# Patient Record
Sex: FEMALE | Race: WHITE | Employment: OTHER | URBAN - METROPOLITAN AREA
[De-identification: names, ages, dates, MRNs, and addresses within clinical notes are randomized per-mention and may not be internally consistent; named-entity substitution may affect disease eponyms.]

---

## 2017-10-16 ENCOUNTER — HOSPITAL ENCOUNTER (OUTPATIENT)
Dept: MRI IMAGING | Age: 72
Discharge: HOME | End: 2017-10-16
Attending: ORTHOPAEDIC SURGERY
Payer: MEDICARE

## 2017-10-16 DIAGNOSIS — M25.552 LEFT HIP PAIN: ICD-10-CM

## 2017-10-16 PROCEDURE — 73721 MRI JNT OF LWR EXTRE W/O DYE: CPT

## 2017-10-26 ENCOUNTER — HOSPITAL ENCOUNTER (OUTPATIENT)
Dept: BONE DENSITY | Age: 72
Discharge: HOME | End: 2017-10-26
Attending: FAMILY MEDICINE
Payer: MEDICARE

## 2017-10-26 DIAGNOSIS — M89.9 DISORDER OF BONE: ICD-10-CM

## 2017-10-26 PROCEDURE — 77080 DXA BONE DENSITY AXIAL: CPT

## 2018-03-23 ENCOUNTER — HOSPITAL ENCOUNTER (OUTPATIENT)
Dept: MAMMOGRAPHY | Age: 73
Discharge: HOME | End: 2018-03-23
Attending: FAMILY MEDICINE
Payer: MEDICARE

## 2018-03-23 DIAGNOSIS — Z12.39 BREAST SCREENING: ICD-10-CM

## 2018-03-23 PROCEDURE — 77067 SCR MAMMO BI INCL CAD: CPT

## 2018-09-07 ENCOUNTER — HOSPITAL ENCOUNTER (OUTPATIENT)
Dept: MRI IMAGING | Age: 73
Discharge: HOME | End: 2018-09-07
Attending: PSYCHIATRY & NEUROLOGY
Payer: MEDICARE

## 2018-09-07 VITALS — WEIGHT: 171 LBS | BODY MASS INDEX: 31.28 KG/M2

## 2018-09-07 DIAGNOSIS — R55 SYNCOPE: ICD-10-CM

## 2018-09-07 PROCEDURE — 70553 MRI BRAIN STEM W/O & W/DYE: CPT

## 2018-09-07 PROCEDURE — 74011250636 HC RX REV CODE- 250/636: Performed by: RADIOLOGY

## 2018-09-07 PROCEDURE — A9575 INJ GADOTERATE MEGLUMI 0.1ML: HCPCS | Performed by: RADIOLOGY

## 2018-09-07 RX ORDER — GADOTERATE MEGLUMINE 376.9 MG/ML
15 INJECTION INTRAVENOUS
Status: COMPLETED | OUTPATIENT
Start: 2018-09-07 | End: 2018-09-07

## 2018-09-07 RX ADMIN — GADOTERATE MEGLUMINE 15 ML: 376.9 INJECTION INTRAVENOUS at 18:01

## 2020-01-09 RX ORDER — LITHIUM CARBONATE 450 MG/1
TABLET ORAL
Qty: 90 TAB | Refills: 1 | Status: ON HOLD | OUTPATIENT
Start: 2020-01-09 | End: 2020-12-15

## 2020-01-09 RX ORDER — LITHIUM CARBONATE 450 MG/1
TABLET ORAL
Qty: 30 TAB | Refills: 1 | Status: SHIPPED | OUTPATIENT
Start: 2020-01-09 | End: 2020-01-09

## 2020-09-12 ENCOUNTER — HOSPITAL ENCOUNTER (EMERGENCY)
Age: 75
Discharge: HOME | End: 2020-09-12
Attending: EMERGENCY MEDICINE
Payer: MEDICARE

## 2020-09-12 VITALS
RESPIRATION RATE: 16 BRPM | DIASTOLIC BLOOD PRESSURE: 72 MMHG | HEART RATE: 75 BPM | TEMPERATURE: 98.1 F | SYSTOLIC BLOOD PRESSURE: 128 MMHG | OXYGEN SATURATION: 96 %
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
## 2020-09-12 PROCEDURE — 99284 EMERGENCY DEPT VISIT MOD MDM: CPT

## 2020-09-12 NOTE — ED NOTES
Pt sleeping, but easily awakened. Unable to contact family or neighbor to meet pt when she gets home. Pt will stay in e.r.. until pt is more awake.

## 2020-09-12 NOTE — ED TRIAGE NOTES
Pt arrived via ems. Ems called by med alert for g.l.f. in bathroom. No obvious injury. Denies pain. Alert to name, place, time, and situation. Nad. No obvious injury.  Pt mad because she does not want to be in hospital.

## 2020-09-12 NOTE — ED PROVIDER NOTES
70-year-old female brought in by EMS for fall at home today. She tripped and fell while in the bathroom and used her life alert to call EMS to help her stand up. When they arrived they brought her to the emergency room for further evaluation. The patient tells me she has no complaints at this time, no pain, and would like to go home. She denies any recent cough or fever. She tells me she had a recent UTI and is taking antibiotics. She took her trazodone last evening, which she uses for sleep. The history is provided by the patient and the EMS personnel. Fall   The accident occurred less than 1 hour ago. The fall occurred while walking. She fell from a height of ground level. She landed on hard floor. There was no blood loss. The patient is experiencing no pain. She was ambulatory at the scene. There was no entrapment after the fall. There was no drug use involved in the accident. There was no alcohol use involved in the accident. Pertinent negatives include no visual change, no fever, no numbness, no abdominal pain, no bowel incontinence, no nausea, no vomiting, no hematuria, no headaches, no extremity weakness, no hearing loss, no loss of consciousness, no tingling and no laceration.         Past Medical History:   Diagnosis Date    Anemia     Arthritis     Bipolar disorder ([address]     Burning with urination     Memory change     Psychiatric disorder     Bipolar Disorder    Stool color black     Tremor        Past Surgical History:   Procedure Laterality Date    HX BREAST BIOPSY Left 1990's    benign    HX CHOLECYSTECTOMY      HX GI      HX GYN           Family History:   Problem Relation Age of Onset    Alzheimer Mother        Social History     Socioeconomic History    Marital status: SINGLE     Spouse name: Not on file    Number of children: Not on file    Years of education: Not on file    Highest education level: Not on file   Occupational History    Not on file   Social Needs    Financial resource strain: Not on file    Food insecurity     Worry: Not on file     Inability: Not on file    Transportation needs     Medical: Not on file     Non-medical: Not on file   Tobacco Use    Smoking status: Former Smoker     Packs/day: 0.25     Years: 10.00     Pack years: 2.50     Last attempt to quit: 1997     Years since quittin.1    Smokeless tobacco: Never Used   Substance and Sexual Activity    Alcohol use: Yes     Alcohol/week: 2.5 standard drinks     Types: 3 Glasses of wine per week    Drug use: No    Sexual activity: Yes   Lifestyle    Physical activity     Days per week: Not on file     Minutes per session: Not on file    Stress: Not on file   Relationships    Social connections     Talks on phone: Not on file     Gets together: Not on file     Attends Restorationism service: Not on file     Active member of club or organization: Not on file     Attends meetings of clubs or organizations: Not on file     Relationship status: Not on file    Intimate partner violence     Fear of current or ex partner: Not on file     Emotionally abused: Not on file     Physically abused: Not on file     Forced sexual activity: Not on file   Other Topics Concern    Not on file   Social History Narrative    Not on file         ALLERGIES: Lamisil [terbinafine] and Thorazine [chlorpromazine]    Review of Systems   Constitutional: Negative for fatigue and fever. HENT: Negative for sneezing and sore throat. Respiratory: Negative for cough and shortness of breath. Cardiovascular: Negative for chest pain and leg swelling. Gastrointestinal: Negative for abdominal pain, bowel incontinence, diarrhea, nausea and vomiting. Genitourinary: Negative for difficulty urinating, dysuria and hematuria. Musculoskeletal: Negative for arthralgias, extremity weakness and myalgias. Skin: Negative for color change and rash.    Neurological: Negative for tingling, loss of consciousness, weakness, numbness and headaches. Psychiatric/Behavioral: Negative for agitation and behavioral problems. Vitals:    09/12/20 0049   BP: 108/62   Pulse: 72   Resp: 16   Temp: 98.4 °F (36.9 °C)   SpO2: 93%            Physical Exam  Vitals signs and nursing note reviewed. Constitutional:       General: She is not in acute distress. Appearance: Normal appearance. HENT:      Head: Normocephalic and atraumatic. Nose: Nose normal.      Mouth/Throat:      Mouth: Mucous membranes are moist.      Pharynx: Oropharynx is clear. Eyes:      Extraocular Movements: Extraocular movements intact. Pupils: Pupils are equal, round, and reactive to light. Neck:      Musculoskeletal: Normal range of motion and neck supple. Cardiovascular:      Rate and Rhythm: Normal rate and regular rhythm. Pulses: Normal pulses. Heart sounds: Normal heart sounds. Pulmonary:      Effort: Pulmonary effort is normal.      Breath sounds: Normal breath sounds. Abdominal:      Palpations: Abdomen is soft. Tenderness: There is no abdominal tenderness. Musculoskeletal: Normal range of motion. General: No tenderness or signs of injury. Skin:     General: Skin is warm and dry. Capillary Refill: Capillary refill takes less than 2 seconds. Findings: No laceration. Neurological:      General: No focal deficit present. Mental Status: She is alert and oriented to person, place, and time. Psychiatric:         Mood and Affect: Mood normal.         Behavior: Behavior normal.          MDM  Number of Diagnoses or Management Options  Diagnosis management comments: 58-year-old female brought by EMS as above. She is alert and oriented, although somewhat sleepy (likely due to trazodone last evening). She wishes to be discharged without any further work-up. Will discharge as requested. Procedures             0700: Patient ambulatory and clinically sober, appropriate for discharge home.

## 2020-09-12 NOTE — ED NOTES
Pt refusing [facility] transport and claims her daughter or son adriana will be waking up soon and will come get her. Transport with [facility] canceled. Pt a+o x's 4.

## 2020-09-12 NOTE — ED NOTES
The patient was discharged home by Dr Nicole Velazquez in stable condition. The patient is alert and oriented, in no respiratory distress and discharge vital signs obtained. The patient's diagnosis, condition and treatment were explained. The patient expressed understanding. A discharge plan has been developed. Aftercare instructions were given. Pt discharged from the ED via w/c by this RN with family. Daughter Kristen Giordano came to pick her up, DC instructions went over with her as well.

## 2020-11-29 ENCOUNTER — APPOINTMENT (OUTPATIENT)
Dept: GENERAL RADIOLOGY | Age: 75
DRG: 885 | End: 2020-11-29
Attending: HOSPITALIST
Payer: MEDICARE

## 2020-11-29 ENCOUNTER — APPOINTMENT (OUTPATIENT)
Dept: CT IMAGING | Age: 75
DRG: 885 | End: 2020-11-29
Attending: EMERGENCY MEDICINE
Payer: MEDICARE

## 2020-11-29 ENCOUNTER — HOSPITAL ENCOUNTER (INPATIENT)
Age: 75
LOS: 15 days | Discharge: PSYCHIATRIC OR SUBSTANCE USE FACILITY | DRG: 885 | End: 2020-12-14
Attending: EMERGENCY MEDICINE | Admitting: HOSPITALIST
Payer: MEDICARE

## 2020-11-29 DIAGNOSIS — R31.9 URINARY TRACT INFECTION WITH HEMATURIA, SITE UNSPECIFIED: Primary | ICD-10-CM

## 2020-11-29 DIAGNOSIS — G93.40 ACUTE ENCEPHALOPATHY: ICD-10-CM

## 2020-11-29 DIAGNOSIS — N39.0 URINARY TRACT INFECTION WITH HEMATURIA, SITE UNSPECIFIED: Primary | ICD-10-CM

## 2020-11-29 PROBLEM — R41.82 AMS (ALTERED MENTAL STATUS): Status: ACTIVE | Noted: 2020-11-29

## 2020-11-29 LAB
ALBUMIN SERPL-MCNC: 2.3 G/DL (ref 3.5–5)
ALBUMIN/GLOB SERPL: 0.6 {RATIO} (ref 1.1–2.2)
ALP SERPL-CCNC: 148 U/L (ref 45–117)
ALT SERPL-CCNC: 56 U/L (ref 12–78)
ANION GAP SERPL CALC-SCNC: 9 MMOL/L (ref 5–15)
APPEARANCE UR: ABNORMAL
AST SERPL-CCNC: 26 U/L (ref 15–37)
BACTERIA URNS QL MICRO: ABNORMAL /HPF
BASOPHILS # BLD: 0 K/UL (ref 0–0.1)
BASOPHILS NFR BLD: 0 % (ref 0–1)
BILIRUB SERPL-MCNC: 0.4 MG/DL (ref 0.2–1)
BILIRUB UR QL: NEGATIVE
BUN SERPL-MCNC: 17 MG/DL (ref 6–20)
BUN/CREAT SERPL: 21 (ref 12–20)
CALCIUM SERPL-MCNC: 9.8 MG/DL (ref 8.5–10.1)
CHLORIDE SERPL-SCNC: 104 MMOL/L (ref 97–108)
CO2 SERPL-SCNC: 27 MMOL/L (ref 21–32)
COLOR UR: ABNORMAL
COMMENT, HOLDF: NORMAL
CREAT SERPL-MCNC: 0.81 MG/DL (ref 0.55–1.02)
DIFFERENTIAL METHOD BLD: ABNORMAL
EOSINOPHIL # BLD: 0.3 K/UL (ref 0–0.4)
EOSINOPHIL NFR BLD: 3 % (ref 0–7)
EPITH CASTS URNS QL MICRO: ABNORMAL /LPF
ERYTHROCYTE [DISTWIDTH] IN BLOOD BY AUTOMATED COUNT: 12.9 % (ref 11.5–14.5)
GLOBULIN SER CALC-MCNC: 3.9 G/DL (ref 2–4)
GLUCOSE SERPL-MCNC: 83 MG/DL (ref 65–100)
GLUCOSE UR STRIP.AUTO-MCNC: NEGATIVE MG/DL
HCT VFR BLD AUTO: 31.8 % (ref 35–47)
HGB BLD-MCNC: 10.1 G/DL (ref 11.5–16)
HGB UR QL STRIP: ABNORMAL
IMM GRANULOCYTES # BLD AUTO: 0.2 K/UL (ref 0–0.04)
IMM GRANULOCYTES NFR BLD AUTO: 2 % (ref 0–0.5)
KETONES UR QL STRIP.AUTO: NEGATIVE MG/DL
LACTATE SERPL-SCNC: 0.9 MMOL/L (ref 0.4–2)
LEUKOCYTE ESTERASE UR QL STRIP.AUTO: ABNORMAL
LITHIUM SERPL-SCNC: 0.57 MMOL/L (ref 0.6–1.2)
LYMPHOCYTES # BLD: 1.8 K/UL (ref 0.8–3.5)
LYMPHOCYTES NFR BLD: 16 % (ref 12–49)
MCH RBC QN AUTO: 32.6 PG (ref 26–34)
MCHC RBC AUTO-ENTMCNC: 31.8 G/DL (ref 30–36.5)
MCV RBC AUTO: 102.6 FL (ref 80–99)
MONOCYTES # BLD: 0.7 K/UL (ref 0–1)
MONOCYTES NFR BLD: 6 % (ref 5–13)
NEUTS SEG # BLD: 7.9 K/UL (ref 1.8–8)
NEUTS SEG NFR BLD: 73 % (ref 32–75)
NITRITE UR QL STRIP.AUTO: POSITIVE
NRBC # BLD: 0 K/UL (ref 0–0.01)
NRBC BLD-RTO: 0 PER 100 WBC
PH UR STRIP: 7 [PH] (ref 5–8)
PLATELET # BLD AUTO: 400 K/UL (ref 150–400)
PMV BLD AUTO: 9.1 FL (ref 8.9–12.9)
POTASSIUM SERPL-SCNC: 3.6 MMOL/L (ref 3.5–5.1)
PROT SERPL-MCNC: 6.2 G/DL (ref 6.4–8.2)
PROT UR STRIP-MCNC: 30 MG/DL
RBC # BLD AUTO: 3.1 M/UL (ref 3.8–5.2)
RBC #/AREA URNS HPF: >100 /HPF (ref 0–5)
SAMPLES BEING HELD,HOLD: NORMAL
SODIUM SERPL-SCNC: 140 MMOL/L (ref 136–145)
SP GR UR REFRACTOMETRY: 1.01 (ref 1–1.03)
TROPONIN I SERPL-MCNC: <0.05 NG/ML
TSH SERPL DL<=0.05 MIU/L-ACNC: 1.86 UIU/ML (ref 0.36–3.74)
UR CULT HOLD, URHOLD: NORMAL
UROBILINOGEN UR QL STRIP.AUTO: 0.2 EU/DL (ref 0.2–1)
WBC # BLD AUTO: 10.8 K/UL (ref 3.6–11)
WBC URNS QL MICRO: ABNORMAL /HPF (ref 0–4)

## 2020-11-29 PROCEDURE — 74011000250 HC RX REV CODE- 250: Performed by: HOSPITALIST

## 2020-11-29 PROCEDURE — 36415 COLL VENOUS BLD VENIPUNCTURE: CPT

## 2020-11-29 PROCEDURE — 80307 DRUG TEST PRSMV CHEM ANLYZR: CPT

## 2020-11-29 PROCEDURE — 81001 URINALYSIS AUTO W/SCOPE: CPT

## 2020-11-29 PROCEDURE — 71045 X-RAY EXAM CHEST 1 VIEW: CPT

## 2020-11-29 PROCEDURE — 84443 ASSAY THYROID STIM HORMONE: CPT

## 2020-11-29 PROCEDURE — 84484 ASSAY OF TROPONIN QUANT: CPT

## 2020-11-29 PROCEDURE — 74011250636 HC RX REV CODE- 250/636: Performed by: HOSPITALIST

## 2020-11-29 PROCEDURE — 83605 ASSAY OF LACTIC ACID: CPT

## 2020-11-29 PROCEDURE — 96374 THER/PROPH/DIAG INJ IV PUSH: CPT

## 2020-11-29 PROCEDURE — 74011250636 HC RX REV CODE- 250/636: Performed by: EMERGENCY MEDICINE

## 2020-11-29 PROCEDURE — 74011250636 HC RX REV CODE- 250/636

## 2020-11-29 PROCEDURE — 87040 BLOOD CULTURE FOR BACTERIA: CPT

## 2020-11-29 PROCEDURE — 85025 COMPLETE CBC W/AUTO DIFF WBC: CPT

## 2020-11-29 PROCEDURE — 74011000258 HC RX REV CODE- 258: Performed by: EMERGENCY MEDICINE

## 2020-11-29 PROCEDURE — 99284 EMERGENCY DEPT VISIT MOD MDM: CPT

## 2020-11-29 PROCEDURE — 80053 COMPREHEN METABOLIC PANEL: CPT

## 2020-11-29 PROCEDURE — 80178 ASSAY OF LITHIUM: CPT

## 2020-11-29 PROCEDURE — 70450 CT HEAD/BRAIN W/O DYE: CPT

## 2020-11-29 PROCEDURE — 65270000029 HC RM PRIVATE

## 2020-11-29 RX ORDER — SODIUM CHLORIDE 0.9 % (FLUSH) 0.9 %
5-40 SYRINGE (ML) INJECTION AS NEEDED
Status: DISCONTINUED | OUTPATIENT
Start: 2020-11-29 | End: 2020-12-14 | Stop reason: HOSPADM

## 2020-11-29 RX ORDER — CALCIUM CARBONATE 200(500)MG
200 TABLET,CHEWABLE ORAL
Status: DISCONTINUED | OUTPATIENT
Start: 2020-11-29 | End: 2020-12-14 | Stop reason: HOSPADM

## 2020-11-29 RX ORDER — SERTRALINE HYDROCHLORIDE 50 MG/1
100 TABLET, FILM COATED ORAL DAILY
Status: DISCONTINUED | OUTPATIENT
Start: 2020-11-30 | End: 2020-11-29

## 2020-11-29 RX ORDER — ACETAMINOPHEN 325 MG/1
650 TABLET ORAL
Status: DISCONTINUED | OUTPATIENT
Start: 2020-11-29 | End: 2020-12-14 | Stop reason: HOSPADM

## 2020-11-29 RX ORDER — TRAZODONE HYDROCHLORIDE 50 MG/1
50 TABLET ORAL
Status: DISCONTINUED | OUTPATIENT
Start: 2020-11-29 | End: 2020-12-14 | Stop reason: HOSPADM

## 2020-11-29 RX ORDER — ENOXAPARIN SODIUM 100 MG/ML
40 INJECTION SUBCUTANEOUS DAILY
Status: DISCONTINUED | OUTPATIENT
Start: 2020-11-30 | End: 2020-12-14 | Stop reason: HOSPADM

## 2020-11-29 RX ORDER — LORAZEPAM 2 MG/ML
2 INJECTION INTRAMUSCULAR
Status: DISCONTINUED | OUTPATIENT
Start: 2020-11-29 | End: 2020-11-30

## 2020-11-29 RX ORDER — HALOPERIDOL 5 MG/ML
2 INJECTION INTRAMUSCULAR
Status: DISCONTINUED | OUTPATIENT
Start: 2020-11-29 | End: 2020-12-14 | Stop reason: HOSPADM

## 2020-11-29 RX ORDER — MELATONIN
1000 DAILY
Status: DISCONTINUED | OUTPATIENT
Start: 2020-11-30 | End: 2020-12-14 | Stop reason: HOSPADM

## 2020-11-29 RX ORDER — PROMETHAZINE HYDROCHLORIDE 25 MG/1
12.5 TABLET ORAL
Status: DISCONTINUED | OUTPATIENT
Start: 2020-11-29 | End: 2020-12-14 | Stop reason: HOSPADM

## 2020-11-29 RX ORDER — PANTOPRAZOLE SODIUM 40 MG/1
40 TABLET, DELAYED RELEASE ORAL
Status: DISCONTINUED | OUTPATIENT
Start: 2020-11-30 | End: 2020-12-14 | Stop reason: HOSPADM

## 2020-11-29 RX ORDER — POLYETHYLENE GLYCOL 3350 17 G/17G
17 POWDER, FOR SOLUTION ORAL DAILY PRN
Status: DISCONTINUED | OUTPATIENT
Start: 2020-11-29 | End: 2020-12-14 | Stop reason: HOSPADM

## 2020-11-29 RX ORDER — ONDANSETRON 2 MG/ML
4 INJECTION INTRAMUSCULAR; INTRAVENOUS
Status: DISCONTINUED | OUTPATIENT
Start: 2020-11-29 | End: 2020-12-14 | Stop reason: HOSPADM

## 2020-11-29 RX ORDER — GUAIFENESIN 100 MG/5ML
81 LIQUID (ML) ORAL DAILY
Status: DISCONTINUED | OUTPATIENT
Start: 2020-11-30 | End: 2020-12-14 | Stop reason: HOSPADM

## 2020-11-29 RX ORDER — ACETAMINOPHEN 650 MG/1
650 SUPPOSITORY RECTAL
Status: DISCONTINUED | OUTPATIENT
Start: 2020-11-29 | End: 2020-12-14 | Stop reason: HOSPADM

## 2020-11-29 RX ORDER — LITHIUM CARBONATE 450 MG/1
450 TABLET ORAL DAILY
Status: DISCONTINUED | OUTPATIENT
Start: 2020-11-30 | End: 2020-12-02

## 2020-11-29 RX ORDER — PRIMIDONE 50 MG/1
5 TABLET ORAL DAILY
Status: DISCONTINUED | OUTPATIENT
Start: 2020-11-30 | End: 2020-11-29

## 2020-11-29 RX ORDER — SODIUM CHLORIDE 0.9 % (FLUSH) 0.9 %
5-40 SYRINGE (ML) INJECTION EVERY 8 HOURS
Status: DISCONTINUED | OUTPATIENT
Start: 2020-11-29 | End: 2020-12-12

## 2020-11-29 RX ORDER — HALOPERIDOL 5 MG/ML
INJECTION INTRAMUSCULAR
Status: COMPLETED
Start: 2020-11-29 | End: 2020-11-29

## 2020-11-29 RX ADMIN — HALOPERIDOL LACTATE 2 MG: 5 INJECTION, SOLUTION INTRAMUSCULAR at 19:06

## 2020-11-29 RX ADMIN — FOLIC ACID: 5 INJECTION, SOLUTION INTRAMUSCULAR; INTRAVENOUS; SUBCUTANEOUS at 21:13

## 2020-11-29 RX ADMIN — Medication 10 ML: at 21:48

## 2020-11-29 RX ADMIN — LORAZEPAM 2 MG: 2 INJECTION INTRAMUSCULAR; INTRAVENOUS at 18:52

## 2020-11-29 RX ADMIN — CEFTRIAXONE SODIUM 1 G: 1 INJECTION, POWDER, FOR SOLUTION INTRAMUSCULAR; INTRAVENOUS at 13:55

## 2020-11-29 NOTE — ED NOTES
TRANSFER - OUT REPORT:    Verbal report given to Katie Payne RN(name) on [address]  being transferred to ER(unit) for routine progression of care       Report consisted of patients Situation, Background, Assessment and   Recommendations(SBAR). Information from the following report(s) SBAR was reviewed with the receiving nurse. Lines:   Peripheral IV 11/29/20 Left Antecubital (Active)   Site Assessment Clean, dry, & intact 11/29/20 1328   Phlebitis Assessment 0 11/29/20 1328   Infiltration Assessment 0 11/29/20 1328   Dressing Status Clean, dry, & intact 11/29/20 1328   Dressing Type Transparent 11/29/20 1328        Opportunity for questions and clarification was provided.       Patient transported with:   Monitor

## 2020-11-29 NOTE — ED NOTES
Spoke to patients daughter on the phone Matti Avila [phone], lives out of town. States pt doesn't share her medical information with family and does not have a POA or advanced directive in place.

## 2020-11-29 NOTE — H&P
History & Physical    Primary Care Provider: Marino Ramirez MD  Source of Information: Patient's daughter      History of Presenting Illness:   Tk Jhon is a 67 y.o. female who presents with AMS      Pt is confused, and had some residue expressive aphasia from previous stroke in may, unable to get good history. 67 white female history of bipolar, stroke, chronic tremor and anemia. [facility] due to worsening mental status disorientation and the delusion. Daughter noticed the patient was walking around her house covered in stool, stooling on herself and more confused. She was the last known normal on Thanksgiving day but has not had a significant changes since then. Daughter said patient was refusing all help from her, living alone, managed her medication by herself. Per record she was taking lithium for many years. And the patient also said she is taking lithium. I called her pharmacy at [facility], per records her last refill was in May with 90 days of supply. She is having delusion and said someone else dumped the stool in her house. She had some expressive aphasia since her stroke this May. Low grade fever noticed in ER. Review of Systems:  UA due to her condition     Past Medical History:   Diagnosis Date    Anemia     Arthritis     Bipolar disorder ([address]     Burning with urination     Memory change     Psychiatric disorder     Bipolar Disorder    Stool color black     Tremor       Past Surgical History:   Procedure Laterality Date    HX BREAST BIOPSY Left 1990's    benign    HX CHOLECYSTECTOMY      HX GI      HX GYN       Prior to Admission medications    Medication Sig Start Date End Date Taking? Authorizing Provider   lithium carbonate CR (ESKALITH CR) 450 mg CR tablet TAKE 1 TABLET BY MOUTH EVERY DAY 1/9/20   Jing Pope MD   primidone (MYSOLINE) 50 mg tablet Take 5 mg by mouth daily. Provider, Historical   hydrocodone-acetaminophen (NORCO) 5-325 mg per tablet Take  by mouth. Provider, Historical   vilazodone (VIIBRYD) 10 mg tab tablet Take 10 mg by mouth daily. Provider, Historical   estradiol (ESTRACE) 1 mg tablet Take 1 mg by mouth daily. Provider, Historical   multivitamin (ONE A DAY) tablet Take 1 Tab by mouth daily. Provider, Historical   calcium carbonate (TUMS) 200 mg calcium (500 mg) chew Take 1 Tab by mouth daily. Provider, Historical   Cholecalciferol, Vitamin D3, (VITAMIN D3) 1,000 unit cap Take  by mouth. Provider, Historical   zolpidem (AMBIEN) 5 mg tablet Take  by mouth nightly as needed for Sleep. Provider, Historical   sertraline (ZOLOFT) 100 mg tablet Take  by mouth daily. Provider, Historical   loratadine 10 mg cap Take  by mouth. Provider, Historical   omega-3 fatty acids-vitamin e (FISH OIL) 1,000 mg cap Take 1 Cap by mouth. Provider, Historical   omeprazole (PRILOSEC) 20 mg capsule Take 20 mg by mouth daily. Provider, Historical   traMADol (ULTRAM) 50 mg tablet Take 50 mg by mouth every six (6) hours as needed for Pain. Provider, Historical   ibuprofen (MOTRIN) 200 mg tablet Take  by mouth.     Provider, Historical     Allergies   Allergen Reactions    Lamisil [Terbinafine] Diarrhea and Nausea and Vomiting    Thorazine [Chlorpromazine] Rash      Family History   Problem Relation Age of Onset    Alzheimer Mother         SOCIAL HISTORY:  Patient resides:  Independently x   Assisted Living    SNF    With family care       Smoking history:   None x   Former    Chronic      Alcohol history:   None x   Social    Chronic      Ambulates:   Independently x   w/cane    w/walker    w/wc    CODE STATUS:  DNR    Full x   Other      Objective:     Physical Exam:     Visit Vitals  /62 (BP 1 Location: Right arm, BP Patient Position: At rest)   Pulse 74   Temp 98.8 °F (37.1 °C)   Resp 20   Ht 5' 2\" (1.575 m)   Wt 75.4 kg (166 lb 3.6 oz)   SpO2 96% BMI 30.40 kg/m²      O2 Device: Room air    General:  Alert, very confused, partially cooperative. Answer some simple quesitons. Not remember daughters name. Some expressive aphasia    Head:  Normocephalic, without obvious abnormality, atraumatic. Eyes:  Conjunctivae/corneas clear. PERRL, EOMs intact. Nose: Nares normal. Septum midline. Mucosa normal. No drainage or sinus tenderness. Throat: Lips, mucosa, and tongue normal. Teeth and gums normal.   Neck: Supple, symmetrical, trachea midline, no adenopathy, thyroid: no enlargement/tenderness/nodules, no carotid bruit and no JVD. Back:   Symmetric, no curvature. ROM normal. No CVA tenderness. Lungs:   Clear to auscultation bilaterally. Chest wall:  No tenderness or deformity. Heart:  Regular rate and rhythm, S1, S2 normal, no murmur, click, rub or gallop. Abdomen:   Soft, non-tender. Bowel sounds normal. No masses,  No organomegaly. Extremities: Extremities normal, atraumatic, no cyanosis or edema. Tremors noticed    Pulses: 2+ and symmetric all extremities. Skin: Skin color, texture, turgor normal. No rashes or lesions   Neurologic: CNII-XII intact. No focal weakness            Data Review:     Recent Days:  Recent Labs     11/29/20  1321   WBC 10.8   HGB 10.1*   HCT 31.8*        Recent Labs     11/29/20  1321      K 3.6      CO2 27   GLU 83   BUN 17   CREA 0.81   CA 9.8   ALB 2.3*   ALT 56     No results for input(s): PH, PCO2, PO2, HCO3, FIO2 in the last 72 hours.     24 Hour Results:  Recent Results (from the past 24 hour(s))   CBC WITH AUTOMATED DIFF    Collection Time: 11/29/20  1:21 PM   Result Value Ref Range    WBC 10.8 3.6 - 11.0 K/uL    RBC 3.10 (L) 3.80 - 5.20 M/uL    HGB 10.1 (L) 11.5 - 16.0 g/dL    HCT 31.8 (L) 35.0 - 47.0 %    .6 (H) 80.0 - 99.0 FL    MCH 32.6 26.0 - 34.0 PG    MCHC 31.8 30.0 - 36.5 g/dL    RDW 12.9 11.5 - 14.5 %    PLATELET 928 365 - 638 K/uL    MPV 9.1 8.9 - 12.9 FL    NRBC 0.0 0  WBC    ABSOLUTE NRBC 0.00 0.00 - 0.01 K/uL    NEUTROPHILS 73 32 - 75 %    LYMPHOCYTES 16 12 - 49 %    MONOCYTES 6 5 - 13 %    EOSINOPHILS 3 0 - 7 %    BASOPHILS 0 0 - 1 %    IMMATURE GRANULOCYTES 2 (H) 0.0 - 0.5 %    ABS. NEUTROPHILS 7.9 1.8 - 8.0 K/UL    ABS. LYMPHOCYTES 1.8 0.8 - 3.5 K/UL    ABS. MONOCYTES 0.7 0.0 - 1.0 K/UL    ABS. EOSINOPHILS 0.3 0.0 - 0.4 K/UL    ABS. BASOPHILS 0.0 0.0 - 0.1 K/UL    ABS. IMM. GRANS. 0.2 (H) 0.00 - 0.04 K/UL    DF AUTOMATED     METABOLIC PANEL, COMPREHENSIVE    Collection Time: 11/29/20  1:21 PM   Result Value Ref Range    Sodium 140 136 - 145 mmol/L    Potassium 3.6 3.5 - 5.1 mmol/L    Chloride 104 97 - 108 mmol/L    CO2 27 21 - 32 mmol/L    Anion gap 9 5 - 15 mmol/L    Glucose 83 65 - 100 mg/dL    BUN 17 6 - 20 MG/DL    Creatinine 0.81 0.55 - 1.02 MG/DL    BUN/Creatinine ratio 21 (H) 12 - 20      GFR est AA >60 >60 ml/min/1.73m2    GFR est non-AA >60 >60 ml/min/1.73m2    Calcium 9.8 8.5 - 10.1 MG/DL    Bilirubin, total 0.4 0.2 - 1.0 MG/DL    ALT (SGPT) 56 12 - 78 U/L    AST (SGOT) 26 15 - 37 U/L    Alk.  phosphatase 148 (H) 45 - 117 U/L    Protein, total 6.2 (L) 6.4 - 8.2 g/dL    Albumin 2.3 (L) 3.5 - 5.0 g/dL    Globulin 3.9 2.0 - 4.0 g/dL    A-G Ratio 0.6 (L) 1.1 - 2.2     TROPONIN I    Collection Time: 11/29/20  1:21 PM   Result Value Ref Range    Troponin-I, Qt. <0.05 <0.05 ng/mL   URINALYSIS W/MICROSCOPIC    Collection Time: 11/29/20  1:21 PM   Result Value Ref Range    Color YELLOW/STRAW      Appearance CLOUDY (A) CLEAR      Specific gravity 1.015 1.003 - 1.030      pH (UA) 7.0 5.0 - 8.0      Protein 30 (A) NEG mg/dL    Glucose Negative NEG mg/dL    Ketone Negative NEG mg/dL    Bilirubin Negative NEG      Blood LARGE (A) NEG      Urobilinogen 0.2 0.2 - 1.0 EU/dL    Nitrites Positive (A) NEG      Leukocyte Esterase SMALL (A) NEG      WBC 20-50 0 - 4 /hpf    RBC >100 (H) 0 - 5 /hpf    Epithelial cells MODERATE (A) FEW /lpf    Bacteria 4+ (A) NEG /hpf   URINE CULTURE HOLD SAMPLE    Collection Time: 11/29/20  1:21 PM    Specimen: Serum; Urine   Result Value Ref Range    Urine culture hold        Urine on hold in Microbiology dept for 2 days. If unpreserved urine is submitted, it cannot be used for addtional testing after 24 hours, recollection will be required. LACTIC ACID    Collection Time: 11/29/20  1:21 PM   Result Value Ref Range    Lactic acid 0.9 0.4 - 2.0 MMOL/L   TSH 3RD GENERATION    Collection Time: 11/29/20  1:21 PM   Result Value Ref Range    TSH 1.86 0.36 - 3.74 uIU/mL   SAMPLES BEING HELD    Collection Time: 11/29/20  1:21 PM   Result Value Ref Range    SAMPLES BEING HELD 1 RED TOP     COMMENT        Add-on orders for these samples will be processed based on acceptable specimen integrity and analyte stability, which may vary by analyte. Imaging:   Ct Head Wo Cont    Result Date: 11/29/2020  IMPRESSION: No acute intracranial process. Small area of chronic encephalomalacia in the left frontal lobe. Imaging findings consistent with moderate chronic microvascular ischemic change. There is a mild degree of cerebral atrophy. Xr Chest Port    Result Date: 11/29/2020  IMPRESSION: No acute findings. Assessment:     Active Problems:    AMS (altered mental status) (11/29/2020)           Plan:     1. AMS/Acute delirium: possible multifactorial, likely pt is not taking lithium as she should. Should run out in Aug, but no refill since then. Worse by the acute metabolic encephalopathy related to UTI, no acute CVA in head CT, but may need brain MRI if no improvement. check lithium level, continue asa, restart lithium, iv abx and re-assess mental status. Psych consult   2. UTI: iv rocephin   3. Anemia:  Chronic likely, check iron study  4. Possible alcohol abuse: per history pt was drinking 3 times /week. Daughter is not sure if she is still drinking.  Will on [facility] protocal          Signed By: Rory Reina MD     November 29, 2020

## 2020-11-29 NOTE — ED NOTES
Report given to Mayo Guzman RN, pt awaiting for [facility] transport to [facility].

## 2020-11-29 NOTE — ED TRIAGE NOTES
Per EMS, pt. Daughter called 911 b/c pt. Has had BM 2 in 2 days on herself. Pt. Lives  By herself and daughter has video that she can watch. Pt. Has previous hx. Of stroke. Pt. Daughter is eating old food. She is baseline in speech post stroke in May. Per EMS  Daughter states she has been unwilling to receive help.

## 2020-11-29 NOTE — ED NOTES
Unable to complete EKG due to patients tremors in both hands and pt unwillingly to hold still.  Dr Annel Andrade aware

## 2020-11-29 NOTE — ED PROVIDER NOTES
The history is provided by the EMS personnel and a relative. Altered mental status    This is a new problem. Episode onset: last seen 3 days ago. The problem has been rapidly worsening. Associated symptoms include confusion, agitation and delusions. Associated symptoms comments: stooling on herself and in her house, unable to care for herself. Was asymptomatic per family 3 days ago. .        Past Medical History:   Diagnosis Date    Anemia     Arthritis     Bipolar disorder ([address]     Burning with urination     Memory change     Psychiatric disorder     Bipolar Disorder    Stool color black     Tremor        Past Surgical History:   Procedure Laterality Date    HX BREAST BIOPSY Left     benign    HX CHOLECYSTECTOMY      HX GI      HX GYN           Family History:   Problem Relation Age of Onset    Alzheimer Mother        Social History     Socioeconomic History    Marital status: SINGLE     Spouse name: Not on file    Number of children: Not on file    Years of education: Not on file    Highest education level: Not on file   Occupational History    Not on file   Social Needs    Financial resource strain: Not on file    Food insecurity     Worry: Not on file     Inability: Not on file    Transportation needs     Medical: Not on file     Non-medical: Not on file   Tobacco Use    Smoking status: Former Smoker     Packs/day: 0.25     Years: 10.00     Pack years: 2.50     Last attempt to quit: 1997     Years since quittin.4    Smokeless tobacco: Never Used   Substance and Sexual Activity    Alcohol use:  Yes     Alcohol/week: 2.5 standard drinks     Types: 3 Glasses of wine per week    Drug use: No    Sexual activity: Yes   Lifestyle    Physical activity     Days per week: Not on file     Minutes per session: Not on file    Stress: Not on file   Relationships    Social connections     Talks on phone: Not on file     Gets together: Not on file     Attends Confucianist service: Not on file     Active member of club or organization: Not on file     Attends meetings of clubs or organizations: Not on file     Relationship status: Not on file    Intimate partner violence     Fear of current or ex partner: Not on file     Emotionally abused: Not on file     Physically abused: Not on file     Forced sexual activity: Not on file   Other Topics Concern    Not on file   Social History Narrative    Not on file         ALLERGIES: Lamisil [terbinafine] and Thorazine [chlorpromazine]    Review of Systems   Unable to perform ROS: Mental status change   Psychiatric/Behavioral: Positive for agitation and confusion. Vitals:    11/29/20 1255 11/29/20 1320   BP: 138/64    Pulse: 68    Resp: 16    Temp: 99.4 °F (37.4 °C) 100.3 °F (37.9 °C)   SpO2: 97%    Weight: 75.4 kg (166 lb 3.6 oz)    Height: 5' 2\" (1.575 m)             Physical Exam  Vitals signs and nursing note reviewed. Constitutional:       General: She is not in acute distress. Appearance: She is well-developed. Comments: Discheveled, covered in stool   HENT:      Head: Normocephalic and atraumatic. Eyes:      Conjunctiva/sclera: Conjunctivae normal.   Neck:      Musculoskeletal: Neck supple. Cardiovascular:      Rate and Rhythm: Normal rate and regular rhythm. Heart sounds: Normal heart sounds. Pulmonary:      Effort: Pulmonary effort is normal. No respiratory distress. Breath sounds: Normal breath sounds. Abdominal:      General: There is no distension. Palpations: Abdomen is soft. Tenderness: There is no abdominal tenderness. Musculoskeletal: Normal range of motion. General: No deformity. Skin:     General: Skin is warm and dry. Neurological:      Mental Status: She is alert. She is disoriented. Cranial Nerves: No cranial nerve deficit. Psychiatric:         Attention and Perception: She is inattentive. Behavior: Behavior is agitated and aggressive.          Cognition and Memory: Cognition is impaired. Memory is impaired. MDM     69-year-old female presents with worsening altered mental status, disorientation, and walking around her house covered in stool, stooling on herself and being unable to take care of herself. She is refuse help from failure numbers. She was last known normal on Thanksgiving day but has had a significant change since then. She has a borderline fever, no other sirs criteria and does have evidence of a urinary tract infection which could be driving encephalopathy. She also has a history of bipolar disorder and stroke which could be contributing. No focal deficits. Procedures    Perfect Serve Consult for Admission  1:44 PM    ED Room Number: SER09/09  Patient Name and age:  Peggy Moore 67 y.o.  female  Working Diagnosis:   1. Urinary tract infection with hematuria, site unspecified    2.  Acute encephalopathy        COVID-19 Suspicion:  no  Sepsis present:  no  Reassessment needed: no  Code Status:  Full Code  Readmission: no  Isolation Requirements:  no  Recommended Level of Care:  telemetry  Department:[place] [facility] - [phone]

## 2020-11-29 NOTE — ED TRIAGE NOTES
Pt comes in from [address] [place] [address] as a transfer for UTI w/ associated AMS.  Pt has expressive aphasia r/t CVA in may

## 2020-11-30 PROBLEM — N39.0 UTI (URINARY TRACT INFECTION): Status: ACTIVE | Noted: 2020-11-30

## 2020-11-30 PROBLEM — G93.41 ACUTE METABOLIC ENCEPHALOPATHY: Status: ACTIVE | Noted: 2020-11-30

## 2020-11-30 LAB
ALBUMIN SERPL-MCNC: 2.4 G/DL (ref 3.5–5)
ALBUMIN/GLOB SERPL: 0.7 {RATIO} (ref 1.1–2.2)
ALP SERPL-CCNC: 148 U/L (ref 45–117)
ALT SERPL-CCNC: 48 U/L (ref 12–78)
ANION GAP SERPL CALC-SCNC: 8 MMOL/L (ref 5–15)
AST SERPL-CCNC: 23 U/L (ref 15–37)
BASOPHILS # BLD: 0.1 K/UL (ref 0–0.1)
BASOPHILS NFR BLD: 1 % (ref 0–1)
BILIRUB SERPL-MCNC: 0.3 MG/DL (ref 0.2–1)
BUN SERPL-MCNC: 11 MG/DL (ref 6–20)
BUN/CREAT SERPL: 17 (ref 12–20)
CALCIUM SERPL-MCNC: 9.2 MG/DL (ref 8.5–10.1)
CHLORIDE SERPL-SCNC: 110 MMOL/L (ref 97–108)
CO2 SERPL-SCNC: 24 MMOL/L (ref 21–32)
CREAT SERPL-MCNC: 0.64 MG/DL (ref 0.55–1.02)
DIFFERENTIAL METHOD BLD: ABNORMAL
EOSINOPHIL # BLD: 0.4 K/UL (ref 0–0.4)
EOSINOPHIL NFR BLD: 4 % (ref 0–7)
ERYTHROCYTE [DISTWIDTH] IN BLOOD BY AUTOMATED COUNT: 12.9 % (ref 11.5–14.5)
ETHANOL SERPL-MCNC: <10 MG/DL
FERRITIN SERPL-MCNC: 91 NG/ML (ref 26–388)
GLOBULIN SER CALC-MCNC: 3.3 G/DL (ref 2–4)
GLUCOSE SERPL-MCNC: 83 MG/DL (ref 65–100)
HCT VFR BLD AUTO: 30.9 % (ref 35–47)
HGB BLD-MCNC: 10 G/DL (ref 11.5–16)
IMM GRANULOCYTES # BLD AUTO: 0.1 K/UL (ref 0–0.04)
IMM GRANULOCYTES NFR BLD AUTO: 1 % (ref 0–0.5)
IRON SATN MFR SERPL: 19 % (ref 20–50)
IRON SERPL-MCNC: 54 UG/DL (ref 35–150)
LYMPHOCYTES # BLD: 2.7 K/UL (ref 0.8–3.5)
LYMPHOCYTES NFR BLD: 27 % (ref 12–49)
MAGNESIUM SERPL-MCNC: 2 MG/DL (ref 1.6–2.4)
MCH RBC QN AUTO: 33.1 PG (ref 26–34)
MCHC RBC AUTO-ENTMCNC: 32.4 G/DL (ref 30–36.5)
MCV RBC AUTO: 102.3 FL (ref 80–99)
MONOCYTES # BLD: 0.9 K/UL (ref 0–1)
MONOCYTES NFR BLD: 9 % (ref 5–13)
NEUTS SEG # BLD: 5.9 K/UL (ref 1.8–8)
NEUTS SEG NFR BLD: 58 % (ref 32–75)
NRBC # BLD: 0 K/UL (ref 0–0.01)
NRBC BLD-RTO: 0 PER 100 WBC
PHOSPHATE SERPL-MCNC: 3 MG/DL (ref 2.6–4.7)
PLATELET # BLD AUTO: 270 K/UL (ref 150–400)
PMV BLD AUTO: 10.6 FL (ref 8.9–12.9)
POTASSIUM SERPL-SCNC: 3.7 MMOL/L (ref 3.5–5.1)
PROT SERPL-MCNC: 5.7 G/DL (ref 6.4–8.2)
RBC # BLD AUTO: 3.02 M/UL (ref 3.8–5.2)
SALICYLATES SERPL-MCNC: 2.4 MG/DL (ref 2.8–20)
SODIUM SERPL-SCNC: 142 MMOL/L (ref 136–145)
TIBC SERPL-MCNC: 288 UG/DL (ref 250–450)
VIT B12 SERPL-MCNC: 763 PG/ML (ref 193–986)
WBC # BLD AUTO: 10.1 K/UL (ref 3.6–11)

## 2020-11-30 PROCEDURE — 82728 ASSAY OF FERRITIN: CPT

## 2020-11-30 PROCEDURE — 74011250637 HC RX REV CODE- 250/637: Performed by: HOSPITALIST

## 2020-11-30 PROCEDURE — 87086 URINE CULTURE/COLONY COUNT: CPT

## 2020-11-30 PROCEDURE — 74011000258 HC RX REV CODE- 258: Performed by: HOSPITALIST

## 2020-11-30 PROCEDURE — 83735 ASSAY OF MAGNESIUM: CPT

## 2020-11-30 PROCEDURE — 36415 COLL VENOUS BLD VENIPUNCTURE: CPT

## 2020-11-30 PROCEDURE — 74011250636 HC RX REV CODE- 250/636: Performed by: HOSPITALIST

## 2020-11-30 PROCEDURE — 82747 ASSAY OF FOLIC ACID RBC: CPT

## 2020-11-30 PROCEDURE — 84100 ASSAY OF PHOSPHORUS: CPT

## 2020-11-30 PROCEDURE — 85025 COMPLETE CBC W/AUTO DIFF WBC: CPT

## 2020-11-30 PROCEDURE — 65270000029 HC RM PRIVATE

## 2020-11-30 PROCEDURE — 74011250636 HC RX REV CODE- 250/636: Performed by: NURSE PRACTITIONER

## 2020-11-30 PROCEDURE — 82607 VITAMIN B-12: CPT

## 2020-11-30 PROCEDURE — 80053 COMPREHEN METABOLIC PANEL: CPT

## 2020-11-30 PROCEDURE — 83540 ASSAY OF IRON: CPT

## 2020-11-30 RX ORDER — FOLIC ACID 1 MG/1
1 TABLET ORAL DAILY
Status: DISCONTINUED | OUTPATIENT
Start: 2020-11-30 | End: 2020-12-14 | Stop reason: HOSPADM

## 2020-11-30 RX ORDER — THERA TABS 400 MCG
1 TAB ORAL DAILY
Status: DISCONTINUED | OUTPATIENT
Start: 2020-11-30 | End: 2020-12-14 | Stop reason: HOSPADM

## 2020-11-30 RX ORDER — LORAZEPAM 2 MG/ML
1 INJECTION INTRAMUSCULAR
Status: DISCONTINUED | OUTPATIENT
Start: 2020-11-30 | End: 2020-12-02

## 2020-11-30 RX ORDER — LANOLIN ALCOHOL/MO/W.PET/CERES
100 CREAM (GRAM) TOPICAL DAILY
Status: DISCONTINUED | OUTPATIENT
Start: 2020-11-30 | End: 2020-12-14 | Stop reason: HOSPADM

## 2020-11-30 RX ADMIN — CEFTRIAXONE SODIUM 1 G: 1 INJECTION, POWDER, FOR SOLUTION INTRAMUSCULAR; INTRAVENOUS at 11:40

## 2020-11-30 RX ADMIN — Medication 10 ML: at 22:00

## 2020-11-30 RX ADMIN — LORAZEPAM 2 MG: 2 INJECTION INTRAMUSCULAR; INTRAVENOUS at 01:16

## 2020-11-30 RX ADMIN — LORAZEPAM 1 MG: 2 INJECTION INTRAMUSCULAR; INTRAVENOUS at 21:20

## 2020-11-30 RX ADMIN — Medication 10 ML: at 14:51

## 2020-11-30 RX ADMIN — HALOPERIDOL LACTATE 2 MG: 5 INJECTION, SOLUTION INTRAMUSCULAR at 16:07

## 2020-11-30 RX ADMIN — ACETAMINOPHEN 650 MG: 325 TABLET ORAL at 19:35

## 2020-11-30 NOTE — PROGRESS NOTES
Care Management Interventions  PCP Verified by CM: Vanessa Sanders MD? at [facility])  Transition of Care Consult (CM Consult): Discharge Planning  Discharge Durable Medical Equipment: No  Physical Therapy Consult: Yes  Occupational Therapy Consult: Yes  Speech Therapy Consult: No  Current Support Network: Lives Alone([facility] apartment)  Confirm Follow Up Transport: (tbd)  The Patient and/or Patient Representative was Provided with a Choice of Provider and Agrees with the Discharge Plan?: Yes  Freedom of Choice List was Provided with Basic Dialogue that Supports the Patient's Individualized Plan of Care/Goals, Treatment Preferences and Shares the Quality Data Associated with the Providers?: Yes  Discharge Location  Discharge Placement: Skilled nursing facility     Chart reviewed. Patient admitted here 11/29/20. Patient's daughter called  911 and was sent to [place] ED with AMS. The patient has a hx of CVA, expressive aphasia, Bipolar Disorder, chronic tremors and anemia. The patient was seen in the ED 9/12/20 due to a fall. CM contacted the patient's daughter (Velasquez Oconnor --[phone]--- to obtain additional background information. Arianna Gonzalez is the only family in the area. The patient has a brother who lives out of state. Arianna Gonzalez moved back to [place] following the patient's stroke and lives 5-10 minutes away. The patient resides at [address] [place] [address] in a [facility] apartment. She has been at this residence for the past 3 years. She has refused help to assist her in the home in the past.    CM discussed therapy recommendations for SNF with Arianna Gonzalez. The patient has been at [address] (SNF's) in the past.  CM will email list of facilities to her tomorrow. The patient will need a covid test (as she gets closer to discharge). CM will continue to follow.     Gailen Brisk, [place], [address] [facility]

## 2020-11-30 NOTE — ED NOTES
Pt became very agitated, yelling from room, attempting to hit staff, + trying to get out of bed. MD notified. Bed alarm placed under pt, bed linens changed, PRN meds given for agitation. Will continue to monitor.

## 2020-11-30 NOTE — PROGRESS NOTES
Occupational Therapy    Orders acknowledged, chart reviewed in prep for skilled OT treatment, spoke with nursing who requests defer at this time 2/2 patient agitation. Will defer and follow up later as able and appropriate.       Thank you,  Patel Castillo, OT

## 2020-11-30 NOTE — PROGRESS NOTES
Problem: Falls - Risk of  Goal: *Absence of Falls  Description: Document Cathy Benitez Fall Risk and appropriate interventions in the flowsheet. Outcome: Not Progressing Towards Goal  Note: Fall Risk Interventions:  Mobility Interventions: (P) Communicate number of staff needed for ambulation/transfer, Bed/chair exit alarm, Patient to call before getting OOB    Mentation Interventions: (P) Bed/chair exit alarm, Toileting rounds, Room close to nurse's station, Door open when patient unattended, Adequate sleep, hydration, pain control    Medication Interventions: (P) Evaluate medications/consider consulting pharmacy, Patient to call before getting OOB, Teach patient to arise slowly    Elimination Interventions: (P) Bed/chair exit alarm, Call light in reach, Patient to call for help with toileting needs, Toileting schedule/hourly rounds    History of Falls Interventions: (P) Bed/chair exit alarm, Door open when patient unattended, Room close to nurse's station         Problem: Patient Education: Go to Patient Education Activity  Goal: Patient/Family Education  Outcome: Not Progressing Towards Goal     Problem: Pressure Injury - Risk of  Goal: *Prevention of pressure injury  Description: Document Kris Scale and appropriate interventions in the flowsheet. Outcome: Not Progressing Towards Goal  Note: Pressure Injury Interventions:  Sensory Interventions: Assess changes in LOC, Minimize linen layers, Maintain/enhance activity level, Keep linens dry and wrinkle-free, Float heels, Turn and reposition approx.  every two hours (pillows and wedges if needed)    Moisture Interventions: Apply protective barrier, creams and emollients, Absorbent underpads, Assess need for specialty bed, Minimize layers, Moisture barrier, Maintain skin hydration (lotion/cream)              Nutrition Interventions: Document food/fluid/supplement intake                     Problem: Patient Education: Go to Patient Education Activity  Goal: Patient/Family Education  Outcome: Not Progressing Towards Goal

## 2020-11-30 NOTE — PROGRESS NOTES
Physical Therapy  11/30/2020    Orders received and acknowledged. Chart reviewed and spoke with RN. Pt kicking, biting and with audible screaming from several doors down. Pt refusing all medications and RN requesting to not initiate PT evaluation at this time. Will continue to follow when pt is more appropriate.      Thank Patricia Nickerson, PT, DPT

## 2020-11-30 NOTE — PROGRESS NOTES
Patient seen kicking, screaming, biting, at nurses. Unable to administer 0730 PO medication of pantoprazole 40 mg. Will continue to monitor.

## 2020-11-30 NOTE — PROGRESS NOTES
[facility]  Hospitalist Group                                                                                          Hospitalist Progress Note  Aruna Segura NP  Answering service: [phone] from in house phone        Date of Service:  2020  NAME:  Berenice Ledesma  :  4/10/1936  MRN:  [id number]      Admission Summary: This is a 67 y.o. female with a PMH CVA, Chronic Tremor, Chronic Anemia and Bipolar Disorder who presents with AMS and Acute Delirium, patient presented from [address] [place] [address] confused, delusional (and said someone else dumped the stool in her house) and had expressive aphasia from previous stroke in May, unable to get full history. Patient's daughter noticed the patient was walking around her house covered in stool and  confused. She was the last known normal on Thanksgiving day but had not had a significant change since then. Daughter said patient was refusing all help from her, living alone and managed her medication by herself. Per record she was taking lithium for many years and patient stated she takes daily as ordered. Per Kary last refill was in May with 90 day supply. Hospitalist team asked to admit. Interval history / Subjective: Follow-up for issues listed below. (Acute Metabolic Encephalopathy, AMS/Acute delirium, UTI, Chronic Anemia)  -Patient seen and examined, no c/o's. Assessment & Plan:     AMS/Acute delirium: multifactorial, likely not taking lithium as orderd. Should've run out in Aug, but no refill since then and worsened by UTI. -CT head: No acute intracranial process. Small area of chronic encephalomalacia in the left frontal lobe. Imaging findings consistent with moderate chronic microvascular ischemic change. There is a mild degree of cerebral atrophy.   -lithium level: 0.57, restart lithium  -closely monitor  -NeuroPsych consult   -ASA level: 2.4  -sitter  -PRN lorazepam and haldol- not to be used at same time    Acute Metabolic Encephalopathy: plan as above. Closely monitor. UTI: low grade fever.  -urine culture pending  -BCNGTD  -IVF's  -IV rocephin x2 doses    Chronic Anemia:    -B12:763, Iron:54,TIBC:288,Iron sat:19%, Ferritin: 91    Possible ETOH abuse: per history pt was drinking 3 times /week. Daughter is not sure if she is still drinking. -[facility] protocol  -alcohol level: <10    DVTppx: Lovenox  Gippx: Protonix  Code Status: Full Code  Diet: Cardiac  Activity: OOB to chair TID and PRN  Discharge: Likely return home @48hrs, ambulates independently. Hospital Problems  Date Reviewed: 8/3/2015          Codes Class Noted POA    AMS (altered mental status) ICD-10-CM: R41.82  ICD-9-CM: 780.97  11/29/2020 Unknown                Review of Systems:   A comprehensive review of systems was negative except for that written in the HPI. Vital Signs:    Last 24hrs VS reviewed since prior progress note. Most recent are:  Visit Vitals  BP (!) 145/51 (BP 1 Location: Right arm, BP Patient Position: At rest)   Pulse 77   Temp 98.1 °F (36.7 °C)   Resp 18   Ht 5' 2\" (1.575 m)   Wt 75.4 kg (166 lb 3.6 oz)   SpO2 97%   BMI 30.40 kg/m²         Intake/Output Summary (Last 24 hours) at 11/30/2020 0801  Last data filed at 11/29/2020 1430  Gross per 24 hour   Intake 50 ml   Output    Net 50 ml        Physical Examination:     I had a face to face encounter with this patient and independently examined them on 11/30/2020 as outlined below:    Constitutional:  No acute distress, uncooperative, unpleasant mood, talking through her teeth- angry   ENT:  Oral mucosa moist.    Resp:  CTA bilaterally. No wheezing/rhonchi/rales. No accessory muscle use. CV:  Regular rhythm, normal rate, no murmurs, gallops, rubs. /GI:  Soft, non distended, non tender, no guarding, BS present. Musculoskeletal:  No edema, warm, 2+ pulses throughout. Neurologic:  Moves all extremities. Chronic aphasia.  Awake, alert, oriented to person and \"I'm in [place]\", otherwise confused, CN II-XII reviewed. Skin:  Good turgor, no rashes or ulcers  Psych:  Poor insight, Intermittent anxiety and agitated. Data Review:    Review and/or order of clinical lab test      Labs:     Recent Labs     11/30/20  0200 11/29/20  1321   WBC 10.1 10.8   HGB 10.0* 10.1*   HCT 30.9* 31.8*    400     Recent Labs     11/30/20  0200 11/29/20  1321    140   K 3.7 3.6   * 104   CO2 24 27   BUN 11 17   CREA 0.64 0.81   GLU 83 83   CA 9.2 9.8   MG 2.0  --    PHOS 3.0  --      Recent Labs     11/30/20  0200 11/29/20  1321   ALT 48 56   * 148*   TBILI 0.3 0.4   TP 5.7* 6.2*   ALB 2.4* 2.3*   GLOB 3.3 3.9     No results for input(s): INR, PTP, APTT, INREXT in the last 72 hours. Recent Labs     11/30/20  0200   TIBC 288   PSAT 19*   FERR 91      Lab Results   Component Value Date/Time    Folate >19.9 08/03/2015 12:00 AM      No results for input(s): PH, PCO2, PO2 in the last 72 hours.   Recent Labs     11/29/20  1321   TROIQ <0.05     No results found for: CHOL, CHOLX, CHLST, CHOLV, HDL, HDLP, LDL, LDLC, DLDLP, TGLX, TRIGL, TRIGP, CHHD, CHHDX  No results found for: Kavita Lock  Lab Results   Component Value Date/Time    Color YELLOW/STRAW 11/29/2020 01:21 PM    Appearance CLOUDY (A) 11/29/2020 01:21 PM    Specific gravity 1.015 11/29/2020 01:21 PM    Specific gravity 1.014 09/07/2016 01:40 AM    pH (UA) 7.0 11/29/2020 01:21 PM    Protein 30 (A) 11/29/2020 01:21 PM    Glucose Negative 11/29/2020 01:21 PM    Ketone Negative 11/29/2020 01:21 PM    Bilirubin Negative 11/29/2020 01:21 PM    Urobilinogen 0.2 11/29/2020 01:21 PM    Nitrites Positive (A) 11/29/2020 01:21 PM    Leukocyte Esterase SMALL (A) 11/29/2020 01:21 PM    Epithelial cells MODERATE (A) 11/29/2020 01:21 PM    Bacteria 4+ (A) 11/29/2020 01:21 PM    WBC 20-50 11/29/2020 01:21 PM    RBC >100 (H) 11/29/2020 01:21 PM         Medications Reviewed:     Current Facility-Administered Medications   Medication Dose Route Frequency    sodium chloride (NS) flush 5-40 mL  5-40 mL IntraVENous Q8H    sodium chloride (NS) flush 5-40 mL  5-40 mL IntraVENous PRN    acetaminophen (TYLENOL) tablet 650 mg  650 mg Oral Q6H PRN    Or    acetaminophen (TYLENOL) suppository 650 mg  650 mg Rectal Q6H PRN    polyethylene glycol (MIRALAX) packet 17 g  17 g Oral DAILY PRN    promethazine (PHENERGAN) tablet 12.5 mg  12.5 mg Oral Q6H PRN    Or    ondansetron (ZOFRAN) injection 4 mg  4 mg IntraVENous Q6H PRN    enoxaparin (LOVENOX) injection 40 mg  40 mg SubCUTAneous DAILY    cefTRIAXone (ROCEPHIN) 1 g in 0.9% sodium chloride (MBP/ADV) 50 mL MBP  1 g IntraVENous Q24H    cholecalciferol (VITAMIN D3) (1000 Units /25 mcg) tablet 1 Tab  1,000 Units Oral DAILY    calcium carbonate (TUMS) chewable tablet 200 mg [elemental]  200 mg Oral TID WITH MEALS    lithium carbonate CR (ESKALITH CR) tablet 450 mg  450 mg Oral DAILY    pantoprazole (PROTONIX) tablet 40 mg  40 mg Oral ACB    aspirin chewable tablet 81 mg  81 mg Oral DAILY    traZODone (DESYREL) tablet 50 mg  50 mg Oral QHS PRN    0.9% sodium chloride 6,761 mL with folic acid 1 mg, thiamine 100 mg, mvi, adult no. 4 with vit K 10 mL infusion   IntraVENous DAILY    LORazepam (ATIVAN) injection 2 mg  2 mg IntraVENous Q1H PRN    haloperidol lactate (HALDOL) injection 2 mg  2 mg IntraMUSCular Q6H PRN     ______________________________________________________________________  EXPECTED LENGTH OF STAY: - - -  ACTUAL LENGTH OF STAY:          1                 Ce Maldonado NP

## 2020-11-30 NOTE — ED NOTES
TRANSFER - OUT REPORT:    Verbal report given to MY[facility], RN (name) on [address]  being transferred to [address] (unit) for routine progression of care       Report consisted of patients Situation, Background, Assessment and   Recommendations(SBAR). Information from the following report(s) SBAR, ED Summary, [facility] and Recent Results was reviewed with the receiving nurse. Lines:   Peripheral IV 11/29/20 Left Antecubital (Active)   Site Assessment Clean, dry, & intact 11/29/20 1328   Phlebitis Assessment 0 11/29/20 1328   Infiltration Assessment 0 11/29/20 1328   Dressing Status Clean, dry, & intact 11/29/20 1328   Dressing Type Transparent 11/29/20 1328        Opportunity for questions and clarification was provided.

## 2020-12-01 LAB
ALBUMIN SERPL-MCNC: 2.3 G/DL (ref 3.5–5)
ALBUMIN/GLOB SERPL: 0.7 {RATIO} (ref 1.1–2.2)
ALP SERPL-CCNC: 130 U/L (ref 45–117)
ALT SERPL-CCNC: 41 U/L (ref 12–78)
ANION GAP SERPL CALC-SCNC: 11 MMOL/L (ref 5–15)
AST SERPL-CCNC: 23 U/L (ref 15–37)
BACTERIA SPEC CULT: NORMAL
BACTERIA SPEC CULT: NORMAL
BASOPHILS # BLD: 0.1 K/UL (ref 0–0.1)
BASOPHILS NFR BLD: 1 % (ref 0–1)
BILIRUB SERPL-MCNC: 0.3 MG/DL (ref 0.2–1)
BUN SERPL-MCNC: 6 MG/DL (ref 6–20)
BUN/CREAT SERPL: 9 (ref 12–20)
CALCIUM SERPL-MCNC: 9.1 MG/DL (ref 8.5–10.1)
CHLORIDE SERPL-SCNC: 113 MMOL/L (ref 97–108)
CO2 SERPL-SCNC: 18 MMOL/L (ref 21–32)
CREAT SERPL-MCNC: 0.66 MG/DL (ref 0.55–1.02)
DIFFERENTIAL METHOD BLD: ABNORMAL
EOSINOPHIL # BLD: 0.4 K/UL (ref 0–0.4)
EOSINOPHIL NFR BLD: 4 % (ref 0–7)
ERYTHROCYTE [DISTWIDTH] IN BLOOD BY AUTOMATED COUNT: 13.2 % (ref 11.5–14.5)
EST. AVERAGE GLUCOSE BLD GHB EST-MCNC: 103 MG/DL
FOLATE BLD-MCNC: 563 NG/ML
FOLATE RBC-MCNC: 1955 NG/ML
GLOBULIN SER CALC-MCNC: 3.4 G/DL (ref 2–4)
GLUCOSE SERPL-MCNC: 127 MG/DL (ref 65–100)
HBA1C MFR BLD: 5.2 % (ref 4–5.6)
HCT VFR BLD AUTO: 28.8 % (ref 34–46.6)
HCT VFR BLD AUTO: 33.3 % (ref 35–47)
HGB BLD-MCNC: 10.2 G/DL (ref 11.5–16)
IMM GRANULOCYTES # BLD AUTO: 0 K/UL (ref 0–0.04)
IMM GRANULOCYTES NFR BLD AUTO: 0 % (ref 0–0.5)
LYMPHOCYTES # BLD: 2.5 K/UL (ref 0.8–3.5)
LYMPHOCYTES NFR BLD: 26 % (ref 12–49)
MAGNESIUM SERPL-MCNC: 2 MG/DL (ref 1.6–2.4)
MCH RBC QN AUTO: 32.6 PG (ref 26–34)
MCHC RBC AUTO-ENTMCNC: 30.6 G/DL (ref 30–36.5)
MCV RBC AUTO: 106.4 FL (ref 80–99)
MONOCYTES # BLD: 0.6 K/UL (ref 0–1)
MONOCYTES NFR BLD: 7 % (ref 5–13)
NEUTS SEG # BLD: 6.1 K/UL (ref 1.8–8)
NEUTS SEG NFR BLD: 63 % (ref 32–75)
NRBC # BLD: 0 K/UL (ref 0–0.01)
NRBC BLD-RTO: 0 PER 100 WBC
PHOSPHATE SERPL-MCNC: 3.1 MG/DL (ref 2.6–4.7)
PLATELET # BLD AUTO: 375 K/UL (ref 150–400)
PMV BLD AUTO: 9.7 FL (ref 8.9–12.9)
POTASSIUM SERPL-SCNC: 3.5 MMOL/L (ref 3.5–5.1)
PROT SERPL-MCNC: 5.7 G/DL (ref 6.4–8.2)
RBC # BLD AUTO: 3.13 M/UL (ref 3.8–5.2)
SERVICE CMNT-IMP: NORMAL
SERVICE CMNT-IMP: NORMAL
SODIUM SERPL-SCNC: 142 MMOL/L (ref 136–145)
WBC # BLD AUTO: 9.6 K/UL (ref 3.6–11)

## 2020-12-01 PROCEDURE — 97530 THERAPEUTIC ACTIVITIES: CPT

## 2020-12-01 PROCEDURE — 74011250637 HC RX REV CODE- 250/637: Performed by: HOSPITALIST

## 2020-12-01 PROCEDURE — 99223 1ST HOSP IP/OBS HIGH 75: CPT | Performed by: PSYCHIATRY & NEUROLOGY

## 2020-12-01 PROCEDURE — 80053 COMPREHEN METABOLIC PANEL: CPT

## 2020-12-01 PROCEDURE — 85025 COMPLETE CBC W/AUTO DIFF WBC: CPT

## 2020-12-01 PROCEDURE — 74011250637 HC RX REV CODE- 250/637: Performed by: NURSE PRACTITIONER

## 2020-12-01 PROCEDURE — 74011000258 HC RX REV CODE- 258: Performed by: HOSPITALIST

## 2020-12-01 PROCEDURE — 36415 COLL VENOUS BLD VENIPUNCTURE: CPT

## 2020-12-01 PROCEDURE — 74011250636 HC RX REV CODE- 250/636: Performed by: HOSPITALIST

## 2020-12-01 PROCEDURE — 83036 HEMOGLOBIN GLYCOSYLATED A1C: CPT

## 2020-12-01 PROCEDURE — 74011250636 HC RX REV CODE- 250/636: Performed by: NURSE PRACTITIONER

## 2020-12-01 PROCEDURE — 97165 OT EVAL LOW COMPLEX 30 MIN: CPT

## 2020-12-01 PROCEDURE — 84100 ASSAY OF PHOSPHORUS: CPT

## 2020-12-01 PROCEDURE — 97161 PT EVAL LOW COMPLEX 20 MIN: CPT

## 2020-12-01 PROCEDURE — 83735 ASSAY OF MAGNESIUM: CPT

## 2020-12-01 PROCEDURE — 65270000029 HC RM PRIVATE

## 2020-12-01 PROCEDURE — 97116 GAIT TRAINING THERAPY: CPT

## 2020-12-01 PROCEDURE — 97535 SELF CARE MNGMENT TRAINING: CPT

## 2020-12-01 RX ORDER — HALOPERIDOL 2 MG/ML
2 SOLUTION ORAL
Status: COMPLETED | OUTPATIENT
Start: 2020-12-01 | End: 2020-12-01

## 2020-12-01 RX ADMIN — Medication 10 ML: at 13:11

## 2020-12-01 RX ADMIN — PANTOPRAZOLE SODIUM 40 MG: 40 TABLET, DELAYED RELEASE ORAL at 06:08

## 2020-12-01 RX ADMIN — LORAZEPAM 1 MG: 2 INJECTION INTRAMUSCULAR; INTRAVENOUS at 11:00

## 2020-12-01 RX ADMIN — CALCIUM CARBONATE (ANTACID) CHEW TAB 500 MG 200 MG: 500 CHEW TAB at 17:16

## 2020-12-01 RX ADMIN — Medication 100 MG: at 13:10

## 2020-12-01 RX ADMIN — CALCIUM CARBONATE (ANTACID) CHEW TAB 500 MG 200 MG: 500 CHEW TAB at 13:09

## 2020-12-01 RX ADMIN — Medication 10 ML: at 06:10

## 2020-12-01 RX ADMIN — Medication 10 ML: at 22:01

## 2020-12-01 RX ADMIN — LITHIUM CARBONATE 450 MG: 450 TABLET, EXTENDED RELEASE ORAL at 13:00

## 2020-12-01 RX ADMIN — THERA TABS 1 TABLET: TAB at 13:10

## 2020-12-01 RX ADMIN — TRAZODONE HYDROCHLORIDE 50 MG: 50 TABLET ORAL at 06:15

## 2020-12-01 RX ADMIN — CALCIUM CARBONATE (ANTACID) CHEW TAB 500 MG 200 MG: 500 CHEW TAB at 06:08

## 2020-12-01 RX ADMIN — HALOPERIDOL 2 MG: 2 SOLUTION ORAL at 12:52

## 2020-12-01 RX ADMIN — CEFTRIAXONE SODIUM 1 G: 1 INJECTION, POWDER, FOR SOLUTION INTRAMUSCULAR; INTRAVENOUS at 11:00

## 2020-12-01 RX ADMIN — HALOPERIDOL LACTATE 2 MG: 5 INJECTION, SOLUTION INTRAMUSCULAR at 18:20

## 2020-12-01 RX ADMIN — TRAZODONE HYDROCHLORIDE 50 MG: 50 TABLET ORAL at 22:01

## 2020-12-01 NOTE — PROGRESS NOTES
Problem: Self Care Deficits Care Plan (Adult)  Goal: *Acute Goals and Plan of Care (Insert Text)  Description:   FUNCTIONAL STATUS PRIOR TO ADMISSION: Pt reports living alone, with walk-in shower with grab bar and shower chair. Chart indicates, pt's daughter has camera set-up to allow for distant Supervision. Pt states she uses Rollator. HOME SUPPORT: The patient lived with daughter to provide PRN assist.    Occupational Therapy Goals  Initiated 12/1/2020  1. Patient will perform RW grooming with supervision within 7 day(s). 2.  Patient will perform EOB/RW lower body dressing with supervision within 7 day(s). 3.  Patient will perform EOB/RW bathing with supervision within 7 day(s). 4.  Patient will perform toilet transfers with RW supervision within 7 day(s). 5.  Patient will perform all aspects of RW toileting with supervision within 7 day(s). Outcome: Not Met     OCCUPATIONAL THERAPY EVALUATION  Patient: Trinh Gordon (89 y.o. female)  Date: 12/1/2020  Primary Diagnosis: AMS (altered mental status) [R41.82]        Precautions:  Fall, Skin, Bed Alarm    ASSESSMENT  Based on the objective data described below, the patient presents with decreased problem solving/sequencing/safety awareness, decreased mobility/balance, decreased strength/endurance, decreased activity tolerance and episodes of high levels of agitation with pt noted to attempt to kick and bite, all of which limit pt's ability to complete self-care routine at level congruent with PLOF. Currently, pt benefits from S/U and Supervision for self-feeding, CGA for RW standing grooming and Min A for bathing, dressing and toileting. Per nursing, pt with increased orientation this date verses yesterday and responded well to simple, directive cues, as well as, cues of encouragement.   Pt benefits from skilled OT to address functional deficits in an overall attempt at maximizing highest level of safe functional independence, with reporting therapist believing pt would benefit from SNF level rehab upon discharge verses home with [facility] and 24/7 ADL/IADL Supervision. Current Level of Function Impacting Discharge (ADLs/self-care): S/U and Supervision for self-feeding, CGA for RW standing grooming and Min A for bathing, dressing and toileting. Functional Outcome Measure: The patient scored 35/100 on the Barthel Index outcome measure. Other factors to consider for discharge: agitation and associated behaviors     Patient will benefit from skilled therapy intervention to address the above noted impairments. PLAN :  Recommendations and Planned Interventions: self care training, functional mobility training, therapeutic exercise, balance training, therapeutic activities, endurance activities, and patient education    Frequency/Duration: Patient will be followed by occupational therapy 3 times a week to address goals. Recommendation for discharge: (in order for the patient to meet his/her long term goals)  To be determined: SNF verses home with [facility] and 24/7 ADL/IADL assist    This discharge recommendation:  Has not yet been discussed the attending provider and/or case management    IF patient discharges home will need the following DME: patient owns DME required for discharge       SUBJECTIVE:   Patient stated I recently lost 35 pounds.     OBJECTIVE DATA SUMMARY:   HISTORY:   Past Medical History:   Diagnosis Date    Anemia     Arthritis     Bipolar disorder ([address]     Burning with urination     Memory change     Psychiatric disorder     Bipolar Disorder    Stool color black     Tremor      Past Surgical History:   Procedure Laterality Date    HX BREAST BIOPSY Left 1990's    benign    HX CHOLECYSTECTOMY      HX GI      HX GYN         Expanded or extensive additional review of patient history:     Home Situation  Home Environment: Private residence  Living Alone: No  Support Systems: Family member(s)(daughter watches her on a camera)  Patient Expects to be Discharged to[de-identified] Unknown  Current DME Used/Available at Home: Bessie Loud, rollator, Grab bars, Shower chair  Tub or Shower Type: Shower    Hand dominance: Right    EXAMINATION OF PERFORMANCE DEFICITS:  Cognitive/Behavioral Status:  Neurologic State: Confused  Orientation Level: Oriented to person;Disoriented to place; Disoriented to situation;Disoriented to time  Cognition: Decreased attention/concentration;Decreased command following; Impaired decision making;Memory loss;Poor safety awareness     Perseveration: Perseverates during ADLS;Perseverates during conversation;Perseverates during mobility  Safety/Judgement: Decreased awareness of environment;Decreased awareness of need for assistance;Decreased awareness of need for safety;Decreased insight into deficits    Hearing: Auditory  Auditory Impairment: None    Vision/Perceptual:     Reading glasses    Range of Motion:  AROM: Generally decreased, functional    Strength:  Strength: Generally decreased, functional    Coordination:  Coordination: Generally decreased, functional  Fine Motor Skills-Upper: Left Intact; Right Intact    Gross Motor Skills-Upper: Left Intact; Right Intact    Tone:  Tone: Normal    Balance:  Sitting: Intact  Standing: Impaired  Standing - Static: Fair;Constant support  Standing - Dynamic : Poor    Functional Mobility and Transfers for ADLs:  Bed Mobility:  Rolling: Supervision  Supine to Sit: Supervision  Sit to Supine: Supervision  Scooting: Supervision    Transfers:  Sit to Stand: Minimum assistance  Stand to Sit: Contact guard assistance  Bed to Chair: Minimum assistance  Bathroom Mobility: Minimum assistance    ADL Assessment:  Feeding: Setup;Supervision    Oral Facial Hygiene/Grooming: Contact guard assistance    Bathing: Minimum assistance    Upper Body Dressing: Minimum assistance    Lower Body Dressing: Minimum assistance    Toileting: Minimum assistance    ADL Intervention and task modifications:  Patient instructed and indicated understanding the benefits of maintaining activity tolerance, functional mobility, and independence with self care tasks during acute stay  to ensure safe return home and to baseline. Encouraged patient to increase frequency and duration OOB, be out of bed for all meals, perform daily ADLs (as approved by RN/MD regarding bathing etc), and performing functional mobility to/from bathroom. Pt educated on safe transfer techniques, with specific emphasis on proper hand placement to push up from seated surface rather than attempt to pull self up, fully positioning self in-front of desired seated location, feeling chair on back of legs and reaching back with 1-2 UE to slowly lower self to seated position. Cognitive Retraining  Safety/Judgement: Decreased awareness of environment;Decreased awareness of need for assistance;Decreased awareness of need for safety;Decreased insight into deficits    Functional Measure:  Barthel Index:    Bathin  Bladder: 5  Bowels: 5  Groomin  Dressin  Feedin  Mobility: 0  Stairs: 0  Toilet Use: 5  Transfer (Bed to Chair and Back): 10  Total: 35/100        The Barthel ADL Index: Guidelines  1. The index should be used as a record of what a patient does, not as a record of what a patient could do. 2. The main aim is to establish degree of independence from any help, physical or verbal, however minor and for whatever reason. 3. The need for supervision renders the patient not independent. 4. A patient's performance should be established using the best available evidence. Asking the patient, friends/relatives and nurses are the usual sources, but direct observation and common sense are also important. However direct testing is not needed. 5. Usually the patient's performance over the preceding 24-48 hours is important, but occasionally longer periods will be relevant. 6. Middle categories imply that the patient supplies over 50 per cent of the effort.   7. Use of aids to be independent is allowed. Jen Ramirez., Barthel, DMattW. (8019). Functional evaluation: the Barthel Index. [address] (14)2. ONOFRE Gonzalez Alois Evan., [facility] [place], [address] (1999). Measuring the change indisability after inpatient rehabilitation; comparison of the responsiveness of the Barthel Index and Functional Tift Measure. Journal of Neurology, Neurosurgery, and Psychiatry, 66(4), 460-372. CAROLIN De La Torre, KELLY Nicole, & Anastasiia Henry M.A. (2004.) Assessment of post-stroke quality of life in cost-effectiveness studies: The usefulness of the Barthel Index and the EuroQoL-5D. Quality of Life Research, 15, 532-20     Occupational Therapy Evaluation Charge Determination   History Examination Decision-Making   LOW Complexity : Brief history review  HIGH Complexity : 5 or more performance deficits relating to physical, cognitive , or psychosocial skils that result in activity limitations and / or participation restrictions LOW Complexity : No comorbidities that affect functional and no verbal or physical assistance needed to complete eval tasks       Based on the above components, the patient evaluation is determined to be of the following complexity level: LOW   Pain Rating:  No c/o pain    Activity Tolerance:   Fair and requires rest breaks    After treatment patient left in no apparent distress:    Supine in bed, Call bell within reach, Bed / chair alarm activated, and Side rails x 3    COMMUNICATION/EDUCATION:   The patients plan of care was discussed with: Physical therapist and Registered nurse. Home safety education was provided and the patient/caregiver indicated understanding., Patient/family have participated as able in goal setting and plan of care. , and Patient/family agree to work toward stated goals and plan of care. This patients plan of care is appropriate for delegation to [facility].     Thank you for this referral.  Deena Abdul OT  Time Calculation: 32 mins

## 2020-12-01 NOTE — PROGRESS NOTES
Physical Therapy  12/1/2020    Chart reviewed and spoke with RN. Pt is currently sleeping and RN requests to not disturb her at this time.     Thank Natalie Yoon, PT, DPT

## 2020-12-01 NOTE — PROGRESS NOTES
Occupational Therapy    Chart reviewed in prep for skilled OT evaluation, spoke with nursing who requests defer at this time 2/2 pt agitation. Will follow up later as able and appropriate.     Thank you,  Day Linton, OT
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
## 2020-12-01 NOTE — PROGRESS NOTES
Problem: Falls - Risk of  Goal: *Absence of Falls  Description: Document Janie Pruitt Fall Risk and appropriate interventions in the flowsheet.   Outcome: Progressing Towards Goal  Note: Fall Risk Interventions:  Mobility Interventions: Communicate number of staff needed for ambulation/transfer    Mentation Interventions: Bed/chair exit alarm    Medication Interventions: Evaluate medications/consider consulting pharmacy    Elimination Interventions: Bed/chair exit alarm    History of Falls Interventions: Bed/chair exit alarm
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
## 2020-12-01 NOTE — PROGRESS NOTES
Problem: Mobility Impaired (Adult and Pediatric)  Goal: *Acute Goals and Plan of Care (Insert Text)  Description: FUNCTIONAL STATUS PRIOR TO ADMISSION: Pt is a poor historian. Per chart review she was Mod Indep with rollator less than a week ago. Daughter has a camera to watch her mother throughout the day. HOME SUPPORT PRIOR TO ADMISSION: The patient lived alone with daughter to provide assistance. Physical Therapy Goals  Initiated 12/1/2020  1. Patient will move from supine to sit and sit to supine , scoot up and down, and roll side to side in bed with modified independence within 7 day(s). 2.  Patient will transfer from bed to chair and chair to bed with modified independence using the least restrictive device within 7 day(s). 3.  Patient will perform sit to stand with modified independence within 7 day(s). 4.  Patient will ambulate with modified independence for 200 feet with the least restrictive device within 7 day(s). 5.  Patient will ascend/descend 3 stairs with 1 handrail(s) with modified independence within 7 day(s). Outcome: Progressing Towards Goal   PHYSICAL THERAPY EVALUATION  Patient: Vitaly Davis (21 y.o. female)  Date: 12/1/2020  Primary Diagnosis: AMS (altered mental status) [R41.82]        Precautions:   Fall, Skin, Bed Alarm    ASSESSMENT  Based on the objective data described below, the patient presents with confusion, poor safety awareness and insight into deficits, impaired standing balance, high fall risk and functional mobility below baseline. Pt continuously yelling out when she is alone in the room. Per chart review pt with increased confusion and AMS leading to this admission. Pt requires Min A for all OOB mobility d/t poor standing balance and altered gait mechanics. When questioned about her leg weakness pt states \"I have no legs. \" Pt ambulated to [facility] with Min A. Adamantly declined socks and refused education on safety.  Two minutes after leaving room pt screaming out again to go to the bathroom and assisted into bathroom with Min JULIO, HHA and pt grabbing to furniture. Pt was living alone prior to admission and in her current cognitive state question her ability to make safe/sound decisions or return home alone. Pt would benefit from a psych evaluation. Recommend SNF vs LTC vs home with daughters with supervision. She can not return home safely at this time. Current Level of Function Impacting Discharge (mobility/balance): Min A to ambulate, high fall risk    Functional Outcome Measure: The patient scored 5/28 on the Tinetti outcome measure which is indicative of high fall risk. Other factors to consider for discharge: poor STM, safety deficits and impaired insight, lives alone     Patient will benefit from skilled therapy intervention to address the above noted impairments. PLAN :  Recommendations and Planned Interventions: bed mobility training, transfer training, gait training, therapeutic exercises, neuromuscular re-education, patient and family training/education, and therapeutic activities      Frequency/Duration: Patient will be followed by physical therapy:  3 times a week to address goals. Recommendation for discharge: (in order for the patient to meet his/her long term goals)  Therapy up to 5 days/week in SNF setting vs LTC vs home with family and 24/7 supervision    This discharge recommendation:  Has been made in collaboration with the attending provider and/or case management    IF patient discharges home will need the following DME: patient owns DME required for discharge         SUBJECTIVE:   Patient stated Anny Ren you ever done anything safe? I don't want to hear it.     OBJECTIVE DATA SUMMARY:   HISTORY:    Past Medical History:   Diagnosis Date    Anemia     Arthritis     Bipolar disorder ([facility] [address]     Burning with urination     Memory change     Psychiatric disorder     Bipolar Disorder    Stool color black     Tremor      Past Surgical History:   Procedure Laterality Date    HX BREAST BIOPSY Left 1990's    benign    HX CHOLECYSTECTOMY      HX GI      HX GYN         Personal factors and/or comorbidities impacting plan of care: bipolar, arthritis    Home Situation  Home Environment: Private residence  Living Alone: No  Support Systems: Family member(s)(daughter watches her on a camera)  Patient Expects to be Discharged to[de-identified] Unknown  Current DME Used/Available at Home: [employer], rollator    EXAMINATION/PRESENTATION/DECISION MAKING:   Critical Behavior:  Neurologic State: Confused, Agitated  Orientation Level: Oriented to person, Disoriented to time, Disoriented to situation, Disoriented to place  Cognition: Decreased attention/concentration     Hearing: Auditory  Auditory Impairment: None  Skin:    Edema:   Range Of Motion:  AROM: Generally decreased, functional                       Strength:    Strength: Generally decreased, functional                    Tone & Sensation:   Tone: Normal                              Coordination:  Coordination: Generally decreased, functional  Vision:      Functional Mobility:  Bed Mobility:  Rolling: Supervision  Supine to Sit: Supervision  Sit to Supine: Supervision  Scooting: Supervision  Transfers:  Sit to Stand: Minimum assistance  Stand to Sit: Contact guard assistance  Stand Pivot Transfers: Minimum assistance     Bed to Chair: Minimum assistance              Balance:   Sitting: Intact  Standing: Impaired  Standing - Static: Fair;Constant support  Standing - Dynamic : Poor  Ambulation/Gait Training:  Distance (ft): 20 Feet (ft)(x 2 and an additional 10 ft)  Assistive Device: Gait belt(HHA)  Ambulation - Level of Assistance: Minimal assistance        Gait Abnormalities: Antalgic;Decreased step clearance; Altered arm swing;Path deviations; Shuffling gait;Trendelenburg;Trunk sway increased        Base of Support: Widened;Center of gravity altered     Speed/Becky: Pace decreased (<100 feet/min); Slow  Step Length: Right shortened;Left shortened  Swing Pattern: Left asymmetrical;Right asymmetrical           Functional Measure:  Tinetti test:    Sitting Balance: 1  Arises: 0  Attempts to Rise: 0  Immediate Standing Balance: 0  Standing Balance: 0  Nudged: 0  Eyes Closed: 0  Turn 360 Degrees - Continuous/Discontinuous: 0  Turn 360 Degrees - Steady/Unsteady: 0  Sitting Down: 1  Balance Score: 2 Balance total score  Indication of Gait: 0  R Step Length/Height: 0  L Step Length/Height: 0  R Foot Clearance: 1  L Foot Clearance: 1  Step Symmetry: 1  Step Continuity: 0  Path: 0  Trunk: 0  Walking Time: 0  Gait Score: 3 Gait total score  Total Score: 5/28 Overall total score         Tinetti Tool Score Risk of Falls  <19 = High Fall Risk  19-24 = Moderate Fall Risk  25-28 = Low Fall Risk  Tinetti ME. Performance-Oriented Assessment of Mobility Problems in Elderly Patients. [facility] 66; [id number].  (Scoring Description: PT Bulletin Feb. 10, 1993)    Older adults: Christian Hdz et al, 2009; n = 1000 [facility] elderly evaluated with ABC, CINDY, ADL, and IADL)  · Mean CINDY score for males aged 69-68 years = 26.21(3.40)  · Mean CINDY score for females age 69-68 years = 25.16(4.30)  · Mean CINDY score for males over 80 years = 23.29(6.02)  · Mean CINDY score for females over 80 years = 17.20(8.32)            Physical Therapy Evaluation Charge Determination   History Examination Presentation Decision-Making   MEDIUM  Complexity : 1-2 comorbidities / personal factors will impact the outcome/ POC  MEDIUM Complexity : 3 Standardized tests and measures addressing body structure, function, activity limitation and / or participation in recreation  LOW Complexity : Stable, uncomplicated  Other outcome measures Tinetti  LOW       Based on the above components, the patient evaluation is determined to be of the following complexity level: LOW     Pain Rating:  none    Activity Tolerance:   Fair    After treatment patient left in no apparent distress:   Supine in bed, Call bell within reach, Bed / chair alarm activated, and Side rails x 3    COMMUNICATION/EDUCATION:   The patients plan of care was discussed with: Registered nurse. Fall prevention education was provided and the patient/caregiver indicated understanding., Patient/family have participated as able in goal setting and plan of care. , and Patient/family agree to work toward stated goals and plan of care.     Thank you for this referral.  Malou Webb, PT   Time Calculation: 38 mins

## 2020-12-02 PROCEDURE — 97530 THERAPEUTIC ACTIVITIES: CPT

## 2020-12-02 PROCEDURE — 74011250637 HC RX REV CODE- 250/637: Performed by: NURSE PRACTITIONER

## 2020-12-02 PROCEDURE — 74011250637 HC RX REV CODE- 250/637: Performed by: HOSPITALIST

## 2020-12-02 PROCEDURE — 65270000029 HC RM PRIVATE

## 2020-12-02 PROCEDURE — 74011250636 HC RX REV CODE- 250/636: Performed by: NURSE PRACTITIONER

## 2020-12-02 PROCEDURE — 74011250636 HC RX REV CODE- 250/636: Performed by: HOSPITALIST

## 2020-12-02 RX ORDER — HALOPERIDOL DECANOATE 50 MG/ML
50 INJECTION INTRAMUSCULAR
Status: DISCONTINUED | OUTPATIENT
Start: 2020-12-03 | End: 2020-12-03

## 2020-12-02 RX ORDER — VALPROIC ACID 250 MG/5ML
250 SOLUTION ORAL 2 TIMES DAILY
Status: DISCONTINUED | OUTPATIENT
Start: 2020-12-02 | End: 2020-12-08

## 2020-12-02 RX ORDER — LORAZEPAM 2 MG/ML
1 INJECTION INTRAMUSCULAR
Status: COMPLETED | OUTPATIENT
Start: 2020-12-02 | End: 2020-12-03

## 2020-12-02 RX ORDER — HALOPERIDOL 2 MG/ML
2 SOLUTION ORAL
Status: DISCONTINUED | OUTPATIENT
Start: 2020-12-02 | End: 2020-12-04

## 2020-12-02 RX ADMIN — VALPROIC ACID 250 MG: 250 SOLUTION ORAL at 20:44

## 2020-12-02 RX ADMIN — Medication 10 ML: at 15:17

## 2020-12-02 RX ADMIN — PROPRANOLOL HYDROCHLORIDE 10 MG: 10 TABLET ORAL at 20:43

## 2020-12-02 RX ADMIN — THERA TABS 1 TABLET: TAB at 10:17

## 2020-12-02 RX ADMIN — HALOPERIDOL LACTATE 2 MG: 5 INJECTION, SOLUTION INTRAMUSCULAR at 17:28

## 2020-12-02 RX ADMIN — Medication 100 MG: at 10:17

## 2020-12-02 RX ADMIN — CALCIUM CARBONATE (ANTACID) CHEW TAB 500 MG 200 MG: 500 CHEW TAB at 06:06

## 2020-12-02 RX ADMIN — Medication 10 ML: at 06:00

## 2020-12-02 RX ADMIN — Medication 1 TABLET: at 10:17

## 2020-12-02 RX ADMIN — LITHIUM CARBONATE 450 MG: 450 TABLET, EXTENDED RELEASE ORAL at 10:17

## 2020-12-02 RX ADMIN — HALOPERIDOL 2 MG: 2 SOLUTION ORAL at 22:35

## 2020-12-02 RX ADMIN — CALCIUM CARBONATE (ANTACID) CHEW TAB 500 MG 200 MG: 500 CHEW TAB at 12:29

## 2020-12-02 RX ADMIN — PANTOPRAZOLE SODIUM 40 MG: 40 TABLET, DELAYED RELEASE ORAL at 06:05

## 2020-12-02 RX ADMIN — ASPIRIN 81 MG: 81 TABLET, CHEWABLE ORAL at 10:17

## 2020-12-02 RX ADMIN — FOLIC ACID 1 MG: 1 TABLET ORAL at 10:17
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
## 2020-12-02 NOTE — PROGRESS NOTES
PINA:  RUR: 11%  Disposition:  SNF    Referrals sent to the following facilities:    [address] [place]-denied  [facility]--denied  [facility]--concerns re yelling. Liaison will visit patient tomorrow morning  [facility] [place]--concerns re yelling. Liaison will visit patient tomorrow morning  [facility] [place]--denied. CM called  Rachell Juanfred (liaison with [facility]). Both facilities present concerns re patient's behaviors of yelling. Concern also presented liaison that patient is not participating consistently in therapy. Yoni of [employer]--??     CM continuing to follow.   Pretty Bloom-Marie, [address]

## 2020-12-02 NOTE — PROGRESS NOTES
Problem: Self Care Deficits Care Plan (Adult)  Goal: *Acute Goals and Plan of Care (Insert Text)  Description:   FUNCTIONAL STATUS PRIOR TO ADMISSION: Pt reports living alone, with walk-in shower with grab bar and shower chair. Chart indicates, pt's daughter has camera set-up to allow for distant Supervision. Pt states she uses Rollator. HOME SUPPORT: The patient lived with daughter to provide PRN assist.    Occupational Therapy Goals  Initiated 12/1/2020  1. Patient will perform RW grooming with supervision within 7 day(s). 2.  Patient will perform EOB/RW lower body dressing with supervision within 7 day(s). 3.  Patient will perform EOB/RW bathing with supervision within 7 day(s). 4.  Patient will perform toilet transfers with RW supervision within 7 day(s). 5.  Patient will perform all aspects of RW toileting with supervision within 7 day(s). Outcome: Not Progressing Towards Goal  OCCUPATIONAL THERAPY TREATMENT  Patient: Mathew Brantley (67 y.o. female)  Date: 12/2/2020  Diagnosis: AMS (altered mental status) [B39.21]   Acute metabolic encephalopathy       Precautions: Fall, Skin, Bed Alarm  Chart, occupational therapy assessment, plan of care, and goals were reviewed. ASSESSMENT  Patient continues with skilled OT services and is not progressing towards goals. Patient received in the bed. Stating \"I just want to go home\". Explained need to stay strong and be mobile with staff to stay strong for discharge. Patient perseverates on \"What's the point. I just want to go home\". Moved to sit, then stand at EOB with supervision overall. Refused hospital socks, the returned to supine in bed despite encouragement. Participation impacted by confusion, impaired orientation, impaired insight, impulsivity. Current Level of Function Impacting Discharge (ADLs): Set up to min assist inferred from mobility.     Other factors to consider for discharge: Lives alone         PLAN :  Patient continues to benefit from skilled intervention to address the above impairments. Continue treatment per established plan of care. to address goals. Recommend with staff: encourage participation in self care and mobility with assist    Recommend next OT session: participation in POC    Recommendation for discharge: (in order for the patient to meet his/her long term goals)  Therapy up to 5 days/week in SNF setting versus home with [facility] and 24/7 assist for cognition and safety    This discharge recommendation:  Has been made in collaboration with the attending provider and/or case management    IF patient discharges home will need the following DME: walker: rolling possibly       SUBJECTIVE:   Patient stated I just want to go home.     OBJECTIVE DATA SUMMARY:   Cognitive/Behavioral Status:  Neurologic State: Alert  Orientation Level: Disoriented to place; Disoriented to situation;Oriented to person  Cognition: Decreased attention/concentration;Decreased command following; Impaired decision making; Impulsive  Perception: Appears intact  Perseveration: Perseverates during conversation  Safety/Judgement: Decreased awareness of environment    Functional Mobility and Transfers for ADLs:  Bed Mobility:  Rolling: Modified independent  Supine to Sit: Supervision  Sit to Supine: Supervision    Transfers:  Sit to Stand: Contact guard assistance          Balance:  Sitting: Intact; Without support  Sitting - Static: Good (unsupported)  Sitting - Dynamic: Good (unsupported)  Standing - Static: Good;Constant support    ADL Intervention:        Cognitive Retraining  Orientation Retraining: Reorienting;Place  Organizing/Sequencing: Breaking task down  Attention to Task: Distractibility; Single task  Following Commands: Follows one step commands/directions; Other(comment)(intermittently with multimodal cues)  Safety/Judgement: Decreased awareness of environment  Cues: Verbal cues provided; Tactile cues provided;Visual cues provided      Activity Tolerance:   Fair    After treatment patient left in no apparent distress:   Supine in bed, Patient positioned in left sidelying for pressure relief, and Bed / chair alarm activated    COMMUNICATION/COLLABORATION:   The patients plan of care was discussed with: Registered nurse.      ROXI Burleson  Time Calculation: 10 mins

## 2020-12-02 NOTE — PROGRESS NOTES
Problem: Falls - Risk of  Goal: *Absence of Falls  Description: Document Thai Oneal Fall Risk and appropriate interventions in the flowsheet.   12/2/2020 1539 by Krystle Mishra RN  Outcome: Progressing Towards Goal  Note: Fall Risk Interventions:  Mobility Interventions: Bed/chair exit alarm, Communicate number of staff needed for ambulation/transfer, Patient to call before getting OOB, Utilize walker, cane, or other assistive device    Mentation Interventions: Adequate sleep, hydration, pain control, Bed/chair exit alarm, Door open when patient unattended, More frequent rounding, Reorient patient, Room close to nurse's station, Toileting rounds, Update white board    Medication Interventions: Bed/chair exit alarm, Evaluate medications/consider consulting pharmacy, Patient to call before getting OOB, Teach patient to arise slowly    Elimination Interventions: Bed/chair exit alarm, Call light in reach, Patient to call for help with toileting needs, Toileting schedule/hourly rounds    History of Falls Interventions: Bed/chair exit alarm, Door open when patient unattended, Room close to nurse's station      12/2/2020 1259 by Krystle Mishra RN  Outcome: Progressing Towards Goal  Note: Fall Risk Interventions:  Mobility Interventions: Bed/chair exit alarm, Communicate number of staff needed for ambulation/transfer, Patient to call before getting OOB, Utilize walker, cane, or other assistive device    Mentation Interventions: Adequate sleep, hydration, pain control, Bed/chair exit alarm, Door open when patient unattended, More frequent rounding, Reorient patient, Room close to nurse's station, Toileting rounds, Update white board    Medication Interventions: Bed/chair exit alarm, Evaluate medications/consider consulting pharmacy, Patient to call before getting OOB, Teach patient to arise slowly    Elimination Interventions: Bed/chair exit alarm, Call light in reach, Patient to call for help with toileting needs, Toileting schedule/hourly rounds    History of Falls Interventions: Bed/chair exit alarm, Door open when patient unattended, Room close to nurse's station         Problem: Patient Education: Go to Patient Education Activity  Goal: Patient/Family Education  12/2/2020 1539 by Thierno Hinson RN  Outcome: Progressing Towards Goal  12/2/2020 1259 by Thierno Hinson RN  Outcome: Progressing Towards Goal     Problem: Pressure Injury - Risk of  Goal: *Prevention of pressure injury  Description: Document Kris Scale and appropriate interventions in the flowsheet. 12/2/2020 1539 by Thierno Hinson RN  Outcome: Progressing Towards Goal  Note: Pressure Injury Interventions:  Sensory Interventions: Assess changes in LOC, Keep linens dry and wrinkle-free, Pressure redistribution bed/mattress (bed type), Minimize linen layers, Float heels, Maintain/enhance activity level, Turn and reposition approx. every two hours (pillows and wedges if needed)    Moisture Interventions: Absorbent underpads, Apply protective barrier, creams and emollients, Check for incontinence Q2 hours and as needed, Maintain skin hydration (lotion/cream), Minimize layers, Moisture barrier    Activity Interventions: Increase time out of bed, Pressure redistribution bed/mattress(bed type)    Mobility Interventions: Float heels, Pressure redistribution bed/mattress (bed type), HOB 30 degrees or less    Nutrition Interventions: Document food/fluid/supplement intake    Friction and Shear Interventions: Lift sheet, Minimize layers             12/2/2020 1259 by Thierno Hinson RN  Outcome: Progressing Towards Goal  Note: Pressure Injury Interventions:  Sensory Interventions: Assess changes in LOC, Keep linens dry and wrinkle-free, Pressure redistribution bed/mattress (bed type), Minimize linen layers, Float heels, Maintain/enhance activity level, Turn and reposition approx.  every two hours (pillows and wedges if needed)    Moisture Interventions: Absorbent underpads, Apply protective barrier, creams and emollients, Check for incontinence Q2 hours and as needed, Maintain skin hydration (lotion/cream), Minimize layers, Moisture barrier    Activity Interventions: Increase time out of bed, Pressure redistribution bed/mattress(bed type)    Mobility Interventions: Float heels, Pressure redistribution bed/mattress (bed type), HOB 30 degrees or less    Nutrition Interventions: Document food/fluid/supplement intake    Friction and Shear Interventions: Lift sheet, Minimize layers                Problem: Patient Education: Go to Patient Education Activity  Goal: Patient/Family Education  12/2/2020 1539 by Cindy Hirsch RN  Outcome: Progressing Towards Goal  12/2/2020 1259 by Cindy Hirsch RN  Outcome: Progressing Towards Goal     Problem: Patient Education: Go to Patient Education Activity  Goal: Patient/Family Education  12/2/2020 1539 by Cindy Hirsch RN  Outcome: Progressing Towards Goal  12/2/2020 1259 by Cindy Hirsch RN  Outcome: Progressing Towards Goal     Problem: Patient Education: Go to Patient Education Activity  Goal: Patient/Family Education  12/2/2020 1539 by Cindy Hirsch RN  Outcome: Progressing Towards Goal  12/2/2020 1259 by Cindy Hirsch RN  Outcome: Progressing Towards Goal

## 2020-12-02 NOTE — CONSULTS
Psychiatric Consultation      DATE OF EVALUATION:  12/2/2020    ATTENDING PHYSICIAN:  Dr. Arnold Slice:    Psychiatric consultation requested for lAlegra Rea to evaluate patient acute delirium. HISTORY OF PRESENT ILLNESS:  The patient, Allegra Rea, is a 67 y.o.,  female  with a [address] CVA 5/1/2019, Chronic Tremor, Chronic Anemia and Bipolar Disorder who presents with AMS and Acute Delirium, patient presented confused, delusional (and said someone else dumped the stool in her house) and had expressive aphasia from previous stroke in May, unable to get full history. Patient's daughter noticed the patient was walking around her house covered in stool and  confused. She was the last known normal on Thanksgiving day but had not had a significant change since then. Daughter said patient was refusing all help from her, living alone and managed her medication by herself. Per record she was taking lithium for many years and patient stated she takes daily as ordered. Per Kary last refill was in May with 90 day supply. Psychiatry asked to consult for behavior concerns of patient intermittent yelling from her room. 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  Authorizing Provider   lithium carbonate CR (ESKALITH CR) 450 mg CR tablet TAKE 1 TABLET BY MOUTH EVERY DAY 20   Leonela Kyle MD   primidone (MYSOLINE) 50 mg tablet Take 5 mg by mouth daily. Provider, Historical   hydrocodone-acetaminophen (NORCO) 5-325 mg per tablet Take  by mouth. Provider, Historical   vilazodone (VIIBRYD) 10 mg tab tablet Take 10 mg by mouth daily. Provider, Historical   estradiol (ESTRACE) 1 mg tablet Take 1 mg by mouth daily. Provider, Historical   multivitamin (ONE A DAY) tablet Take 1 Tab by mouth daily. Provider, Historical   calcium carbonate (TUMS) 200 mg calcium (500 mg) chew Take 1 Tab by mouth daily. Provider, Historical   Cholecalciferol, Vitamin D3, (VITAMIN D3) 1,000 unit cap Take  by mouth. Provider, Historical   zolpidem (AMBIEN) 5 mg tablet Take  by mouth nightly as needed for Sleep. Provider, Historical   sertraline (ZOLOFT) 100 mg tablet Take  by mouth daily. Provider, Historical   loratadine 10 mg cap Take  by mouth. Provider, Historical   omega-3 fatty acids-vitamin e (FISH OIL) 1,000 mg cap Take 1 Cap by mouth. Provider, Historical   omeprazole (PRILOSEC) 20 mg capsule Take 20 mg by mouth daily. Provider, Historical   traMADol (ULTRAM) 50 mg tablet Take 50 mg by mouth every six (6) hours as needed for Pain. Provider, Historical   ibuprofen (MOTRIN) 200 mg tablet Take  by mouth. Provider, Historical       ALLERGIES:  Allergies   Allergen Reactions    Lamisil [Terbinafine] Diarrhea and Nausea and Vomiting    Thorazine [Chlorpromazine] Rash        SOCIAL HISTORY:  Social History     Tobacco Use    Smoking status: Former Smoker     Packs/day: 0.25     Years: 10.00     Pack years: 2.50     Last attempt to quit: 1997     Years since quittin.4    Smokeless tobacco: Never Used   Substance Use Topics    Alcohol use:  Yes     Alcohol/week: 2.5 standard drinks     Types: 3 Glasses of wine per week       FAMILY HISTORY:  Family History   Problem Relation Age of Onset    Alzheimer Mother         REVIEW OF SYSTEMS:  The patient denies complaints of  depression, anxiety, delusions,   A/V hallucinations, suicidal ideation, homicidal ideation. Medical review of systems mainly considered negative. MENTAL STATUS EXAM:  Sensorium  confused   Orientation person and situation   Relations unreliable   Eye Contact appropriate   Appearance:  age appropriate   Motor Behavior:  gait unsteady   Speech:  Expressive aphasia   Vocabulary average   Thought Process: within normal limits   Thought Content not internally preoccupied    Suicidal ideations none   Homicidal ideations none   Mood:  stable   Affect:  mood-congruent   Memory recent  impaired   Memory remote:  impaired   Concentration:  impaired   Abstraction:  abstract   Insight:  Poor   Reliability Poor   Judgment:  Poor     ASSESSEMENT:  The patient is a 67 y.o.  female with a history of Past CVA and acute encephalopathy. I was asked to consult with her today due to delirium, increased confusion after patient was found by her daughter at home covered in stool. Prior to admission the patient was living at home alone with intermittent help from her daughter. The patient is alert with confusion. She is able to respond to some questions, adamant that she does not have depression or anxiety. She was able to tell me that she wants to get out of the hospital.  It was clear that she is having a lot of frustration surrounding her expressive aphasia and has a hard time when she is trying to explain something and is unable to find the words she is looking for. She becomes agitated and frustrated. PLAN:  Recommend to utilize scheduled haldol in the evening for delirium and D/C lorazepam as it can cause increased confusion in elderly. Thank you for the opportunity to participate in the care of your patient.                     SIGNED:    Darshan Charles NP  12/2/2020

## 2020-12-03 ENCOUNTER — APPOINTMENT (OUTPATIENT)
Dept: MRI IMAGING | Age: 75
DRG: 885 | End: 2020-12-03
Attending: NURSE PRACTITIONER
Payer: MEDICARE

## 2020-12-03 LAB
ALBUMIN SERPL-MCNC: 2.6 G/DL (ref 3.5–5)
ALBUMIN/GLOB SERPL: 0.7 {RATIO} (ref 1.1–2.2)
ALP SERPL-CCNC: 110 U/L (ref 45–117)
ALT SERPL-CCNC: 33 U/L (ref 12–78)
ANION GAP SERPL CALC-SCNC: 6 MMOL/L (ref 5–15)
AST SERPL-CCNC: 16 U/L (ref 15–37)
ATRIAL RATE: 77 BPM
BASOPHILS # BLD: 0.1 K/UL (ref 0–0.1)
BASOPHILS NFR BLD: 1 % (ref 0–1)
BILIRUB SERPL-MCNC: 0.3 MG/DL (ref 0.2–1)
BUN SERPL-MCNC: 7 MG/DL (ref 6–20)
BUN/CREAT SERPL: 11 (ref 12–20)
CALCIUM SERPL-MCNC: 9.4 MG/DL (ref 8.5–10.1)
CALCULATED P AXIS, ECG09: 39 DEGREES
CALCULATED R AXIS, ECG10: 20 DEGREES
CALCULATED T AXIS, ECG11: 25 DEGREES
CHLORIDE SERPL-SCNC: 110 MMOL/L (ref 97–108)
CO2 SERPL-SCNC: 25 MMOL/L (ref 21–32)
CREAT SERPL-MCNC: 0.64 MG/DL (ref 0.55–1.02)
DIAGNOSIS, 93000: NORMAL
DIFFERENTIAL METHOD BLD: ABNORMAL
EOSINOPHIL # BLD: 0.6 K/UL (ref 0–0.4)
EOSINOPHIL NFR BLD: 5 % (ref 0–7)
ERYTHROCYTE [DISTWIDTH] IN BLOOD BY AUTOMATED COUNT: 13.6 % (ref 11.5–14.5)
GLOBULIN SER CALC-MCNC: 4 G/DL (ref 2–4)
GLUCOSE SERPL-MCNC: 98 MG/DL (ref 65–100)
HCT VFR BLD AUTO: 32.7 % (ref 35–47)
HGB BLD-MCNC: 10.3 G/DL (ref 11.5–16)
IMM GRANULOCYTES # BLD AUTO: 0.1 K/UL (ref 0–0.04)
IMM GRANULOCYTES NFR BLD AUTO: 0 % (ref 0–0.5)
LYMPHOCYTES # BLD: 2.8 K/UL (ref 0.8–3.5)
LYMPHOCYTES NFR BLD: 23 % (ref 12–49)
MAGNESIUM SERPL-MCNC: 2 MG/DL (ref 1.6–2.4)
MCH RBC QN AUTO: 32.6 PG (ref 26–34)
MCHC RBC AUTO-ENTMCNC: 31.5 G/DL (ref 30–36.5)
MCV RBC AUTO: 103.5 FL (ref 80–99)
MONOCYTES # BLD: 0.6 K/UL (ref 0–1)
MONOCYTES NFR BLD: 5 % (ref 5–13)
NEUTS SEG # BLD: 7.8 K/UL (ref 1.8–8)
NEUTS SEG NFR BLD: 66 % (ref 32–75)
NRBC # BLD: 0 K/UL (ref 0–0.01)
NRBC BLD-RTO: 0 PER 100 WBC
P-R INTERVAL, ECG05: 162 MS
PHOSPHATE SERPL-MCNC: 2.9 MG/DL (ref 2.6–4.7)
PLATELET # BLD AUTO: 492 K/UL (ref 150–400)
PMV BLD AUTO: 8.9 FL (ref 8.9–12.9)
POTASSIUM SERPL-SCNC: 3.5 MMOL/L (ref 3.5–5.1)
PROT SERPL-MCNC: 6.6 G/DL (ref 6.4–8.2)
Q-T INTERVAL, ECG07: 408 MS
QRS DURATION, ECG06: 96 MS
QTC CALCULATION (BEZET), ECG08: 461 MS
RBC # BLD AUTO: 3.16 M/UL (ref 3.8–5.2)
SODIUM SERPL-SCNC: 141 MMOL/L (ref 136–145)
VENTRICULAR RATE, ECG03: 77 BPM
WBC # BLD AUTO: 12 K/UL (ref 3.6–11)

## 2020-12-03 PROCEDURE — 80053 COMPREHEN METABOLIC PANEL: CPT

## 2020-12-03 PROCEDURE — 36415 COLL VENOUS BLD VENIPUNCTURE: CPT

## 2020-12-03 PROCEDURE — 74011250637 HC RX REV CODE- 250/637: Performed by: HOSPITALIST

## 2020-12-03 PROCEDURE — 85025 COMPLETE CBC W/AUTO DIFF WBC: CPT

## 2020-12-03 PROCEDURE — 93005 ELECTROCARDIOGRAM TRACING: CPT

## 2020-12-03 PROCEDURE — 74011250637 HC RX REV CODE- 250/637: Performed by: NURSE PRACTITIONER

## 2020-12-03 PROCEDURE — 65270000029 HC RM PRIVATE

## 2020-12-03 PROCEDURE — 84100 ASSAY OF PHOSPHORUS: CPT

## 2020-12-03 PROCEDURE — 83735 ASSAY OF MAGNESIUM: CPT

## 2020-12-03 PROCEDURE — 74011250636 HC RX REV CODE- 250/636: Performed by: NURSE PRACTITIONER

## 2020-12-03 RX ADMIN — VALPROIC ACID 250 MG: 250 SOLUTION ORAL at 19:20

## 2020-12-03 RX ADMIN — THERA TABS 1 TABLET: TAB at 09:55

## 2020-12-03 RX ADMIN — Medication 10 ML: at 07:06

## 2020-12-03 RX ADMIN — CALCIUM CARBONATE (ANTACID) CHEW TAB 500 MG 200 MG: 500 CHEW TAB at 12:44

## 2020-12-03 RX ADMIN — Medication 10 ML: at 16:47

## 2020-12-03 RX ADMIN — VALPROIC ACID 250 MG: 250 SOLUTION ORAL at 09:55

## 2020-12-03 RX ADMIN — CALCIUM CARBONATE (ANTACID) CHEW TAB 500 MG 200 MG: 500 CHEW TAB at 16:46

## 2020-12-03 RX ADMIN — Medication 100 MG: at 09:55

## 2020-12-03 RX ADMIN — LORAZEPAM 1 MG: 2 INJECTION INTRAMUSCULAR; INTRAVENOUS at 04:58

## 2020-12-03 RX ADMIN — ASPIRIN 81 MG: 81 TABLET, CHEWABLE ORAL at 09:55

## 2020-12-03 RX ADMIN — Medication 1 TABLET: at 11:05

## 2020-12-03 RX ADMIN — PROPRANOLOL HYDROCHLORIDE 10 MG: 10 TABLET ORAL at 11:05

## 2020-12-03 RX ADMIN — PROPRANOLOL HYDROCHLORIDE 10 MG: 10 TABLET ORAL at 20:10

## 2020-12-03 RX ADMIN — FOLIC ACID 1 MG: 1 TABLET ORAL at 09:55

## 2020-12-03 NOTE — PROGRESS NOTES
Occupational therapy Note    OT spoke with RN prior to session and RN reports pt was up all night. Pt currently sleeping and RN advising to defer session to allow pt to sleep. Will follow up tomorrow as appropriate.     Thank you,  Bekah Quinonez OTR/L

## 2020-12-03 NOTE — PROGRESS NOTES
Placed in bilateral soft wrist restraints for combative behavior per daytime hospitalist note. When examined at 2320 last night, patient appeared to be resting quietly. Wrist restraints were loosely affixed bilaterally and not tied. Patient has full ROM upper extremities. Cursory evaluation of skin on hands wrists and forearms unremarkable. Recommend continuing wrist restraints as ordered but removing them when not necessary    This patient was stable at the time of my exam.  Please do not hesitate to call me again if her status does not improve or worsens.       Isabel Montelongo, MPH, MSN, RN, NP-C  [phone] or via [employer]

## 2020-12-03 NOTE — CONSULTS
[address]    Name:  Hemal Infante  MR#:  [id number]  :  1945  ACCOUNT #:  [de-identified]  DATE OF SERVICE:  2020    BEHAVIORAL HEALTH CONSULTATION    REASON FOR CONSULTATION:  Acute delirium. HISTORY OF PRESENTING ILLNESS:  The patient is a 41-year-old female who is currently being seen on the medical unit for psychiatric consultation, for complaints mentioned above. She is a poor historian. No history is obtained from her. She is irritable during the interview. Her past medical history is significant for bipolar disorder, CVA, chronic tremor, and anemia. She was initially sent to [place] ED for worsening mentation, disorientation, and delusion. Her daughter noted that the patient was walking around her house covered in stool and confused. She also was refusing all help from her daughter. She lives by herself. She has been taking lithium for many years. Currently, she is being treated for UTI and acute delirium. The moment I went into her room, she asked me if I am going to get her out of the hospital.  Reportedly, she has been kicking, screaming, and biting. She has been agitated and yelling in the unit. She was given Geodon for aggression. Tremors and confusion are noteworthy. She has been noncompliant with her medication and has been refusing her medications. Her lithium level was 0.57 on . I recommended medication adjustment, but she declined. She stated she would like to stay on lithium and prefers not to try other medication. I was not able to discuss medications with her because she became irritable and yelled at me to step out of the room. She also could not tell me who the prescriber is. PAST MEDICAL HISTORY:  See H and P.     Past Medical History:   Diagnosis Date    Anemia     Arthritis     Bipolar disorder ([facility] [address]     Burning with urination     Memory change     Psychiatric disorder     Bipolar Disorder    Stool color black  Tremor        Labs: (reviewed/updated 12/3/2020)  Patient Vitals for the past 8 hrs:   BP Temp Pulse Resp SpO2   12/03/20 0252 (!) 153/75 97.6 °F (36.4 °C) 74 16 97 %     Labs Reviewed   CBC WITH AUTOMATED DIFF - Abnormal; Notable for the following components:       Result Value    RBC 3.10 (*)     HGB 10.1 (*)     HCT 31.8 (*)     .6 (*)     IMMATURE GRANULOCYTES 2 (*)     ABS. IMM. GRANS. 0.2 (*)     All other components within normal limits   METABOLIC PANEL, COMPREHENSIVE - Abnormal; Notable for the following components:    BUN/Creatinine ratio 21 (*)     Alk. phosphatase 148 (*)     Protein, total 6.2 (*)     Albumin 2.3 (*)     A-G Ratio 0.6 (*)     All other components within normal limits   URINALYSIS W/MICROSCOPIC - Abnormal; Notable for the following components:    Appearance CLOUDY (*)     Protein 30 (*)     Blood LARGE (*)     Nitrites Positive (*)     Leukocyte Esterase SMALL (*)     RBC >100 (*)     Epithelial cells MODERATE (*)     Bacteria 4+ (*)     All other components within normal limits   LITHIUM - Abnormal; Notable for the following components:    Lithium level 0.57 (*)     All other components within normal limits   CBC WITH AUTOMATED DIFF - Abnormal; Notable for the following components:    RBC 3.02 (*)     HGB 10.0 (*)     HCT 30.9 (*)     .3 (*)     IMMATURE GRANULOCYTES 1 (*)     ABS. IMM. GRANS. 0.1 (*)     All other components within normal limits   IRON PROFILE - Abnormal; Notable for the following components:    Iron % saturation 19 (*)     All other components within normal limits   RBC, FOLATE - Abnormal; Notable for the following components:    HCT 28.8 (*)     All other components within normal limits   METABOLIC PANEL, COMPREHENSIVE - Abnormal; Notable for the following components:    Chloride 110 (*)     Alk.  phosphatase 148 (*)     Protein, total 5.7 (*)     Albumin 2.4 (*)     A-G Ratio 0.7 (*)     All other components within normal limits   SALICYLATE - Abnormal; Notable for the following components:    Salicylate level 2.4 (*)     All other components within normal limits   CBC WITH AUTOMATED DIFF - Abnormal; Notable for the following components:    RBC 3.13 (*)     HGB 10.2 (*)     HCT 33.3 (*)     .4 (*)     All other components within normal limits   METABOLIC PANEL, COMPREHENSIVE - Abnormal; Notable for the following components:    Chloride 113 (*)     CO2 18 (*)     Glucose 127 (*)     BUN/Creatinine ratio 9 (*)     Alk. phosphatase 130 (*)     Protein, total 5.7 (*)     Albumin 2.3 (*)     A-G Ratio 0.7 (*)     All other components within normal limits   CBC WITH AUTOMATED DIFF - Abnormal; Notable for the following components:    WBC 12.0 (*)     RBC 3.16 (*)     HGB 10.3 (*)     HCT 32.7 (*)     .5 (*)     PLATELET 463 (*)     ABS. EOSINOPHILS 0.6 (*)     ABS. IMM.  GRANS. 0.1 (*)     All other components within normal limits   METABOLIC PANEL, COMPREHENSIVE - Abnormal; Notable for the following components:    Chloride 110 (*)     BUN/Creatinine ratio 11 (*)     Albumin 2.6 (*)     A-G Ratio 0.7 (*)     All other components within normal limits   URINE CULTURE HOLD SAMPLE   CULTURE, URINE   CULTURE, BLOOD   CULTURE, URINE   TROPONIN I   LACTIC ACID   TSH 3RD GENERATION   SAMPLES BEING HELD   PHOSPHORUS   VITAMIN B12   FERRITIN   MAGNESIUM   ETHYL ALCOHOL   MAGNESIUM   PHOSPHORUS   HEMOGLOBIN A1C WITH EAG   MAGNESIUM   PHOSPHORUS     Lab Results   Component Value Date/Time    Sodium 141 12/03/2020 03:30 AM    Potassium 3.5 12/03/2020 03:30 AM    Chloride 110 (H) 12/03/2020 03:30 AM    CO2 25 12/03/2020 03:30 AM    Anion gap 6 12/03/2020 03:30 AM    Glucose 98 12/03/2020 03:30 AM    BUN 7 12/03/2020 03:30 AM    Creatinine 0.64 12/03/2020 03:30 AM    BUN/Creatinine ratio 11 (L) 12/03/2020 03:30 AM    GFR est AA >60 12/03/2020 03:30 AM    GFR est non-AA >60 12/03/2020 03:30 AM    Calcium 9.4 12/03/2020 03:30 AM    Bilirubin, total 0.3 12/03/2020 03:30 AM    Alk. phosphatase 110 12/03/2020 03:30 AM    Protein, total 6.6 12/03/2020 03:30 AM    Albumin 2.6 (L) 12/03/2020 03:30 AM    Globulin 4.0 12/03/2020 03:30 AM    A-G Ratio 0.7 (L) 12/03/2020 03:30 AM    ALT (SGPT) 33 12/03/2020 03:30 AM     No results displayed because visit has over 200 results. Vitals:    12/02/20 0851 12/02/20 1415 12/02/20 2035 12/03/20 0252   BP: 123/70 133/77 (!) 145/74 (!) 153/75   Pulse: 77 79 71 74   Resp: 16 16 18 16   Temp: 98.6 °F (37 °C) 97.7 °F (36.5 °C) 98.5 °F (36.9 °C) 97.6 °F (36.4 °C)   SpO2: 93% 95% 95% 97%   Weight:       Height:         Recent Results (from the past 24 hour(s))   CBC WITH AUTOMATED DIFF    Collection Time: 12/03/20  3:30 AM   Result Value Ref Range    WBC 12.0 (H) 3.6 - 11.0 K/uL    RBC 3.16 (L) 3.80 - 5.20 M/uL    HGB 10.3 (L) 11.5 - 16.0 g/dL    HCT 32.7 (L) 35.0 - 47.0 %    .5 (H) 80.0 - 99.0 FL    MCH 32.6 26.0 - 34.0 PG    MCHC 31.5 30.0 - 36.5 g/dL    RDW 13.6 11.5 - 14.5 %    PLATELET 100 (H) 803 - 400 K/uL    MPV 8.9 8.9 - 12.9 FL    NRBC 0.0 0  WBC    ABSOLUTE NRBC 0.00 0.00 - 0.01 K/uL    NEUTROPHILS 66 32 - 75 %    LYMPHOCYTES 23 12 - 49 %    MONOCYTES 5 5 - 13 %    EOSINOPHILS 5 0 - 7 %    BASOPHILS 1 0 - 1 %    IMMATURE GRANULOCYTES 0 0.0 - 0.5 %    ABS. NEUTROPHILS 7.8 1.8 - 8.0 K/UL    ABS. LYMPHOCYTES 2.8 0.8 - 3.5 K/UL    ABS. MONOCYTES 0.6 0.0 - 1.0 K/UL    ABS. EOSINOPHILS 0.6 (H) 0.0 - 0.4 K/UL    ABS. BASOPHILS 0.1 0.0 - 0.1 K/UL    ABS. IMM.  GRANS. 0.1 (H) 0.00 - 0.04 K/UL    DF AUTOMATED     MAGNESIUM    Collection Time: 12/03/20  3:30 AM   Result Value Ref Range    Magnesium 2.0 1.6 - 2.4 mg/dL   METABOLIC PANEL, COMPREHENSIVE    Collection Time: 12/03/20  3:30 AM   Result Value Ref Range    Sodium 141 136 - 145 mmol/L    Potassium 3.5 3.5 - 5.1 mmol/L    Chloride 110 (H) 97 - 108 mmol/L    CO2 25 21 - 32 mmol/L    Anion gap 6 5 - 15 mmol/L    Glucose 98 65 - 100 mg/dL    BUN 7 6 - 20 MG/DL    Creatinine 0. 64 0.55 - 1.02 MG/DL    BUN/Creatinine ratio 11 (L) 12 - 20      GFR est AA >60 >60 ml/min/1.73m2    GFR est non-AA >60 >60 ml/min/1.73m2    Calcium 9.4 8.5 - 10.1 MG/DL    Bilirubin, total 0.3 0.2 - 1.0 MG/DL    ALT (SGPT) 33 12 - 78 U/L    AST (SGOT) 16 15 - 37 U/L    Alk. phosphatase 110 45 - 117 U/L    Protein, total 6.6 6.4 - 8.2 g/dL    Albumin 2.6 (L) 3.5 - 5.0 g/dL    Globulin 4.0 2.0 - 4.0 g/dL    A-G Ratio 0.7 (L) 1.1 - 2.2     PHOSPHORUS    Collection Time: 12/03/20  3:30 AM   Result Value Ref Range    Phosphorus 2.9 2.6 - 4.7 MG/DL       RADIOLOGY REPORTS:  Results from Hospital Encounter encounter on 11/29/20   XR CHEST PORT    Narrative EXAM: XR CHEST PORT    INDICATION: fever    COMPARISON: None. FINDINGS: A portable AP radiograph of the chest was obtained at 14:46 hours. The  patient is on a cardiac monitor. The lungs are clear. The cardiac and  mediastinal contours and pulmonary vascularity are normal.  The chest wall  structures and visualized upper abdomen show no acute findings with incidental  note of degenerative spine and shoulder changes as well as partial visualization  of posterior windy and screw fixation hardware of the lumbar spine. Impression IMPRESSION: No acute findings. Ct Head Wo Cont    Result Date: 11/29/2020  CLINICAL HISTORY: Encephalopathy INDICATION: Encephalopathy COMPARISON: 9/7/2018. CT dose reduction was achieved through use of a standardized protocol tailored for this examination and automatic exposure control for dose modulation. TECHNIQUE: Serial axial images with a collimation of 5 mm were obtained from the skull base through the vertex  FINDINGS: There is sulcal and ventricular prominence. Confluent periventricular and scattered foci of hypodensity in the cerebral white matter. There is no evidence of an acute infarction, hemorrhage, or mass-effect. There is no evidence of midline shift or hydrocephalus. Posterior fossa structures are unremarkable.  No extra-axial collections are seen. Mastoid air cells are well pneumatized and clear. Hypodensity in the central ejrri. 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 IMPRESSION: No acute intracranial process. Small area of chronic encephalomalacia in the left frontal lobe. Imaging findings consistent with moderate chronic microvascular ischemic change. There is a mild degree of cerebral atrophy. Xr Chest Port    Result Date: 11/29/2020  EXAM: XR CHEST PORT INDICATION: fever COMPARISON: None. FINDINGS: A portable AP radiograph of the chest was obtained at 14:46 hours. The patient is on a cardiac monitor. The lungs are clear. The cardiac and mediastinal contours and pulmonary vascularity are normal.  The chest wall structures and visualized upper abdomen show no acute findings with incidental note of degenerative spine and shoulder changes as well as partial visualization of posterior windy and screw fixation hardware of the lumbar spine. IMPRESSION: No acute findings. PAST PSYCHIATRIC HISTORY:  She carries a diagnosis of bipolar disorder. She has been on lithium for many years. She was hospitalized at [facility], and she was diagnosed with bipolar disorder in the 66's. She has a history of 3 manic episodes. PSYCHOSOCIAL HISTORY:  She has a daughter. She lives by herself. ASSESSMENT AND PLANNING:  The patient carries a diagnosis of bipolar I disorder. Lithium is not the best medication given her age and worsening tremors and confusion. I attempted to talk to her about medication changes but she got angry and yelled at  me to step out of her room. I recommend to dc lithium and start her on depakote sprinkles or liquid 250 mg bid. She has done well with monotherapy lithium for many years, so I am hopeful her mood lability and agitation will improve with depakote alone. If it doesn't, please consider adding risperdal liquid 1mg at bedtime.  I also recommend inderal to start at 10mg bid for tremors if her medical history allows, if she's able to tolerate it consider increasing it to 20 mg bid after 3 days. I am hopeful that by discontinuing lithium her tremors will improved. If not, I do recommend neurology appointment outpatient to rule out any organic causes such as parkinson disease and also dementia for confusion. Geodon is also not a good option for p.r.n. medication. We can utilize Haldol and lorazepam both IM for increased agitation, psychosis. Studies show that lorazepam IM, short term, does not cause confusion or delirium in the elderly. Thank you for this consult. Please call with questions.       ARMAAN MART NP      [id number]  D:  12/02/2020 18:31  T:  12/02/2020 22:01  JOB #:  [id number]
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
## 2020-12-03 NOTE — PROGRESS NOTES
Spoke with nurse. Patient had a rough night and is currently sleeping. Not advised her wake her at this time. Will follow up tomorrow as appropriate. Thanks!     Sera Stevenson PT, DPT  Geriatric Clinical Specialist

## 2020-12-04 LAB
BACTERIA SPEC CULT: NORMAL
SERVICE CMNT-IMP: NORMAL

## 2020-12-04 PROCEDURE — 74011250637 HC RX REV CODE- 250/637: Performed by: HOSPITALIST

## 2020-12-04 PROCEDURE — 74011250636 HC RX REV CODE- 250/636: Performed by: HOSPITALIST

## 2020-12-04 PROCEDURE — 65270000029 HC RM PRIVATE

## 2020-12-04 PROCEDURE — 74011250637 HC RX REV CODE- 250/637: Performed by: NURSE PRACTITIONER

## 2020-12-04 RX ORDER — FOLIC ACID 1 MG/1
1 TABLET ORAL DAILY
Status: DISCONTINUED | OUTPATIENT
Start: 2020-12-04 | End: 2020-12-04 | Stop reason: SDUPTHER

## 2020-12-04 RX ORDER — RISPERIDONE 1 MG/1
1 TABLET, FILM COATED ORAL 2 TIMES DAILY
Status: DISCONTINUED | OUTPATIENT
Start: 2020-12-04 | End: 2020-12-14 | Stop reason: HOSPADM

## 2020-12-04 RX ORDER — GUAIFENESIN 100 MG/5ML
81 LIQUID (ML) ORAL DAILY
Status: DISCONTINUED | OUTPATIENT
Start: 2020-12-04 | End: 2020-12-04

## 2020-12-04 RX ADMIN — PANTOPRAZOLE SODIUM 40 MG: 40 TABLET, DELAYED RELEASE ORAL at 07:18

## 2020-12-04 RX ADMIN — CALCIUM CARBONATE (ANTACID) CHEW TAB 500 MG 200 MG: 500 CHEW TAB at 07:18

## 2020-12-04 RX ADMIN — THERA TABS 1 TABLET: TAB at 09:15

## 2020-12-04 RX ADMIN — ENOXAPARIN SODIUM 40 MG: 40 INJECTION SUBCUTANEOUS at 09:15

## 2020-12-04 RX ADMIN — Medication 100 MG: at 09:15

## 2020-12-04 RX ADMIN — HALOPERIDOL 2 MG: 2 SOLUTION ORAL at 00:53

## 2020-12-04 RX ADMIN — ASPIRIN 81 MG: 81 TABLET, CHEWABLE ORAL at 09:15

## 2020-12-04 RX ADMIN — VALPROIC ACID 250 MG: 250 SOLUTION ORAL at 09:18

## 2020-12-04 RX ADMIN — CALCIUM CARBONATE (ANTACID) CHEW TAB 500 MG 200 MG: 500 CHEW TAB at 11:10

## 2020-12-04 RX ADMIN — VALPROIC ACID 250 MG: 250 SOLUTION ORAL at 17:00

## 2020-12-04 RX ADMIN — TRAZODONE HYDROCHLORIDE 50 MG: 50 TABLET ORAL at 00:53

## 2020-12-04 RX ADMIN — PROPRANOLOL HYDROCHLORIDE 10 MG: 10 TABLET ORAL at 09:19

## 2020-12-04 RX ADMIN — Medication 10 ML: at 07:18

## 2020-12-04 RX ADMIN — Medication 1 TABLET: at 09:15

## 2020-12-04 RX ADMIN — FOLIC ACID 1 MG: 1 TABLET ORAL at 09:15

## 2020-12-04 RX ADMIN — RISPERIDONE 1 MG: 1 TABLET ORAL at 23:21

## 2020-12-04 RX ADMIN — HALOPERIDOL LACTATE 2 MG: 5 INJECTION, SOLUTION INTRAMUSCULAR at 09:26
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
## 2020-12-04 NOTE — PROGRESS NOTES
Physical Therapy    PT intervention deferred per RN request due to pt sleeping after being agitated/ combative; recommended do not wake. Will continue to follow.     Naveen Given, PT, MPT

## 2020-12-04 NOTE — PROGRESS NOTES
PINA:  RUR: 11%  Disposition:  SNF    CM spoke with communicated with patient's daughter Argentina Rubinstein) through text messages on yesterday. CM advised that patient's prior behaviors were limiting positive feedback from area snfs. Daughter aware that patient would need to be:  1.  restraint free for 24 hrs  2.  exhibiting appropriate behaviors  3.  actively participating with therapy to be considered for SNF rehab. CM asked daughter about visiting and she responded that she was high risk and unable to visit patient due to her health condition. CM visited with patient yesterday (approx 5:15pm). Patient was appropriate in conversation with this CM. She has difficulty in communicating her thoughts (due to previous CVA). CM continuing to follow.     Ila Jordan, [address] [facility]

## 2020-12-04 NOTE — CONSULTS
Lengthy discussion with patient's daughter, Lupe Saldaña, regarding medication recommendations. Educated her why depakote is a better option compared with lithium at this point. Patient has been confused and tremors has been ongoing. Daughter also educated about risperdal. Daughter gave verbal informed consent to continue depakote, to dc lithium and to trial risperdal 1mg bid. Changed made. Va level ordered on the 6th.

## 2020-12-04 NOTE — PROGRESS NOTES
Occupational Therapy: OT intervention deferred per RN request due to pt sleeping after being agitated ; recommended do not wake. Will continue to follow.   ROXI Jauregui/L

## 2020-12-04 NOTE — PROGRESS NOTES
Problem: Falls - Risk of  Goal: *Absence of Falls  Description: Document Francisco Ndiaye Fall Risk and appropriate interventions in the flowsheet. Outcome: Progressing Towards Goal  Note: Fall Risk Interventions:  Mobility Interventions: Bed/chair exit alarm, Patient to call before getting OOB, OT consult for ADLs, PT Consult for mobility concerns, PT Consult for assist device competence    Mentation Interventions: Bed/chair exit alarm, Door open when patient unattended, Adequate sleep, hydration, pain control, More frequent rounding, Reorient patient, Room close to nurse's station, Toileting rounds, Update white board    Medication Interventions: Bed/chair exit alarm, Evaluate medications/consider consulting pharmacy, Patient to call before getting OOB, Teach patient to arise slowly    Elimination Interventions: Bed/chair exit alarm, Call light in reach, Patient to call for help with toileting needs, Toileting schedule/hourly rounds    History of Falls Interventions: Bed/chair exit alarm, Consult care management for discharge planning, Door open when patient unattended, Room close to nurse's station         Problem: Patient Education: Go to Patient Education Activity  Goal: Patient/Family Education  Outcome: Progressing Towards Goal     Problem: Pressure Injury - Risk of  Goal: *Prevention of pressure injury  Description: Document Kris Scale and appropriate interventions in the flowsheet.   Outcome: Progressing Towards Goal  Note: Pressure Injury Interventions:  Sensory Interventions: Assess changes in LOC, Float heels, Keep linens dry and wrinkle-free, Minimize linen layers, Pressure redistribution bed/mattress (bed type)    Moisture Interventions: Absorbent underpads, Apply protective barrier, creams and emollients, Check for incontinence Q2 hours and as needed, Minimize layers, Maintain skin hydration (lotion/cream)    Activity Interventions: Pressure redistribution bed/mattress(bed type), PT/OT evaluation, Increase time out of bed    Mobility Interventions: Float heels, HOB 30 degrees or less, Pressure redistribution bed/mattress (bed type), PT/OT evaluation    Nutrition Interventions: Document food/fluid/supplement intake, Offer support with meals,snacks and hydration    Friction and Shear Interventions: Lift sheet, Minimize layers, Apply protective barrier, creams and emollients                Problem: Patient Education: Go to Patient Education Activity  Goal: Patient/Family Education  Outcome: Progressing Towards Goal     Problem: Patient Education: Go to Patient Education Activity  Goal: Patient/Family Education  Outcome: Progressing Towards Goal     Problem: Patient Education: Go to Patient Education Activity  Goal: Patient/Family Education  Outcome: Progressing Towards Goal     Problem: Non-Violent Restraints  Goal: *Removal from restraints as soon as assessed to be safe  Outcome: Resolved/Met  Goal: *No harm/injury to patient while restraints in use  Outcome: Resolved/Met  Goal: *Patient's dignity will be maintained  Outcome: Resolved/Met  Goal: *Patient Specific Goal (EDIT GOAL, INSERT TEXT)  Outcome: Resolved/Met  Goal: Non-violent Restaints:Standard Interventions  Outcome: Resolved/Met  Goal: Non-violent Restraints:Patient Interventions  Outcome: Resolved/Met  Goal: Patient/Family Education  Outcome: Resolved/Met

## 2020-12-05 PROCEDURE — 74011250636 HC RX REV CODE- 250/636: Performed by: HOSPITALIST

## 2020-12-05 PROCEDURE — 74011250637 HC RX REV CODE- 250/637: Performed by: NURSE PRACTITIONER

## 2020-12-05 PROCEDURE — 65270000029 HC RM PRIVATE

## 2020-12-05 PROCEDURE — 74011250637 HC RX REV CODE- 250/637: Performed by: HOSPITALIST

## 2020-12-05 RX ADMIN — ASPIRIN 81 MG: 81 TABLET, CHEWABLE ORAL at 09:03

## 2020-12-05 RX ADMIN — VALPROIC ACID 250 MG: 250 SOLUTION ORAL at 17:31

## 2020-12-05 RX ADMIN — ACETAMINOPHEN 650 MG: 325 TABLET ORAL at 06:16

## 2020-12-05 RX ADMIN — PANTOPRAZOLE SODIUM 40 MG: 40 TABLET, DELAYED RELEASE ORAL at 06:17

## 2020-12-05 RX ADMIN — RISPERIDONE 1 MG: 1 TABLET ORAL at 09:04

## 2020-12-05 RX ADMIN — VALPROIC ACID 250 MG: 250 SOLUTION ORAL at 09:07

## 2020-12-05 RX ADMIN — CALCIUM CARBONATE (ANTACID) CHEW TAB 500 MG 200 MG: 500 CHEW TAB at 13:09

## 2020-12-05 RX ADMIN — CALCIUM CARBONATE (ANTACID) CHEW TAB 500 MG 200 MG: 500 CHEW TAB at 08:59

## 2020-12-05 RX ADMIN — ENOXAPARIN SODIUM 40 MG: 40 INJECTION SUBCUTANEOUS at 09:07

## 2020-12-05 RX ADMIN — TRAZODONE HYDROCHLORIDE 50 MG: 50 TABLET ORAL at 22:25

## 2020-12-05 RX ADMIN — RISPERIDONE 1 MG: 1 TABLET ORAL at 22:25

## 2020-12-05 RX ADMIN — FOLIC ACID 1 MG: 1 TABLET ORAL at 09:00

## 2020-12-05 RX ADMIN — Medication 100 MG: at 09:03

## 2020-12-05 RX ADMIN — THERA TABS 1 TABLET: TAB at 09:03

## 2020-12-05 RX ADMIN — PROPRANOLOL HYDROCHLORIDE 10 MG: 10 TABLET ORAL at 17:32

## 2020-12-05 RX ADMIN — CALCIUM CARBONATE (ANTACID) CHEW TAB 500 MG 200 MG: 500 CHEW TAB at 16:10

## 2020-12-05 NOTE — BH NOTES
PSYCHIATRY CONSULT NOTE:    REASON FOR CONSULT:      HISTORY OF PRESENTING COMPLAINT:  Soraya Lovelace is a 67 y.o. WHITE OR  female who is currently admitted to the medical floor at [facility].   Patient seen on a consult. Reports she wants to go home. Mood is OK. No indication of avel. Does not feel depressed. Not oriented to time. Demonstrates significant expressive aphasia  Not fully aware of the reason for her hospitalization. Now on Depakote and Risperdal.  Tolerating these medications. Discussed on phone with daughter. Expresses concerns about mother's social situation- lives alone, unable to take her medications without direction. Worries gains made will be lost once patient is discharged home. PAST PSYCHIATRIC HISTORY and SUBSTANCE ABUSE HISTORY:        PAST MEDICAL HISTORY:  Please see H&P for details. Past Medical History:   Diagnosis Date    Anemia     Arthritis     Bipolar disorder ([address]     Burning with urination     Memory change     Psychiatric disorder     Bipolar Disorder    Stool color black     Tremor            Lab Results   Component Value Date/Time    WBC 12.0 (H) 12/03/2020 03:30 AM    HGB 10.3 (L) 12/03/2020 03:30 AM    HCT 32.7 (L) 12/03/2020 03:30 AM    PLATELET 457 (H) 67/00/9847 03:30 AM    .5 (H) 12/03/2020 03:30 AM      Lab Results   Component Value Date/Time    Sodium 141 12/03/2020 03:30 AM    Potassium 3.5 12/03/2020 03:30 AM    Chloride 110 (H) 12/03/2020 03:30 AM    CO2 25 12/03/2020 03:30 AM    Anion gap 6 12/03/2020 03:30 AM    Glucose 98 12/03/2020 03:30 AM    BUN 7 12/03/2020 03:30 AM    Creatinine 0.64 12/03/2020 03:30 AM    BUN/Creatinine ratio 11 (L) 12/03/2020 03:30 AM    GFR est AA >60 12/03/2020 03:30 AM    GFR est non-AA >60 12/03/2020 03:30 AM    Calcium 9.4 12/03/2020 03:30 AM    Bilirubin, total 0.3 12/03/2020 03:30 AM    Alk.  phosphatase 110 12/03/2020 03:30 AM    Protein, total 6.6 12/03/2020 03:30 AM    Albumin 2.6 (L) 12/03/2020 03:30 AM    Globulin 4.0 12/03/2020 03:30 AM    A-G Ratio 0.7 (L) 12/03/2020 03:30 AM    ALT (SGPT) 33 12/03/2020 03:30 AM          PSYCHOSOCIAL HISTORY:        MENTAL STATUS EXAM:    General appearance:  Well  groomed, psychomotor activity is WNL   Eye contact: Good eye contact  Speech: Spontaneous, soft, motor aphasia   Affect : Full range  Mood: \"Good \"  Thought Process: Blocking (due to aphasia)  Perception: Denies AH or VH. Thought Content: Denies SI/HI  Insight: Partial  Judgement: Fair  Cognition: Not oriented to time. ASSESSMENT AND PLAN:  Omaira Martinez meets criteria for a diagnosis of Bipolar disorder. Patient appears to be at her baseline. However, patient does not appear to have the capacity to make her medical decisions at this time. RECOMMENDATIONS  -Refer to daughter for medical decisions  -/came management consult for disposition planning. Thank you.

## 2020-12-05 NOTE — PROGRESS NOTES
Transition of Care Plan  RUR 15 %    Psychiatry to evaluate for patient's competency to make her own medical decisions. Daughter  Merlyn Kelley  [phone]    Cm met with patient in her room to discuss transition of care plan. Patient said she wants to return home when discharged. . She does not want to go to [facility]-- She said she has an aide 2x a week but could not give CM the name of her aide. The aide comes Tues and Thurs for 2 hours to take her to appointments. She said  does not have anyone who cooks or cleans for her. CM discussed with patient the benefits of going to [facility]  as patient is not able to ambulate on her own. Lieutenant Potts, with Isabelle Langford and Karen Robledo was planning to visit patient in the hospital, this week,  to assess her for SNF but canceled due to patient's inappropriate behaviors. Cm offered to have Sydnee Shafer reschedule as patient's behavior has been more appropriate -- She is no longer in restraints. Patient declined having the liaison come to the hospital.     Therapy has not worked with patient in a couple of days. . Previous notes recommend Snf -- Patient does live in a [facility] apartment alone. Patient needs to be seen again for recommendations     CM did talk with patient's daughter, Antonio Gonzalez, on the phone and Antonio Gonzalez is very concerned about patient going home without help. She feels patient needs rehab. She said she cannot take care of patient and wants patient to be safe at home. She has a history of turning \"helpers\" away. S She confirmed the  Tuesday and Thursdays for 2 hours. She does not do any housekeeping or cooking. Antonio Gonzalez has medical issues and cannot take care of patient. She is willing to investigate securing caretakers and as requested, CM emailed her a list of personal care agencies. She does not know about patient's finances but feels that if patient goes home, she will need caretakers.       Daughter did state again to CM that he feels patient needs rehab prior to returning home so that when she returns home, she will be safe. Daughter cannot care for patient due to her ow medical concerns. CM will follow and assist with transition of care planning.

## 2020-12-05 NOTE — PROGRESS NOTES
Hospitalist Progress Note    NAME: Mathew Brantley   :  1945   MRN:  [id number]       Assessment / Plan:  AMS/Acute delirium: multifactorial, likely not taking lithium as orderd. Should've run out in Aug, but no refill since then and worsened by UTI. -CT head: No acute intracranial process. Small area of chronic encephalomalacia in the left frontal lobe. Imaging findings consistent with moderate chronic microvascular ischemic change. There is a mild degree of cerebral atrophy. -lithium level: 0.57,d/c lithium: used since = tremor  -closely monitor  -Neuro consult: no acute needs  -ASA level: 2.4  -sitter  -Combative, biting: Geodon x1 then 12/3 start Haloperidol Decanotate 50mg IM monthly (held lithium/ refuses)  -MRI brain when stable  -Baseline EKG  -restraints  Psychiatry following: Discontinued long-acting haloperidol. Haloperidol 2 mg every 6 hours as needed for agitation.     Bipolar Disorder: diagnosed in the , hospitalized at that time at [facility]. She has been compliant with her meds since then. Daugther states patient has had x2 manic episodes since the . -Psych following: discussed with Psych NP Ifeoma Vinson plan of care: patient with hx of tremor on Lithium ling term- d/c. Haldol Dec 50mg IM x1- long acting, start haldol 2mg PO Q HS, Depakote 25mg BID po, propranolol will start at 10mg BID po- monitor.     Acute Metabolic Encephalopathy: Plan as above. .      UTI: low grade fever.  -Negative urine culture  -BCNGTD  -IVF's  -IV rocephin x2 doses     Chronic Anemia:    -B12:763, Iron:54,TIBC:288,Iron sat:19%, Ferritin: 91     Possible ETOH abuse: per history pt was drinking 3 times /week. Daughter is not sure if she is still drinking.    -CIWA protocol  -alcohol level: <10     DVTppx: Lovenox  Gippx: Protonix  Code Status: Full Code  Diet: Cardiac  Activity: OOB to chair TID and NEALN  [email or web address], ambulates independently, PT/OT eval     Subjective:     Chief Complaint / Reason for Physician Visit  \"More alert today\". Discussed with RN events overnight. Review of Systems:  Symptom Y/N Comments  Symptom Y/N Comments   Fever/Chills    Chest Pain     Poor Appetite    Edema     Cough    Abdominal Pain     Sputum    Joint Pain     SOB/SHAW    Pruritis/Rash     Nausea/vomit    Tolerating PT/OT     Diarrhea    Tolerating Diet     Constipation    Other       Could NOT obtain due to:      Objective:     VITALS:   Last 24hrs VS reviewed since prior progress note. Most recent are:  Patient Vitals for the past 24 hrs:   Temp Pulse Resp BP SpO2   12/05/20 0822 98.5 °F (36.9 °C) 98 18 123/66 96 %   12/05/20 0309 98.2 °F (36.8 °C) 97 17 (!) 147/79 94 %   12/04/20 1521 97.8 °F (36.6 °C) 67 18 135/72 96 %     No intake or output data in the 24 hours ending 12/05/20 1432     I had a face to face encounter and independently examined this patient on 12/5/2020, as outlined below:  PHYSICAL EXAM:  General: No acute distress, delirious  EENT:  EOMI. Anicteric sclerae. MMM  Resp:  CTA bilaterally, no wheezing or rales. No accessory muscle use  CV:  Regular  rhythm,  No edema  GI:  Soft, Non distended, Non tender. +Bowel sounds  Neurologic:  No deficit, occasionally agitated  Psych:   Episode of agitation. Skin:  No rashes. No jaundice    Reviewed most current lab test results and cultures  YES  Reviewed most current radiology test results   YES  Review and summation of old records today    NO  Reviewed patient's current orders and MAR    YES  PMH/SH reviewed - no change compared to H&P  ________________________________________________________________________  Care Plan discussed with:    Comments   Patient     Family      RN     Care Manager     Consultant                        Multidiciplinary team rounds were held today with , nursing, pharmacist and clinical coordinator. Patient's plan of care was discussed; medications were reviewed and discharge planning was addressed. ________________________________________________________________________  Total NON critical care TIME: 35  Minutes    Total CRITICAL CARE TIME Spent:   Minutes non procedure based      Comments   >50% of visit spent in counseling and coordination of care     ________________________________________________________________________  Maricarmen Portillo MD     Procedures: see electronic medical records for all procedures/Xrays and details which were not copied into this note but were reviewed prior to creation of Plan. LABS:  I reviewed today's most current labs and imaging studies.   Pertinent labs include:  Recent Labs     12/03/20  0330   WBC 12.0*   HGB 10.3*   HCT 32.7*   *     Recent Labs     12/03/20  0330      K 3.5   *   CO2 25   GLU 98   BUN 7   CREA 0.64   CA 9.4   MG 2.0   PHOS 2.9   ALB 2.6*   TBILI 0.3   ALT 33       Signed: Maricarmen Portillo MD

## 2020-12-06 PROCEDURE — 74011250636 HC RX REV CODE- 250/636: Performed by: HOSPITALIST

## 2020-12-06 PROCEDURE — 74011250637 HC RX REV CODE- 250/637: Performed by: NURSE PRACTITIONER

## 2020-12-06 PROCEDURE — 74011250637 HC RX REV CODE- 250/637: Performed by: HOSPITALIST

## 2020-12-06 PROCEDURE — 97530 THERAPEUTIC ACTIVITIES: CPT

## 2020-12-06 PROCEDURE — 97116 GAIT TRAINING THERAPY: CPT

## 2020-12-06 PROCEDURE — 65270000029 HC RM PRIVATE

## 2020-12-06 RX ADMIN — TRAZODONE HYDROCHLORIDE 50 MG: 50 TABLET ORAL at 22:38

## 2020-12-06 RX ADMIN — CALCIUM CARBONATE (ANTACID) CHEW TAB 500 MG 200 MG: 500 CHEW TAB at 17:26

## 2020-12-06 RX ADMIN — FOLIC ACID 1 MG: 1 TABLET ORAL at 09:39

## 2020-12-06 RX ADMIN — THERA TABS 1 TABLET: TAB at 09:38

## 2020-12-06 RX ADMIN — VALPROIC ACID 250 MG: 250 SOLUTION ORAL at 17:26

## 2020-12-06 RX ADMIN — VALPROIC ACID 250 MG: 250 SOLUTION ORAL at 09:40

## 2020-12-06 RX ADMIN — PANTOPRAZOLE SODIUM 40 MG: 40 TABLET, DELAYED RELEASE ORAL at 06:38

## 2020-12-06 RX ADMIN — RISPERIDONE 1 MG: 1 TABLET ORAL at 09:39

## 2020-12-06 RX ADMIN — PROPRANOLOL HYDROCHLORIDE 10 MG: 10 TABLET ORAL at 09:42

## 2020-12-06 RX ADMIN — CALCIUM CARBONATE (ANTACID) CHEW TAB 500 MG 200 MG: 500 CHEW TAB at 06:39

## 2020-12-06 RX ADMIN — ACETAMINOPHEN 650 MG: 325 TABLET ORAL at 18:23

## 2020-12-06 RX ADMIN — ASPIRIN 81 MG: 81 TABLET, CHEWABLE ORAL at 09:38

## 2020-12-06 RX ADMIN — Medication 100 MG: at 09:40

## 2020-12-06 RX ADMIN — Medication 1 TABLET: at 09:45

## 2020-12-06 RX ADMIN — CALCIUM CARBONATE (ANTACID) CHEW TAB 500 MG 200 MG: 500 CHEW TAB at 11:20

## 2020-12-06 RX ADMIN — ENOXAPARIN SODIUM 40 MG: 40 INJECTION SUBCUTANEOUS at 09:41

## 2020-12-06 RX ADMIN — RISPERIDONE 1 MG: 1 TABLET ORAL at 22:38

## 2020-12-06 RX ADMIN — Medication 10 ML: at 22:00

## 2020-12-06 RX ADMIN — PROPRANOLOL HYDROCHLORIDE 10 MG: 10 TABLET ORAL at 17:26

## 2020-12-06 NOTE — PROGRESS NOTES
Problem: Mobility Impaired (Adult and Pediatric)  Goal: *Acute Goals and Plan of Care (Insert Text)  Description: FUNCTIONAL STATUS PRIOR TO ADMISSION: Pt is a poor historian. Per chart review she was Mod Indep with rollator less than a week ago. Daughter has a camera to watch her mother throughout the day. HOME SUPPORT PRIOR TO ADMISSION: The patient lived alone with daughter to provide assistance. Physical Therapy Goals  Initiated 12/1/2020  1. Patient will move from supine to sit and sit to supine , scoot up and down, and roll side to side in bed with modified independence within 7 day(s). 2.  Patient will transfer from bed to chair and chair to bed with modified independence using the least restrictive device within 7 day(s). 3.  Patient will perform sit to stand with modified independence within 7 day(s). 4.  Patient will ambulate with modified independence for 200 feet with the least restrictive device within 7 day(s). 5.  Patient will ascend/descend 3 stairs with 1 handrail(s) with modified independence within 7 day(s). Outcome: Progressing Towards Goal   PHYSICAL THERAPY TREATMENT  Patient: Berenice Ledesma (89 y.o. female)  Date: 12/6/2020  Diagnosis: AMS (altered mental status) [S49.82]   Acute metabolic encephalopathy       Precautions: Fall, Skin, Bed Alarm  Chart, physical therapy assessment, plan of care and goals were reviewed. ASSESSMENT  Patient continues with skilled PT services and is progressing towards goals. Was asked to see pt today for possible discharge to home. She remains limited by aphasia (has from a previous CVA), impaired standing balance and some confusion. She was more agreeable to mobility than previous documentation indicates, but did require some encouragement to fully participate. She amb a total of 40 feet with walker support with one seated rest break. Post session left her up to the chair on an activated alarm pad.  Afterwards I heard it going off and PCT in to assist her. I discussed with her nurse recommending trying the chair to recline position to see if that makes pt more relaxed and less likely to attempt up out of the chair. It pt were to discharge to home as she presents today, I would recommend that someone be there with her to ensure her safety, still a high fall risk at this time. Current Level of Function Impacting Discharge (mobility/balance): up to min assist, amb with a rolling walker. Other factors to consider for discharge:  lives alone and psych history         PLAN :  Patient continues to benefit from skilled intervention to address the above impairments. Continue treatment per established plan of care. to address goals. Recommendation for discharge: (in order for the patient to meet his/her long term goals)  Therapy up to 5 days/week in SNF setting or intensive home health therapy program unless she has hands on assist for all of amb    This discharge recommendation:  A follow-up discussion with the attending provider and/or case management is planned    IF patient discharges home will need the following DME: patient owns DME required for discharge       SUBJECTIVE:   Patient stated I have aphasia.     OBJECTIVE DATA SUMMARY:   Chart checked pt cleared by nursing  Critical Behavior:  Neurologic State: Appropriate for age  Orientation Level: Disoriented to situation, Disoriented to time, Oriented to person, Oriented to place  Cognition: Impaired decision making  Safety/Judgement: Decreased awareness of environment  Functional Mobility Training:  Bed Mobility:     Supine to Sit: Supervision              Transfers:  Sit to Stand: Contact guard assistance  Stand to Sit: Stand-by assistance                             Balance:  Sitting: Intact; Without support  Standing: Impaired  Standing - Static: Constant support;Good  Standing - Dynamic : Constant support; Fair  Ambulation/Gait Training:  Distance (ft): 40 Feet (ft)(with one seated rest break)     Ambulation - Level of Assistance: Contact guard assistance(occasional min assist)        Gait Abnormalities: Antalgic;Decreased step clearance        Base of Support: Widened  Stance: Right decreased  Speed/Becky: Slow;Pace decreased (<100 feet/min)  Step Length: Left shortened;Right shortened                    Stairs: Therapeutic Exercises:     Pain Rating:  None rated    Activity Tolerance:   Good and requires rest breaks    After treatment patient left in no apparent distress:   Sitting in chair, Call bell within reach, and Bed / chair alarm activated    COMMUNICATION/COLLABORATION:   The patients plan of care was discussed with: Registered nurseMatt Olguin Line   Time Calculation: 29 mins

## 2020-12-06 NOTE — PROGRESS NOTES
Nurse attempted to draw morning labs (Valproic Acid level) and pt refused. Nurse tried multiple times to get pt to agree to labs but pt refused each time.

## 2020-12-06 NOTE — PROGRESS NOTES
Hospitalist Progress Note    NAME: Valeriy Pino   :  1945   MRN:  [id number]       Assessment / Plan:  AMS/Acute delirium: Multifactorial, likely not taking lithium as orderd. Should've run out in Aug, but no refill since then and worsened by UTI. -CT head: No acute intracranial process. Small area of chronic encephalomalacia in the left frontal lobe. Imaging findings consistent with moderate chronic microvascular ischemic change. There is a mild degree of cerebral atrophy. -lithium level: 0.57,d/c lithium: used since = tremor  -closely monitor  -Neuro consult: no acute needs  -ASA level: 2.4  -sitter  -Combative, biting: Geodon x1 then 12/3 start Haloperidol Decanotate 50mg IM monthly (held lithium/ refuses)  -MRI brain when stable  -Baseline EKG  -restraints  Psychiatry following: Discontinued long-acting haloperidol. Haloperidol 2 mg every 6 hours as needed for agitation.     Bipolar Disorder: Daughter insisted that patient needs to get lithium. She is on valproic acid Risperdal.     Acute Metabolic Encephalopathy: Improving     UTI: low grade fever.  -Negative urine culture  -BCNGTD  -IVF's  -IV rocephin x2 doses. Urine culture negative     Chronic Anemia:    -B12:763, Iron:54,TIBC:288,Iron sat:19%, Ferritin: 91     Possible ETOH abuse: per history pt was drinking 3 times /week. Daughter is not sure if she is still drinking. -[facility] protocol  -alcohol level: <10     DVTppx: Lovenox  Gippx: Protonix  Code Status: Full Code  Diet: Cardiac  Activity: OOB to chair TID and PRN  Discharge: Likely SNF on Monday discussed with case management regarding discharge planning. I consulted psychiatry day advised patient unable to make complex medical decision. Subjective:     Chief Complaint / Reason for Physician Visit  \"ok, denies any discomfort. \". Discussed with RN events overnight.      Review of Systems:  Symptom Y/N Comments  Symptom Y/N Comments   Fever/Chills    Chest Pain     Poor Appetite Edema     Cough    Abdominal Pain     Sputum    Joint Pain     SOB/SHAW    Pruritis/Rash     Nausea/vomit    Tolerating PT/OT     Diarrhea    Tolerating Diet     Constipation    Other       Could NOT obtain due to:      Objective:     VITALS:   Last 24hrs VS reviewed since prior progress note. Most recent are:  Patient Vitals for the past 24 hrs:   Temp Pulse Resp BP SpO2   12/06/20 0834 98.4 °F (36.9 °C) (!) 105 18 (!) 146/84 97 %   12/06/20 0224 98.1 °F (36.7 °C) 77 17 115/70 95 %   12/05/20 1938 98.9 °F (37.2 °C) 97 18 112/68 98 %   12/05/20 1513 98.6 °F (37 °C) 89 16 126/71 97 %     No intake or output data in the 24 hours ending 12/06/20 1450     I had a face to face encounter and independently examined this patient on 12/6/2020, as outlined below:  PHYSICAL EXAM:  General: No acute distress. EENT:  EOMI. Anicteric sclerae. MMM  Resp:  CTA bilaterally, no wheezing or rales. No accessory muscle use  CV:  Regular  rhythm,  No edema  GI:  Soft, Non distended, Non tender. +Bowel sounds  Neurologic:  Expressive dysphasia. Psych:   More alert today, not agitated or anxious  Skin:  No rashes. No jaundice    Reviewed most current lab test results and cultures  YES  Reviewed most current radiology test results   YES  Review and summation of old records today    NO  Reviewed patient's current orders and MAR    YES  PMH/ reviewed - no change compared to H&P  ________________________________________________________________________  Care Plan discussed with:    Comments   Patient     Family      RN     Care Manager     Consultant                        Multidiciplinary team rounds were held today with , nursing, pharmacist and clinical coordinator. Patient's plan of care was discussed; medications were reviewed and discharge planning was addressed.      ________________________________________________________________________  Total NON critical care TIME: 35  Minutes    Total CRITICAL CARE TIME Spent: Minutes non procedure based      Comments   >50% of visit spent in counseling and coordination of care     ________________________________________________________________________  Jabier Flores MD     Procedures: see electronic medical records for all procedures/Xrays and details which were not copied into this note but were reviewed prior to creation of Plan. LABS:  I reviewed today's most current labs and imaging studies. Pertinent labs include:  No results for input(s): WBC, HGB, HCT, PLT, HGBEXT, HCTEXT, PLTEXT, HGBEXT, HCTEXT, PLTEXT in the last 72 hours. No results for input(s): NA, K, CL, CO2, GLU, BUN, CREA, CA, MG, PHOS, ALB, TBIL, TBILI, ALT, INR, INREXT, INREXT in the last 72 hours.     No lab exists for component: SGOT    Signed: Jabier Flores MD

## 2020-12-07 LAB
ANION GAP SERPL CALC-SCNC: 6 MMOL/L (ref 5–15)
BASOPHILS # BLD: 0.1 K/UL (ref 0–0.1)
BASOPHILS NFR BLD: 1 % (ref 0–1)
BUN SERPL-MCNC: 12 MG/DL (ref 6–20)
BUN/CREAT SERPL: 16 (ref 12–20)
CALCIUM SERPL-MCNC: 9.9 MG/DL (ref 8.5–10.1)
CHLORIDE SERPL-SCNC: 108 MMOL/L (ref 97–108)
CO2 SERPL-SCNC: 26 MMOL/L (ref 21–32)
CREAT SERPL-MCNC: 0.73 MG/DL (ref 0.55–1.02)
DIFFERENTIAL METHOD BLD: ABNORMAL
EOSINOPHIL # BLD: 0.2 K/UL (ref 0–0.4)
EOSINOPHIL NFR BLD: 3 % (ref 0–7)
ERYTHROCYTE [DISTWIDTH] IN BLOOD BY AUTOMATED COUNT: 13.5 % (ref 11.5–14.5)
GLUCOSE SERPL-MCNC: 91 MG/DL (ref 65–100)
HCT VFR BLD AUTO: 36 % (ref 35–47)
HGB BLD-MCNC: 11.2 G/DL (ref 11.5–16)
IMM GRANULOCYTES # BLD AUTO: 0 K/UL (ref 0–0.04)
IMM GRANULOCYTES NFR BLD AUTO: 0 % (ref 0–0.5)
LYMPHOCYTES # BLD: 2.1 K/UL (ref 0.8–3.5)
LYMPHOCYTES NFR BLD: 31 % (ref 12–49)
MCH RBC QN AUTO: 32.8 PG (ref 26–34)
MCHC RBC AUTO-ENTMCNC: 31.1 G/DL (ref 30–36.5)
MCV RBC AUTO: 105.6 FL (ref 80–99)
MONOCYTES # BLD: 0.6 K/UL (ref 0–1)
MONOCYTES NFR BLD: 9 % (ref 5–13)
NEUTS SEG # BLD: 3.8 K/UL (ref 1.8–8)
NEUTS SEG NFR BLD: 56 % (ref 32–75)
NRBC # BLD: 0 K/UL (ref 0–0.01)
NRBC BLD-RTO: 0 PER 100 WBC
PLATELET # BLD AUTO: 496 K/UL (ref 150–400)
PMV BLD AUTO: 8.7 FL (ref 8.9–12.9)
POTASSIUM SERPL-SCNC: 3.9 MMOL/L (ref 3.5–5.1)
RBC # BLD AUTO: 3.41 M/UL (ref 3.8–5.2)
SODIUM SERPL-SCNC: 140 MMOL/L (ref 136–145)
VALPROATE SERPL-MCNC: 35 UG/ML (ref 50–100)
WBC # BLD AUTO: 6.7 K/UL (ref 3.6–11)

## 2020-12-07 PROCEDURE — 74011250637 HC RX REV CODE- 250/637: Performed by: NURSE PRACTITIONER

## 2020-12-07 PROCEDURE — 65270000029 HC RM PRIVATE

## 2020-12-07 PROCEDURE — 74011250636 HC RX REV CODE- 250/636: Performed by: HOSPITALIST

## 2020-12-07 PROCEDURE — 36415 COLL VENOUS BLD VENIPUNCTURE: CPT

## 2020-12-07 PROCEDURE — 80048 BASIC METABOLIC PNL TOTAL CA: CPT

## 2020-12-07 PROCEDURE — 74011250637 HC RX REV CODE- 250/637: Performed by: HOSPITALIST

## 2020-12-07 PROCEDURE — 85025 COMPLETE CBC W/AUTO DIFF WBC: CPT

## 2020-12-07 PROCEDURE — 80164 ASSAY DIPROPYLACETIC ACD TOT: CPT

## 2020-12-07 RX ADMIN — Medication 10 ML: at 20:49

## 2020-12-07 RX ADMIN — CALCIUM CARBONATE (ANTACID) CHEW TAB 500 MG 200 MG: 500 CHEW TAB at 17:50

## 2020-12-07 RX ADMIN — Medication 100 MG: at 10:45

## 2020-12-07 RX ADMIN — VALPROIC ACID 250 MG: 250 SOLUTION ORAL at 10:45

## 2020-12-07 RX ADMIN — PROPRANOLOL HYDROCHLORIDE 10 MG: 10 TABLET ORAL at 10:45

## 2020-12-07 RX ADMIN — ENOXAPARIN SODIUM 40 MG: 40 INJECTION SUBCUTANEOUS at 10:45

## 2020-12-07 RX ADMIN — FOLIC ACID 1 MG: 1 TABLET ORAL at 10:45

## 2020-12-07 RX ADMIN — PROPRANOLOL HYDROCHLORIDE 10 MG: 10 TABLET ORAL at 17:50

## 2020-12-07 RX ADMIN — Medication 1 TABLET: at 10:45

## 2020-12-07 RX ADMIN — PANTOPRAZOLE SODIUM 40 MG: 40 TABLET, DELAYED RELEASE ORAL at 06:48

## 2020-12-07 RX ADMIN — PROMETHAZINE HYDROCHLORIDE 12.5 MG: 25 TABLET ORAL at 18:51

## 2020-12-07 RX ADMIN — THERA TABS 1 TABLET: TAB at 10:45

## 2020-12-07 RX ADMIN — TRAZODONE HYDROCHLORIDE 50 MG: 50 TABLET ORAL at 20:48

## 2020-12-07 RX ADMIN — RISPERIDONE 1 MG: 1 TABLET ORAL at 20:48

## 2020-12-07 RX ADMIN — Medication 10 ML: at 13:46

## 2020-12-07 RX ADMIN — CALCIUM CARBONATE (ANTACID) CHEW TAB 500 MG 200 MG: 500 CHEW TAB at 08:00

## 2020-12-07 RX ADMIN — Medication 10 ML: at 06:48

## 2020-12-07 RX ADMIN — RISPERIDONE 1 MG: 1 TABLET ORAL at 10:45

## 2020-12-07 RX ADMIN — CALCIUM CARBONATE (ANTACID) CHEW TAB 500 MG 200 MG: 500 CHEW TAB at 06:48

## 2020-12-07 RX ADMIN — VALPROIC ACID 250 MG: 250 SOLUTION ORAL at 17:50

## 2020-12-07 RX ADMIN — ASPIRIN 81 MG: 81 TABLET, CHEWABLE ORAL at 10:45

## 2020-12-07 NOTE — PROGRESS NOTES
Chart checked, pt cleared by nursing. Pt received up to the chair politely yet adamantly declining to work with me stating \"It's crappy\" multiple times as well as stating \"Your little things don't mean as much to me\". Her affect was flat, unlike yesterday when it was much brighter. Despite encouragement, I was unable to get her to participate in PT session.  Stephanie Rodriges, PT
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
## 2020-12-07 NOTE — PROGRESS NOTES
Comprehensive Nutrition Assessment    Type and Reason for Visit: Initial, RD nutrition re-screen/LOS      Nutrition Recommendations/Plan:    1. Regular diet with trial of ONS Magic Cup and Ensure Enlive  2. Please obtain new weight      Nutrition Assessment:     67 y.o. female who has a past medical history of Anemia, Arthritis, Bipolar disorder ([address] Burning with urination, Memory change, Psychiatric disorder, Stool color black, and Tremor. Admitted with AMS (altered mental status) [R41.82]    Pt screened for LOS. Noted psych following (notes unavailable), but making adjustments to medications. Pt with some delirium today. Noted to have chucked some of her food in trash. Unreceptive to talking to this staff. No overt nutritional concerns per nursing. PO intake variable and r/t mental status. Will add ONS Magic Cup and Ensure Enlive once daily each for pt to try/ see if receptive. Adjust diet to regular to allow more options. Continue to monitor while IP. Malnutrition Assessment:  Malnutrition Status:  Insufficient data      Nutritionally Significant Medications:   Tums, Vit D, folvite, Protonix, MVI, thiamine      Estimated Daily Nutrient Needs:  Energy (kcal): 1702-7296(MSJ x 1.2-1.3)  Protein (g): 75(1 gm/kg)  Fluid (ml/day): 1 mL/kcal    Nutrition Related Findings:   Edema: none  Last BM: 12/4    Wounds:    None       Current Nutrition Therapies:  DIET CARDIAC Regular    Meal intake:   Patient Vitals for the past 168 hrs:   % Diet Eaten   12/07/20 1342 80 %   12/02/20 0851 10 %         Anthropometric Measures:  · Height:  5' 2\" (157.5 cm)  · Current Body Wt:  75.4 kg (166 lb 3.6 oz)   · Admission Body Wt:  166 lb 3.6 oz(75.4 kg - bed)    · Usual Body Wt:      ?unknown  · Ideal Body Wt:  110 lbs:  151.1 %    · BMI Category:  Obese class 1 (BMI 30.0-34. 9)       Wt Readings from Last 20 Encounters:   11/29/20 75.4 kg (166 lb 3.6 oz)   09/07/18 77.6 kg (171 lb)   09/06/16 69.4 kg (152 lb 14.4 oz) 08/03/15 81.3 kg (179 lb 3.2 oz)   09/30/14 80.1 kg (176 lb 8 oz)   09/29/14 80.4 kg (177 lb 4.8 oz)   09/19/14 80.3 kg (177 lb)   08/07/14 80.5 kg (177 lb 6.4 oz)   07/11/14 78.3 kg (172 lb 9.6 oz)         Nutrition Diagnosis:   · (suboptimal oral intake) related to cognitive or neurological impairment as evidenced by intake 26-50%, intake 0-25%, intake 51-75%(variable PO intake)      Nutrition Interventions:   Food and/or Nutrient Delivery: Modify current diet, Start oral nutrition supplement  Nutrition Education and Counseling: No recommendations at this time  Coordination of Nutrition Care: Continue to monitor while inpatient, Feeding assistance/environmental change    Goals:  PO >/=50% of most meals over next 5-7 days       Nutrition Monitoring and Evaluation:   Behavioral-Environmental Outcomes: Beliefs and attitudes  Food/Nutrient Intake Outcomes: Food and nutrient intake, Supplement intake  Physical Signs/Symptoms Outcomes: Biochemical data, GI status, Weight, Meal time behavior    Discharge Planning: Too soon to determine     Recent Labs     12/07/20  0304   GLU 91   BUN 12   CREA 0.73      K 3.9      CO2 26   CA 9.9       Recent Labs     12/08/20  0244 12/07/20  0304   WBC 9.8 6.7   HGB 11.2* 11.2*   HCT 34.9* 36.0    496*       No results for input(s): PREALB in the last 72 hours. No results for input(s): TRIGL in the last 72 hours. No results for input(s): GLUCPOC in the last 72 hours.     Lab Results   Component Value Date/Time    Hemoglobin A1c 5.2 12/01/2020 11:10 AM       Iron Profile  Iron   Date Value Ref Range Status   11/30/2020 54 35 - 150 ug/dL Final     TIBC   Date Value Ref Range Status   11/30/2020 288 250 - 450 ug/dL Final     Iron % saturation   Date Value Ref Range Status   11/30/2020 19 (L) 20 - 50 % Final       Ferritin   Date Value Ref Range Status   11/30/2020 91 26 - 388 NG/ML Final         Justina Canchola, RD  Available via [employer]  Or Pager# [phone]

## 2020-12-07 NOTE — PROGRESS NOTES
Occupational Therapy: Noted, per emerita miller that patient declining therapy. Will follow.   Lawence Skill, DIANE/L

## 2020-12-08 LAB
BASOPHILS # BLD: 0.1 K/UL (ref 0–0.1)
BASOPHILS NFR BLD: 1 % (ref 0–1)
DIFFERENTIAL METHOD BLD: ABNORMAL
EOSINOPHIL # BLD: 0.2 K/UL (ref 0–0.4)
EOSINOPHIL NFR BLD: 2 % (ref 0–7)
ERYTHROCYTE [DISTWIDTH] IN BLOOD BY AUTOMATED COUNT: 15 % (ref 11.5–14.5)
HCT VFR BLD AUTO: 34.9 % (ref 35–47)
HGB BLD-MCNC: 11.2 G/DL (ref 11.5–16)
IMM GRANULOCYTES # BLD AUTO: 0 K/UL (ref 0–0.04)
IMM GRANULOCYTES NFR BLD AUTO: 0 % (ref 0–0.5)
LYMPHOCYTES # BLD: 2.1 K/UL (ref 0.8–3.5)
LYMPHOCYTES NFR BLD: 21 % (ref 12–49)
MCH RBC QN AUTO: 33.5 PG (ref 26–34)
MCHC RBC AUTO-ENTMCNC: 32.1 G/DL (ref 30–36.5)
MCV RBC AUTO: 104.5 FL (ref 80–99)
MONOCYTES # BLD: 0.8 K/UL (ref 0–1)
MONOCYTES NFR BLD: 8 % (ref 5–13)
NEUTS SEG # BLD: 6.6 K/UL (ref 1.8–8)
NEUTS SEG NFR BLD: 68 % (ref 32–75)
NRBC # BLD: 0 K/UL (ref 0–0.01)
NRBC BLD-RTO: 0 PER 100 WBC
PLATELET # BLD AUTO: 357 K/UL (ref 150–400)
PMV BLD AUTO: 11.1 FL (ref 8.9–12.9)
RBC # BLD AUTO: 3.34 M/UL (ref 3.8–5.2)
WBC # BLD AUTO: 9.8 K/UL (ref 3.6–11)

## 2020-12-08 PROCEDURE — 36415 COLL VENOUS BLD VENIPUNCTURE: CPT

## 2020-12-08 PROCEDURE — 85025 COMPLETE CBC W/AUTO DIFF WBC: CPT

## 2020-12-08 PROCEDURE — 65270000029 HC RM PRIVATE

## 2020-12-08 PROCEDURE — 74011250637 HC RX REV CODE- 250/637: Performed by: NURSE PRACTITIONER

## 2020-12-08 PROCEDURE — 74011250637 HC RX REV CODE- 250/637: Performed by: HOSPITALIST

## 2020-12-08 PROCEDURE — 74011250636 HC RX REV CODE- 250/636: Performed by: HOSPITALIST

## 2020-12-08 RX ORDER — VALPROIC ACID 250 MG/5ML
250 SOLUTION ORAL 3 TIMES DAILY
Status: DISCONTINUED | OUTPATIENT
Start: 2020-12-08 | End: 2020-12-14 | Stop reason: HOSPADM

## 2020-12-08 RX ORDER — MIRTAZAPINE 15 MG/1
7.5 TABLET, FILM COATED ORAL
Status: DISCONTINUED | OUTPATIENT
Start: 2020-12-08 | End: 2020-12-10

## 2020-12-08 RX ADMIN — PANTOPRAZOLE SODIUM 40 MG: 40 TABLET, DELAYED RELEASE ORAL at 07:20

## 2020-12-08 RX ADMIN — RISPERIDONE 1 MG: 1 TABLET ORAL at 11:00

## 2020-12-08 RX ADMIN — ASPIRIN 81 MG: 81 TABLET, CHEWABLE ORAL at 10:59

## 2020-12-08 RX ADMIN — VALPROIC ACID 250 MG: 250 SOLUTION ORAL at 11:00

## 2020-12-08 RX ADMIN — MIRTAZAPINE 7.5 MG: 15 TABLET, FILM COATED ORAL at 21:02

## 2020-12-08 RX ADMIN — Medication 100 MG: at 10:59

## 2020-12-08 RX ADMIN — Medication 10 ML: at 07:21

## 2020-12-08 RX ADMIN — ENOXAPARIN SODIUM 40 MG: 40 INJECTION SUBCUTANEOUS at 11:00

## 2020-12-08 RX ADMIN — PROPRANOLOL HYDROCHLORIDE 10 MG: 10 TABLET ORAL at 11:00

## 2020-12-08 RX ADMIN — THERA TABS 1 TABLET: TAB at 11:00

## 2020-12-08 RX ADMIN — VALPROIC ACID 250 MG: 250 SOLUTION ORAL at 17:22

## 2020-12-08 RX ADMIN — VALPROIC ACID 250 MG: 250 SOLUTION ORAL at 21:02

## 2020-12-08 RX ADMIN — CALCIUM CARBONATE (ANTACID) CHEW TAB 500 MG 200 MG: 500 CHEW TAB at 07:21

## 2020-12-08 RX ADMIN — FOLIC ACID 1 MG: 1 TABLET ORAL at 10:59

## 2020-12-08 RX ADMIN — RISPERIDONE 1 MG: 1 TABLET ORAL at 20:58

## 2020-12-08 RX ADMIN — PROPRANOLOL HYDROCHLORIDE 10 MG: 10 TABLET ORAL at 17:22

## 2020-12-08 RX ADMIN — Medication 10 ML: at 21:06

## 2020-12-08 NOTE — ROUTINE PROCESS
Pt refused redraw of AM labs. We started a new IV and got labs but one hemolyzed and pt said \"I don't want to be bother again\".

## 2020-12-08 NOTE — CONSULTS
Psychiatry Consult Follow Up Note    Reason for consult: follow up, med mgt  Please see full consult note written on 12/2/20    IMPRESSION:   Ms. Tejinder Melgar appears better compared to the last time I saw her. She smiled and talked to me during the interview. She also was calm and pleasant but confused. However, her sitter at bedside shares she has periods of lability and remains very confused. She also threw a milk carton at her nurse earlier today, when I asked her about this, she smiled, \"that was good, wasn't it? The nurses are Kenneth Dhillon. \" While she has periods of lability, she has not been combative, no prn im medication has been given in several days which shows progress. Va level yesterday was subtherapeutic at 35. Tremors appear to be better as well. She denies si hi or avh. Sleep varies, doesn't sleep the whole night, \"the nurses always come here at night. \"    I will try to call her daughter Mitzi Evans tomorrow. RECCS:    1. Increase depakene to 250mg tid. 2. Continue risperdal and inderal.  3. Start remeron 7.5mg qhs.  4. Trazodone prn is being utilized.     -------------------------------------------------------------------------------------------------------------------  Clinical summary of events since last encounter:    Meds:  Current Facility-Administered Medications   Medication Dose Route Frequency    valproic acid (as sodium salt) (DEPAKENE) 250 mg/5 mL (5 mL) oral solution 250 mg  250 mg Oral TID    influenza vaccine 2020-21 (6 mos+)(PF) (FLUARIX/FLULAVAL/FLUZONE QUAD) injection 0.5 mL  0.5 mL IntraMUSCular PRIOR TO DISCHARGE    risperiDONE (RisperDAL) tablet 1 mg  1 mg Oral BID    propranolol (INDERAL) 1 mg/mL oral suspension 10 mg  10 mg Oral BID    folic acid (FOLVITE) tablet 1 mg  1 mg Oral DAILY    therapeutic multivitamin (THERAGRAN) tablet 1 Tab  1 Tab Oral DAILY    thiamine HCL (B-1) tablet 100 mg  100 mg Oral DAILY    sodium chloride (NS) flush 5-40 mL  5-40 mL IntraVENous Q8H    sodium chloride (NS) flush 5-40 mL  5-40 mL IntraVENous PRN    acetaminophen (TYLENOL) tablet 650 mg  650 mg Oral Q6H PRN    Or    acetaminophen (TYLENOL) suppository 650 mg  650 mg Rectal Q6H PRN    polyethylene glycol (MIRALAX) packet 17 g  17 g Oral DAILY PRN    promethazine (PHENERGAN) tablet 12.5 mg  12.5 mg Oral Q6H PRN    Or    ondansetron (ZOFRAN) injection 4 mg  4 mg IntraVENous Q6H PRN    enoxaparin (LOVENOX) injection 40 mg  40 mg SubCUTAneous DAILY    cholecalciferol (VITAMIN D3) (1000 Units /25 mcg) tablet 1 Tab  1,000 Units Oral DAILY    calcium carbonate (TUMS) chewable tablet 200 mg [elemental]  200 mg Oral TID WITH MEALS    pantoprazole (PROTONIX) tablet 40 mg  40 mg Oral ACB    aspirin chewable tablet 81 mg  81 mg Oral DAILY    traZODone (DESYREL) tablet 50 mg  50 mg Oral QHS PRN    haloperidol lactate (HALDOL) injection 2 mg  2 mg IntraMUSCular Q6H PRN         Vital Signs:  Blood pressure 132/66, pulse 86, temperature 97.4 °F (36.3 °C), resp. rate 18, height 5' 2\" (1.575 m), weight 75.4 kg (166 lb 3.6 oz), SpO2 99 %. Labs: (reviewed/updated 12/8/2020)  Recent Results (from the past 24 hour(s))   CBC WITH AUTOMATED DIFF    Collection Time: 12/08/20  2:44 AM   Result Value Ref Range    WBC 9.8 3.6 - 11.0 K/uL    RBC 3.34 (L) 3.80 - 5.20 M/uL    HGB 11.2 (L) 11.5 - 16.0 g/dL    HCT 34.9 (L) 35.0 - 47.0 %    .5 (H) 80.0 - 99.0 FL    MCH 33.5 26.0 - 34.0 PG    MCHC 32.1 30.0 - 36.5 g/dL    RDW 15.0 (H) 11.5 - 14.5 %    PLATELET 039 080 - 830 K/uL    MPV 11.1 8.9 - 12.9 FL    NRBC 0.0 0  WBC    ABSOLUTE NRBC 0.00 0.00 - 0.01 K/uL    NEUTROPHILS 68 32 - 75 %    LYMPHOCYTES 21 12 - 49 %    MONOCYTES 8 5 - 13 %    EOSINOPHILS 2 0 - 7 %    BASOPHILS 1 0 - 1 %    IMMATURE GRANULOCYTES 0 0.0 - 0.5 %    ABS. NEUTROPHILS 6.6 1.8 - 8.0 K/UL    ABS. LYMPHOCYTES 2.1 0.8 - 3.5 K/UL    ABS. MONOCYTES 0.8 0.0 - 1.0 K/UL    ABS. EOSINOPHILS 0.2 0.0 - 0.4 K/UL    ABS.  BASOPHILS 0.1 0.0 - 0.1 K/UL    ABS. IMM. GRANS. 0.0 0.00 - 0.04 K/UL    DF AUTOMATED       Lab Results   Component Value Date/Time    Valproic acid 35 (L) 12/07/2020 03:04 AM     Lab Results   Component Value Date/Time    Lithium level 0.57 (L) 11/29/2020 05:27 PM       Radiology (reviewed/updated 12/8/2020)  Ct Head Wo Cont    Result Date: 11/29/2020  CLINICAL HISTORY: Encephalopathy INDICATION: Encephalopathy COMPARISON: 9/7/2018. CT dose reduction was achieved through use of a standardized protocol tailored for this examination and automatic exposure control for dose modulation. TECHNIQUE: Serial axial images with a collimation of 5 mm were obtained from the skull base through the vertex  FINDINGS: There is sulcal and ventricular prominence. Confluent periventricular and scattered foci of hypodensity in the cerebral white matter. There is no evidence of an acute infarction, hemorrhage, or mass-effect. There is no evidence of midline shift or hydrocephalus. Posterior fossa structures are unremarkable. No extra-axial collections are seen. Mastoid air cells are well pneumatized and clear. Hypodensity in the central jerri. 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 IMPRESSION: No acute intracranial process. Small area of chronic encephalomalacia in the left frontal lobe. Imaging findings consistent with moderate chronic microvascular ischemic change. There is a mild degree of cerebral atrophy. Xr Chest Port    Result Date: 11/29/2020  EXAM: XR CHEST PORT INDICATION: fever COMPARISON: None. FINDINGS: A portable AP radiograph of the chest was obtained at 14:46 hours. The patient is on a cardiac monitor. The lungs are clear.  The cardiac and mediastinal contours and pulmonary vascularity are normal.  The chest wall structures and visualized upper abdomen show no acute findings with incidental note of degenerative spine and shoulder changes as well as partial visualization of posterior windy and screw fixation hardware of the lumbar spine. IMPRESSION: No acute findings. Mental Status Exam:  General appearance: Sergo Fofana is a 67 y.o. WHITE OR  female groomed, psychomotor activity is wnl   Eye contact: good eye contact  Speech: Spontaneous  Affect : mildly constricted  Mood: \"good\"  Thought Process: Logical  Perception: Denies AH or VH. Thought Content: denies SI or Plan  Insight: Poor  Judgement: poor  Cognition: Impaired        Length of psychotherapy session: 35 minutes     Total Patient Care Time Spent: 35 minutes : (Coordinated treatment team rounds conducted with patient present; discussions with nurses, , pharmacist,  held; counseling time with patient, individual psychotherapy with patient; and discussions with family members; chart reviewed in full including consultant notes, ancillary staff notes, vitals and labs reviewed in full).     Greater than 50% of total patient care time included counseling    Signed:  Carmen Singh NP  12/8/2020
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
## 2020-12-08 NOTE — PROGRESS NOTES
Pt observed chewing on a medication capsul that was not administered by staff. When questioned the pt stated that it was a compazine capsule that she brought to the hospital. The pt gave the remainder of her medication to staff who locked it in the drawr in the pt's room.

## 2020-12-08 NOTE — PROGRESS NOTES
PINA:  RUR: 15%  Disposition:  SNF    CM will be re-sending referrals to facilities tomorrow morning for reconsideration. Most have denied referrals due to previous behaviors. Patient is not safe to return home alone and can't live with her daughter. CM has been in communication with daughter Ruiz Arteaga) daily by either text or phone call. CM received  From Miladys Freitas document (Report of Incapacity Evaluation) which needed completion. Daughter is trying to position herself to assist patient with financial decision-making and at this time does not have the legal authority to do so. CM discussed and reviewed with Dr. Juvenal Sarah, however he stated he was not comfortable in completing this document. CM will leave form on hard chart and ask if the Psychiatrist would complete this form. CM suggested to Miladys Freitas, that she should consider assisted living following patient's snf rehab stay. CM forwarding information re [facility] [employer] to the daughter. CM continuing to follow.   Madeline Staff, BSW, CRM

## 2020-12-09 PROCEDURE — 74011250637 HC RX REV CODE- 250/637: Performed by: NURSE PRACTITIONER

## 2020-12-09 PROCEDURE — 65270000029 HC RM PRIVATE

## 2020-12-09 PROCEDURE — 74011250636 HC RX REV CODE- 250/636: Performed by: HOSPITALIST

## 2020-12-09 PROCEDURE — 74011250637 HC RX REV CODE- 250/637: Performed by: HOSPITALIST

## 2020-12-09 PROCEDURE — 94760 N-INVAS EAR/PLS OXIMETRY 1: CPT

## 2020-12-09 PROCEDURE — 74011250637 HC RX REV CODE- 250/637: Performed by: FAMILY MEDICINE

## 2020-12-09 RX ORDER — PROPRANOLOL HYDROCHLORIDE 10 MG/1
10 TABLET ORAL 2 TIMES DAILY
Status: DISCONTINUED | OUTPATIENT
Start: 2020-12-09 | End: 2020-12-14 | Stop reason: HOSPADM

## 2020-12-09 RX ADMIN — HALOPERIDOL LACTATE 2 MG: 5 INJECTION, SOLUTION INTRAMUSCULAR at 01:27

## 2020-12-09 RX ADMIN — RISPERIDONE 1 MG: 1 TABLET ORAL at 14:18

## 2020-12-09 RX ADMIN — PROPRANOLOL HYDROCHLORIDE 10 MG: 10 TABLET ORAL at 14:25

## 2020-12-09 RX ADMIN — RISPERIDONE 1 MG: 1 TABLET ORAL at 21:24

## 2020-12-09 RX ADMIN — FOLIC ACID 1 MG: 1 TABLET ORAL at 14:19

## 2020-12-09 RX ADMIN — CALCIUM CARBONATE (ANTACID) CHEW TAB 500 MG 200 MG: 500 CHEW TAB at 19:30

## 2020-12-09 RX ADMIN — VALPROIC ACID 250 MG: 250 SOLUTION ORAL at 21:24

## 2020-12-09 RX ADMIN — PROPRANOLOL HYDROCHLORIDE 10 MG: 10 TABLET ORAL at 19:30

## 2020-12-09 RX ADMIN — ASPIRIN 81 MG: 81 TABLET, CHEWABLE ORAL at 14:18

## 2020-12-09 RX ADMIN — THERA TABS 1 TABLET: TAB at 14:18

## 2020-12-09 RX ADMIN — VALPROIC ACID 250 MG: 250 SOLUTION ORAL at 14:20

## 2020-12-09 RX ADMIN — Medication 1 TABLET: at 14:18

## 2020-12-09 RX ADMIN — MIRTAZAPINE 7.5 MG: 15 TABLET, FILM COATED ORAL at 21:24

## 2020-12-09 RX ADMIN — Medication 100 MG: at 14:18

## 2020-12-09 RX ADMIN — TRAZODONE HYDROCHLORIDE 50 MG: 50 TABLET ORAL at 02:48

## 2020-12-09 RX ADMIN — CALCIUM CARBONATE (ANTACID) CHEW TAB 500 MG 200 MG: 500 CHEW TAB at 14:19

## 2020-12-09 RX ADMIN — PANTOPRAZOLE SODIUM 40 MG: 40 TABLET, DELAYED RELEASE ORAL at 06:40

## 2020-12-09 RX ADMIN — ENOXAPARIN SODIUM 40 MG: 40 INJECTION SUBCUTANEOUS at 14:27

## 2020-12-09 NOTE — PROGRESS NOTES
Physical Therapy    Pt refused PT treatment. Poor response to education and encouragement. RN notes pt ambulating in hallway pushing cart yesterday. Will con't to follow as tolerated.     Herminia Farrell, PT, MPT
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
## 2020-12-09 NOTE — PROGRESS NOTES
Occupational Therapy: Patient received in bed and encouraged to mobilize or sit up to eat. Patient declining participation, stating \"you're not my person\". Patient educated on benefits of mobilization and participation in self care. Patient continued to decline. Will follow to complete weekly reassessment at next visit.   ROXI Burleson/JORDAN

## 2020-12-10 PROCEDURE — 74011250637 HC RX REV CODE- 250/637: Performed by: HOSPITALIST

## 2020-12-10 PROCEDURE — 74011250637 HC RX REV CODE- 250/637: Performed by: FAMILY MEDICINE

## 2020-12-10 PROCEDURE — 74011250637 HC RX REV CODE- 250/637: Performed by: NURSE PRACTITIONER

## 2020-12-10 PROCEDURE — 74011250636 HC RX REV CODE- 250/636: Performed by: HOSPITALIST

## 2020-12-10 PROCEDURE — 97535 SELF CARE MNGMENT TRAINING: CPT

## 2020-12-10 PROCEDURE — 65270000029 HC RM PRIVATE

## 2020-12-10 RX ORDER — MIRTAZAPINE 15 MG/1
15 TABLET, FILM COATED ORAL
Status: DISCONTINUED | OUTPATIENT
Start: 2020-12-10 | End: 2020-12-14 | Stop reason: HOSPADM

## 2020-12-10 RX ADMIN — CALCIUM CARBONATE (ANTACID) CHEW TAB 500 MG 200 MG: 500 CHEW TAB at 12:02

## 2020-12-10 RX ADMIN — VALPROIC ACID 250 MG: 250 SOLUTION ORAL at 08:56

## 2020-12-10 RX ADMIN — ENOXAPARIN SODIUM 40 MG: 40 INJECTION SUBCUTANEOUS at 12:03

## 2020-12-10 RX ADMIN — PROPRANOLOL HYDROCHLORIDE 10 MG: 10 TABLET ORAL at 08:49

## 2020-12-10 RX ADMIN — RISPERIDONE 1 MG: 1 TABLET ORAL at 08:56

## 2020-12-10 RX ADMIN — Medication 1 TABLET: at 12:02

## 2020-12-10 RX ADMIN — THERA TABS 1 TABLET: TAB at 12:02

## 2020-12-10 RX ADMIN — ASPIRIN 81 MG: 81 TABLET, CHEWABLE ORAL at 12:06

## 2020-12-10 RX ADMIN — FOLIC ACID 1 MG: 1 TABLET ORAL at 08:53

## 2020-12-10 RX ADMIN — CALCIUM CARBONATE (ANTACID) CHEW TAB 500 MG 200 MG: 500 CHEW TAB at 17:10

## 2020-12-10 RX ADMIN — RISPERIDONE 1 MG: 1 TABLET ORAL at 21:00

## 2020-12-10 RX ADMIN — Medication 100 MG: at 12:02

## 2020-12-10 RX ADMIN — VALPROIC ACID 250 MG: 250 SOLUTION ORAL at 12:02

## 2020-12-10 RX ADMIN — Medication 100 MG: at 08:51

## 2020-12-10 RX ADMIN — VALPROIC ACID 250 MG: 250 SOLUTION ORAL at 22:00

## 2020-12-10 RX ADMIN — HALOPERIDOL LACTATE 2 MG: 5 INJECTION, SOLUTION INTRAMUSCULAR at 23:28

## 2020-12-10 RX ADMIN — PROPRANOLOL HYDROCHLORIDE 10 MG: 10 TABLET ORAL at 17:10

## 2020-12-10 NOTE — PROGRESS NOTES
Problem: Self Care Deficits Care Plan (Adult)  Goal: *Acute Goals and Plan of Care (Insert Text)  Description:   FUNCTIONAL STATUS PRIOR TO ADMISSION: Pt reports living alone, with walk-in shower with grab bar and shower chair. Chart indicates, pt's daughter has camera set-up to allow for distant Supervision. Pt states she uses Rollator. HOME SUPPORT: The patient lived with daughter to provide PRN assist.    Occupational Therapy Goals  Initiated 12/1/2020, Reviewed 12/10/20, continue all goals  1. Patient will perform RW grooming with supervision within 7 day(s). 2.  Patient will perform EOB/RW lower body dressing with supervision within 7 day(s). 3.  Patient will perform EOB/RW bathing with supervision within 7 day(s). 4.  Patient will perform toilet transfers with RW supervision within 7 day(s). 5.  Patient will perform all aspects of RW toileting with supervision within 7 day(s). Outcome: Progressing Towards Goal   OCCUPATIONAL THERAPY TREATMENT/WEEKLY RE-ASSESSMENT  Patient: Nano Maria (89 y.o. female)  Date: 12/10/2020  Diagnosis: AMS (altered mental status) [E31.87]   Acute metabolic encephalopathy       Precautions: Fall, Skin, Bed Alarm  Chart, occupational therapy assessment, plan of care, and goals were reviewed. ASSESSMENT  Patient continues with skilled OT services and is progressing towards goals. Pt willing to participate with increase encouragement. Pt mostly limited by poor cognition, easily irritable. Pt has been declining to participate in therapy, however, pt was willing to access bathroom using RW to brush teeth at sink with SBA. Occasional verbal/tactile cues to safely manage RW. Pt declined to use the bathroom or sit in chair. Pt's gown was wet and changed gown with min assist. Pt returned to bed with call bell. Anticipate pt would benefit from SNF then transition to LTC if daughter unable to care for her. Will con't to follow.       Current Level of Function Impacting Discharge (ADLs): min assist to SBA    Other factors to consider for discharge: poor cognition         PLAN :  Goals have been updated based on progression since last assessment. Patient continues to benefit from skilled intervention to address the above impairments. Continue to follow patient 3 times a week to address goals. Recommend with staff: Mark  for meals if willing    Recommend next OT session: see goals    Recommendation for discharge: (in order for the patient to meet his/her long term goals)  Therapy up to 5 days/week in SNF setting    This discharge recommendation:  Has been made in collaboration with the attending provider and/or case management    IF patient discharges home will need the following DME: none       SUBJECTIVE:   Patient stated Renetta Jackson are you getting out of here!?.    OBJECTIVE DATA SUMMARY:   Cognitive/Behavioral Status:  Neurologic State: Confused  Orientation Level: Disoriented to place; Disoriented to situation;Disoriented to time  Cognition: Impaired decision making        Safety/Judgement: Decreased insight into deficits; Decreased awareness of need for safety;Decreased awareness of need for assistance    Functional Mobility and Transfers for ADLs:  Bed Mobility:  Supine to Sit: Modified independent  Sit to Supine: Minimum assistance;Assist x1    Transfers:  Sit to Stand: Stand-by assistance  Functional Transfers  Bathroom Mobility: Stand-by assistance  Adaptive Equipment: Walker (comment)       Balance:  Sitting: Intact  Standing: Intact; With support    ADL Intervention:       Grooming  Grooming Assistance: Stand-by assistance  Position Performed: Standing; Other (comment)(at sink)                   Lower Body Dressing Assistance  Socks: Stand-by assistance         Cognitive Retraining  Safety/Judgement: Decreased insight into deficits; Decreased awareness of need for safety;Decreased awareness of need for assistance    Therapeutic Exercises: Pain:      Activity Tolerance:   Good    After treatment patient left in no apparent distress:   Supine in bed, Call bell within reach, and Bed / chair alarm activated    COMMUNICATION/COLLABORATION:   The patients plan of care was discussed with: Physical therapist and Registered nurse.      Juan Francisco Wright, OTR/L  Time Calculation: 23 mins

## 2020-12-10 NOTE — PROGRESS NOTES
Music Therapy Assessment  [id number] [id number]     1945  67 y.o.  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    Macrocytic anemia with vitamin B12 deficiency 08/03/2015    Balance disorder 07/11/2014    JACKIE (obstructive sleep apnea) 07/11/2014    Memory loss 07/11/2014        Date: 12/10/2020            Total Time (in minutes): 5          [facility]E MED ONCOLOGY    Mental Status:   [x] Alert [  ] Lonell Conquest [  ]  Confused  [  ] Minimally responsive  [  ] Sleeping    Communication Status: [  ] Impaired Speech [  ] Nonverbal -N/A    Physical Status:   [  ] Oxygen in use  [  ] Hard of Hearing [  ] Vision Impaired  [  ] Ambulatory  [  ] Ambulatory with assistance [  ] Non-ambulatory -N/A    Music Preferences, Background: N/A: Please see Session Observations below.      Clinical Problem to be addressed: .spiritual support and relaxation    Goal(s) met in session:  Physical/Pain management (Scale of 1-10):    Pre-session rating: Pt didn't report on pain  Post-session rating: Pt didn't report on pain   [  ] Increased relaxation   [  ] Affected breathing patterns  [  ] Decreased muscle tension   [  ] Decreased agitation  [  ] Affected heart rate    [  ] Increased alertness     Emotional/Psychological:  [  ] Increased self-expression   [  ] Decreased aggressive behavior   [  ] Decreased feelings of stress  [  ] Discussed healthy coping skills     [  ] Improved mood    [  ] Decreased withdrawn behavior     Social:  [  ] Decreased feelings of isolation/loneliness [  ] Positive social interaction   [  ] Provided support and/or comfort for family/friends    Spiritual:  [  ] Spiritual support    [  ] Expressed peace  [x] Expressed tono    [  ] Discussed beliefs    Techniques Utilized (Check all that apply): N/A: Please see Session Observations below. [  ] Procedural support MT [  ] Music for relaxation [  ] Patient preferred music  [  ] Dodie analysis  [  ] Song choice  [  ] Music for validation  [  ] Entrainment  [  ] Movement to music [  ] Guided visualization  [  ] Meghann Oocnnor  [  ] Patient instrument playing [  ] Creed Mutters writing  [  ] Beata Bong along   [  ] Sharri Paradise  [  ] Sensory stimulation  [  ] Active Listening  [  ] Music for spiritual support [  ] Making of CDs as gifts    Session Observations:  Referred by John Dixon. Patient (pt) was alert, sitting up in her chair. She was eating dinner as this music therapy intern (MTI) entered the room. MTI introduced self and asked how she was feeling. She presented with confusion as evidenced by (AEB) responding is a way the MTI could not understand. MTI asked what her favorite kinds of music were and she responded saying \"I just want to get out of here\". Pt became tearful during this and MTI provided words of encouragement. MTI asked about the pt's tono and support system. She shared both were strong and have helped with her coping over the hectic year. MTI provided active listening and offered a hymn. She declined this and said \"I want to get out of here\". MTI expressed understanding and asked if the pt needed anything further. She declined this. MTI exited. Florentin Juares, Music Therapy Intern  Spiritual Care Services Dept.    Referral-based service
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
## 2020-12-11 PROCEDURE — 74011250637 HC RX REV CODE- 250/637: Performed by: HOSPITALIST

## 2020-12-11 PROCEDURE — 65270000029 HC RM PRIVATE

## 2020-12-11 PROCEDURE — 74011250637 HC RX REV CODE- 250/637: Performed by: NURSE PRACTITIONER

## 2020-12-11 PROCEDURE — 97530 THERAPEUTIC ACTIVITIES: CPT

## 2020-12-11 PROCEDURE — 74011250637 HC RX REV CODE- 250/637: Performed by: FAMILY MEDICINE

## 2020-12-11 PROCEDURE — 74011250636 HC RX REV CODE- 250/636: Performed by: HOSPITALIST

## 2020-12-11 RX ADMIN — ASPIRIN 81 MG: 81 TABLET, CHEWABLE ORAL at 10:05

## 2020-12-11 RX ADMIN — FOLIC ACID 1 MG: 1 TABLET ORAL at 10:05

## 2020-12-11 RX ADMIN — HALOPERIDOL LACTATE 2 MG: 5 INJECTION, SOLUTION INTRAMUSCULAR at 13:48

## 2020-12-11 RX ADMIN — CALCIUM CARBONATE (ANTACID) CHEW TAB 500 MG 200 MG: 500 CHEW TAB at 17:36

## 2020-12-11 RX ADMIN — ENOXAPARIN SODIUM 40 MG: 40 INJECTION SUBCUTANEOUS at 10:06

## 2020-12-11 RX ADMIN — Medication 1 TABLET: at 10:05

## 2020-12-11 RX ADMIN — THERA TABS 1 TABLET: TAB at 10:05

## 2020-12-11 RX ADMIN — PROPRANOLOL HYDROCHLORIDE 10 MG: 10 TABLET ORAL at 10:05

## 2020-12-11 RX ADMIN — RISPERIDONE 1 MG: 1 TABLET ORAL at 20:10

## 2020-12-11 RX ADMIN — CALCIUM CARBONATE (ANTACID) CHEW TAB 500 MG 200 MG: 500 CHEW TAB at 11:49

## 2020-12-11 RX ADMIN — RISPERIDONE 1 MG: 1 TABLET ORAL at 10:05

## 2020-12-11 RX ADMIN — Medication 100 MG: at 10:05

## 2020-12-11 RX ADMIN — VALPROIC ACID 250 MG: 250 SOLUTION ORAL at 15:32

## 2020-12-11 RX ADMIN — VALPROIC ACID 250 MG: 250 SOLUTION ORAL at 10:03

## 2020-12-11 RX ADMIN — PANTOPRAZOLE SODIUM 40 MG: 40 TABLET, DELAYED RELEASE ORAL at 07:46

## 2020-12-11 RX ADMIN — PROPRANOLOL HYDROCHLORIDE 10 MG: 10 TABLET ORAL at 17:35

## 2020-12-11 NOTE — PROGRESS NOTES
Problem: Mobility Impaired (Adult and Pediatric)  Goal: *Acute Goals and Plan of Care (Insert Text)  Description: FUNCTIONAL STATUS PRIOR TO ADMISSION: Pt is a poor historian. Per chart review she was Mod Indep with rollator less than a week ago. Daughter has a camera to watch her mother throughout the day. HOME SUPPORT PRIOR TO ADMISSION: The patient lived alone with daughter to provide assistance. Physical Therapy Goals  12/11/20  1. Patient will transfer from bed to chair and chair to bed with modified independence using the least restrictive device within 7 day(s). 2.  Patient will perform sit to stand with modified independence within 7 day(s). 3.  Patient will ambulate with modified independence for 200 feet with the least restrictive device within 7 day(s). 4.  Patient will ascend/descend 3 stairs with 1 handrail(s) with modified independence within 7 day(s). Initiated 12/1/2020  1. Patient will move from supine to sit and sit to supine , scoot up and down, and roll side to side in bed with modified independence within 7 day(s). MET  2. Patient will transfer from bed to chair and chair to bed with modified independence using the least restrictive device within 7 day(s). ONGOING  3. Patient will perform sit to stand with modified independence within 7 day(s). ONGOING  4. Patient will ambulate with modified independence for 200 feet with the least restrictive device within 7 day(s). ONGOING  5. Patient will ascend/descend 3 stairs with 1 handrail(s) with modified independence within 7 day(s). ONGOING      Outcome: Progressing Towards Goal  PHYSICAL THERAPY TREATMENT: WEEKLY REASSESSMENT  Patient: Karl Cronin (87 y.o. female)  Date: 12/11/2020  Primary Diagnosis: AMS (altered mental status) [R41.82]        Precautions:   Fall, Skin, Bed Alarm      ASSESSMENT  Patient continues with skilled PT services and is no progressing towards goals with therapy staff.  Per documentation she has been ambulating with care staff on her unit. Today she is received seated EOB yelling for help. Patient is aggressive when socks are donned for safety. She is provided HHA in order to ambulate and transfer from the [facility] to the bed. Patient demonstrates fair dynamic standing balance. Patient's progression toward goals since last assessment: Patient has met 1/4 initially established goals    Current Level of Function Impacting Discharge (mobility/balance): CGA- Min A     Other factors to consider for discharge: medical stability, cognition, decreased balance, increased risk for falls          PLAN :  Goals have been updated based on progression since last assessment. Patient continues to benefit from skilled intervention to address the above impairments. Recommendations and Planned Interventions: transfer training, gait training, therapeutic exercises, neuromuscular re-education, edema management/control, patient and family training/education, and therapeutic activities      Frequency/Duration: Patient will be followed by physical therapy:  2 times a week to address goals. Recommendation for discharge: (in order for the patient to meet his/her long term goals)  Therapy up to 5 days/week in SNF setting to LTC     This discharge recommendation:  Has been made in collaboration with the attending provider and/or case management    IF patient discharges home will need the following DME: to be determined (TBD)         SUBJECTIVE:   Patient stated get out of here, just leave me alone.     OBJECTIVE DATA SUMMARY:   HISTORY:    Past Medical History:   Diagnosis Date    Anemia     Arthritis     Bipolar disorder ([address]     Burning with urination     Memory change     Psychiatric disorder     Bipolar Disorder    Stool color black     Tremor      Past Surgical History:   Procedure Laterality Date    HX BREAST BIOPSY Left 1990's    benign    HX CHOLECYSTECTOMY      HX GI      HX GYN         Personal factors and/or comorbidities impacting plan of care: please see above    Home Situation  Home Environment: Private residence  Living Alone: No  Support Systems: Family member(s)(daughter watches her on a camera)  Patient Expects to be Discharged to[de-identified] Unknown  Current DME Used/Available at Home: Carlota Reading, rollator, Grab bars, Shower chair  Tub or Shower Type: Shower    EXAMINATION/PRESENTATION/DECISION MAKING:   Critical Behavior:  Neurologic State: Confused, Restless  Orientation Level: Disoriented to place, Disoriented to situation, Disoriented to time, Oriented to person  Cognition: Memory loss, Poor safety awareness, Impaired decision making, Impulsive  Safety/Judgement: Decreased insight into deficits, Decreased awareness of need for safety, Decreased awareness of need for assistance  Hearing: Auditory  Auditory Impairment: None  Skin:    Edema:   Range Of Motion:  AROM: Within functional limits           PROM: Within functional limits           Strength:    Strength: Generally decreased, functional                    Tone & Sensation:   Tone: Normal                              Coordination:  Coordination: Generally decreased, functional  Vision:      Functional Mobility:  Bed Mobility:     Supine to Sit: Bed Modified;Stand-by assistance  Sit to Supine: Stand-by assistance  Scooting: Stand-by assistance  Transfers:  Sit to Stand: Stand-by assistance  Stand to Sit: Stand-by assistance        Bed to Chair: Contact guard assistance              Balance:   Sitting: Intact  Standing: Impaired; Without support  Standing - Static: Good  Standing - Dynamic : Fair  Ambulation/Gait Training:  Distance (ft): 8 Feet (ft)  Assistive Device: Gait belt  Ambulation - Level of Assistance: Contact guard assistance        Gait Abnormalities: Decreased step clearance; Path deviations        Base of Support: Widened;Center of gravity altered     Speed/Becky: Slow  Step Length: Right shortened;Left shortened                     Stairs: Therapeutic Exercises:       Pain Rating:      Activity Tolerance:   Fair    After treatment patient left in no apparent distress:   Supine in bed, Call bell within reach, Bed / chair alarm activated, and Side rails x 3    COMMUNICATION/EDUCATION:   The patients plan of care was discussed with: Registered nurse. Fall prevention education was provided and the patient/caregiver indicated understanding., Patient/family have participated as able in goal setting and plan of care. , and Patient/family agree to work toward stated goals and plan of care.     Thank you for this referral.  Virgil Caballero, PT   Time Calculation: 12 mins
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
## 2020-12-11 NOTE — CONSULTS
Psychiatry Consult Follow Up Note    Reason for consult: follow up, med mgt  Please see full consult note written on 12/2/20    IMPRESSION:   Ms. Rito Galindo is sitting up in her chair, initially calm and pleasant but became suddenly irritable when I asked if she could raise her arms up so I can check her tremors. She eventually did but only briefly. Her tremors have significantly improved since lithium was discontinued. She still has periods of lability, easily irritable but no aggression or agitation which is an improvement. She remains markedly confused, asking if she could go home. She is tolerating her meds and denies side effects. She's getting prn trazodone almost every night. I called her daughter, Tavon Young, I informed her that I increased the patient's depakene given the subtherapeutic level of the depakote from the other day, Tavon Young indicated understanding. RECCS:    Increase remeron to 15mg qhs. Continue risperdal, depakene, and inderal.  Trazodone prn for sleep, if remeron is ineffective.   Va level on Saturday.      -------------------------------------------------------------------------------------------------------------------  Clinical summary of events since last encounter:    Meds:  Current Facility-Administered Medications   Medication Dose Route Frequency    propranoloL (INDERAL) tablet 10 mg  10 mg Oral BID    valproic acid (as sodium salt) (DEPAKENE) 250 mg/5 mL (5 mL) oral solution 250 mg  250 mg Oral TID    mirtazapine (REMERON) tablet 7.5 mg  7.5 mg Oral QHS    influenza vaccine 2020-21 (6 mos+)(PF) (FLUARIX/FLULAVAL/FLUZONE QUAD) injection 0.5 mL  0.5 mL IntraMUSCular PRIOR TO DISCHARGE    risperiDONE (RisperDAL) tablet 1 mg  1 mg Oral BID    folic acid (FOLVITE) tablet 1 mg  1 mg Oral DAILY    therapeutic multivitamin (THERAGRAN) tablet 1 Tab  1 Tab Oral DAILY    thiamine HCL (B-1) tablet 100 mg  100 mg Oral DAILY    sodium chloride (NS) flush 5-40 mL  5-40 mL IntraVENous Q8H    sodium chloride (NS) flush 5-40 mL  5-40 mL IntraVENous PRN    acetaminophen (TYLENOL) tablet 650 mg  650 mg Oral Q6H PRN    Or    acetaminophen (TYLENOL) suppository 650 mg  650 mg Rectal Q6H PRN    polyethylene glycol (MIRALAX) packet 17 g  17 g Oral DAILY PRN    promethazine (PHENERGAN) tablet 12.5 mg  12.5 mg Oral Q6H PRN    Or    ondansetron (ZOFRAN) injection 4 mg  4 mg IntraVENous Q6H PRN    enoxaparin (LOVENOX) injection 40 mg  40 mg SubCUTAneous DAILY    cholecalciferol (VITAMIN D3) (1000 Units /25 mcg) tablet 1 Tab  1,000 Units Oral DAILY    calcium carbonate (TUMS) chewable tablet 200 mg [elemental]  200 mg Oral TID WITH MEALS    pantoprazole (PROTONIX) tablet 40 mg  40 mg Oral ACB    aspirin chewable tablet 81 mg  81 mg Oral DAILY    traZODone (DESYREL) tablet 50 mg  50 mg Oral QHS PRN    haloperidol lactate (HALDOL) injection 2 mg  2 mg IntraMUSCular Q6H PRN         Vital Signs:  Blood pressure 123/88, pulse 88, temperature 98.3 °F (36.8 °C), resp. rate 18, height 5' 2\" (1.575 m), weight 75.4 kg (166 lb 3.6 oz), SpO2 96 %. Labs: (reviewed/updated 12/10/2020)  No results found for this or any previous visit (from the past 24 hour(s)). Lab Results   Component Value Date/Time    Valproic acid 35 (L) 12/07/2020 03:04 AM     Lab Results   Component Value Date/Time    Lithium level 0.57 (L) 11/29/2020 05:27 PM       Radiology (reviewed/updated 12/10/2020)  Ct Head Wo Cont    Result Date: 11/29/2020  CLINICAL HISTORY: Encephalopathy INDICATION: Encephalopathy COMPARISON: 9/7/2018. CT dose reduction was achieved through use of a standardized protocol tailored for this examination and automatic exposure control for dose modulation. TECHNIQUE: Serial axial images with a collimation of 5 mm were obtained from the skull base through the vertex  FINDINGS: There is sulcal and ventricular prominence. Confluent periventricular and scattered foci of hypodensity in the cerebral white matter.  There is no evidence of an acute infarction, hemorrhage, or mass-effect. There is no evidence of midline shift or hydrocephalus. Posterior fossa structures are unremarkable. No extra-axial collections are seen. Mastoid air cells are well pneumatized and clear. Hypodensity in the central jerri. 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 IMPRESSION: No acute intracranial process. Small area of chronic encephalomalacia in the left frontal lobe. Imaging findings consistent with moderate chronic microvascular ischemic change. There is a mild degree of cerebral atrophy. Xr Chest Port    Result Date: 11/29/2020  EXAM: XR CHEST PORT INDICATION: fever COMPARISON: None. FINDINGS: A portable AP radiograph of the chest was obtained at 14:46 hours. The patient is on a cardiac monitor. The lungs are clear. The cardiac and mediastinal contours and pulmonary vascularity are normal.  The chest wall structures and visualized upper abdomen show no acute findings with incidental note of degenerative spine and shoulder changes as well as partial visualization of posterior windy and screw fixation hardware of the lumbar spine. IMPRESSION: No acute findings. Mental Status Exam:  General appearance: Peggy Moore is a 67 y.o. WHITE OR  female groomed, psychomotor activity is wnl   Eye contact: good eye contact  Speech: a little pressured  Affect : constricted  Mood: \"ok\"  Thought Process: disorganized  Perception: Denies AH or VH.    Thought Content: denies SI or Plan  Insight: Poor  Judgement: poor  Cognition: Impaired        Length of psychotherapy session: 35 minutes     Total Patient Care Time Spent: 35 minutes : (Coordinated treatment team rounds conducted with patient present; discussions with nurses, , pharmacist,  held; counseling time with patient, individual psychotherapy with patient; and discussions with family members; chart reviewed in full including consultant notes, ancillary staff notes, vitals and labs reviewed in full).     Greater than 50% of total patient care time included counseling    Signed:  Rito Antonio NP  12/10/2020

## 2020-12-11 NOTE — PROGRESS NOTES
-[id number] Patient walking around room covered in stool and urine. Patient very irritable with aggressive behavior towards staff. Patient seen kicking, biting, hitting, and throwing cups of water. Security paged. -[address] arrives. IM Haldol administered and patient returned to bed.

## 2020-12-12 PROCEDURE — 74011250637 HC RX REV CODE- 250/637: Performed by: FAMILY MEDICINE

## 2020-12-12 PROCEDURE — 74011250637 HC RX REV CODE- 250/637: Performed by: NURSE PRACTITIONER

## 2020-12-12 PROCEDURE — 94760 N-INVAS EAR/PLS OXIMETRY 1: CPT

## 2020-12-12 PROCEDURE — 74011250637 HC RX REV CODE- 250/637: Performed by: HOSPITALIST

## 2020-12-12 PROCEDURE — 65270000029 HC RM PRIVATE

## 2020-12-12 PROCEDURE — 74011250636 HC RX REV CODE- 250/636: Performed by: HOSPITALIST

## 2020-12-12 RX ADMIN — PANTOPRAZOLE SODIUM 40 MG: 40 TABLET, DELAYED RELEASE ORAL at 06:33

## 2020-12-12 RX ADMIN — PROPRANOLOL HYDROCHLORIDE 10 MG: 10 TABLET ORAL at 08:08

## 2020-12-12 RX ADMIN — CALCIUM CARBONATE (ANTACID) CHEW TAB 500 MG 200 MG: 500 CHEW TAB at 11:07

## 2020-12-12 RX ADMIN — Medication 100 MG: at 08:08

## 2020-12-12 RX ADMIN — THERA TABS 1 TABLET: TAB at 08:08

## 2020-12-12 RX ADMIN — CALCIUM CARBONATE (ANTACID) CHEW TAB 500 MG 200 MG: 500 CHEW TAB at 07:08

## 2020-12-12 RX ADMIN — PROPRANOLOL HYDROCHLORIDE 10 MG: 10 TABLET ORAL at 16:32

## 2020-12-12 RX ADMIN — VALPROIC ACID 250 MG: 250 SOLUTION ORAL at 16:32

## 2020-12-12 RX ADMIN — ENOXAPARIN SODIUM 40 MG: 40 INJECTION SUBCUTANEOUS at 08:08

## 2020-12-12 RX ADMIN — VALPROIC ACID 250 MG: 250 SOLUTION ORAL at 08:08

## 2020-12-12 RX ADMIN — FOLIC ACID 1 MG: 1 TABLET ORAL at 08:08

## 2020-12-12 RX ADMIN — HALOPERIDOL LACTATE 2 MG: 5 INJECTION, SOLUTION INTRAMUSCULAR at 08:26

## 2020-12-12 RX ADMIN — ASPIRIN 81 MG: 81 TABLET, CHEWABLE ORAL at 08:08

## 2020-12-12 RX ADMIN — RISPERIDONE 1 MG: 1 TABLET ORAL at 08:08

## 2020-12-12 RX ADMIN — CALCIUM CARBONATE (ANTACID) CHEW TAB 500 MG 200 MG: 500 CHEW TAB at 16:33

## 2020-12-12 NOTE — PROGRESS NOTES
Problem: Falls - Risk of  Goal: *Absence of Falls  Description: Document Rajendra Going Fall Risk and appropriate interventions in the flowsheet.   Outcome: Progressing Towards Goal  Note: Fall Risk Interventions:  Mobility Interventions: Communicate number of staff needed for ambulation/transfer, Bed/chair exit alarm, Patient to call before getting OOB    Mentation Interventions: Bed/chair exit alarm, Door open when patient unattended, Update white board, Toileting rounds, Room close to nurse's station    Medication Interventions: Evaluate medications/consider consulting pharmacy    Elimination Interventions: Call light in reach, Bed/chair exit alarm, Patient to call for help with toileting needs    History of Falls Interventions: Door open when patient unattended, Room close to nurse's station
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
## 2020-12-13 PROCEDURE — 74011250637 HC RX REV CODE- 250/637: Performed by: NURSE PRACTITIONER

## 2020-12-13 PROCEDURE — 74011250637 HC RX REV CODE- 250/637: Performed by: HOSPITALIST

## 2020-12-13 PROCEDURE — 74011250637 HC RX REV CODE- 250/637: Performed by: FAMILY MEDICINE

## 2020-12-13 PROCEDURE — 94760 N-INVAS EAR/PLS OXIMETRY 1: CPT

## 2020-12-13 PROCEDURE — 65270000029 HC RM PRIVATE

## 2020-12-13 PROCEDURE — 74011250636 HC RX REV CODE- 250/636: Performed by: HOSPITALIST

## 2020-12-13 RX ADMIN — THERA TABS 1 TABLET: TAB at 08:10

## 2020-12-13 RX ADMIN — RISPERIDONE 1 MG: 1 TABLET ORAL at 08:10

## 2020-12-13 RX ADMIN — CALCIUM CARBONATE (ANTACID) CHEW TAB 500 MG 200 MG: 500 CHEW TAB at 15:51

## 2020-12-13 RX ADMIN — ASPIRIN 81 MG: 81 TABLET, CHEWABLE ORAL at 08:10

## 2020-12-13 RX ADMIN — ENOXAPARIN SODIUM 40 MG: 40 INJECTION SUBCUTANEOUS at 08:09

## 2020-12-13 RX ADMIN — VALPROIC ACID 250 MG: 250 SOLUTION ORAL at 15:52

## 2020-12-13 RX ADMIN — ACETAMINOPHEN 650 MG: 325 TABLET ORAL at 15:51

## 2020-12-13 RX ADMIN — PANTOPRAZOLE SODIUM 40 MG: 40 TABLET, DELAYED RELEASE ORAL at 08:11

## 2020-12-13 RX ADMIN — RISPERIDONE 1 MG: 1 TABLET ORAL at 20:19

## 2020-12-13 RX ADMIN — FOLIC ACID 1 MG: 1 TABLET ORAL at 08:10

## 2020-12-13 RX ADMIN — CALCIUM CARBONATE (ANTACID) CHEW TAB 500 MG 200 MG: 500 CHEW TAB at 11:07

## 2020-12-13 RX ADMIN — PROPRANOLOL HYDROCHLORIDE 10 MG: 10 TABLET ORAL at 15:52

## 2020-12-13 RX ADMIN — PROPRANOLOL HYDROCHLORIDE 10 MG: 10 TABLET ORAL at 08:11

## 2020-12-13 RX ADMIN — VALPROIC ACID 250 MG: 250 SOLUTION ORAL at 20:20

## 2020-12-13 RX ADMIN — Medication 1 TABLET: at 08:10

## 2020-12-13 RX ADMIN — MIRTAZAPINE 15 MG: 15 TABLET, FILM COATED ORAL at 20:19

## 2020-12-13 RX ADMIN — Medication 100 MG: at 08:10

## 2020-12-13 RX ADMIN — TRAZODONE HYDROCHLORIDE 50 MG: 50 TABLET ORAL at 20:19

## 2020-12-13 RX ADMIN — VALPROIC ACID 250 MG: 250 SOLUTION ORAL at 08:09

## 2020-12-13 NOTE — PROGRESS NOTES
Pt refused all med's and told me to [facility] her alone and don't bother her\". Provided education on all medications and how they were helping her. 0540 -Pt said she didn't want any morning med's. I educated her on the medications and their purpose and still denied.
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
## 2020-12-14 ENCOUNTER — HOSPITAL ENCOUNTER (INPATIENT)
Age: 75
LOS: 31 days | Discharge: HOME | DRG: 885 | End: 2021-01-14
Attending: PSYCHIATRY & NEUROLOGY | Admitting: PSYCHIATRY & NEUROLOGY
Payer: MEDICARE

## 2020-12-14 VITALS
HEIGHT: 62 IN | WEIGHT: 166.23 LBS | BODY MASS INDEX: 30.59 KG/M2 | TEMPERATURE: 97.5 F | DIASTOLIC BLOOD PRESSURE: 58 MMHG | OXYGEN SATURATION: 94 % | RESPIRATION RATE: 18 BRPM | HEART RATE: 72 BPM | SYSTOLIC BLOOD PRESSURE: 113 MMHG

## 2020-12-14 PROBLEM — F31.9 BIPOLAR 1 DISORDER (HCC): Status: ACTIVE | Noted: 2020-12-14

## 2020-12-14 LAB
COVID-19 RAPID TEST, COVR: NOT DETECTED
SARS-COV-2, COV2: NOT DETECTED
SOURCE, COVRS: NORMAL
SPECIMEN SOURCE, FCOV2M: NORMAL
SPECIMEN SOURCE, FCOV2M: NORMAL
SPECIMEN TYPE, XMCV1T: NORMAL

## 2020-12-14 PROCEDURE — 74011250637 HC RX REV CODE- 250/637: Performed by: NURSE PRACTITIONER

## 2020-12-14 PROCEDURE — 74011250637 HC RX REV CODE- 250/637: Performed by: HOSPITALIST

## 2020-12-14 PROCEDURE — 87635 SARS-COV-2 COVID-19 AMP PRB: CPT

## 2020-12-14 PROCEDURE — 74011250637 HC RX REV CODE- 250/637: Performed by: FAMILY MEDICINE

## 2020-12-14 PROCEDURE — 65220000003 HC RM SEMIPRIVATE PSYCH

## 2020-12-14 RX ORDER — OLANZAPINE 2.5 MG/1
2.5 TABLET ORAL
Status: DISCONTINUED | OUTPATIENT
Start: 2020-12-14 | End: 2021-01-14 | Stop reason: HOSPADM

## 2020-12-14 RX ORDER — PROPRANOLOL HYDROCHLORIDE 10 MG/1
10 TABLET ORAL 2 TIMES DAILY
Status: DISCONTINUED | OUTPATIENT
Start: 2020-12-14 | End: 2021-01-06

## 2020-12-14 RX ORDER — VALPROIC ACID 250 MG/5ML
250 SOLUTION ORAL 3 TIMES DAILY
Status: DISCONTINUED | OUTPATIENT
Start: 2020-12-14 | End: 2020-12-15

## 2020-12-14 RX ORDER — ADHESIVE BANDAGE
30 BANDAGE TOPICAL DAILY PRN
Status: DISCONTINUED | OUTPATIENT
Start: 2020-12-14 | End: 2021-01-14 | Stop reason: HOSPADM

## 2020-12-14 RX ORDER — HALOPERIDOL 5 MG/ML
2.5 INJECTION INTRAMUSCULAR
Status: DISCONTINUED | OUTPATIENT
Start: 2020-12-14 | End: 2021-01-14 | Stop reason: HOSPADM

## 2020-12-14 RX ORDER — ACETAMINOPHEN 325 MG/1
650 TABLET ORAL
Status: DISCONTINUED | OUTPATIENT
Start: 2020-12-14 | End: 2021-01-14 | Stop reason: HOSPADM

## 2020-12-14 RX ORDER — DIPHENHYDRAMINE HYDROCHLORIDE 50 MG/ML
25 INJECTION, SOLUTION INTRAMUSCULAR; INTRAVENOUS
Status: DISCONTINUED | OUTPATIENT
Start: 2020-12-14 | End: 2021-01-14 | Stop reason: HOSPADM

## 2020-12-14 RX ORDER — RISPERIDONE 1 MG/1
1 TABLET, FILM COATED ORAL 2 TIMES DAILY
Status: DISCONTINUED | OUTPATIENT
Start: 2020-12-14 | End: 2021-01-14 | Stop reason: HOSPADM

## 2020-12-14 RX ORDER — MIRTAZAPINE 15 MG/1
15 TABLET, FILM COATED ORAL
Status: DISCONTINUED | OUTPATIENT
Start: 2020-12-14 | End: 2021-01-14 | Stop reason: HOSPADM

## 2020-12-14 RX ORDER — TRAZODONE HYDROCHLORIDE 50 MG/1
50 TABLET ORAL
Status: DISCONTINUED | OUTPATIENT
Start: 2020-12-14 | End: 2021-01-14 | Stop reason: HOSPADM

## 2020-12-14 RX ORDER — BENZTROPINE MESYLATE 1 MG/1
0.5 TABLET ORAL
Status: DISCONTINUED | OUTPATIENT
Start: 2020-12-14 | End: 2021-01-14 | Stop reason: HOSPADM

## 2020-12-14 RX ADMIN — PROPRANOLOL HYDROCHLORIDE 10 MG: 10 TABLET ORAL at 10:12

## 2020-12-14 RX ADMIN — THERA TABS 1 TABLET: TAB at 10:11

## 2020-12-14 RX ADMIN — CALCIUM CARBONATE (ANTACID) CHEW TAB 500 MG 200 MG: 500 CHEW TAB at 16:00

## 2020-12-14 RX ADMIN — Medication 1 TABLET: at 10:12

## 2020-12-14 RX ADMIN — MIRTAZAPINE 15 MG: 15 TABLET, FILM COATED ORAL at 20:43

## 2020-12-14 RX ADMIN — PROPRANOLOL HYDROCHLORIDE 10 MG: 10 TABLET ORAL at 20:43

## 2020-12-14 RX ADMIN — VALPROIC ACID 250 MG: 250 SOLUTION ORAL at 16:00

## 2020-12-14 RX ADMIN — VALPROIC ACID 250 MG: 250 SOLUTION ORAL at 20:42

## 2020-12-14 RX ADMIN — FOLIC ACID 1 MG: 1 TABLET ORAL at 10:11

## 2020-12-14 RX ADMIN — Medication 100 MG: at 10:11

## 2020-12-14 RX ADMIN — ASPIRIN 81 MG: 81 TABLET, CHEWABLE ORAL at 10:11

## 2020-12-14 RX ADMIN — RISPERIDONE 1 MG: 1 TABLET ORAL at 10:11

## 2020-12-14 RX ADMIN — RISPERIDONE 1 MG: 1 TABLET ORAL at 20:42

## 2020-12-14 RX ADMIN — CALCIUM CARBONATE (ANTACID) CHEW TAB 500 MG 200 MG: 500 CHEW TAB at 11:13

## 2020-12-14 RX ADMIN — VALPROIC ACID 250 MG: 250 SOLUTION ORAL at 10:12

## 2020-12-14 NOTE — PROGRESS NOTES
TRANSFER - IN REPORT:    Verbal report received from [facility]) on [address]  being received from [facility] for routine progression of care      Report consisted of patients Situation, Background, Assessment and   Recommendations(SBAR). Information from the following report(s) SBAR, MAR and Recent Results was reviewed with the receiving nurse. Opportunity for questions and clarification was provided. Assessment completed upon patients arrival to unit and care assumed.
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
## 2020-12-14 NOTE — ROUTINE PROCESS
TRANSFER - OUT REPORT: 
 
Verbal report given to Swapna ALFARO(name) on [address]  being transferred to (unit) for routine progression of care Report consisted of patients Situation, Background, Assessment and  
Recommendations(SBAR). Information from the following report(s) SBAR was reviewed with the receiving nurse. Lines:    
 
Opportunity for questions and clarification was provided. Patient transported with: 
 Registered Nurse

## 2020-12-14 NOTE — PROGRESS NOTES
Occupational Therapy: Noted plan for transfer to [facility]. Will discharge OT order. Please re consult if needed on Behavioral Health. Noted patient up and walking in pierce with staff at times.   PEPITO Rojas

## 2020-12-14 NOTE — PROGRESS NOTES
Physical Therapy    Chart reviewed; noted plan for transfer to [address] [place] [address]. Pt ambulating on unit with nursing staff when tolerated. Will discontinue PT orders at this time. Please re-consult if need arises.     Holli Varela, PT, MPT

## 2020-12-15 LAB
APPEARANCE UR: CLEAR
BACTERIA URNS QL MICRO: NEGATIVE /HPF
BILIRUB UR QL: NEGATIVE
CHOLEST SERPL-MCNC: 176 MG/DL
COLOR UR: ABNORMAL
EPITH CASTS URNS QL MICRO: ABNORMAL /LPF
EST. AVERAGE GLUCOSE BLD GHB EST-MCNC: 97 MG/DL
GLUCOSE P FAST SERPL-MCNC: 95 MG/DL (ref 65–100)
GLUCOSE UR STRIP.AUTO-MCNC: NEGATIVE MG/DL
HBA1C MFR BLD: 5 % (ref 4–5.6)
HDLC SERPL-MCNC: 48 MG/DL
HDLC SERPL: 3.7 {RATIO} (ref 0–5)
HGB UR QL STRIP: ABNORMAL
KETONES UR QL STRIP.AUTO: NEGATIVE MG/DL
LDLC SERPL CALC-MCNC: 76.6 MG/DL (ref 0–100)
LEUKOCYTE ESTERASE UR QL STRIP.AUTO: ABNORMAL
LIPID PROFILE,FLP: ABNORMAL
NITRITE UR QL STRIP.AUTO: NEGATIVE
PH UR STRIP: 7 [PH] (ref 5–8)
PROT UR STRIP-MCNC: NEGATIVE MG/DL
RBC #/AREA URNS HPF: ABNORMAL /HPF (ref 0–5)
SP GR UR REFRACTOMETRY: 1.01 (ref 1–1.03)
TRIGL SERPL-MCNC: 257 MG/DL (ref ?–150)
UR CULT HOLD, URHOLD: NORMAL
UROBILINOGEN UR QL STRIP.AUTO: 0.2 EU/DL (ref 0.2–1)
VLDLC SERPL CALC-MCNC: 51.4 MG/DL
WBC URNS QL MICRO: ABNORMAL /HPF (ref 0–4)

## 2020-12-15 PROCEDURE — 74011250637 HC RX REV CODE- 250/637: Performed by: NURSE PRACTITIONER

## 2020-12-15 PROCEDURE — 83036 HEMOGLOBIN GLYCOSYLATED A1C: CPT

## 2020-12-15 PROCEDURE — 36415 COLL VENOUS BLD VENIPUNCTURE: CPT

## 2020-12-15 PROCEDURE — 82947 ASSAY GLUCOSE BLOOD QUANT: CPT

## 2020-12-15 PROCEDURE — 97116 GAIT TRAINING THERAPY: CPT

## 2020-12-15 PROCEDURE — 97164 PT RE-EVAL EST PLAN CARE: CPT

## 2020-12-15 PROCEDURE — 81001 URINALYSIS AUTO W/SCOPE: CPT

## 2020-12-15 PROCEDURE — 80061 LIPID PANEL: CPT

## 2020-12-15 PROCEDURE — 65220000003 HC RM SEMIPRIVATE PSYCH

## 2020-12-15 RX ORDER — GABAPENTIN 400 MG/1
400 CAPSULE ORAL 4 TIMES DAILY
COMMUNITY
End: 2021-01-14

## 2020-12-15 RX ORDER — ALENDRONATE SODIUM 70 MG/1
70 TABLET ORAL
COMMUNITY
End: 2021-01-14

## 2020-12-15 RX ORDER — TRAZODONE HYDROCHLORIDE 100 MG/1
100 TABLET ORAL
COMMUNITY
End: 2021-01-14

## 2020-12-15 RX ORDER — TIZANIDINE HYDROCHLORIDE 4 MG/1
4 CAPSULE, GELATIN COATED ORAL EVERY 6 HOURS
COMMUNITY
End: 2021-01-14

## 2020-12-15 RX ORDER — ATORVASTATIN CALCIUM 10 MG/1
10 TABLET, FILM COATED ORAL DAILY
COMMUNITY
End: 2021-01-14

## 2020-12-15 RX ORDER — VALPROIC ACID 250 MG/5ML
250 SOLUTION ORAL 4 TIMES DAILY
Status: DISCONTINUED | OUTPATIENT
Start: 2020-12-15 | End: 2020-12-23

## 2020-12-15 RX ORDER — DICLOFENAC SODIUM 75 MG/1
75 TABLET, DELAYED RELEASE ORAL 2 TIMES DAILY
COMMUNITY
End: 2021-01-14

## 2020-12-15 RX ADMIN — VALPROIC ACID 250 MG: 250 SOLUTION ORAL at 17:02

## 2020-12-15 RX ADMIN — RISPERIDONE 1 MG: 1 TABLET ORAL at 09:01

## 2020-12-15 RX ADMIN — PROPRANOLOL HYDROCHLORIDE 10 MG: 10 TABLET ORAL at 09:01

## 2020-12-15 RX ADMIN — PROPRANOLOL HYDROCHLORIDE 10 MG: 10 TABLET ORAL at 17:02

## 2020-12-15 RX ADMIN — VALPROIC ACID 250 MG: 250 SOLUTION ORAL at 12:20

## 2020-12-15 RX ADMIN — ACETAMINOPHEN 650 MG: 325 TABLET ORAL at 11:42

## 2020-12-15 RX ADMIN — VALPROIC ACID 250 MG: 250 SOLUTION ORAL at 09:01

## 2020-12-15 NOTE — PROGRESS NOTES
Goals will be met by 12/22/20  Problem: Falls - Risk of  Goal: *Absence of Falls  Description: Document Jeanie Ulloa Fall Risk and appropriate interventions in the flowsheet. Outcome: Progressing Towards Goal  Note: Fall Risk Interventions:  Patient is absent of falls. Mobility Interventions: Bed/chair exit alarm    Mentation Interventions: Adequate sleep, hydration, pain control, Bed/chair exit alarm, Door open when patient unattended    Medication Interventions: Bed/chair exit alarm    Elimination Interventions: Bed/chair exit alarm, Call light in reach    History of Falls Interventions: Bed/chair exit alarm, Door open when patient unattended, Room close to nurse's station     Problem: Altered Thought Process (Adult/Pediatric)  Goal: *STG: Participates in treatment plan  Outcome: Progressing Towards Goal  Note: Patient participates in treatment plan. Goal: *STG: Complies with medication therapy  Outcome: Progressing Towards Goal  Note: Patient complies with medication therapy.     [facility]  Master Treatment Plan for Eleazar Adkins Treatment Plan Initiated: 12/15/20    Treatment Plan Modalities:  Type of Modality Amount  (x minutes) Frequency (x/week) Duration (x days) Name of Responsible Staff   [address] [place] [address] meetings to encourage peer interactions 15 7 1 Kathy Brooks RN     Group psychotherapy to assist in building coping skills and internal controls 60 7 1 Ulises Pino   Therapeutic activity groups to build coping skills 60 7 1 Ulises Pino   Psychoeducation in group setting to address:   Medication education   [address] PharmD   Coping skills   30 3 1 Ulises Pino   Relaxation techniques         Symptom management         Discharge planning   60 2 [address]   60 1 1 volunteer   Recovery/AA/NA   61 3 1 volunteer   Physician medication management   15 7 1 Dr. Ace Martin   Family meeting/discharge planning   15 2 1 Alysha Abdul and Yessica Walton

## 2020-12-15 NOTE — PROGRESS NOTES
Problem: Falls - Risk of  Goal: *Absence of Falls  Description: Document Cheryle White Fall Risk and appropriate interventions in the flowsheet.   Outcome: Progressing Towards Goal  Note: Fall Risk Interventions:  Mobility Interventions: Communicate number of staff needed for ambulation/transfer, Bed/chair exit alarm, Utilize walker, cane, or other assistive device    Mentation Interventions: Adequate sleep, hydration, pain control, Bed/chair exit alarm, Door open when patient unattended, More frequent rounding, Reorient patient, Room close to nurse's station    Medication Interventions: Bed/chair exit alarm, Patient to call before getting OOB, Teach patient to arise slowly    Elimination Interventions: Bed/chair exit alarm, Patient to call for help with toileting needs, Toilet paper/wipes in reach, Toileting schedule/hourly rounds    History of Falls Interventions: Bed/chair exit alarm, Door open when patient unattended, Investigate reason for fall, Room close to nurse's station         Problem: Altered Thought Process (Adult/Pediatric)  Goal: *STG: Remains safe in hospital  Outcome: Progressing Towards Goal  Goal: *STG: Seeks staff when feelings of anxiety and fear arise  Outcome: Progressing Towards Goal  Goal: *STG: Complies with medication therapy  Outcome: Progressing Towards Goal  Goal: *STG: Absence of lethality  Outcome: Progressing Towards Goal  Goal: Interventions  Outcome: Progressing Towards Goal

## 2020-12-15 NOTE — PROGRESS NOTES
Problem: Discharge Planning  Goal: *Discharge to safe environment  Outcome: Not Progressing Towards Goal  Note: Patient lives alone and may not be safe to return for discharge. Further assessment required. Goal: *Knowledge of medication management  Outcome: Not Progressing Towards Goal  Note: Patient denies mental health Dx and refuses need for medications. Goal: *Knowledge of discharge instructions  Outcome: Not Progressing Towards Goal  Note: Patient does not verbalize understanding of goals for treatment or safe discharge.

## 2020-12-15 NOTE — PROGRESS NOTES
Laboratory Monitoring for Antipsychotics: This patient is currently prescribed the following medication(s):   Current Facility-Administered Medications   Medication Dose Route Frequency    valproic acid (as sodium salt) (DEPAKENE) 250 mg/5 mL (5 mL) oral solution 250 mg  250 mg Oral QID    propranoloL (INDERAL) tablet 10 mg  10 mg Oral BID    mirtazapine (REMERON) tablet 15 mg  15 mg Oral QHS    risperiDONE (RisperDAL) tablet 1 mg  1 mg Oral BID     The following labs have been completed for monitoring of antipsychotics and/or mood stabilizers:    Height, Weight, BMI Estimation  Estimated body mass index is 30.4 kg/m² as calculated from the following:    Height as of 12/8/20: 157.5 cm (62\"). Weight as of 12/2/20: 75.4 kg (166 lb 3.6 oz). Vital Signs/Blood Pressure  Visit Vitals  /64   Pulse 70   Temp 98 °F (36.7 °C)   Resp 16   SpO2 94%     Renal Function, Hepatic Function and Chemistry  CrCl cannot be calculated (Unknown ideal weight.). Lab Results   Component Value Date/Time    Sodium 140 12/07/2020 03:04 AM    Potassium 3.9 12/07/2020 03:04 AM    Chloride 108 12/07/2020 03:04 AM    CO2 26 12/07/2020 03:04 AM    Anion gap 6 12/07/2020 03:04 AM    BUN 12 12/07/2020 03:04 AM    Creatinine 0.73 12/07/2020 03:04 AM    BUN/Creatinine ratio 16 12/07/2020 03:04 AM    Bilirubin, total 0.3 12/03/2020 03:30 AM    Protein, total 6.6 12/03/2020 03:30 AM    Albumin 2.6 (L) 12/03/2020 03:30 AM    Globulin 4.0 12/03/2020 03:30 AM    A-G Ratio 0.7 (L) 12/03/2020 03:30 AM    ALT (SGPT) 33 12/03/2020 03:30 AM    AST (SGOT) 16 12/03/2020 03:30 AM    Alk.  phosphatase 110 12/03/2020 03:30 AM     Lab Results   Component Value Date/Time    Glucose 95 12/15/2020 04:22 AM     Lab Results   Component Value Date/Time    Hemoglobin A1c 5.0 12/15/2020 04:30 AM     Hematology  Lab Results   Component Value Date/Time    WBC 9.8 12/08/2020 02:44 AM    RBC 3.34 (L) 12/08/2020 02:44 AM    HGB 11.2 (L) 12/08/2020 02:44 AM    HCT 34.9 (L) 12/08/2020 02:44 AM    .5 (H) 12/08/2020 02:44 AM    MCH 33.5 12/08/2020 02:44 AM    MCHC 32.1 12/08/2020 02:44 AM    RDW 15.0 (H) 12/08/2020 02:44 AM    PLATELET 846 12/22/9021 02:44 AM     Lipids  Lab Results   Component Value Date/Time    Cholesterol, total 176 12/15/2020 04:22 AM    HDL Cholesterol 48 12/15/2020 04:22 AM    LDL, calculated 76.6 12/15/2020 04:22 AM    Triglyceride 257 (H) 12/15/2020 04:22 AM    CHOL/HDL Ratio 3.7 12/15/2020 04:22 AM     Thyroid Function  Lab Results   Component Value Date/Time    TSH 1.86 11/29/2020 01:21 PM     Assessment/Plan:  Recommended baseline laboratory monitoring has been completed based on this patient's current medication regimen. Patient is a candidate for a high-intensity statin such as atorvastatin 40mg. Follow-up metabolic monitoring labs should be completed in 3 months (quarterly for the first year of antipsychotic therapy).      Angel Vasquez, HELDERD

## 2020-12-15 NOTE — BH NOTES
PSYCHOSOCIAL ASSESSMENT  :Patient identifying info:  Vin Landis is a 67 y.o., female admitted 2020  7:38 PM     Presenting problem and precipitating factors: Pt admitted to [facility] under TDO. She does not know why she is on BHU, denies mental health Dx and refuses medications. Her daughter brought Pt in on 20 for inability to care for self, voiding on self and in house without cleaning it up. Patient has been increasingly confused, agitated and delusion for 3 days PTA. Pt has been noncompliant with medications since . While in hospital Pt was kicking, screaming, biting, at nurses. 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    Strengths: supportive family, stable housing, connected to outpatient provider    Collateral information: daughter Francisco Javier Macedo -[phone]    Current psychiatric /substance abuse providers and contact info: Kerri Yen NP    Previous psychiatric/substance abuse providers and response to treatment: yes per daughter    Family history of mental illness or substance abuse: none indicated    Substance abuse history:    Social History     Tobacco Use    Smoking status: Former Smoker     Packs/day: 0.25     Years: 10.00     Pack years: 2.50     Last attempt to quit: 1997     Years since quittin.4    Smokeless tobacco: Never Used   Substance Use Topics    Alcohol use:  Yes     Alcohol/week: 2.5 standard drinks     Types: 3 Glasses of wine per week       History of biomedical complications associated with substance abuse : none indicated    Patient's current acceptance of treatment or motivation for change: TDO    Family constellation: 1 daughter who lives local, 1 brother who lives out of state    Is significant other involved? no    Describe support system: daughter    Describe living arrangements and home environment: Lives Alone([facility] apartment) may need SNF for discharge per CM note on 20. The patient has been at [address] [place] [address] ([facility]'s) in the past    Health issues:   Hospital Problems  Date Reviewed: 8/3/2015          Codes Class Noted POA    Bipolar 1 disorder ([facility] ICD-10-CM: F31.9  ICD-9-CM: 296.7  2020 Unknown              Trauma history: none indicated    Legal issues: none indicated    History of  service: no    Financial status: not assessed    Rastafarian/cultural factors: Baptism    Education/work history: [employer] graduate, worked as a     Have you been licensed as a health care professional (current or ): no    Leisure and recreation preferences: not assessed    Describe coping skills: limited, ineffective    Yessica Ignacio  12/15/2020
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
## 2020-12-15 NOTE — PROGRESS NOTES
Pt appears asleep in bed, NAD, even respirations, will continue to monitor q15min     Problem: Falls - Risk of  Goal: *Absence of Falls  Description: Document Marisa Blood Fall Risk and appropriate interventions in the flowsheet.   Outcome: Progressing Towards Goal  Note: Fall Risk Interventions:  Mobility Interventions: Bed/chair exit alarm, Communicate number of staff needed for ambulation/transfer, Utilize walker, cane, or other assistive device    Mentation Interventions: Adequate sleep, hydration, pain control, Bed/chair exit alarm, Door open when patient unattended, More frequent rounding    Medication Interventions: Bed/chair exit alarm    Elimination Interventions: Bed/chair exit alarm    History of Falls Interventions: Bed/chair exit alarm

## 2020-12-15 NOTE — INTERDISCIPLINARY ROUNDS
Behavioral Health Interdisciplinary Rounds Patient Name: Pauline Fuller  Age: 67 y.o. Room/Bed:  740/01 Primary Diagnosis: <principal problem not specified> Admission Status: TDO Readmission within 30 days: no 
Power of  in place: no 
Patient requires a blocked bed: no          Reason for blocked bed: VTE Prophylaxis: Not indicated Mobility needs/Fall risk: yes Flu Vaccine :   
Nutritional Plan: no 
Consults: PT        
Labs/Testing due today?: yes Sleep hours: 8 Participation in Care/Groups:  New patient Medication Compliant?: Yes PRNS (last 24 hours): None Restraints (last 24 hours):  no 
  
CIWA (range last 24 hours): COWS (range last 24 hours): Alcohol screening (AUDIT) completed -   AUDIT Score: 0 If applicable, date SBIRT discussed in treatment team AND documented:  
AUDIT Screen Score: AUDIT Score: 0 Tobacco - patient is a smoker: Have You Used Tobacco in the Past 30 Days: No 
Illegal Drugs use: Have You Used Any Illegal Substances Over the Past 12 Months: No 
 
24 hour chart check complete: yes Patient goal(s) for today: : meet treatment team, acclimate to unit Treatment team focus/goals: assess needs for treatment and safe discharge Progress note : PT admitted under TDO for inability to care for self. She is confused, does not understand why she is in hospital and denies and mental healht Dx or need for medication. LOS:  1  Expected LOS: TBD Financial concerns/prescription coverage:  [place] MEDICARE PART A & B Family contact: daughterVivi ([phone]) Family requesting physician contact today:  NA Discharge plan: TBD - Pt was living alone but may required 24/7 care and placement Access to weapons : megan Outpatient provider(s): [facility] Patient's preferred phone number for follow up call : [phone] Patient's preferred e-mail address : 
 
 Participating treatment team members: [id number] Nick Noble, Severa Sil, NP, Margarita Toro, MSW; Galen Ch, RN; Ariel Montiel, PharmD

## 2020-12-15 NOTE — PROGRESS NOTES
Problem: Mobility Impaired (Adult and Pediatric)  Goal: *Acute Goals and Plan of Care (Insert Text)  Description: FUNCTIONAL STATUS PRIOR TO ADMISSION: Patient unable to provide history. Per chart review, patient was modified independence with rollator until recent hospital admission. HOME SUPPORT PRIOR TO ADMISSION: The patient lived alone with daughter to provide assistance. Chart states daughter has a camera to watch her mother throughout the day. Physical Therapy Goals  Initiated 12/15/2020  1. Patient will move from supine to sit and sit to supine , scoot up and down, and roll side to side in bed with modified independence within 7 day(s). 2.  Patient will transfer from bed to chair and chair to bed with modified independence using the least restrictive device within 7 day(s). 3.  Patient will perform sit to stand with modified independence within 7 day(s). 4.  Patient will ambulate with modified independence for 150 feet with the least restrictive device within 7 day(s). Outcome: Progressing Towards Goal  PHYSICAL THERAPY REEVALUATION  Patient: Hien Faye (8 y.o. female)  Date: 12/15/2020  Primary Diagnosis: Bipolar 1 disorder ([facility] [F31.9]        Precautions:  Fall      ASSESSMENT  Based on the objective data described below, the patient presents with generalized weakness, impaired balance, decreased safety awareness, and decreased activity endurance. From presentation and chart review, patients mobility fluctuates and is likely behavioral at times. She was able to ambulate with a RW short distance today. Unsteady gait pattern and concerns for increased fall risk. Current Level of Function Impacting Discharge (mobility/balance): [facility]    Functional Outcome Measure: The patient scored 40/100 on the Barthel Index outcome measure which is indicative of moderate dependency for functional mobility/ADL.       Other factors to consider for discharge: fall risk, lives alone      Patient will benefit from skilled therapy intervention to address the above noted impairments. PLAN :  Recommendations and Planned Interventions: bed mobility training, transfer training, gait training, therapeutic exercises, patient and family training/education, and therapeutic activities      Frequency/Duration: Patient will be followed by physical therapy:  3 times a week to address goals. Recommendation for discharge: (in order for the patient to meet his/her long term goals)  Therapy up to 5 days/week in SNF setting vs HH with family assist (unsafe to return home living alone)    This discharge recommendation:  Has not yet been discussed the attending provider and/or case management    Equipment recommendations for successful discharge (if) home: TBD     SUBJECTIVE:   Patient stated [employer] work but don't work.     OBJECTIVE DATA SUMMARY:   HISTORY:    Past Medical History:   Diagnosis Date    Anemia     Arthritis     Bipolar disorder ([address]     Burning with urination     Memory change     Psychiatric disorder     Bipolar Disorder    Stool color black     Tremor      Past Surgical History:   Procedure Laterality Date    HX BREAST BIOPSY Left 1990's    benign    HX CHOLECYSTECTOMY      HX GI      [address] course since last seen and reason for reevaluation: Admitted to physiatric unit    Personal factors and/or comorbidities impacting plan of care:     Home Situation  Home Environment: Private residence  One/Two Story Residence: Other (Comment)(Unsure)  Living Alone: Yes  Support Systems: Family member(s)(Daughter watches her on camera)  Patient Expects to be Discharged to[de-identified] Unknown  Current DME Used/Available at Home: Marlen Hamilton, rollator, Grab bars, Shower chair    EXAMINATION/PRESENTATION/DECISION MAKING:   Critical Behavior:  Neurologic State: Alert  Orientation Level: Disoriented to place, Disoriented to situation, Disoriented to time, Oriented to person  Cognition: Decreased attention/concentration, Follows commands, Impaired decision making, Memory loss, Poor safety awareness     Hearing: Auditory  Auditory Impairment: None    Range Of Motion:  AROM: Generally decreased, functional                       Strength:    Strength: Generally decreased, functional                    Tone & Sensation:   Tone: Normal              Sensation: Intact               Coordination:  Coordination: Generally decreased, functional    Functional Mobility:  Bed Mobility:  Rolling: Supervision  Supine to Sit: Supervision     Scooting: Supervision  Transfers:  Sit to Stand: Contact guard assistance  Stand to Sit: Contact guard assistance     Balance:   Sitting: Intact  Standing: Impaired  Standing - Static: Fair  Standing - Dynamic : Fair  Ambulation/Gait Training:  Distance (ft): 25 Feet (ft)  Assistive Device: Walker, rolling  Ambulation - Level of Assistance: Contact guard assistance        Gait Abnormalities: Decreased step clearance;Trunk sway increased;Trendelenburg        Base of Support: Widened     Speed/Becky: Slow  Step Length: Left shortened;Right shortened                  Slow, requires support of RW. Mildly unsteady. Functional Measure:  Barthel Index:    Bathin  Bladder: 5  Bowels: 5  Groomin  Dressin  Feedin  Mobility: 0(unable to perform distance)  Stairs: 0  Toilet Use: 5  Transfer (Bed to Chair and Back): 10  Total: 40/100     The Barthel ADL Index: Guidelines  1. The index should be used as a record of what a patient does, not as a record of what a patient could do. 2. The main aim is to establish degree of independence from any help, physical or verbal, however minor and for whatever reason. 3. The need for supervision renders the patient not independent. 4. A patient's performance should be established using the best available evidence. Asking the patient, friends/relatives and nurses are the usual sources, but direct observation and common sense are also important.  However direct testing is not needed. 5. Usually the patient's performance over the preceding 24-48 hours is important, but occasionally longer periods will be relevant. 6. Middle categories imply that the patient supplies over 50 per cent of the effort. 7. Use of aids to be independent is allowed. Allyn Chou., Barthel, D.W. (4423). Functional evaluation: the Barthel Index. [address] [facility] (14)2. ONOFRE Mooney, Cherie Green., Mary Grace Hilario., [place], [address] [place] [address] (1999). Measuring the change indisability after inpatient rehabilitation; comparison of the responsiveness of the Barthel Index and Functional Foster Measure. Journal of Neurology, Neurosurgery, and Psychiatry, 66(4), 306-343. Diane Bolden, CAROLIN, KELLY Nicole, & Ruth Betts MMARIAH. (2004.) Assessment of post-stroke quality of life in cost-effectiveness studies: The usefulness of the Barthel Index and the EuroQoL-5D. Quality of Life Research, 13, 427-43      Pain Rating:  No c/o pain     Activity Tolerance:   Fair    After treatment patient left in no apparent distress:   Sitting in chair in dining room. Nurse present. COMMUNICATION/EDUCATION:   The patients plan of care was discussed with: Registered nurse. Fall prevention education was provided and the patient/caregiver indicated understanding., Patient/family have participated as able in goal setting and plan of care. , and Patient/family agree to work toward stated goals and plan of care.     Thank you for this referral.  Kay Hoffman, PT, DPT   Time Calculation: 18 mins

## 2020-12-15 NOTE — PROGRESS NOTES
Problem: Altered Thought Process (Adult/Pediatric)  Goal: *STG: Remains safe in hospital  Outcome: Progressing Towards Goal  Goal: *STG: Seeks staff when feelings of anxiety and fear arise  Outcome: Progressing Towards Goal  Goal: *STG: Complies with medication therapy  Outcome: Progressing Towards Goal  Goal: *STG: Decreased delusional thinking  Outcome: Not Progressing Towards Goal  Goal: *STG: Absence of lethality  Outcome: Progressing Towards Goal  Goal: Interventions  Outcome: Progressing Towards Goal

## 2020-12-16 PROCEDURE — 74011250637 HC RX REV CODE- 250/637: Performed by: NURSE PRACTITIONER

## 2020-12-16 PROCEDURE — 65220000003 HC RM SEMIPRIVATE PSYCH

## 2020-12-16 RX ADMIN — PROPRANOLOL HYDROCHLORIDE 10 MG: 10 TABLET ORAL at 08:56

## 2020-12-16 RX ADMIN — RISPERIDONE 1 MG: 1 TABLET ORAL at 21:00

## 2020-12-16 RX ADMIN — RISPERIDONE 1 MG: 1 TABLET ORAL at 08:56

## 2020-12-16 RX ADMIN — VALPROIC ACID 250 MG: 250 SOLUTION ORAL at 08:56

## 2020-12-16 RX ADMIN — VALPROIC ACID 250 MG: 250 SOLUTION ORAL at 12:23

## 2020-12-16 RX ADMIN — MIRTAZAPINE 15 MG: 15 TABLET, FILM COATED ORAL at 21:00

## 2020-12-16 RX ADMIN — ACETAMINOPHEN 650 MG: 325 TABLET ORAL at 11:21

## 2020-12-16 RX ADMIN — PROPRANOLOL HYDROCHLORIDE 10 MG: 10 TABLET ORAL at 17:10

## 2020-12-16 RX ADMIN — VALPROIC ACID 250 MG: 250 SOLUTION ORAL at 17:10

## 2020-12-16 RX ADMIN — VALPROIC ACID 250 MG: 250 SOLUTION ORAL at 21:00

## 2020-12-16 NOTE — PROGRESS NOTES
Pt appears asleep in bed, NAD, even respirations, will continue to monitor q15min     Problem: Falls - Risk of  Goal: *Absence of Falls  Description: Document Starleen Freeze Fall Risk and appropriate interventions in the flowsheet.   Outcome: Progressing Towards Goal  Note: Fall Risk Interventions:  Mobility Interventions: Bed/chair exit alarm, Communicate number of staff needed for ambulation/transfer    Mentation Interventions: Bed/chair exit alarm, Adequate sleep, hydration, pain control, Door open when patient unattended, More frequent rounding    Medication Interventions: Bed/chair exit alarm, Teach patient to arise slowly    Elimination Interventions: Bed/chair exit alarm    History of Falls Interventions: Bed/chair exit alarm

## 2020-12-16 NOTE — PROGRESS NOTES
Problem: Falls - Risk of  Goal: *Absence of Falls  Description: Document Primo Navarro Fall Risk and appropriate interventions in the flowsheet. Outcome: Progressing Towards Goal  Note: Fall Risk Interventions:  Mobility Interventions: Bed/chair exit alarm, Patient to call before getting OOB, Utilize walker, cane, or other assistive device    Mentation Interventions: Adequate sleep, hydration, pain control, Bed/chair exit alarm, Door open when patient unattended, Room close to nurse's station    Medication Interventions: Bed/chair exit alarm, Patient to call before getting OOB, Teach patient to arise slowly    Elimination Interventions: Bed/chair exit alarm, Patient to call for help with toileting needs, Stay With Me (per policy)    History of Falls Interventions: Bed/chair exit alarm, Door open when patient unattended, Room close to nurse's station    Free of falls. Falls tool completed and accurate. Bed alarm on the bed. Patient ambulating with walker, gait is fairly stable, but staff at side to maintain safety.

## 2020-12-16 NOTE — PROGRESS NOTES
Problem: Falls - Risk of  Goal: *Absence of Falls  Description: Document Kaiser Wells Fall Risk and appropriate interventions in the flowsheet. Note: Fall Risk Interventions:  Mobility Interventions: Bed/chair exit alarm, Patient to call before getting OOB, Utilize walker, cane, or other assistive device    Mentation Interventions: Adequate sleep, hydration, pain control, Bed/chair exit alarm, Door open when patient unattended, Room close to nurse's station    Medication Interventions: Bed/chair exit alarm, Patient to call before getting OOB, Teach patient to arise slowly    Elimination Interventions: Bed/chair exit alarm, Patient to call for help with toileting needs, Stay With Me (per policy)    History of Falls Interventions: Bed/chair exit alarm, Door open when patient unattended, Room close to nurse's station    1515: Patient received awake and alert and sitting quietly in the chair in the dining room. Mood and affect; dull, flat, anxious and confused. Denies SI/HI. Will continue to monitor q15 minutes for safety checks. Patient ate 100% of dinner tray. Throughout shift, patient was hyperverbal, intrusive, and confused. However was redirectable and accepting of instructions. MMSE was completed during evening shift.

## 2020-12-16 NOTE — H&P
[address] [facility] HISTORY AND PHYSICAL    Name:  Sandhya Akers  MR#:  [id number]  :  1945  ACCOUNT #:  [de-identified]  ADMIT DATE:  2020    PSYCH HISTORY AND PHYSICAL    CHIEF COMPLAINT: \"Am I going home? \"    HISTORY OF PRESENTING ILLNESS:  The patient is a 50-year-old female who is currently admitted at [facility] inpatient psychiatric unit on a temporary USP order. Her TDO hearing is scheduled in the morning. She is well known to me from, I have been following her on the medical unit for psychiatric consultations. She is a poor historian. She remains markedly confused, she confabulates. No history is obtained from her. Her past medical history is significant for CVA, chronic tremor, and anemia. she also carries a long standing diagnosis of Bipolar 1 disorder and has been seeing JACOBY Hester, at [facility]. She has been stabilized on lithium monotherapy for many years. She was initially sent to [facility] for worsening mentation, disorientation, and delusion. Her daughter noted that the patient was walking around her house covered in stool and confused. She also was refusing all help from her daughter. She lives by herself. She was treated for UTI and acute delirium. She has been kicking, screaming, and biting. She has been agitated and yelling in the medical unit. Throwing milk carton, combative. Code atlas was called and IM medication had to be given quite a few times. She is also not sleeping. She also has tremors which according to her daughter has been ongoing for at least 15 years. She has been noncompliant with her medication and has been refusing her medications. She is admitted to the inpatient psychiatric unit for further stabilization and treatment. PAST MEDICAL HISTORY:  See H and P.       Past Medical History:   Diagnosis Date    Anemia     Arthritis     Bipolar disorder ([address]     Burning with urination     Memory change     Psychiatric disorder     Bipolar Disorder    Stool color black     Tremor        Labs: (reviewed/updated 12/16/2020)  Patient Vitals for the past 8 hrs:   BP Temp Pulse Resp SpO2   12/16/20 0829 110/65 98.7 °F (37.1 °C) 84 16 95 %   12/16/20 0742 (!) 143/80 98.1 °F (36.7 °C) 81 16 95 %     Labs Reviewed   LIPID PANEL - Abnormal; Notable for the following components:       Result Value    Triglyceride 257 (*)     All other components within normal limits   URINALYSIS W/MICROSCOPIC - Abnormal; Notable for the following components:    Blood SMALL (*)     Leukocyte Esterase MODERATE (*)     All other components within normal limits   URINE CULTURE HOLD SAMPLE   GLUCOSE, FASTING   HEMOGLOBIN A1C WITH EAG     Lab Results   Component Value Date/Time    Sodium 140 12/07/2020 03:04 AM    Potassium 3.9 12/07/2020 03:04 AM    Chloride 108 12/07/2020 03:04 AM    CO2 26 12/07/2020 03:04 AM    Anion gap 6 12/07/2020 03:04 AM    Glucose 95 12/15/2020 04:22 AM    BUN 12 12/07/2020 03:04 AM    Creatinine 0.73 12/07/2020 03:04 AM    BUN/Creatinine ratio 16 12/07/2020 03:04 AM    GFR est AA >60 12/07/2020 03:04 AM    GFR est non-AA >60 12/07/2020 03:04 AM    Calcium 9.9 12/07/2020 03:04 AM    Bilirubin, total 0.3 12/03/2020 03:30 AM    Alk.  phosphatase 110 12/03/2020 03:30 AM    Protein, total 6.6 12/03/2020 03:30 AM    Albumin 2.6 (L) 12/03/2020 03:30 AM    Globulin 4.0 12/03/2020 03:30 AM    A-G Ratio 0.7 (L) 12/03/2020 03:30 AM    ALT (SGPT) 33 12/03/2020 03:30 AM     Admission on 12/14/2020   Component Date Value Ref Range Status    LIPID PROFILE 12/15/2020        Final    Cholesterol, total 12/15/2020 176  <200 MG/DL Final    Triglyceride 12/15/2020 257* <150 MG/DL Final    HDL Cholesterol 12/15/2020 48  MG/DL Final    LDL, calculated 12/15/2020 76.6  0 - 100 MG/DL Final    VLDL, calculated 12/15/2020 51.4  MG/DL Final    CHOL/HDL Ratio 12/15/2020 3.7  0.0 - 5.0   Final    Glucose 12/15/2020 95  65 - 100 MG/DL Final    Color 12/15/2020 YELLOW/STRAW    Final    Appearance 12/15/2020 CLEAR  CLEAR   Final    Specific gravity 12/15/2020 1.011  1.003 - 1.030   Final    pH (UA) 12/15/2020 7.0  5.0 - 8.0   Final    Protein 12/15/2020 Negative  NEG mg/dL Final    Glucose 12/15/2020 Negative  NEG mg/dL Final    Ketone 12/15/2020 Negative  NEG mg/dL Final    Bilirubin 12/15/2020 Negative  NEG   Final    Blood 12/15/2020 SMALL* NEG   Final    Urobilinogen 12/15/2020 0.2  0.2 - 1.0 EU/dL Final    Nitrites 12/15/2020 Negative  NEG   Final    Leukocyte Esterase 12/15/2020 MODERATE* NEG   Final    WBC 12/15/2020 5-10  0 - 4 /hpf Final    RBC 12/15/2020 0-5  0 - 5 /hpf Final    Epithelial cells 12/15/2020 FEW  FEW /lpf Final    Bacteria 12/15/2020 Negative  NEG /hpf Final    Hemoglobin A1c 12/15/2020 5.0  4.0 - 5.6 % Final    Est. average glucose 12/15/2020 97  mg/dL Final    Urine culture hold 12/15/2020 Urine on hold in Microbiology dept for 2 days. If unpreserved urine is submitted, it cannot be used for addtional testing after 24 hours, recollection will be required. Final     Vitals:    12/15/20 1613 12/15/20 1931 12/16/20 0742 12/16/20 0829   BP: 107/64 108/65 (!) 143/80 110/65   Pulse: 70 80 81 84   Resp: 16 14 16 16   Temp: 98 °F (36.7 °C) 97.1 °F (36.2 °C) 98.1 °F (36.7 °C) 98.7 °F (37.1 °C)   SpO2: 94% 94% 95% 95%     No results found for this or any previous visit (from the past 24 hour(s)). RADIOLOGY REPORTS:  Results from Hospital Encounter encounter on 11/29/20   XR CHEST PORT    Narrative EXAM: XR CHEST PORT    INDICATION: fever    COMPARISON: None. FINDINGS: A portable AP radiograph of the chest was obtained at 14:46 hours. The  patient is on a cardiac monitor. The lungs are clear.  The cardiac and  mediastinal contours and pulmonary vascularity are normal.  The chest wall  structures and visualized upper abdomen show no acute findings with incidental  note of degenerative spine and shoulder changes as well as partial visualization  of posterior windy and screw fixation hardware of the lumbar spine. Impression IMPRESSION: No acute findings. Ct Head Wo Cont    Result Date: 11/29/2020  CLINICAL HISTORY: Encephalopathy INDICATION: Encephalopathy COMPARISON: 9/7/2018. CT dose reduction was achieved through use of a standardized protocol tailored for this examination and automatic exposure control for dose modulation. TECHNIQUE: Serial axial images with a collimation of 5 mm were obtained from the skull base through the vertex  FINDINGS: There is sulcal and ventricular prominence. Confluent periventricular and scattered foci of hypodensity in the cerebral white matter. There is no evidence of an acute infarction, hemorrhage, or mass-effect. There is no evidence of midline shift or hydrocephalus. Posterior fossa structures are unremarkable. No extra-axial collections are seen. Mastoid air cells are well pneumatized and clear. Hypodensity in the central jerri. 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 IMPRESSION: No acute intracranial process. Small area of chronic encephalomalacia in the left frontal lobe. Imaging findings consistent with moderate chronic microvascular ischemic change. There is a mild degree of cerebral atrophy. Xr Chest Port    Result Date: 11/29/2020  EXAM: XR CHEST PORT INDICATION: fever COMPARISON: None. FINDINGS: A portable AP radiograph of the chest was obtained at 14:46 hours. The patient is on a cardiac monitor. The lungs are clear. The cardiac and mediastinal contours and pulmonary vascularity are normal.  The chest wall structures and visualized upper abdomen show no acute findings with incidental note of degenerative spine and shoulder changes as well as partial visualization of posterior windy and screw fixation hardware of the lumbar spine. IMPRESSION: No acute findings.           PAST PSYCHIATRIC HISTORY:  She carries a diagnosis of bipolar disorder. She has been on lithium for many years. She was hospitalized at [facility], and she was diagnosed with bipolar disorder in the 66's. She has a history of 3 manic episodes.      PSYCHOSOCIAL HISTORY:  She has a daughter. She lives by herself. She is a retired . DIAGNOSIS: Unspecified dementia with behavioral disturbance. Bipolar 1 disorder by history. ASSESSMENT AND PLANNING:  I will continue her inpatient stay. She will be provided with support and encouraged to attend groups. Her safety will be monitored. Her medications will be modified and assessed. Case Management will work on discharge planning.     ASSETS AND STRENGTHS:  She has a supportive daughter.     ESTIMATED LENGTH OF STAY:  5-7 days.           ARMAAN MART NP

## 2020-12-16 NOTE — BH NOTES
1800: Per patient, daughter Gracia Escobar [phone] would like to be apart of TDO hearing tomorrow morning via conference call if possible.

## 2020-12-16 NOTE — PROGRESS NOTES
PSYCHIATRIC PROGRESS NOTE       Patient: Jessie Morocho MRN: [id number]  SSN: [id number]    YOB: 1945  Age: 67 y.o. Sex: female      Admit Date: 12/14/2020    LOS: 2 days       Chief Complaint:  What do you want me to say? Interval History:  Jessie Morocho remains very irritable and profoundly confused. She continues to confabulates but is having a hard time processing information. All she kept saying is she wants to go home. She refused her medications last night. Depakote was increased yesterday. Slept 8 hours last night. TDO hearing scheduled in the morning. Past Medical History:  Past Medical History:   Diagnosis Date    Anemia     Arthritis     Bipolar disorder ([address]     Burning with urination     Memory change     Psychiatric disorder     Bipolar Disorder    Stool color black     Tremor          ALLERGIES:(reviewed/updated 12/16/2020)  Allergies   Allergen Reactions    Lamisil [Terbinafine] Diarrhea and Nausea and Vomiting    Thorazine [Chlorpromazine] Rash       Laboratory report:  Lab Results   Component Value Date/Time    WBC 9.8 12/08/2020 02:44 AM    HGB 11.2 (L) 12/08/2020 02:44 AM    HCT 34.9 (L) 12/08/2020 02:44 AM    PLATELET 072 62/14/4281 02:44 AM    .5 (H) 12/08/2020 02:44 AM      Lab Results   Component Value Date/Time    Sodium 140 12/07/2020 03:04 AM    Potassium 3.9 12/07/2020 03:04 AM    Chloride 108 12/07/2020 03:04 AM    CO2 26 12/07/2020 03:04 AM    Anion gap 6 12/07/2020 03:04 AM    Glucose 95 12/15/2020 04:22 AM    BUN 12 12/07/2020 03:04 AM    Creatinine 0.73 12/07/2020 03:04 AM    BUN/Creatinine ratio 16 12/07/2020 03:04 AM    GFR est AA >60 12/07/2020 03:04 AM    GFR est non-AA >60 12/07/2020 03:04 AM    Calcium 9.9 12/07/2020 03:04 AM    Bilirubin, total 0.3 12/03/2020 03:30 AM    Alk.  phosphatase 110 12/03/2020 03:30 AM    Protein, total 6.6 12/03/2020 03:30 AM    Albumin 2.6 (L) 12/03/2020 03:30 AM    Globulin 4.0 12/03/2020 03:30 AM    A-G Ratio 0.7 (L) 12/03/2020 03:30 AM    ALT (SGPT) 33 12/03/2020 03:30 AM      Vitals:    12/15/20 1613 12/15/20 1931 12/16/20 0742 12/16/20 0829   BP: 107/64 108/65 (!) 143/80 110/65   Pulse: 70 80 81 84   Resp: 16 14 16 16   Temp: 98 °F (36.7 °C) 97.1 °F (36.2 °C) 98.1 °F (36.7 °C) 98.7 °F (37.1 °C)   SpO2: 94% 94% 95% 95%       Lab Results   Component Value Date/Time    Valproic acid 35 (L) 12/07/2020 03:04 AM     Lab Results   Component Value Date/Time    Lithium level 0.57 (L) 11/29/2020 05:27 PM       Vital Signs  Patient Vitals for the past 24 hrs:   Temp Pulse Resp BP SpO2   12/16/20 0829 98.7 °F (37.1 °C) 84 16 110/65 95 %   12/16/20 0742 98.1 °F (36.7 °C) 81 16 (!) 143/80 95 %   12/15/20 1931 97.1 °F (36.2 °C) 80 14 108/65 94 %   12/15/20 1613 98 °F (36.7 °C) 70 16 107/64 94 %   12/15/20 1130 98 °F (36.7 °C) 66 16 119/71 96 %     Wt Readings from Last 3 Encounters:   11/29/20 75.4 kg (166 lb 3.6 oz)   12/02/20 75.4 kg (166 lb 3.6 oz)   09/07/18 77.6 kg (171 lb)     Temp Readings from Last 3 Encounters:   12/16/20 98.7 °F (37.1 °C)   12/14/20 97.5 °F (36.4 °C)   09/12/20 98.1 °F (36.7 °C)     BP Readings from Last 3 Encounters:   12/16/20 110/65   12/14/20 (!) 113/58   09/12/20 128/72     Pulse Readings from Last 3 Encounters:   12/16/20 84   12/14/20 72   09/12/20 75       Radiology (reviewed/updated 12/16/2020)  Ct Head Wo Cont    Result Date: 11/29/2020  CLINICAL HISTORY: Encephalopathy INDICATION: Encephalopathy COMPARISON: 9/7/2018. CT dose reduction was achieved through use of a standardized protocol tailored for this examination and automatic exposure control for dose modulation. TECHNIQUE: Serial axial images with a collimation of 5 mm were obtained from the skull base through the vertex  FINDINGS: There is sulcal and ventricular prominence. Confluent periventricular and scattered foci of hypodensity in the cerebral white matter. There is no evidence of an acute infarction, hemorrhage, or mass-effect.  There is no evidence of midline shift or hydrocephalus. Posterior fossa structures are unremarkable. No extra-axial collections are seen. Mastoid air cells are well pneumatized and clear. Hypodensity in the central jerri. 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 IMPRESSION: No acute intracranial process. Small area of chronic encephalomalacia in the left frontal lobe. Imaging findings consistent with moderate chronic microvascular ischemic change. There is a mild degree of cerebral atrophy. Xr Chest Port    Result Date: 11/29/2020  EXAM: XR CHEST PORT INDICATION: fever COMPARISON: None. FINDINGS: A portable AP radiograph of the chest was obtained at 14:46 hours. The patient is on a cardiac monitor. The lungs are clear. The cardiac and mediastinal contours and pulmonary vascularity are normal.  The chest wall structures and visualized upper abdomen show no acute findings with incidental note of degenerative spine and shoulder changes as well as partial visualization of posterior windy and screw fixation hardware of the lumbar spine. IMPRESSION: No acute findings.       Current Facility-Administered Medications   Medication Dose Route Frequency Provider Last Rate Last Admin    valproic acid (as sodium salt) (DEPAKENE) 250 mg/5 mL (5 mL) oral solution 250 mg  250 mg Oral QID Hemalatha Vinson NP   250 mg at 12/16/20 0856    OLANZapine (ZyPREXA) tablet 2.5 mg  2.5 mg Oral Q6H PRN Navneet Moore, MAX        haloperidol lactate (HALDOL) injection 2.5 mg  2.5 mg IntraMUSCular Q6H PRN Navneet Moore, MAX        benztropine (COGENTIN) tablet 0.5 mg  0.5 mg Oral BID PRN Navneet Moore, NP        diphenhydrAMINE (BENADRYL) injection 25 mg  25 mg IntraMUSCular BID PRN Navneet Moore, NP        acetaminophen (TYLENOL) tablet 650 mg  650 mg Oral Q4H PRN Kim Moore NP   650 mg at 12/15/20 1142    magnesium hydroxide (MILK OF MAGNESIA) 400 mg/5 mL oral suspension 30 mL  30 mL Oral DAILY PRN NorwoodNisha Rangel, NP        traZODone (DESYREL) tablet 50 mg  50 mg Oral QHS PRN Norwood-Julio CesarNishao, NP        propranoloL (INDERAL) tablet 10 mg  10 mg Oral BID Kim Moore, NP   10 mg at 12/16/20 0856    mirtazapine (REMERON) tablet 15 mg  15 mg Oral QHS Norwood-Kim Harrell, NP   15 mg at 12/14/20 2043    risperiDONE (RisperDAL) tablet 1 mg  1 mg Oral BID Kim Moore, NP   1 mg at 12/16/20 0856       Side Effects: (reviewed/updated 12/16/2020)  None reported or admitted to. Review of Systems: (reviewed/updated 12/16/2020)  Appetite: good  Sleep: good   All other Review of Systems: negative    Mental Status Exam:  Eye contact: Good eye contact  Psychomotor activity: irritable   Speech is spontaneous  Thought process:confabulation  Mood is \"ok\"  Affect: constricted  Perception: No avh  Suicidal ideation: No si  Homicidal ideation: No hi  Insight/judgment: Poor  Cognition is impaired      Physical Exam:  Musculoskeletal system: steady gait  Tremor not present  Cog wheeling not present      Assessment and Plan:  Ursula Cogan meets criteria for a diagnosis of Unspecified dementia with behavioral disturbance. Bipolar 1 disorder by history. MMSE. Continue current medications as prescribed. We will closely monitor for safety. We will encourage reality orientation. Disposition planning to continue. I certify that this patients inpatient psychiatric hospital services furnished since the previous certification were, and continue to be, required for treatment that could reasonably be expected to improve the patient's condition, or for diagnostic study, and that the patient continues to need, on a daily basis, active treatment furnished directly by or requiring the supervision of inpatient psychiatric facility personnel. In addition, the hospital records show that services furnished were intensive treatment services, admission or related services, or equivalent services.       Signed:  Ena Pelyao NP  12/16/2020

## 2020-12-16 NOTE — INTERDISCIPLINARY ROUNDS
Behavioral Health Interdisciplinary Rounds Patient Name: Jovita Staples  Age: 67 y.o. Room/Bed:  740/01 Primary Diagnosis: <principal problem not specified> Admission Status: TDO Readmission within 30 days: no 
Power of  in place: no 
Patient requires a blocked bed: no          Reason for blocked bed: VTE Prophylaxis: No 
 
Mobility needs/Fall risk: yes Flu Vaccine :   
Nutritional Plan: no 
Consults:         
Labs/Testing due today?: no 
 
Sleep hours: 8 + Participation in Care/Groups:  yes Medication Compliant?: Selective PRNS (last 24 hours): Pain Restraints (last 24 hours):  no 
  
CIWA (range last 24 hours): COWS (range last 24 hours): Alcohol screening (AUDIT) completed -   AUDIT Score: 0 If applicable, date SBIRT discussed in treatment team AND documented:  
AUDIT Screen Score: AUDIT Score: 0 Tobacco - patient is a smoker: Have You Used Tobacco in the Past 30 Days: No 
Illegal Drugs use: Have You Used Any Illegal Substances Over the Past 12 Months: No 
 
24 hour chart check complete: yes Patient goal(s) for today:  
Treatment team focus/goals: titrate medications and assess needs for discharge Progress note: Pt is admitted under TDO for inability to care for self. Daughter agrees Pt needs treatment but does not want to attend TDO hearing on Thursday 12/17/20. Daughter plans to seek guardianship of patient as there is no POA or advanced directives for patient, she desires independence and refuses necessary care at times. LOS:  2  Expected LOS: TBD Financial concerns/prescription coverage:  [place] MEDICARE PART A & B Family contact: daughterTaryn ([phone]) Family requesting physician contact today:  NA Discharge plan: TBD - Pt was living alone but may required 24/7 care and placement Access to weapons : na Outpatient provider(s): CYNTHIA Keen transitional care manger for Dr Marcel Chavez ([phone]) Patient's preferred phone number for follow up call : [phone] Patient's preferred e-mail address : 
 
Participating treatment team members: Martell Carmichael, Elijah Keane NP, Gordo Conley MSW; Trip Giraldo RN; Yakelin Vasquez, ShivaD

## 2020-12-16 NOTE — PROGRESS NOTES
Problem: Altered Thought Process (Adult/Pediatric)  Goal: *STG: Participates in treatment plan  Outcome: Progressing Towards Goal   Received this morning resting in bed. Was alert, but remains confused. Appears aphasic at times, having difficulties verbalizing thoughts/needs. Was irritable  and short with staff, refusing shower early this am.  As the morning continued, became cooperative and pleasant. Agreeable to taking a shower. Was able to follow directions during am cares, but needed much assistance. Remains labile, pleasant at one moment and defiant at another. Has been medication and meal compliant. Some small moments of  paranoia noted. Has been out on the unit most of the morning and has been medication and meal compliant. Staff to maintain safety during hospitalization.   [address] [facility] [address]  Master Treatment Plan for [employer]    Date Treatment Plan Initiated:12/16/2020    Treatment Plan Modalities:  Type of Modality Amount  (x minutes) Frequency (x/week) Duration (x days) Name of Responsible Staff   Community & wrap-up meetings to encourage peer interactions 15 7 1   DARION   Group psychotherapy to assist in building coping skills and internal controls 60 7 1 Ulises Pino   Therapeutic activity groups to build coping skills 60 7 1 Ulises Pino   Psychoeducation in group setting to address:   Medication education   [address] PharmD   Coping skills   30 3 1 Ulises Pino   Relaxation techniques         Symptom management         Discharge planning   60 2 [address]   60 1 1 volunteer   Recovery/AA/NA   61 1 1 volunteer   Physician medication management   15 7 1 SENP   Family meeting/discharge planning   15 2 [address]

## 2020-12-17 PROCEDURE — 74011250637 HC RX REV CODE- 250/637: Performed by: NURSE PRACTITIONER

## 2020-12-17 PROCEDURE — 65220000003 HC RM SEMIPRIVATE PSYCH

## 2020-12-17 RX ORDER — DONEPEZIL HYDROCHLORIDE 5 MG/1
5 TABLET, FILM COATED ORAL
Status: DISCONTINUED | OUTPATIENT
Start: 2020-12-17 | End: 2021-01-14 | Stop reason: HOSPADM

## 2020-12-17 RX ADMIN — PROPRANOLOL HYDROCHLORIDE 10 MG: 10 TABLET ORAL at 16:14

## 2020-12-17 RX ADMIN — VALPROIC ACID 250 MG: 250 SOLUTION ORAL at 12:41

## 2020-12-17 RX ADMIN — DONEPEZIL HYDROCHLORIDE 5 MG: 5 TABLET, FILM COATED ORAL at 20:14

## 2020-12-17 RX ADMIN — VALPROIC ACID 250 MG: 250 SOLUTION ORAL at 08:18

## 2020-12-17 RX ADMIN — MIRTAZAPINE 15 MG: 15 TABLET, FILM COATED ORAL at 20:14

## 2020-12-17 RX ADMIN — VALPROIC ACID 250 MG: 250 SOLUTION ORAL at 16:15

## 2020-12-17 RX ADMIN — VALPROIC ACID 250 MG: 250 SOLUTION ORAL at 20:13

## 2020-12-17 RX ADMIN — RISPERIDONE 1 MG: 1 TABLET ORAL at 08:17

## 2020-12-17 RX ADMIN — PROPRANOLOL HYDROCHLORIDE 10 MG: 10 TABLET ORAL at 08:18

## 2020-12-17 RX ADMIN — RISPERIDONE 1 MG: 1 TABLET ORAL at 20:14

## 2020-12-17 NOTE — PROGRESS NOTES
Patient educated on fall precautions while taking sedative medications. Patient has bell at bedside and has been educated on ringing bell before getting OOB. Will continue to educate and monitor. Problem: Falls - Risk of  Goal: *Absence of Falls  Description: Document Solis Lund Fall Risk and appropriate interventions in the flowsheet.   Outcome: Progressing Towards Goal  Note: Fall Risk Interventions:  Mobility Interventions: Bed/chair exit alarm    Mentation Interventions: Adequate sleep, hydration, pain control, Bed/chair exit alarm, Door open when patient unattended, Room close to nurse's station    Medication Interventions: Bed/chair exit alarm, Patient to call before getting OOB, Teach patient to arise slowly    Elimination Interventions: Bed/chair exit alarm    History of Falls Interventions: Bed/chair exit alarm, Door open when patient unattended, Room close to nurse's station         Problem: Altered Thought Process (Adult/Pediatric)  Goal: *STG: Complies with medication therapy  Outcome: Progressing Towards Goal

## 2020-12-17 NOTE — PROGRESS NOTES
PSYCHIATRIC PROGRESS NOTE       Patient: Vasquez Hartley MRN: [id number]  SSN: [id number]    YOB: 1945  Age: 67 y.o. Sex: female      Admit Date: 12/14/2020    LOS: 3 days       Chief Complaint:  I don't remember ever seeing you. Interval History:  Vasquez Hartley is sitting in the day room, markedly confused, not as irritable as yesterday. She slept about 6 hours last night. She does not remember seeing me at all, she is also asking if she could go home. She had her hearing this morning and was involuntary committed. Mood remains labile but no agitation or aggression. She is compliant with her meds and denies side effects. MMSE was 8, which shows sever cognitive impairment. Past Medical History:  Past Medical History:   Diagnosis Date    Anemia     Arthritis     Bipolar disorder ([address]     Burning with urination     Memory change     Psychiatric disorder     Bipolar Disorder    Stool color black     Tremor          ALLERGIES:(reviewed/updated 12/17/2020)  Allergies   Allergen Reactions    Lamisil [Terbinafine] Diarrhea and Nausea and Vomiting    Thorazine [Chlorpromazine] Rash       Laboratory report:  Lab Results   Component Value Date/Time    WBC 9.8 12/08/2020 02:44 AM    HGB 11.2 (L) 12/08/2020 02:44 AM    HCT 34.9 (L) 12/08/2020 02:44 AM    PLATELET 592 09/73/2294 02:44 AM    .5 (H) 12/08/2020 02:44 AM      Lab Results   Component Value Date/Time    Sodium 140 12/07/2020 03:04 AM    Potassium 3.9 12/07/2020 03:04 AM    Chloride 108 12/07/2020 03:04 AM    CO2 26 12/07/2020 03:04 AM    Anion gap 6 12/07/2020 03:04 AM    Glucose 95 12/15/2020 04:22 AM    BUN 12 12/07/2020 03:04 AM    Creatinine 0.73 12/07/2020 03:04 AM    BUN/Creatinine ratio 16 12/07/2020 03:04 AM    GFR est AA >60 12/07/2020 03:04 AM    GFR est non-AA >60 12/07/2020 03:04 AM    Calcium 9.9 12/07/2020 03:04 AM    Bilirubin, total 0.3 12/03/2020 03:30 AM    Alk.  phosphatase 110 12/03/2020 03:30 AM    Protein, total 6.6 12/03/2020 03:30 AM    Albumin 2.6 (L) 12/03/2020 03:30 AM    Globulin 4.0 12/03/2020 03:30 AM    A-G Ratio 0.7 (L) 12/03/2020 03:30 AM    ALT (SGPT) 33 12/03/2020 03:30 AM      Vitals:    12/16/20 1228 12/16/20 1600 12/16/20 1709 12/17/20 0743   BP: 122/77 (!) 101/55 120/77 125/75   Pulse: 75 75  96   Resp: 16 16  16   Temp: 97.4 °F (36.3 °C) 98.5 °F (36.9 °C)  97.7 °F (36.5 °C)   SpO2: 96% 96%  96%       Lab Results   Component Value Date/Time    Valproic acid 35 (L) 12/07/2020 03:04 AM     Lab Results   Component Value Date/Time    Lithium level 0.57 (L) 11/29/2020 05:27 PM       Vital Signs  Patient Vitals for the past 24 hrs:   Temp Pulse Resp BP SpO2   12/17/20 0743 97.7 °F (36.5 °C) 96 16 125/75 96 %   12/16/20 1709    120/77    12/16/20 1600 98.5 °F (36.9 °C) 75 16 (!) 101/55 96 %     Wt Readings from Last 3 Encounters:   11/29/20 75.4 kg (166 lb 3.6 oz)   12/02/20 75.4 kg (166 lb 3.6 oz)   09/07/18 77.6 kg (171 lb)     Temp Readings from Last 3 Encounters:   12/17/20 97.7 °F (36.5 °C)   12/14/20 97.5 °F (36.4 °C)   09/12/20 98.1 °F (36.7 °C)     BP Readings from Last 3 Encounters:   12/17/20 125/75   12/14/20 (!) 113/58   09/12/20 128/72     Pulse Readings from Last 3 Encounters:   12/17/20 96   12/14/20 72   09/12/20 75       Radiology (reviewed/updated 12/17/2020)  Ct Head Wo Cont    Result Date: 11/29/2020  CLINICAL HISTORY: Encephalopathy INDICATION: Encephalopathy COMPARISON: 9/7/2018. CT dose reduction was achieved through use of a standardized protocol tailored for this examination and automatic exposure control for dose modulation. TECHNIQUE: Serial axial images with a collimation of 5 mm were obtained from the skull base through the vertex  FINDINGS: There is sulcal and ventricular prominence. Confluent periventricular and scattered foci of hypodensity in the cerebral white matter. There is no evidence of an acute infarction, hemorrhage, or mass-effect.  There is no evidence of midline shift or hydrocephalus. Posterior fossa structures are unremarkable. No extra-axial collections are seen. Mastoid air cells are well pneumatized and clear. Hypodensity in the central jerri. 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 IMPRESSION: No acute intracranial process. Small area of chronic encephalomalacia in the left frontal lobe. Imaging findings consistent with moderate chronic microvascular ischemic change. There is a mild degree of cerebral atrophy. Xr Chest Port    Result Date: 11/29/2020  EXAM: XR CHEST PORT INDICATION: fever COMPARISON: None. FINDINGS: A portable AP radiograph of the chest was obtained at 14:46 hours. The patient is on a cardiac monitor. The lungs are clear. The cardiac and mediastinal contours and pulmonary vascularity are normal.  The chest wall structures and visualized upper abdomen show no acute findings with incidental note of degenerative spine and shoulder changes as well as partial visualization of posterior windy and screw fixation hardware of the lumbar spine. IMPRESSION: No acute findings.       Current Facility-Administered Medications   Medication Dose Route Frequency Provider Last Rate Last Admin    valproic acid (as sodium salt) (DEPAKENE) 250 mg/5 mL (5 mL) oral solution 250 mg  250 mg Oral QID Hemalatha Vinson NP   250 mg at 12/17/20 1241    OLANZapine (ZyPREXA) tablet 2.5 mg  2.5 mg Oral Q6H PRN Sukumar Moore NP        haloperidol lactate (HALDOL) injection 2.5 mg  2.5 mg IntraMUSCular Q6H PRN Sukumar Moore NP        benztropine (COGENTIN) tablet 0.5 mg  0.5 mg Oral BID PRN Sukumar Moore NP        diphenhydrAMINE (BENADRYL) injection 25 mg  25 mg IntraMUSCular BID PRN Kim Moore NP        acetaminophen (TYLENOL) tablet 650 mg  650 mg Oral Q4H PRN Kim Moore NP   650 mg at 12/16/20 1121    magnesium hydroxide (MILK OF MAGNESIA) 400 mg/5 mL oral suspension 30 mL  30 mL Oral DAILY PRN Norwood-Julio Cesar, Chioma Ket, NP        traZODone (DESYREL) tablet 50 mg  50 mg Oral QHS PRN Norwood-Julio Cesar, Chioma Ket, NP        propranoloL (INDERAL) tablet 10 mg  10 mg Oral BID Kim Moore, NP   10 mg at 12/17/20 0818    mirtazapine (REMERON) tablet 15 mg  15 mg Oral QHS NorwoodKim Rangel, NP   15 mg at 12/16/20 2100    risperiDONE (RisperDAL) tablet 1 mg  1 mg Oral BID Kim Moore, NP   1 mg at 12/17/20 0817       Side Effects: (reviewed/updated 12/17/2020)  None reported or admitted to. Review of Systems: (reviewed/updated 12/17/2020)  Appetite: good  Sleep: good   All other Review of Systems: negative    Mental Status Exam:  Eye contact: Good eye contact  Psychomotor activity: irritable   Speech is spontaneous  Thought process:confabulation  Mood is \"ok\"  Affect: constricted  Perception: No avh  Suicidal ideation: No si  Homicidal ideation: No hi  Insight/judgment: Poor  Cognition is impaired. MMSE-8      Physical Exam:  Musculoskeletal system: steady gait  Tremor not present  Cog wheeling not present      Assessment and Plan:  Sayda Navarro meets criteria for a diagnosis of Unspecified dementia with behavioral disturbance. Bipolar 1 disorder by history. Aricept 5mg qhs. Va level Saturday am.  Continue current medications as prescribed. We will closely monitor for safety. We will encourage reality orientation. Disposition planning to continue.        I certify that this patients inpatient psychiatric hospital services furnished since the previous certification were, and continue to be, required for treatment that could reasonably be expected to improve the patient's condition, or for diagnostic study, and that the patient continues to need, on a daily basis, active treatment furnished directly by or requiring the supervision of inpatient psychiatric facility personnel. In addition, the hospital records show that services furnished were intensive treatment services, admission or related services, or equivalent services.       Signed:  Lenin Anderson NP  12/17/2020

## 2020-12-17 NOTE — PROGRESS NOTES
Problem: Falls - Risk of  Goal: *Absence of Falls  Description: Document Green Salvia Fall Risk and appropriate interventions in the flowsheet. Outcome: Progressing Towards Goal  Note: Fall Risk Interventions:  Mobility Interventions: Bed/chair exit alarm    Mentation Interventions: Adequate sleep, hydration, pain control, Bed/chair exit alarm, Door open when patient unattended, Room close to nurse's station    Medication Interventions: Bed/chair exit alarm, Patient to call before getting OOB, Teach patient to arise slowly    Elimination Interventions: Bed/chair exit alarm    History of Falls Interventions: Bed/chair exit alarm, Door open when patient unattended, Room close to nurse's station         Problem: Altered Thought Process (Adult/Pediatric)  Goal: *STG: Participates in treatment plan  Outcome: Progressing Towards Goal  Goal: *STG: Seeks staff when feelings of anxiety and fear arise  Outcome: Progressing Towards Goal  Goal: *STG: Attends activities and groups  Outcome: Progressing Towards Goal    Patient received resting and alert in bed. Ambulates with walker. Confused, periods of aphasia. Appreciates humor, engaging with others. Medication compliant.

## 2020-12-17 NOTE — PROGRESS NOTES
Problem: Discharge Planning  Goal: *Discharge to safe environment  Outcome: Not Progressing Towards Goal  Goal: *Knowledge of medication management  Outcome: Not Progressing Towards Goal  Goal: *Knowledge of discharge instructions  Outcome: Not Progressing Towards Goal

## 2020-12-17 NOTE — INTERDISCIPLINARY ROUNDS
Behavioral Health Interdisciplinary Rounds Patient Name: Pauline Fuller  Age: 67 y.o. Room/Bed:  740/01 Primary Diagnosis: <principal problem not specified> Admission Status: Involuntary Commitment Readmission within 30 days: no 
Power of  in place: no 
Patient requires a blocked bed: no          Reason for blocked bed: VTE Prophylaxis: No 
 
Mobility needs/Fall risk: yes Flu Vaccine :   
Nutritional Plan: no 
Consults:       
Labs/Testing due today?: no 
 
Sleep hours: 6.5 Participation in Care/Groups:   
Medication Compliant?: Yes PRNS (last 24 hours): None Restraints (last 24 hours):  no 
  
CIWA (range last 24 hours): COWS (range last 24 hours): Alcohol screening (AUDIT) completed -   AUDIT Score: 0 If applicable, date SBIRT discussed in treatment team AND documented:  
AUDIT Screen Score: AUDIT Score: 0 Tobacco - patient is a smoker: Have You Used Tobacco in the Past 30 Days: No 
Illegal Drugs use: Have You Used Any Illegal Substances Over the Past 12 Months: No 
 
24 hour chart check complete: yes Patient goal(s) for today: attend TDO hearing Treatment team focus/goals: facilitate TDO hearing, titrate medications and assess needs for discharge Progress note: Pt was involuntarily committed at TDO hearing for inability to care for self. Daughter agrees Pt needs treatment but does not want to attend TDO hearing today. Daughter plans to seek guardianship of patient as there is no POA or advanced directives for patient, she desires independence and refuses necessary care at times. Pt is confused and forgetful and does not remember talking to her NP 3 minutes after treatment team.  
 
LOS:  3  Expected LOS: TBD 
  
Financial concerns/prescription coverage:  [place] MEDICARE PART A & B Family contact: daughterVivi ([phone]) MSW spoke with daughter regarding plans for treatment and safe discharge.   
Family requesting physician contact today:  JANEL 
 Discharge plan: TBD - Pt was living alone but may required 24/7 care and placement Access to weapons : na Outpatient provider(s): [facility] Coffman transitional care manger for Dr Emi Au ([phone]) Patient's preferred phone number for follow up call : [phone] Patient's preferred e-mail address : 
  
Participating treatment team members: Marc Lawrence NP, Chico Li, MSW; Praveen Sena RN; Gi Manning, ShivaD

## 2020-12-17 NOTE — PROGRESS NOTES
Problem: Falls - Risk of  Goal: *Absence of Falls  Description: Document Stefanie Collado Fall Risk and appropriate interventions in the flowsheet. Outcome: Progressing Towards Goal  Note: Fall Risk Interventions:  Mobility Interventions: Bed/chair exit alarm    Mentation Interventions: Adequate sleep, hydration, pain control, Bed/chair exit alarm, Door open when patient unattended, Room close to nurse's station    Medication Interventions: Bed/chair exit alarm, Patient to call before getting OOB, Teach patient to arise slowly    Elimination Interventions: Bed/chair exit alarm, Patient to call for help with toileting needs, Stay With Me (per policy)    History of Falls Interventions: Bed/chair exit alarm, Door open when patient unattended, Room close to nurse's station       Pt. Received resting in bed, NAD, respirations even and unlabored. Will continue q15 safety rounds.

## 2020-12-18 PROCEDURE — 65220000003 HC RM SEMIPRIVATE PSYCH

## 2020-12-18 PROCEDURE — 74011250637 HC RX REV CODE- 250/637: Performed by: NURSE PRACTITIONER

## 2020-12-18 PROCEDURE — 97116 GAIT TRAINING THERAPY: CPT

## 2020-12-18 RX ADMIN — ACETAMINOPHEN 650 MG: 325 TABLET ORAL at 14:06

## 2020-12-18 RX ADMIN — RISPERIDONE 1 MG: 1 TABLET ORAL at 08:23

## 2020-12-18 RX ADMIN — VALPROIC ACID 250 MG: 250 SOLUTION ORAL at 12:35

## 2020-12-18 RX ADMIN — RISPERIDONE 1 MG: 1 TABLET ORAL at 20:26

## 2020-12-18 RX ADMIN — VALPROIC ACID 250 MG: 250 SOLUTION ORAL at 20:26

## 2020-12-18 RX ADMIN — PROPRANOLOL HYDROCHLORIDE 10 MG: 10 TABLET ORAL at 17:04

## 2020-12-18 RX ADMIN — PROPRANOLOL HYDROCHLORIDE 10 MG: 10 TABLET ORAL at 08:23

## 2020-12-18 RX ADMIN — VALPROIC ACID 250 MG: 250 SOLUTION ORAL at 08:23

## 2020-12-18 RX ADMIN — VALPROIC ACID 250 MG: 250 SOLUTION ORAL at 17:04

## 2020-12-18 RX ADMIN — DONEPEZIL HYDROCHLORIDE 5 MG: 5 TABLET, FILM COATED ORAL at 20:26

## 2020-12-18 RX ADMIN — OLANZAPINE 2.5 MG: 2.5 TABLET, FILM COATED ORAL at 19:08

## 2020-12-18 RX ADMIN — MIRTAZAPINE 15 MG: 15 TABLET, FILM COATED ORAL at 20:26

## 2020-12-18 NOTE — BH NOTES
Behavioral Health Treatment Team Note     Patient goal(s) for today: take medications as prescribed, follow staff instructions, talk to family  Treatment team focus/goals: titrate medications, assess needs for discharge    Progress note: Patient remains confused and forgetful. Tolerated Aricept last night with no difficulties noted. LOS:  4  Expected LOS: TBD    Insurance info/prescription coverage:  [place] MED[place]ARE/[place] MEDICARE PART A & B  Date of last family contact:  daughterJeff ([phone]) MSW spoke with daughter regarding plans for treatment and safe discharge.    Family requesting physician contact today:  no  Discharge plan:  TBD - Pt was living alone but may required 24/7 care and placement  Guns in the home:  no   Outpatient provider(s):  CYN[facility]IA    Participating treatment team members: Otilia Healy, Renetta Jordan LPC LSATP CSAC, Brennon Wang NP

## 2020-12-18 NOTE — INTERDISCIPLINARY ROUNDS
Behavioral Health Interdisciplinary Rounds Patient Name: Rishi Maradiaga  Age: 67 y.o. Room/Bed:  740/ Primary Diagnosis: <principal problem not specified> Admission Status: Involuntary Commitment Readmission within 30 days: no 
Power of  in place: no 
Patient requires a blocked bed: no          Reason for blocked bed: VTE Prophylaxis: No 
 
Mobility needs/Fall risk: yes Flu Vaccine :  
Nutritional Plan: no 
Consults:         
Labs/Testing due today?: no 
 
Sleep hours: 7.5 Participation in Care/Groups:  
Medication Compliant?: Yes PRNS (last 24 hours): None Restraints (last 24 hours):  no 
  
CIWA (range last 24 hours): COWS (range last 24 hours): Alcohol screening (AUDIT) completed -   AUDIT Score: 0 If applicable, date SBIRT discussed in treatment team AND documented:  
AUDIT Screen Score: AUDIT Score: 0 Document Brief Intervention (corresponds directly with the 5 A's, Ask, Advise, Assess, Assist, and Arrange): At- Risk Patients (Score 7-15 for women; 8-15 for men) Discuss concern patient is drinking at unhealthy levels known to increase risk of alcohol-related health problems. Is Patient ready to commit to change? If No: 
? Encourage reflection ? Discuss short term and long term health risks of consuming alcohol ? Barriers to change ? Reaffirm willingness to help / Educational materials provided If Yes: 
? Set goal 
? Plan 
? Educational materials provided Harmful use or Dependence (Score 16 or greater) ? Discuss short term and long term health risks of consuming alcohol ? Recommendations ? Negotiate drinking goal 
? Recommend addiction specialist/center ? Arrange follow-up appointments. Tobacco - patient is a smoker: Have You Used Tobacco in the Past 30 Days: No 
Illegal Drugs use: Have You Used Any Illegal Substances Over the Past 12 Months: No 
 
24 hour chart check complete:  
 
Patient goal(s) for today: Treatment team focus/goals:  
Progress note LOS:  4  Expected LOS:  
 
Financial concerns/prescription coverage: 
Family contact:      
Family requesting physician contact today:  
Discharge plan: Access to weapons: Outpatient provider(s):  
Patient's preferred phone number for follow up call :  
Patient's preferred e-mail address : 
Participating treatment team members: Frank Shea, * (assigned SW),

## 2020-12-18 NOTE — PROGRESS NOTES
Problem: Falls - Risk of  Goal: *Absence of Falls  Description: Document Jackie Gorge Fall Risk and appropriate interventions in the flowsheet.   Outcome: Progressing Towards Goal  Note: Fall Risk Interventions:  Mobility Interventions: Bed/chair exit alarm    Mentation Interventions: Adequate sleep, hydration, pain control, Bed/chair exit alarm, Door open when patient unattended, Room close to nurse's station    Medication Interventions: Teach patient to arise slowly    Elimination Interventions: Bed/chair exit alarm    History of Falls Interventions: Bed/chair exit alarm, Door open when patient unattended, Room close to nurse's station         Problem: Altered Thought Process (Adult/Pediatric)  Goal: *STG: Remains safe in hospital  Outcome: Progressing Towards Goal

## 2020-12-18 NOTE — PROGRESS NOTES
PSYCHIATRIC PROGRESS NOTE       Patient: Milagro Cole MRN: [id number]  SSN: [id number]    YOB: 1945  Age: 67 y.o. Sex: female      Admit Date: 12/14/2020    LOS: 4 days       Chief Complaint:  I don't remember ever seeing you. Interval History:  Milagro Cole is calm and pleasant but profoundly confused. She confabulates a lot but smiling a lot. Less irritable today. She is requesting to go home. Denies si hi or avh. Compliant with her meds with no side effects noted. Sleeping and eating well. Past Medical History:  Past Medical History:   Diagnosis Date    Anemia     Arthritis     Bipolar disorder ([address]     Burning with urination     Memory change     Psychiatric disorder     Bipolar Disorder    Stool color black     Tremor          ALLERGIES:(reviewed/updated 12/18/2020)  Allergies   Allergen Reactions    Lamisil [Terbinafine] Diarrhea and Nausea and Vomiting    Thorazine [Chlorpromazine] Rash       Laboratory report:  Lab Results   Component Value Date/Time    WBC 9.8 12/08/2020 02:44 AM    HGB 11.2 (L) 12/08/2020 02:44 AM    HCT 34.9 (L) 12/08/2020 02:44 AM    PLATELET 378 22/18/7207 02:44 AM    .5 (H) 12/08/2020 02:44 AM      Lab Results   Component Value Date/Time    Sodium 140 12/07/2020 03:04 AM    Potassium 3.9 12/07/2020 03:04 AM    Chloride 108 12/07/2020 03:04 AM    CO2 26 12/07/2020 03:04 AM    Anion gap 6 12/07/2020 03:04 AM    Glucose 95 12/15/2020 04:22 AM    BUN 12 12/07/2020 03:04 AM    Creatinine 0.73 12/07/2020 03:04 AM    BUN/Creatinine ratio 16 12/07/2020 03:04 AM    GFR est AA >60 12/07/2020 03:04 AM    GFR est non-AA >60 12/07/2020 03:04 AM    Calcium 9.9 12/07/2020 03:04 AM    Bilirubin, total 0.3 12/03/2020 03:30 AM    Alk.  phosphatase 110 12/03/2020 03:30 AM    Protein, total 6.6 12/03/2020 03:30 AM    Albumin 2.6 (L) 12/03/2020 03:30 AM    Globulin 4.0 12/03/2020 03:30 AM    A-G Ratio 0.7 (L) 12/03/2020 03:30 AM    ALT (SGPT) 33 12/03/2020 03:30 AM      Vitals:    12/17/20 1606 12/17/20 1937 12/18/20 0754 12/18/20 1619   BP: 119/64 118/65 109/63 117/63   Pulse: 74 76 75 79   Resp: 16 16 16 16   Temp: 98.3 °F (36.8 °C) 98.1 °F (36.7 °C) 98.2 °F (36.8 °C) 98.2 °F (36.8 °C)   SpO2:   95% 97%       Lab Results   Component Value Date/Time    Valproic acid 35 (L) 12/07/2020 03:04 AM     Lab Results   Component Value Date/Time    Lithium level 0.57 (L) 11/29/2020 05:27 PM       Vital Signs  Patient Vitals for the past 24 hrs:   Temp Pulse Resp BP SpO2   12/18/20 1619 98.2 °F (36.8 °C) 79 16 117/63 97 %   12/18/20 0754 98.2 °F (36.8 °C) 75 16 109/63 95 %   12/17/20 1937 98.1 °F (36.7 °C) 76 16 118/65      Wt Readings from Last 3 Encounters:   11/29/20 75.4 kg (166 lb 3.6 oz)   12/02/20 75.4 kg (166 lb 3.6 oz)   09/07/18 77.6 kg (171 lb)     Temp Readings from Last 3 Encounters:   12/18/20 98.2 °F (36.8 °C)   12/14/20 97.5 °F (36.4 °C)   09/12/20 98.1 °F (36.7 °C)     BP Readings from Last 3 Encounters:   12/18/20 117/63   12/14/20 (!) 113/58   09/12/20 128/72     Pulse Readings from Last 3 Encounters:   12/18/20 79   12/14/20 72   09/12/20 75       Radiology (reviewed/updated 12/18/2020)  Ct Head Wo Cont    Result Date: 11/29/2020  CLINICAL HISTORY: Encephalopathy INDICATION: Encephalopathy COMPARISON: 9/7/2018. CT dose reduction was achieved through use of a standardized protocol tailored for this examination and automatic exposure control for dose modulation. TECHNIQUE: Serial axial images with a collimation of 5 mm were obtained from the skull base through the vertex  FINDINGS: There is sulcal and ventricular prominence. Confluent periventricular and scattered foci of hypodensity in the cerebral white matter. There is no evidence of an acute infarction, hemorrhage, or mass-effect. There is no evidence of midline shift or hydrocephalus. Posterior fossa structures are unremarkable. No extra-axial collections are seen.  Mastoid air cells are well pneumatized and clear.  Hypodensity in the central jerri. 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 IMPRESSION: No acute intracranial process. Small area of chronic encephalomalacia in the left frontal lobe. Imaging findings consistent with moderate chronic microvascular ischemic change. There is a mild degree of cerebral atrophy. Xr Chest Port    Result Date: 11/29/2020  EXAM: XR CHEST PORT INDICATION: fever COMPARISON: None. FINDINGS: A portable AP radiograph of the chest was obtained at 14:46 hours. The patient is on a cardiac monitor. The lungs are clear. The cardiac and mediastinal contours and pulmonary vascularity are normal.  The chest wall structures and visualized upper abdomen show no acute findings with incidental note of degenerative spine and shoulder changes as well as partial visualization of posterior windy and screw fixation hardware of the lumbar spine. IMPRESSION: No acute findings.       Current Facility-Administered Medications   Medication Dose Route Frequency Provider Last Rate Last Admin    donepeziL (ARICEPT) tablet 5 mg  5 mg Oral QHS Hemalatha Vinson NP   5 mg at 12/17/20 2014    valproic acid (as sodium salt) (DEPAKENE) 250 mg/5 mL (5 mL) oral solution 250 mg  250 mg Oral QID Hemalatha Vinson NP   250 mg at 12/18/20 1704    OLANZapine (ZyPREXA) tablet 2.5 mg  2.5 mg Oral Q6H PRN Enma-Julio Cesar, Redge Ill, NP        haloperidol lactate (HALDOL) injection 2.5 mg  2.5 mg IntraMUSCular Q6H PRN Norwood-Julio Cesar, Redge Ill, NP        benztropine (COGENTIN) tablet 0.5 mg  0.5 mg Oral BID PRN Norwood-Julio Cesar, Redge Ill, NP        diphenhydrAMINE (BENADRYL) injection 25 mg  25 mg IntraMUSCular BID PRN Norwood-Julio Cesar, Redge Ill, NP        acetaminophen (TYLENOL) tablet 650 mg  650 mg Oral Q4H PRN Kim Moore NP   650 mg at 12/18/20 1406    magnesium hydroxide (MILK OF MAGNESIA) 400 mg/5 mL oral suspension 30 mL  30 mL Oral DAILY PRN NorwoodChioma Hayden Ket, NP        traZODone (DESYREL) tablet 50 mg  50 mg Oral QHS PRN Chioma Moore, NP        propranoloL (INDERAL) tablet 10 mg  10 mg Oral BID NorwoodKim Hayden, NP   10 mg at 12/18/20 1704    mirtazapine (REMERON) tablet 15 mg  15 mg Oral QHS NorwoodKim Hayden, NP   15 mg at 12/17/20 2014    risperiDONE (RisperDAL) tablet 1 mg  1 mg Oral BID WillemKim nunes, NP   1 mg at 12/18/20 1812       Side Effects: (reviewed/updated 12/18/2020)  None reported or admitted to. Review of Systems: (reviewed/updated 12/18/2020)  Appetite: good  Sleep: good   All other Review of Systems: negative    Mental Status Exam:  Eye contact: Good eye contact  Psychomotor activity: irritable   Speech is spontaneous  Thought process:confabulation  Mood is \"ok\"  Affect: constricted  Perception: No avh  Suicidal ideation: No si  Homicidal ideation: No hi  Insight/judgment: Poor  Cognition is impaired. MMSE-8      Physical Exam:  Musculoskeletal system: steady gait  Tremor not present  Cog wheeling not present      Assessment and Plan:  Sayda Navarro meets criteria for a diagnosis of Unspecified dementia with behavioral disturbance. Bipolar 1 disorder by history. Va level Saturday am.  Continue current medications as prescribed. We will closely monitor for safety. We will encourage reality orientation. Disposition planning to continue. I certify that this patients inpatient psychiatric hospital services furnished since the previous certification were, and continue to be, required for treatment that could reasonably be expected to improve the patient's condition, or for diagnostic study, and that the patient continues to need, on a daily basis, active treatment furnished directly by or requiring the supervision of inpatient psychiatric facility personnel.  In addition, the hospital records show that services furnished were intensive treatment services, admission or related services, or equivalent services.       Signed:  Farhan Corbin NP  12/18/2020

## 2020-12-18 NOTE — PROGRESS NOTES
Pt received talking in dining room. Confused, confabulates. Oriented to person and place. Problem: Falls - Risk of  Goal: *Absence of Falls  Description: Document Adolfo Sol Fall Risk and appropriate interventions in the flowsheet.   Outcome: Progressing Towards Goal  Note: Fall Risk Interventions:  Mobility Interventions: Assess mobility with egress test, Patient to call before getting OOB, Utilize walker, cane, or other assistive device    Mentation Interventions: Adequate sleep, hydration, pain control, Bed/chair exit alarm    Medication Interventions: Bed/chair exit alarm    Elimination Interventions: Bed/chair exit alarm    History of Falls Interventions: Bed/chair exit alarm         Problem: Altered Thought Process (Adult/Pediatric)  Goal: *STG: Participates in treatment plan  Outcome: Progressing Towards Goal

## 2020-12-18 NOTE — GROUP NOTE
[facility] GROUP DOCUMENTATION INDIVIDUAL Group Therapy Note Date: 12/18/2020 Group Start Time: 5002 Group End Time: 2219 Group Topic: Reflection/Relaxation [address] [facility] FUNMILAYO Cortes  GROUP DOCUMENTATION GROUP Group Therapy Note Attendees:  
  
 
Attendance: Attended Patient's Goal:decrease anxiety Interventions/techniques: Supported Follows Directions: Followed directions Interactions: Interacted appropriately Mental Status: Calm Behavior/appearance: Cooperative Goals Achieved: Able to engage in interactions, Able to listen to others and Able to give feedback to another Additional Notes:   
Leora Wadsworth LPN

## 2020-12-18 NOTE — PROGRESS NOTES
Problem: Altered Thought Process (Adult/Pediatric)  Goal: *STG: Participates in treatment plan  Outcome: Progressing Towards Goal  Note: Up on unit engaegd and social. Impaired memory, confabulation and easily confused. Mood and affect anxious to sad. Complaint with tx plan and following nursing direction. Behaviors appropriate and kind. Daily goal is to talk with family.  Staff ofcus is on coping skills education  Goal: *STG: Seeks staff when feelings of anxiety and fear arise  Outcome: Progressing Towards Goal  Goal: *STG: Complies with medication therapy  Outcome: Progressing Towards Goal

## 2020-12-18 NOTE — GROUP NOTE
[facility]  GROUP DOCUMENTATION INDIVIDUAL Group Therapy Note Date: 12/18/2020 Group Start Time: 6846 Group End Time: 1020 Group Topic: Reflection/Relaxation [address] [facility] FUNMILAYO Bravo  GROUP DOCUMENTATION GROUP Group Therapy Note Attendees:  
  
 
Attendance: Attended Patient's Goal: decreased anxiety Interventions/techniques: Supported Follows Directions: Followed directions Interactions: Interacted appropriately Mental Status: Anxious Behavior/appearance: Cooperative Goals Achieved: Able to engage in interactions, Able to listen to others and Able to give feedback to another Additional Notes:  
Michelle Urbina LPN

## 2020-12-18 NOTE — PROGRESS NOTES
Problem: Mobility Impaired (Adult and Pediatric)  Goal: *Acute Goals and Plan of Care (Insert Text)  Description: FUNCTIONAL STATUS PRIOR TO ADMISSION: Patient unable to provide history. Per chart review, patient was modified independence with rollator until recent hospital admission. HOME SUPPORT PRIOR TO ADMISSION: The patient lived alone with daughter to provide assistance. Chart states daughter has a camera to watch her mother throughout the day. Physical Therapy Goals  Initiated 12/15/2020  1. Patient will move from supine to sit and sit to supine , scoot up and down, and roll side to side in bed with modified independence within 7 day(s). - MET  2. Patient will transfer from bed to chair and chair to bed with modified independence using the least restrictive device within 7 day(s). -MET  3. Patient will perform sit to stand with modified independence within 7 day(s). -MET  4. Patient will ambulate with modified independence for 150 feet with the least restrictive device within 7 day(s). -Assist met, distance not. Patient verbalized no interest in walking the distance. Outcome: Resolved/Met   PHYSICAL THERAPY TREATMENT/DISCHARGE  Patient: Henok Elkins (64 y.o. female)  Date: 12/18/2020  Diagnosis: Bipolar 1 disorder ([facility] [F31.9] <principal problem not specified>       Precautions: Fall  Chart, physical therapy assessment, plan of care and goals were reviewed. ASSESSMENT  Patient continues with skilled PT services and has progressed towards goals. Patient has met all goals except for distance of ambulation. She verbalized not having to ambulate distance at baseline and no desire to increase distance of ambulation. Patient does demonstrate safety concerns with impulsivity, confusion and poor safety awareness/planning. Will likely need supervision for safety at time of discharge. Recommend LTC placement vs home with family support/supervision.      Other factors to consider for discharge: confusion, lives alone at baseline          PLAN :  Patient will be discharged from acute skilled physical therapy at this time. Rationale for discharge:  Goals achieved    Recommendation for discharge: (in order for the patient to meet his/her long term goals)  No skilled physical therapy/ follow up rehabilitation needs identified at this time. This discharge recommendation:  Has not yet been discussed the attending provider and/or case management    IF patient discharges home will need the following DME: patient owns DME required for discharge       SUBJECTIVE:   Patient stated I don't walk more.     OBJECTIVE DATA SUMMARY:   Critical Behavior:  Neurologic State: Confused  Orientation Level: Disoriented to situation, Disoriented to time  Cognition: Impaired decision making     Functional Mobility Training:  Bed Mobility:  Rolling: Modified independent  Supine to Sit: Modified independent     Scooting: Modified independent        Transfers:  Sit to Stand: Modified independent  Stand to Sit: Modified independent                             Balance:  Sitting: Intact  Standing: Intact  Standing - Static: Good  Standing - Dynamic : Good(with support of RW)  Ambulation/Gait Training:  Distance (ft): 20 Feet (ft)(1 rep with PT, witnessed 2 additional reps without assist/PT)  Assistive Device: Walker, rolling  Ambulation - Level of Assistance: Modified independent                 Base of Support: Widened     Speed/Becky: Slow(slightly)  Step Length: Right shortened;Left shortened                  Steady and safe with use of RW. Pain Rating:  No c/o pain     Activity Tolerance:   Good    After treatment patient left in no apparent distress:   Sitting in chair and in dining room with nursing. COMMUNICATION/COLLABORATION:   The patients plan of care was discussed with: Registered nurse.      Howard Araiza, PT, DPT   Time Calculation: 12 mins

## 2020-12-19 PROCEDURE — 74011250637 HC RX REV CODE- 250/637: Performed by: NURSE PRACTITIONER

## 2020-12-19 PROCEDURE — 65220000003 HC RM SEMIPRIVATE PSYCH

## 2020-12-19 RX ORDER — NYSTATIN 100000 [USP'U]/G
POWDER TOPICAL 2 TIMES DAILY
Status: DISCONTINUED | OUTPATIENT
Start: 2020-12-19 | End: 2021-01-14 | Stop reason: HOSPADM

## 2020-12-19 RX ORDER — DOCUSATE SODIUM 100 MG/1
100 CAPSULE, LIQUID FILLED ORAL DAILY
Status: DISCONTINUED | OUTPATIENT
Start: 2020-12-19 | End: 2021-01-14 | Stop reason: HOSPADM

## 2020-12-19 RX ORDER — DOCUSATE SODIUM 100 MG/1
100 CAPSULE, LIQUID FILLED ORAL DAILY
Status: DISCONTINUED | OUTPATIENT
Start: 2020-12-20 | End: 2020-12-19

## 2020-12-19 RX ADMIN — VALPROIC ACID 250 MG: 250 SOLUTION ORAL at 17:59

## 2020-12-19 RX ADMIN — DOCUSATE SODIUM 100 MG: 100 CAPSULE ORAL at 13:51

## 2020-12-19 RX ADMIN — MIRTAZAPINE 15 MG: 15 TABLET, FILM COATED ORAL at 20:28

## 2020-12-19 RX ADMIN — PROPRANOLOL HYDROCHLORIDE 10 MG: 10 TABLET ORAL at 09:08

## 2020-12-19 RX ADMIN — RISPERIDONE 1 MG: 1 TABLET ORAL at 20:29

## 2020-12-19 RX ADMIN — VALPROIC ACID 250 MG: 250 SOLUTION ORAL at 13:51

## 2020-12-19 RX ADMIN — DONEPEZIL HYDROCHLORIDE 5 MG: 5 TABLET, FILM COATED ORAL at 20:29

## 2020-12-19 RX ADMIN — RISPERIDONE 1 MG: 1 TABLET ORAL at 09:08

## 2020-12-19 RX ADMIN — NYSTATIN: 100000 POWDER TOPICAL at 13:56

## 2020-12-19 RX ADMIN — PROPRANOLOL HYDROCHLORIDE 10 MG: 10 TABLET ORAL at 17:59

## 2020-12-19 RX ADMIN — MAGNESIUM HYDROXIDE 30 ML: 400 SUSPENSION ORAL at 12:21

## 2020-12-19 RX ADMIN — VALPROIC ACID 250 MG: 250 SOLUTION ORAL at 20:28

## 2020-12-19 RX ADMIN — VALPROIC ACID 250 MG: 250 SOLUTION ORAL at 09:08

## 2020-12-19 RX ADMIN — ACETAMINOPHEN 650 MG: 325 TABLET ORAL at 00:03

## 2020-12-19 NOTE — PROGRESS NOTES
PSYCHIATRIC PROGRESS NOTE       Patient: Sayda Navarro MRN: [id number]  SSN: [id number]    YOB: 1945  Age: 67 y.o. Sex: female      Admit Date: 12/14/2020    LOS: 5 days       Chief Complaint:  Who are you? Interval History:  Sayda Navarro is calm and pleasant but remains markedly confused. She tries her best to answer but ends either stopping mid sentence or confabulating. Remains discharge focused. Denies si hi or avh. Compliant with her meds with no side effects noted. Sleeping and eating well. Va level was not obtained this morning, reordered for the morning. Past Medical History:  Past Medical History:   Diagnosis Date    Anemia     Arthritis     Bipolar disorder ([address]     Burning with urination     Memory change     Psychiatric disorder     Bipolar Disorder    Stool color black     Tremor          ALLERGIES:(reviewed/updated 12/19/2020)  Allergies   Allergen Reactions    Lamisil [Terbinafine] Diarrhea and Nausea and Vomiting    Thorazine [Chlorpromazine] Rash       Laboratory report:  Lab Results   Component Value Date/Time    WBC 9.8 12/08/2020 02:44 AM    HGB 11.2 (L) 12/08/2020 02:44 AM    HCT 34.9 (L) 12/08/2020 02:44 AM    PLATELET 010 22/98/3555 02:44 AM    .5 (H) 12/08/2020 02:44 AM      Lab Results   Component Value Date/Time    Sodium 140 12/07/2020 03:04 AM    Potassium 3.9 12/07/2020 03:04 AM    Chloride 108 12/07/2020 03:04 AM    CO2 26 12/07/2020 03:04 AM    Anion gap 6 12/07/2020 03:04 AM    Glucose 95 12/15/2020 04:22 AM    BUN 12 12/07/2020 03:04 AM    Creatinine 0.73 12/07/2020 03:04 AM    BUN/Creatinine ratio 16 12/07/2020 03:04 AM    GFR est AA >60 12/07/2020 03:04 AM    GFR est non-AA >60 12/07/2020 03:04 AM    Calcium 9.9 12/07/2020 03:04 AM    Bilirubin, total 0.3 12/03/2020 03:30 AM    Alk.  phosphatase 110 12/03/2020 03:30 AM    Protein, total 6.6 12/03/2020 03:30 AM    Albumin 2.6 (L) 12/03/2020 03:30 AM    Globulin 4.0 12/03/2020 03:30 AM A-G Ratio 0.7 (L) 12/03/2020 03:30 AM    ALT (SGPT) 33 12/03/2020 03:30 AM      Vitals:    12/18/20 0754 12/18/20 1619 12/18/20 2032 12/19/20 0907   BP: 109/63 117/63 (!) 145/81 (!) 143/77   Pulse: 75 79 85 94   Resp: 16 16 18 18   Temp: 98.2 °F (36.8 °C) 98.2 °F (36.8 °C) 98 °F (36.7 °C) 98.1 °F (36.7 °C)   SpO2: 95% 97% 98%        Lab Results   Component Value Date/Time    Valproic acid 35 (L) 12/07/2020 03:04 AM     Lab Results   Component Value Date/Time    Lithium level 0.57 (L) 11/29/2020 05:27 PM       Vital Signs  Patient Vitals for the past 24 hrs:   Temp Pulse Resp BP SpO2   12/19/20 0907 98.1 °F (36.7 °C) 94 18 (!) 143/77    12/18/20 2032 98 °F (36.7 °C) 85 18 (!) 145/81 98 %   12/18/20 1619 98.2 °F (36.8 °C) 79 16 117/63 97 %     Wt Readings from Last 3 Encounters:   11/29/20 75.4 kg (166 lb 3.6 oz)   12/02/20 75.4 kg (166 lb 3.6 oz)   09/07/18 77.6 kg (171 lb)     Temp Readings from Last 3 Encounters:   12/19/20 98.1 °F (36.7 °C)   12/14/20 97.5 °F (36.4 °C)   09/12/20 98.1 °F (36.7 °C)     BP Readings from Last 3 Encounters:   12/19/20 (!) 143/77   12/14/20 (!) 113/58   09/12/20 128/72     Pulse Readings from Last 3 Encounters:   12/19/20 94   12/14/20 72   09/12/20 75       Radiology (reviewed/updated 12/19/2020)  Ct Head Wo Cont    Result Date: 11/29/2020  CLINICAL HISTORY: Encephalopathy INDICATION: Encephalopathy COMPARISON: 9/7/2018. CT dose reduction was achieved through use of a standardized protocol tailored for this examination and automatic exposure control for dose modulation. TECHNIQUE: Serial axial images with a collimation of 5 mm were obtained from the skull base through the vertex  FINDINGS: There is sulcal and ventricular prominence. Confluent periventricular and scattered foci of hypodensity in the cerebral white matter. There is no evidence of an acute infarction, hemorrhage, or mass-effect. There is no evidence of midline shift or hydrocephalus.  Posterior fossa structures are unremarkable. No extra-axial collections are seen. Mastoid air cells are well pneumatized and clear. Hypodensity in the central jerri. 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 IMPRESSION: No acute intracranial process. Small area of chronic encephalomalacia in the left frontal lobe. Imaging findings consistent with moderate chronic microvascular ischemic change. There is a mild degree of cerebral atrophy. Xr Chest Port    Result Date: 11/29/2020  EXAM: XR CHEST PORT INDICATION: fever COMPARISON: None. FINDINGS: A portable AP radiograph of the chest was obtained at 14:46 hours. The patient is on a cardiac monitor. The lungs are clear. The cardiac and mediastinal contours and pulmonary vascularity are normal.  The chest wall structures and visualized upper abdomen show no acute findings with incidental note of degenerative spine and shoulder changes as well as partial visualization of posterior windy and screw fixation hardware of the lumbar spine. IMPRESSION: No acute findings.       Current Facility-Administered Medications   Medication Dose Route Frequency Provider Last Rate Last Admin    nystatin (MYCOSTATIN) 100,000 unit/gram powder   Topical BID Hemalatha Vinson NP        donepeziL (ARICEPT) tablet 5 mg  5 mg Oral QHS Hemalatha Vinson NP   5 mg at 12/18/20 2026    valproic acid (as sodium salt) (DEPAKENE) 250 mg/5 mL (5 mL) oral solution 250 mg  250 mg Oral QID Hemalatha Vinson NP   250 mg at 12/19/20 0908    OLANZapine (ZyPREXA) tablet 2.5 mg  2.5 mg Oral Q6H PRN Kim Moore NP   2.5 mg at 12/18/20 1908    haloperidol lactate (HALDOL) injection 2.5 mg  2.5 mg IntraMUSCular Q6H PRN Josey Moore NP        benztropine (COGENTIN) tablet 0.5 mg  0.5 mg Oral BID PRN Josey Moore NP        diphenhydrAMINE (BENADRYL) injection 25 mg  25 mg IntraMUSCular BID PRN Oscar Akhil Chaney NP        acetaminophen (TYLENOL) tablet 650 mg  650 mg Oral Q4H PRN Kim Moore NP   650 mg at 12/19/20 0003    magnesium hydroxide (MILK OF MAGNESIA) 400 mg/5 mL oral suspension 30 mL  30 mL Oral DAILY PRN NorwoodJuan Carlos, Akhil Chaney, NP        traZODone (DESYREL) tablet 50 mg  50 mg Oral QHS PRN [facility], MAX        propranoloL (INDERAL) tablet 10 mg  10 mg Oral BID Kim Moore, NP   10 mg at 12/19/20 0908    mirtazapine (REMERON) tablet 15 mg  15 mg Oral QHS Kim Moore, NP   15 mg at 12/18/20 2026    risperiDONE (RisperDAL) tablet 1 mg  1 mg Oral BID Kim Moore, NP   1 mg at 12/19/20 0908       Side Effects: (reviewed/updated 12/19/2020)  None reported or admitted to. Review of Systems: (reviewed/updated 12/19/2020)  Appetite: good  Sleep: good   All other Review of Systems: negative    Mental Status Exam:  Eye contact: Good eye contact  Psychomotor activity: irritable   Speech is spontaneous  Thought process:confabulation  Mood is \"ok\"  Affect: constricted  Perception: No avh  Suicidal ideation: No si  Homicidal ideation: No hi  Insight/judgment: Poor  Cognition is impaired. MMSE-8      Physical Exam:  Musculoskeletal system: steady gait  Tremor not present  Cog wheeling not present      Assessment and Plan:  Frank Shea meets criteria for a diagnosis of Unspecified dementia with behavioral disturbance. Bipolar 1 disorder by history. Va level tomorrow am.  Continue current medications as prescribed. We will closely monitor for safety. We will encourage reality orientation. Disposition planning to continue.        I certify that this patients inpatient psychiatric hospital services furnished since the previous certification were, and continue to be, required for treatment that could reasonably be expected to improve the patient's condition, or for diagnostic study, and that the patient continues to need, on a daily basis, active treatment furnished directly by or requiring the supervision of inpatient psychiatric facility personnel. In addition, the hospital records show that services furnished were intensive treatment services, admission or related services, or equivalent services.       Signed:  Lenin Anderson NP  12/19/2020

## 2020-12-19 NOTE — PROGRESS NOTES
Problem: Altered Thought Process (Adult/Pediatric)  Goal: *STG: Complies with medication therapy  Note: Patient complies with medication therapy. Patient is out on the unit, confused, labile. Patient refused to take a shower, and yelled \"you can't make me! \"  A few minutes later, patient allowed staff to help her clean up and get dressed. Patient ate 40% of breakfast, consumed 600ml of coffee, water and juice.

## 2020-12-19 NOTE — BH NOTES
Pt called daughter and during call appeared agitated stated \"she's telling me lies\", then proceeded to hang up the phone. RN attempted to administer medication, pt stated \"I'm not taking a damn thing until you get her back on the phone. \" RN clarified who \"her\" was that the pt was referring to and she stated \"my daughter\". After phone call with daughter, pt agreed to take medication. Attempted to assist pt with personal hygiene and pt refused, stating \"I'm fine\". Multiple attempts to assist pt with hygiene, but still refused stating, \"I have been to 4 hospitals and none of yall know shit! \". She threw her soiled urszula pad onto the floor and asked for a clean one. After pad was replaced, pt stated she was tired and asked for a long sleeve shirt to be put on top of the one she was already wearing and asked that blankets be pulled over her.

## 2020-12-19 NOTE — PROGRESS NOTES
Problem: Falls - Risk of  Goal: *Absence of Falls  Description: Document Leafy Holden Fall Risk and appropriate interventions in the flowsheet. Outcome: Progressing Towards Goal  Note: Fall Risk Interventions:  Mobility Interventions: Assess mobility with egress test    Mentation Interventions: Adequate sleep, hydration, pain control    Medication Interventions: Bed/chair exit alarm    Elimination Interventions: Bed/chair exit alarm    History of Falls Interventions: Bed/chair exit alarm       Pt. In bed resting, NAD, respirations even and unlabored. Will continue q15 safety rounds.     PRN Medication Documentation    Specific patient behavior that led to need for PRN medication: c/o of pain   Staff interventions attempted prior to PRN being given: education  PRN medication given: tylenol  Patient response/effectiveness of PRN medication: pt resting

## 2020-12-19 NOTE — PROGRESS NOTES
Problem: Falls - Risk of  Goal: *Absence of Falls  Description: Document Lita Talbot Fall Risk and appropriate interventions in the flowsheet. Outcome: Progressing Towards Goal  Note: Fall Risk Interventions:  Mobility Interventions: Assess mobility with egress test    Mentation Interventions: Adequate sleep, hydration, pain control    Medication Interventions: Bed/chair exit alarm    Elimination Interventions: Bed/chair exit alarm    History of Falls Interventions: Bed/chair exit alarm    Problem: Altered Thought Process (Adult/Pediatric)  Goal: *STG: Participates in treatment plan  Outcome: Progressing Towards Goal  Goal: *STG: Complies with medication therapy  Outcome: Progressing Towards Goal    Pt appears calm, cooperative, and compliant with medications. No signs of distress noted, will continue to monitor.

## 2020-12-20 LAB — VALPROATE SERPL-MCNC: 58 UG/ML (ref 50–100)

## 2020-12-20 PROCEDURE — 65220000003 HC RM SEMIPRIVATE PSYCH

## 2020-12-20 PROCEDURE — 36415 COLL VENOUS BLD VENIPUNCTURE: CPT

## 2020-12-20 PROCEDURE — 74011250637 HC RX REV CODE- 250/637: Performed by: NURSE PRACTITIONER

## 2020-12-20 PROCEDURE — 80164 ASSAY DIPROPYLACETIC ACD TOT: CPT

## 2020-12-20 RX ORDER — LOPERAMIDE HYDROCHLORIDE 2 MG/1
2 CAPSULE ORAL
Status: DISCONTINUED | OUTPATIENT
Start: 2020-12-20 | End: 2021-01-14 | Stop reason: HOSPADM

## 2020-12-20 RX ADMIN — DONEPEZIL HYDROCHLORIDE 5 MG: 5 TABLET, FILM COATED ORAL at 20:40

## 2020-12-20 RX ADMIN — MIRTAZAPINE 15 MG: 15 TABLET, FILM COATED ORAL at 20:40

## 2020-12-20 RX ADMIN — NYSTATIN: 100000 POWDER TOPICAL at 10:17

## 2020-12-20 RX ADMIN — PROPRANOLOL HYDROCHLORIDE 10 MG: 10 TABLET ORAL at 09:57

## 2020-12-20 RX ADMIN — NYSTATIN: 100000 POWDER TOPICAL at 17:57

## 2020-12-20 RX ADMIN — VALPROIC ACID 250 MG: 250 SOLUTION ORAL at 09:57

## 2020-12-20 RX ADMIN — LOPERAMIDE HYDROCHLORIDE 2 MG: 2 CAPSULE ORAL at 20:41

## 2020-12-20 RX ADMIN — RISPERIDONE 1 MG: 1 TABLET ORAL at 09:56

## 2020-12-20 RX ADMIN — PROPRANOLOL HYDROCHLORIDE 10 MG: 10 TABLET ORAL at 17:27

## 2020-12-20 RX ADMIN — RISPERIDONE 1 MG: 1 TABLET ORAL at 20:40

## 2020-12-20 RX ADMIN — VALPROIC ACID 250 MG: 250 SOLUTION ORAL at 13:18

## 2020-12-20 RX ADMIN — VALPROIC ACID 250 MG: 250 SOLUTION ORAL at 20:40

## 2020-12-20 RX ADMIN — VALPROIC ACID 250 MG: 250 SOLUTION ORAL at 17:27

## 2020-12-20 NOTE — PROGRESS NOTES
Problem: Falls - Risk of  Goal: *Absence of Falls  Description: Document Greyson Cease Fall Risk and appropriate interventions in the flowsheet. Outcome: Progressing Towards Goal  Note: Fall Risk Interventions:  Mobility Interventions: Assess mobility with egress test    Mentation Interventions: Adequate sleep, hydration, pain control    Medication Interventions: Bed/chair exit alarm    Elimination Interventions: Bed/chair exit alarm    History of Falls Interventions: Bed/chair exit alarm       Pt. Received resting in bed, NAD, respirations even and unlabored. Will continue q15 safety rounds.

## 2020-12-20 NOTE — PROGRESS NOTES
Problem: Falls - Risk of  Goal: *Absence of Falls  Description: Document Eugene Banks Fall Risk and appropriate interventions in the flowsheet. 12/20/2020 1514 by Roxana Stewart RN  Outcome: Progressing Towards Goal  Note: Fall Risk Interventions:  Mobility Interventions: Assess mobility with egress test, Bed/chair exit alarm, Communicate number of staff needed for ambulation/transfer, Patient to call before getting OOB, Utilize walker, cane, or other assistive device    Mentation Interventions: Adequate sleep, hydration, pain control, Bed/chair exit alarm, Door open when patient unattended, More frequent rounding, Reorient patient, Room close to nurse's station    Medication Interventions: Bed/chair exit alarm, Patient to call before getting OOB, Teach patient to arise slowly    Elimination Interventions: Bed/chair exit alarm, Patient to call for help with toileting needs, Stay With Me (per policy), Toilet paper/wipes in reach, Toileting schedule/hourly rounds    History of Falls Interventions: Bed/chair exit alarm, Door open when patient unattended, Room close to nurse's station         Problem: Altered Thought Process (Adult/Pediatric)  Goal: *STG: Participates in treatment plan  12/20/2020 1514 by Roxana Stewart RN  Outcome: Progressing Towards Goal     Problem: Altered Thought Process (Adult/Pediatric)  Goal: *STG: Complies with medication therapy  Outcome: Progressing Towards Goal     Problem: Altered Thought Process (Adult/Pediatric)  Goal: *STG: Attends activities and groups  Outcome: Not Progressing Towards Goal     Problem: Altered Thought Process (Adult/Pediatric)  Goal: *STG: Demonstrates ability to understand and use improved judgment in daily activities and relationships  Outcome: Not Progressing Towards Goal     Pt is in her room resting. Remains periodically pleasantly confused - redirection and reorientation offered but pt is calm cooperative and appropriate with staff and peers.   Helping patient with suhail care due to BM's. Encouraged plenty of fluids. Med/meal compliant   NAD noted   Will continue to monitor.

## 2020-12-20 NOTE — BH NOTES
1900: woke patient and got her cleaned up and out of soiled garments. Patient was out on unit pleasant and cooperative and appropriate with peers. Was med compliant but only after she was satisfied with information given by writer on the purpose of each med. She stated that she was only taking them because she knew she had to in order to get out of here.

## 2020-12-20 NOTE — PROGRESS NOTES
Problem: Falls - Risk of  Goal: *Absence of Falls  Description: Document Madiha Wang Fall Risk and appropriate interventions in the flowsheet. Outcome: Progressing Towards Goal  Note: Fall Risk Interventions:  Mobility Interventions: Assess mobility with egress test, Bed/chair exit alarm, Communicate number of staff needed for ambulation/transfer, Patient to call before getting OOB, Utilize walker, cane, or other assistive device    Mentation Interventions: Adequate sleep, hydration, pain control, Bed/chair exit alarm, Door open when patient unattended, More frequent rounding, Reorient patient, Room close to nurse's station    Medication Interventions: Bed/chair exit alarm, Patient to call before getting OOB, Teach patient to arise slowly    Elimination Interventions: Bed/chair exit alarm, Patient to call for help with toileting needs, Stay With Me (per policy), Toilet paper/wipes in reach, Toileting schedule/hourly rounds    History of Falls Interventions: Bed/chair exit alarm, Door open when patient unattended, Room close to nurse's station         Problem: Altered Thought Process (Adult/Pediatric)  Goal: *STG: Participates in treatment plan  Outcome: Progressing Towards Goal  Note: Pt is visible in the DR periodic confusion noted. Pt is alert to place and person. Continues to ask why she is here and that she is not getting anywhere from here. Pleasant calm and cooperative   Med/meal compliant   Able to use a walker to ambulate minimal assistance needed with ADL's  NAD noted   Will continue to monitor.

## 2020-12-20 NOTE — PROGRESS NOTES
PSYCHIATRIC PROGRESS NOTE       Patient: Jah Mendez MRN: [id number]  SSN: [id number]    YOB: 1945  Age: 67 y.o. Sex: female      Admit Date: 12/14/2020    LOS: 6 days       Chief Complaint:  Did you know that I am a ? Interval History:  Jah Mendez states she is doing ok, calm and pleasant during the interview but remains markedly confused. She tries her best to answer but ends either stopping mid sentence or confabulating. Denies si hi or avh. Compliant with her meds with no side effects noted. Sleeping and eating well. Fernanda Morning. Past Medical History:  Past Medical History:   Diagnosis Date    Anemia     Arthritis     Bipolar disorder ([address]     Burning with urination     Memory change     Psychiatric disorder     Bipolar Disorder    Stool color black     Tremor          ALLERGIES:(reviewed/updated 12/20/2020)  Allergies   Allergen Reactions    Lamisil [Terbinafine] Diarrhea and Nausea and Vomiting    Thorazine [Chlorpromazine] Rash       Laboratory report:  Lab Results   Component Value Date/Time    WBC 9.8 12/08/2020 02:44 AM    HGB 11.2 (L) 12/08/2020 02:44 AM    HCT 34.9 (L) 12/08/2020 02:44 AM    PLATELET 359 23/25/5459 02:44 AM    .5 (H) 12/08/2020 02:44 AM      Lab Results   Component Value Date/Time    Sodium 140 12/07/2020 03:04 AM    Potassium 3.9 12/07/2020 03:04 AM    Chloride 108 12/07/2020 03:04 AM    CO2 26 12/07/2020 03:04 AM    Anion gap 6 12/07/2020 03:04 AM    Glucose 95 12/15/2020 04:22 AM    BUN 12 12/07/2020 03:04 AM    Creatinine 0.73 12/07/2020 03:04 AM    BUN/Creatinine ratio 16 12/07/2020 03:04 AM    GFR est AA >60 12/07/2020 03:04 AM    GFR est non-AA >60 12/07/2020 03:04 AM    Calcium 9.9 12/07/2020 03:04 AM    Bilirubin, total 0.3 12/03/2020 03:30 AM    Alk.  phosphatase 110 12/03/2020 03:30 AM    Protein, total 6.6 12/03/2020 03:30 AM    Albumin 2.6 (L) 12/03/2020 03:30 AM    Globulin 4.0 12/03/2020 03:30 AM    A-G Ratio 0.7 (L) 12/03/2020 03:30 AM    ALT (SGPT) 33 12/03/2020 03:30 AM      Vitals:    12/19/20 0907 12/19/20 1608 12/20/20 0812 12/20/20 1026   BP: (!) 143/77 113/75 122/77    Pulse: 94 86 (!) 118    Resp: 18 16 18    Temp: 98.1 °F (36.7 °C) 97.8 °F (36.6 °C) 98.1 °F (36.7 °C)    SpO2:  94% 95%    Weight:    69.3 kg (152 lb 11.2 oz)       Lab Results   Component Value Date/Time    Valproic acid 58 12/20/2020 06:20 AM     Lab Results   Component Value Date/Time    Lithium level 0.57 (L) 11/29/2020 05:27 PM       Vital Signs  Patient Vitals for the past 24 hrs:   Temp Pulse Resp BP SpO2   12/20/20 0812 98.1 °F (36.7 °C) (!) 118 18 122/77 95 %   12/19/20 1608 97.8 °F (36.6 °C) 86 16 113/75 94 %     Wt Readings from Last 3 Encounters:   12/20/20 69.3 kg (152 lb 11.2 oz)   11/29/20 75.4 kg (166 lb 3.6 oz)   12/02/20 75.4 kg (166 lb 3.6 oz)     Temp Readings from Last 3 Encounters:   12/20/20 98.1 °F (36.7 °C)   12/14/20 97.5 °F (36.4 °C)   09/12/20 98.1 °F (36.7 °C)     BP Readings from Last 3 Encounters:   12/20/20 122/77   12/14/20 (!) 113/58   09/12/20 128/72     Pulse Readings from Last 3 Encounters:   12/20/20 (!) 118   12/14/20 72   09/12/20 75       Radiology (reviewed/updated 12/20/2020)  Ct Head Wo Cont    Result Date: 11/29/2020  CLINICAL HISTORY: Encephalopathy INDICATION: Encephalopathy COMPARISON: 9/7/2018. CT dose reduction was achieved through use of a standardized protocol tailored for this examination and automatic exposure control for dose modulation. TECHNIQUE: Serial axial images with a collimation of 5 mm were obtained from the skull base through the vertex  FINDINGS: There is sulcal and ventricular prominence. Confluent periventricular and scattered foci of hypodensity in the cerebral white matter. There is no evidence of an acute infarction, hemorrhage, or mass-effect. There is no evidence of midline shift or hydrocephalus. Posterior fossa structures are unremarkable. No extra-axial collections are seen. Mastoid air cells are well pneumatized and clear. Hypodensity in the central jerri. 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 IMPRESSION: No acute intracranial process. Small area of chronic encephalomalacia in the left frontal lobe. Imaging findings consistent with moderate chronic microvascular ischemic change. There is a mild degree of cerebral atrophy. Xr Chest Port    Result Date: 11/29/2020  EXAM: XR CHEST PORT INDICATION: fever COMPARISON: None. FINDINGS: A portable AP radiograph of the chest was obtained at 14:46 hours. The patient is on a cardiac monitor. The lungs are clear. The cardiac and mediastinal contours and pulmonary vascularity are normal.  The chest wall structures and visualized upper abdomen show no acute findings with incidental note of degenerative spine and shoulder changes as well as partial visualization of posterior windy and screw fixation hardware of the lumbar spine. IMPRESSION: No acute findings.       Current Facility-Administered Medications   Medication Dose Route Frequency Provider Last Rate Last Admin    nystatin (MYCOSTATIN) 100,000 unit/gram powder   Topical BID Hemalatha Vinson NP   Given at 12/20/20 1017    docusate sodium (COLACE) capsule 100 mg  100 mg Oral DAILY Hemalatha Vinson NP   Stopped at 12/20/20 0900    donepeziL (ARICEPT) tablet 5 mg  5 mg Oral QHS Hemalatha Vinson NP   5 mg at 12/19/20 2029    valproic acid (as sodium salt) (DEPAKENE) 250 mg/5 mL (5 mL) oral solution 250 mg  250 mg Oral QID Hemalatha Vinson NP   250 mg at 12/20/20 0957    OLANZapine (ZyPREXA) tablet 2.5 mg  2.5 mg Oral Q6H PRN Kim Moore NP   2.5 mg at 12/18/20 1908    haloperidol lactate (HALDOL) injection 2.5 mg  2.5 mg IntraMUSCular Q6H PRN Kim Moore NP        benztropine (COGENTIN) tablet 0.5 mg  0.5 mg Oral BID PRN Doug Moore NP Sharmaine Ping diphenhydrAMINE (BENADRYL) injection 25 mg  25 mg IntraMUSCular BID PRN Chaneli, Ebb Span, NP        acetaminophen (TYLENOL) tablet 650 mg  650 mg Oral Q4H PRN Kim Moore, NP   650 mg at 12/19/20 0003    magnesium hydroxide (MILK OF MAGNESIA) 400 mg/5 mL oral suspension 30 mL  30 mL Oral DAILY PRN Kim Moore, NP   30 mL at 12/19/20 1221    traZODone (DESYREL) tablet 50 mg  50 mg Oral QHS PRN Chaneli, Ebb Span, NP        propranoloL (INDERAL) tablet 10 mg  10 mg Oral BID Kim Moore, NP   10 mg at 12/20/20 0957    mirtazapine (REMERON) tablet 15 mg  15 mg Oral QHS Kim Moore, NP   15 mg at 12/19/20 2028    risperiDONE (RisperDAL) tablet 1 mg  1 mg Oral BID Kim Moore, NP   1 mg at 12/20/20 9022       Side Effects: (reviewed/updated 12/20/2020)  None reported or admitted to. Review of Systems: (reviewed/updated 12/20/2020)  Appetite: good  Sleep: good   All other Review of Systems: negative    Mental Status Exam:  Eye contact: Good eye contact  Psychomotor activity: irritable   Speech is spontaneous  Thought process:confabulation  Mood is \"ok\"  Affect: constricted  Perception: No avh  Suicidal ideation: No si  Homicidal ideation: No hi  Insight/judgment: Poor  Cognition is impaired. MMSE-8      Physical Exam:  Musculoskeletal system: steady gait  Tremor not present  Cog wheeling not present      Assessment and Plan:  Yasmin Vallejo meets criteria for a diagnosis of Unspecified dementia with behavioral disturbance. Bipolar 1 disorder by history. Continue current medications as prescribed. We will closely monitor for safety. We will encourage reality orientation. Disposition planning to continue.        I certify that this patients inpatient psychiatric hospital services furnished since the previous certification were, and continue to be, required for treatment that could reasonably be expected to improve the patient's condition, or for diagnostic study, and that the patient continues to need, on a daily basis, active treatment furnished directly by or requiring the supervision of inpatient psychiatric facility personnel. In addition, the hospital records show that services furnished were intensive treatment services, admission or related services, or equivalent services.       Signed:  Brooklyn Mckeon NP  12/20/2020

## 2020-12-20 NOTE — PROGRESS NOTES
Patient's mews is 3 due to elevated HR. Patient is eating breakfast, in no acute distress, will continue to monitor.

## 2020-12-21 PROCEDURE — 74011250637 HC RX REV CODE- 250/637: Performed by: NURSE PRACTITIONER

## 2020-12-21 PROCEDURE — 65220000003 HC RM SEMIPRIVATE PSYCH

## 2020-12-21 RX ADMIN — LOPERAMIDE HYDROCHLORIDE 2 MG: 2 CAPSULE ORAL at 08:00

## 2020-12-21 RX ADMIN — RISPERIDONE 1 MG: 1 TABLET ORAL at 08:00

## 2020-12-21 RX ADMIN — PROPRANOLOL HYDROCHLORIDE 10 MG: 10 TABLET ORAL at 08:00

## 2020-12-21 RX ADMIN — RISPERIDONE 1 MG: 1 TABLET ORAL at 20:18

## 2020-12-21 RX ADMIN — VALPROIC ACID 250 MG: 250 SOLUTION ORAL at 12:00

## 2020-12-21 RX ADMIN — NYSTATIN: 100000 POWDER TOPICAL at 16:45

## 2020-12-21 RX ADMIN — VALPROIC ACID 250 MG: 250 SOLUTION ORAL at 20:18

## 2020-12-21 RX ADMIN — ACETAMINOPHEN 650 MG: 325 TABLET ORAL at 11:58

## 2020-12-21 RX ADMIN — VALPROIC ACID 250 MG: 250 SOLUTION ORAL at 08:00

## 2020-12-21 RX ADMIN — DONEPEZIL HYDROCHLORIDE 5 MG: 5 TABLET, FILM COATED ORAL at 20:18

## 2020-12-21 RX ADMIN — LOPERAMIDE HYDROCHLORIDE 2 MG: 2 CAPSULE ORAL at 16:45

## 2020-12-21 RX ADMIN — PROPRANOLOL HYDROCHLORIDE 10 MG: 10 TABLET ORAL at 16:45

## 2020-12-21 RX ADMIN — MIRTAZAPINE 15 MG: 15 TABLET, FILM COATED ORAL at 20:18

## 2020-12-21 RX ADMIN — VALPROIC ACID 250 MG: 250 SOLUTION ORAL at 16:44

## 2020-12-21 NOTE — PROGRESS NOTES
Problem: Falls - Risk of  Goal: *Absence of Falls  Description: Document Kaiden Carranza Fall Risk and appropriate interventions in the flowsheet.   Outcome: Progressing Towards Goal  Note: Fall Risk Interventions:  Mobility Interventions: Communicate number of staff needed for ambulation/transfer    Mentation Interventions: Adequate sleep, hydration, pain control, Evaluate medications/consider consulting pharmacy    Medication Interventions: Bed/chair exit alarm    Elimination Interventions: Bed/chair exit alarm    History of Falls Interventions: Bed/chair exit alarm

## 2020-12-21 NOTE — PROGRESS NOTES
PSYCHIATRIC PROGRESS NOTE       Patient: Hien Faye MRN: [id number]  SSN: [id number]    YOB: 1945  Age: 67 y.o. Sex: female      Admit Date: 12/14/2020    LOS: 7 days       Chief Complaint:  I feel good today. Interval History:  Hien Faye remains confused and disorganized. She greeted this Sari Cohen like she had known me for a long time. However her memory is quite poor and was not oriented X 3 except for remembering the year. Says her mood is \"good\" and denies any SI or plan. She remains ambivalent about being placed in an penitentiary but is still willing to continue the discussion. Compliant with taking medications. Past Medical History:  Past Medical History:   Diagnosis Date    Anemia     Arthritis     Bipolar disorder ([address]     Burning with urination     Memory change     Psychiatric disorder     Bipolar Disorder    Stool color black     Tremor          ALLERGIES:(reviewed/updated 12/21/2020)  Allergies   Allergen Reactions    Lamisil [Terbinafine] Diarrhea and Nausea and Vomiting    Thorazine [Chlorpromazine] Rash       Laboratory report:  Lab Results   Component Value Date/Time    WBC 9.8 12/08/2020 02:44 AM    HGB 11.2 (L) 12/08/2020 02:44 AM    HCT 34.9 (L) 12/08/2020 02:44 AM    PLATELET 502 69/56/5707 02:44 AM    .5 (H) 12/08/2020 02:44 AM      Lab Results   Component Value Date/Time    Sodium 140 12/07/2020 03:04 AM    Potassium 3.9 12/07/2020 03:04 AM    Chloride 108 12/07/2020 03:04 AM    CO2 26 12/07/2020 03:04 AM    Anion gap 6 12/07/2020 03:04 AM    Glucose 95 12/15/2020 04:22 AM    BUN 12 12/07/2020 03:04 AM    Creatinine 0.73 12/07/2020 03:04 AM    BUN/Creatinine ratio 16 12/07/2020 03:04 AM    GFR est AA >60 12/07/2020 03:04 AM    GFR est non-AA >60 12/07/2020 03:04 AM    Calcium 9.9 12/07/2020 03:04 AM    Bilirubin, total 0.3 12/03/2020 03:30 AM    Alk.  phosphatase 110 12/03/2020 03:30 AM    Protein, total 6.6 12/03/2020 03:30 AM    Albumin 2.6 (L) 12/03/2020 03:30 AM    Globulin 4.0 12/03/2020 03:30 AM    A-G Ratio 0.7 (L) 12/03/2020 03:30 AM    ALT (SGPT) 33 12/03/2020 03:30 AM      Vitals:    12/20/20 1026 12/20/20 1652 12/20/20 1934 12/21/20 0750   BP:  (!) 141/74 136/67 120/60   Pulse:  93 75 89   Resp:  16 16 18   Temp:  98.6 °F (37 °C) 98.2 °F (36.8 °C) 98.5 °F (36.9 °C)   SpO2:  96% 98% 95%   Weight: 69.3 kg (152 lb 11.2 oz)          Lab Results   Component Value Date/Time    Valproic acid 58 12/20/2020 06:20 AM     Lab Results   Component Value Date/Time    Lithium level 0.57 (L) 11/29/2020 05:27 PM       Vital Signs  Patient Vitals for the past 24 hrs:   Temp Pulse Resp BP SpO2   12/21/20 0750 98.5 °F (36.9 °C) 89 18 120/60 95 %   12/20/20 1934 98.2 °F (36.8 °C) 75 16 136/67 98 %   12/20/20 1652 98.6 °F (37 °C) 93 16 (!) 141/74 96 %     Wt Readings from Last 3 Encounters:   12/20/20 69.3 kg (152 lb 11.2 oz)   11/29/20 75.4 kg (166 lb 3.6 oz)   12/02/20 75.4 kg (166 lb 3.6 oz)     Temp Readings from Last 3 Encounters:   12/21/20 98.5 °F (36.9 °C)   12/14/20 97.5 °F (36.4 °C)   09/12/20 98.1 °F (36.7 °C)     BP Readings from Last 3 Encounters:   12/21/20 120/60   12/14/20 (!) 113/58   09/12/20 128/72     Pulse Readings from Last 3 Encounters:   12/21/20 89   12/14/20 72   09/12/20 75       Radiology (reviewed/updated 12/21/2020)  Ct Head Wo Cont    Result Date: 11/29/2020  CLINICAL HISTORY: Encephalopathy INDICATION: Encephalopathy COMPARISON: 9/7/2018. CT dose reduction was achieved through use of a standardized protocol tailored for this examination and automatic exposure control for dose modulation. TECHNIQUE: Serial axial images with a collimation of 5 mm were obtained from the skull base through the vertex  FINDINGS: There is sulcal and ventricular prominence. Confluent periventricular and scattered foci of hypodensity in the cerebral white matter. There is no evidence of an acute infarction, hemorrhage, or mass-effect.  There is no evidence of midline shift or hydrocephalus. Posterior fossa structures are unremarkable. No extra-axial collections are seen. Mastoid air cells are well pneumatized and clear. Hypodensity in the central jerri. 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 IMPRESSION: No acute intracranial process. Small area of chronic encephalomalacia in the left frontal lobe. Imaging findings consistent with moderate chronic microvascular ischemic change. There is a mild degree of cerebral atrophy. Xr Chest Port    Result Date: 11/29/2020  EXAM: XR CHEST PORT INDICATION: fever COMPARISON: None. FINDINGS: A portable AP radiograph of the chest was obtained at 14:46 hours. The patient is on a cardiac monitor. The lungs are clear. The cardiac and mediastinal contours and pulmonary vascularity are normal.  The chest wall structures and visualized upper abdomen show no acute findings with incidental note of degenerative spine and shoulder changes as well as partial visualization of posterior windy and screw fixation hardware of the lumbar spine. IMPRESSION: No acute findings.       Current Facility-Administered Medications   Medication Dose Route Frequency Provider Last Rate Last Admin    loperamide (IMODIUM) capsule 2 mg  2 mg Oral Q4H PRN Hemalatha Vinson NP   2 mg at 12/21/20 0800    nystatin (MYCOSTATIN) 100,000 unit/gram powder   Topical BID Crystal Kasey KIM NP   Given at 12/20/20 1757    docusate sodium (COLACE) capsule 100 mg  100 mg Oral DAILY Hemalatha Vinson NP   Stopped at 12/20/20 0900    donepeziL (ARICEPT) tablet 5 mg  5 mg Oral QHS Hemalatha Vinson NP   5 mg at 12/20/20 2040    valproic acid (as sodium salt) (DEPAKENE) 250 mg/5 mL (5 mL) oral solution 250 mg  250 mg Oral QID Hemalatha Vinson NP   250 mg at 12/21/20 0800    OLANZapine (ZyPREXA) tablet 2.5 mg  2.5 mg Oral Q6H PRN Kim Moore NP   2.5 mg at 12/18/20 1908    haloperidol lactate (HALDOL) injection 2.5 mg  2.5 mg IntraMUSCular Q6H PRN Cyndi Moore, NP        benztropine (COGENTIN) tablet 0.5 mg  0.5 mg Oral BID PRN Cyndi Moore, NP        diphenhydrAMINE (BENADRYL) injection 25 mg  25 mg IntraMUSCular BID PRN Cyndi Moore, NP        acetaminophen (TYLENOL) tablet 650 mg  650 mg Oral Q4H PRN Kim Moore, NP   650 mg at 12/19/20 0003    magnesium hydroxide (MILK OF MAGNESIA) 400 mg/5 mL oral suspension 30 mL  30 mL Oral DAILY PRN Kim Moore, NP   30 mL at 12/19/20 1221    traZODone (DESYREL) tablet 50 mg  50 mg Oral QHS PRN Cyndi Moore, NP        propranoloL (INDERAL) tablet 10 mg  10 mg Oral BID Kim Moore, NP   10 mg at 12/21/20 0800    mirtazapine (REMERON) tablet 15 mg  15 mg Oral QHS Kim Moore, NP   15 mg at 12/20/20 2040    risperiDONE (RisperDAL) tablet 1 mg  1 mg Oral BID Kim Moore, NP   1 mg at 12/21/20 0800       Side Effects: (reviewed/updated 12/21/2020)  None reported or admitted to. Review of Systems: (reviewed/updated 12/21/2020)  Appetite: good  Sleep: good   All other Review of Systems: negative    Mental Status Exam:  Eye contact: Good eye contact  Psychomotor activity: irritable   Speech is spontaneous  Thought process:confabulation  Mood is \"ok\"  Affect: constricted  Perception: No avh  Suicidal ideation: No si  Homicidal ideation: No hi  Insight/judgment: Poor  Cognition is impaired. MMSE-8      Physical Exam:  Musculoskeletal system: steady gait  Tremor not present  Cog wheeling not present      Assessment and Plan:  Milagro Cole meets criteria for a diagnosis of Unspecified dementia with behavioral disturbance. Bipolar 1 disorder by history. Continue current medications as prescribed. We will closely monitor for safety. We will encourage reality orientation.   Disposition planning to continue. I certify that this patients inpatient psychiatric hospital services furnished since the previous certification were, and continue to be, required for treatment that could reasonably be expected to improve the patient's condition, or for diagnostic study, and that the patient continues to need, on a daily basis, active treatment furnished directly by or requiring the supervision of inpatient psychiatric facility personnel. In addition, the hospital records show that services furnished were intensive treatment services, admission or related services, or equivalent services.       Signed:  Jerline Ahumada, MD  12/21/2020

## 2020-12-21 NOTE — PROGRESS NOTES
Problem: Falls - Risk of  Goal: *Absence of Falls  Description: Document Bard  Fall Risk and appropriate interventions in the flowsheet.   Outcome: Progressing Towards Goal  Note: Fall Risk Interventions:  Mobility Interventions: Bed/chair exit alarm, Communicate number of staff needed for ambulation/transfer, Utilize walker, cane, or other assistive device    Mentation Interventions: Adequate sleep, hydration, pain control, Bed/chair exit alarm, Door open when patient unattended, More frequent rounding    Medication Interventions: Teach patient to arise slowly, Bed/chair exit alarm    Elimination Interventions: Bed/chair exit alarm    History of Falls Interventions: Bed/chair exit alarm

## 2020-12-21 NOTE — PROGRESS NOTES
[address] [address]  Master Treatment Plan Juan Wyatt        Date Treatment Plan Initiated: 12/14      Treatment Plan Modalities:    Type of Modality Amount  (x minutes) Frequency (x/week) Duration (x days) Name of Responsible Staff   Community & wrap-up meetings to encourage peer interactions    15    7    1     Ginny WHITMORE   Group psychotherapy to assist in building coping skills and internal controls    60    7    1    Mason Joya LCSW   Therapeutic activity groups to build coping skills    60    7    1    Mason Joya LCSW   Psychoeducation in group setting to address:   Medication education    15    7    1    Michelle Valverde RN   Coping skills    20    7    1    Noelle WHITMORE, RN   Relaxation techniques           Symptom management           Discharge planning    15    7    1    Yessica    Spirituality     60    7    1     Daron SILVAI    60    7    1    Volunteer from [employer]   [facility]    60    7    1    Volunteer from [address] [place] [address] medication management    15    7    1    Dr. Denice Fuller   Family meeting/discharge planning                           Problem: Falls - Risk of  Goal: *Absence of Falls  Description: Document Enid Fall Risk and appropriate interventions in the flowsheet.   Outcome: Progressing Towards Goal  Note: Fall Risk Interventions:  Mobility Interventions: Bed/chair exit alarm    Mentation Interventions: Adequate sleep, hydration, pain control, Bed/chair exit alarm, Door open when patient unattended, Reorient patient    Medication Interventions: Bed/chair exit alarm, Teach patient to arise slowly    Elimination Interventions: Bed/chair exit alarm, Call light in reach    History of Falls Interventions: Bed/chair exit alarm         Problem: Altered Thought Process (Adult/Pediatric)  Goal: *STG: Participates in treatment plan  12/21/2020 1331 by James Up RN  Outcome: Progressing Towards Goal  Note: Pt was very irritable, argumentative, confused in the morning, ate 25% of breakfast and most of lunch. Pt took medications. Pt is calmer at lunch, still confusion, thought blocking. Walking with walker steadily. Pt showered.    Problem: Patient Education: Go to Patient Education Activity  Goal: Patient/Family Education  Outcome: Progressing Towards Goal     Problem: Altered Thought Process (Adult/Pediatric)  Goal: *STG: Complies with medication therapy  Outcome: Progressing Towards Goal  Note: Med compliant  Goal: *STG: Attends activities and groups  Outcome: Progressing Towards Goal  Goal: *STG: Demonstrates ability to understand and use improved judgment in daily activities and relationships  Outcome: Progressing Towards Goal  Goal: Interventions  Outcome: Progressing Towards Goal     Problem: Patient Education: Go to Patient Education Activity  Goal: Patient/Family Education  Outcome: Progressing Towards Goal

## 2020-12-21 NOTE — INTERDISCIPLINARY ROUNDS
Behavioral Health Interdisciplinary Rounds Patient Name: Maylin Fernandez  Age: 67 y.o. Room/Bed:  740/ Primary Diagnosis: <principal problem not specified> Admission Status: Involuntary Commitment Readmission within 30 days: no 
Power of  in place: no 
Patient requires a blocked bed: no          Reason for blocked bed:  
Sleep hours: 8 Participation in Care/Groups:  yes Medication Compliant?: Yes PRNS (last 24 hours): imodium Restraints (last 24 hours):  no 
  
Alcohol screening (AUDIT) completed -  AUDIT Score: 0  If applicable, date SBIRT discussed in treatment team AND documented:   
Tobacco - patient is a smoker: Have You Used Tobacco in the Past 30 Days: No 
Illegal Drugs use: Have You Used Any Illegal Substances Over the Past 12 Months: No 
 
24 hour chart check complete: yes Patient goal(s) for today:  
Treatment team focus/goals: titrate medications and assess needs for discharge, compete [facility] for placement Progress note: Pt is involuntarily committed for inability to care for self. Pt is alert but disoriented. She engages with treatment team but is confused, cannot stay on topic and tries to wear her face mask like a hat. She reported to treatment team that she had spoke to her \"mother\" this morning. Family Contact information: daughter, Lizy Gardner ([phone]) MSW spoke to daughter regarding plan for seeking placement for safe discharge. Patient's preferred phone number for follow up call : [phone] Patient's preferred e-mail address : 
 
LOS:  7  Expected LOS: TBD Participating treatment team members: Dr. Ruth Lawrence;  RULA Akbar; Dann Ritchie RN; Modesto Mitchell, ShivaD

## 2020-12-22 PROCEDURE — 65220000003 HC RM SEMIPRIVATE PSYCH

## 2020-12-22 PROCEDURE — 74011250637 HC RX REV CODE- 250/637: Performed by: NURSE PRACTITIONER

## 2020-12-22 RX ADMIN — PROPRANOLOL HYDROCHLORIDE 10 MG: 10 TABLET ORAL at 09:40

## 2020-12-22 RX ADMIN — DONEPEZIL HYDROCHLORIDE 5 MG: 5 TABLET, FILM COATED ORAL at 20:18

## 2020-12-22 RX ADMIN — LOPERAMIDE HYDROCHLORIDE 2 MG: 2 CAPSULE ORAL at 09:39

## 2020-12-22 RX ADMIN — NYSTATIN: 100000 POWDER TOPICAL at 09:00

## 2020-12-22 RX ADMIN — PROPRANOLOL HYDROCHLORIDE 10 MG: 10 TABLET ORAL at 16:59

## 2020-12-22 RX ADMIN — VALPROIC ACID 250 MG: 250 SOLUTION ORAL at 16:59

## 2020-12-22 RX ADMIN — OLANZAPINE 2.5 MG: 2.5 TABLET, FILM COATED ORAL at 11:48

## 2020-12-22 RX ADMIN — RISPERIDONE 1 MG: 1 TABLET ORAL at 09:40

## 2020-12-22 RX ADMIN — ACETAMINOPHEN 650 MG: 325 TABLET ORAL at 15:30

## 2020-12-22 RX ADMIN — VALPROIC ACID 250 MG: 250 SOLUTION ORAL at 13:32

## 2020-12-22 RX ADMIN — VALPROIC ACID 250 MG: 250 SOLUTION ORAL at 20:18

## 2020-12-22 RX ADMIN — RISPERIDONE 1 MG: 1 TABLET ORAL at 20:19

## 2020-12-22 RX ADMIN — VALPROIC ACID 250 MG: 250 SOLUTION ORAL at 09:39

## 2020-12-22 RX ADMIN — MIRTAZAPINE 15 MG: 15 TABLET, FILM COATED ORAL at 20:19

## 2020-12-22 NOTE — PROGRESS NOTES
PSYCHIATRIC PROGRESS NOTE       Patient: Otilia Healy MRN: [id number]  SSN: [id number]    YOB: 1945  Age: 67 y.o. Sex: female      Admit Date: 12/14/2020    LOS: 8 days       Chief Complaint:  I feel fine    Interval History:  Otilia Healy is little changed. She has been somewhat irritable at times but is otherwise doing well. Calm and pleasant during the interview but is quite disorganized and confused. Asks the same question repeatedly. Denies any SI or plan. She had two episodes of loose stools today. No Ah or Vh.       Past Medical History:  Past Medical History:   Diagnosis Date    Anemia     Arthritis     Bipolar disorder ([address]     Burning with urination     Memory change     Psychiatric disorder     Bipolar Disorder    Stool color black     Tremor          ALLERGIES:(reviewed/updated 12/22/2020)  Allergies   Allergen Reactions    Lamisil [Terbinafine] Diarrhea and Nausea and Vomiting    Thorazine [Chlorpromazine] Rash       Laboratory report:  Lab Results   Component Value Date/Time    WBC 9.8 12/08/2020 02:44 AM    HGB 11.2 (L) 12/08/2020 02:44 AM    HCT 34.9 (L) 12/08/2020 02:44 AM    PLATELET 306 83/78/9343 02:44 AM    .5 (H) 12/08/2020 02:44 AM      Lab Results   Component Value Date/Time    Sodium 140 12/07/2020 03:04 AM    Potassium 3.9 12/07/2020 03:04 AM    Chloride 108 12/07/2020 03:04 AM    CO2 26 12/07/2020 03:04 AM    Anion gap 6 12/07/2020 03:04 AM    Glucose 95 12/15/2020 04:22 AM    BUN 12 12/07/2020 03:04 AM    Creatinine 0.73 12/07/2020 03:04 AM    BUN/Creatinine ratio 16 12/07/2020 03:04 AM    GFR est AA >60 12/07/2020 03:04 AM    GFR est non-AA >60 12/07/2020 03:04 AM    Calcium 9.9 12/07/2020 03:04 AM    Bilirubin, total 0.3 12/03/2020 03:30 AM    Alk.  phosphatase 110 12/03/2020 03:30 AM    Protein, total 6.6 12/03/2020 03:30 AM    Albumin 2.6 (L) 12/03/2020 03:30 AM    Globulin 4.0 12/03/2020 03:30 AM    A-G Ratio 0.7 (L) 12/03/2020 03:30 AM    ALT (SGPT) 33 12/03/2020 03:30 AM      Vitals:    12/21/20 0750 12/21/20 1625 12/21/20 2011 12/22/20 0855   BP: 120/60 122/67 139/69 137/77   Pulse: 89 87 85 90   Resp: 18 16 16 16   Temp: 98.5 °F (36.9 °C) 98.1 °F (36.7 °C)  98.2 °F (36.8 °C)   SpO2: 95% 95% 96% 93%   Weight:           Lab Results   Component Value Date/Time    Valproic acid 58 12/20/2020 06:20 AM     Lab Results   Component Value Date/Time    Lithium level 0.57 (L) 11/29/2020 05:27 PM       Vital Signs  Patient Vitals for the past 24 hrs:   Temp Pulse Resp BP SpO2   12/22/20 0855 98.2 °F (36.8 °C) 90 16 137/77 93 %   12/21/20 2011  85 16 139/69 96 %   12/21/20 1625 98.1 °F (36.7 °C) 87 16 122/67 95 %     Wt Readings from Last 3 Encounters:   12/20/20 69.3 kg (152 lb 11.2 oz)   11/29/20 75.4 kg (166 lb 3.6 oz)   12/02/20 75.4 kg (166 lb 3.6 oz)     Temp Readings from Last 3 Encounters:   12/22/20 98.2 °F (36.8 °C)   12/14/20 97.5 °F (36.4 °C)   09/12/20 98.1 °F (36.7 °C)     BP Readings from Last 3 Encounters:   12/22/20 137/77   12/14/20 (!) 113/58   09/12/20 128/72     Pulse Readings from Last 3 Encounters:   12/22/20 90   12/14/20 72   09/12/20 75       Radiology (reviewed/updated 12/22/2020)  Ct Head Wo Cont    Result Date: 11/29/2020  CLINICAL HISTORY: Encephalopathy INDICATION: Encephalopathy COMPARISON: 9/7/2018. CT dose reduction was achieved through use of a standardized protocol tailored for this examination and automatic exposure control for dose modulation. TECHNIQUE: Serial axial images with a collimation of 5 mm were obtained from the skull base through the vertex  FINDINGS: There is sulcal and ventricular prominence. Confluent periventricular and scattered foci of hypodensity in the cerebral white matter. There is no evidence of an acute infarction, hemorrhage, or mass-effect. There is no evidence of midline shift or hydrocephalus. Posterior fossa structures are unremarkable. No extra-axial collections are seen.  Mastoid air cells are well pneumatized and clear. Hypodensity in the central jerri. 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 IMPRESSION: No acute intracranial process. Small area of chronic encephalomalacia in the left frontal lobe. Imaging findings consistent with moderate chronic microvascular ischemic change. There is a mild degree of cerebral atrophy. Xr Chest Port    Result Date: 11/29/2020  EXAM: XR CHEST PORT INDICATION: fever COMPARISON: None. FINDINGS: A portable AP radiograph of the chest was obtained at 14:46 hours. The patient is on a cardiac monitor. The lungs are clear. The cardiac and mediastinal contours and pulmonary vascularity are normal.  The chest wall structures and visualized upper abdomen show no acute findings with incidental note of degenerative spine and shoulder changes as well as partial visualization of posterior windy and screw fixation hardware of the lumbar spine. IMPRESSION: No acute findings.       Current Facility-Administered Medications   Medication Dose Route Frequency Provider Last Rate Last Admin    loperamide (IMODIUM) capsule 2 mg  2 mg Oral Q4H PRN Hemalatha Vinson NP   2 mg at 12/22/20 0939    nystatin (MYCOSTATIN) 100,000 unit/gram powder   Topical BID Radhajaleesa KIM NP   Given at 12/21/20 1645    docusate sodium (COLACE) capsule 100 mg  100 mg Oral DAILY Hemalatha Vinson NP   Stopped at 12/20/20 0900    donepeziL (ARICEPT) tablet 5 mg  5 mg Oral QHS Hemalatha Vinson NP   5 mg at 12/21/20 2018    valproic acid (as sodium salt) (DEPAKENE) 250 mg/5 mL (5 mL) oral solution 250 mg  250 mg Oral QID Hemalatha Vinson NP   250 mg at 12/22/20 0939    OLANZapine (ZyPREXA) tablet 2.5 mg  2.5 mg Oral Q6H PRN Kim Moore NP   2.5 mg at 12/18/20 1908    haloperidol lactate (HALDOL) injection 2.5 mg  2.5 mg IntraMUSCular Q6H PRN Frieda Moore, NP        benztropine (COGENTIN) tablet 0.5 mg  0.5 mg Oral BID PRN Megan Moore, NP        diphenhydrAMINE (BENADRYL) injection 25 mg  25 mg IntraMUSCular BID PRN Megan Moore NP        acetaminophen (TYLENOL) tablet 650 mg  650 mg Oral Q4H PRN Kim Moore, NP   650 mg at 12/21/20 1158    magnesium hydroxide (MILK OF MAGNESIA) 400 mg/5 mL oral suspension 30 mL  30 mL Oral DAILY PRN Kim Moore, NP   30 mL at 12/19/20 1221    traZODone (DESYREL) tablet 50 mg  50 mg Oral QHS PRN Megan Moore NP        propranoloL (INDERAL) tablet 10 mg  10 mg Oral BID Kim Moore, NP   10 mg at 12/22/20 0940    mirtazapine (REMERON) tablet 15 mg  15 mg Oral QHS Kim Moore, NP   15 mg at 12/21/20 2018    risperiDONE (RisperDAL) tablet 1 mg  1 mg Oral BID Kim Moore, NP   1 mg at 12/22/20 0940       Side Effects: (reviewed/updated 12/22/2020)  None reported or admitted to. Review of Systems: (reviewed/updated 12/22/2020)  Appetite: good  Sleep: good   All other Review of Systems: negative    Mental Status Exam:  Eye contact: Good eye contact  Psychomotor activity: irritable   Speech is spontaneous  Thought process:confabulation  Mood is \"ok\"  Affect: constricted  Perception: No avh  Suicidal ideation: No si  Homicidal ideation: No hi  Insight/judgment: Poor  Cognition is impaired. MMSE-8      Physical Exam:  Musculoskeletal system: steady gait  Tremor not present  Cog wheeling not present      Assessment and Plan:  Sandra Taveras meets criteria for a diagnosis of Unspecified dementia with behavioral disturbance. Bipolar 1 disorder by history. Continue current medications as prescribed. We will closely monitor for safety. We will encourage reality orientation. Disposition planning to continue.        I certify that this patients inpatient psychiatric hospital services furnished since the previous certification were, and continue to be, required for treatment that could reasonably be expected to improve the patient's condition, or for diagnostic study, and that the patient continues to need, on a daily basis, active treatment furnished directly by or requiring the supervision of inpatient psychiatric facility personnel. In addition, the hospital records show that services furnished were intensive treatment services, admission or related services, or equivalent services.       Signed:  Desean Macias MD  12/22/2020

## 2020-12-22 NOTE — PROGRESS NOTES
Laboratory Monitoring for Divalproex/Valproic Acid: This patient is currently prescribed the following medication(s):   Current Facility-Administered Medications   Medication Dose Route Frequency    nystatin (MYCOSTATIN) 100,000 unit/gram powder   Topical BID    docusate sodium (COLACE) capsule 100 mg  100 mg Oral DAILY    donepeziL (ARICEPT) tablet 5 mg  5 mg Oral QHS    valproic acid (as sodium salt) (DEPAKENE) 250 mg/5 mL (5 mL) oral solution 250 mg  250 mg Oral QID    propranoloL (INDERAL) tablet 10 mg  10 mg Oral BID    mirtazapine (REMERON) tablet 15 mg  15 mg Oral QHS    risperiDONE (RisperDAL) tablet 1 mg  1 mg Oral BID     The following labs have been completed for monitoring of valproic acid:    Valproic Acid Serum Concentration  Lab Results   Component Value Date/Time    Valproic acid 58 12/20/2020 06:20 AM       Hepatic Function  Lab Results   Component Value Date/Time    Bilirubin, total 0.3 12/03/2020 03:30 AM    Protein, total 6.6 12/03/2020 03:30 AM    Albumin 2.6 (L) 12/03/2020 03:30 AM    Globulin 4.0 12/03/2020 03:30 AM    A-G Ratio 0.7 (L) 12/03/2020 03:30 AM    ALT (SGPT) 33 12/03/2020 03:30 AM    AST (SGOT) 16 12/03/2020 03:30 AM    Alk. phosphatase 110 12/03/2020 03:30 AM     Hematology  Lab Results   Component Value Date/Time    WBC 9.8 12/08/2020 02:44 AM    RBC 3.34 (L) 12/08/2020 02:44 AM    HGB 11.2 (L) 12/08/2020 02:44 AM    HCT 34.9 (L) 12/08/2020 02:44 AM    .5 (H) 12/08/2020 02:44 AM    MCH 33.5 12/08/2020 02:44 AM    MCHC 32.1 12/08/2020 02:44 AM    RDW 15.0 (H) 12/08/2020 02:44 AM    PLATELET 604 57/81/1266 02:44 AM     Assessment/Plan:  A valproic acid level was drawn on 12/20 @ 0620 and found to be 58 mcg/mL. This level is reflective of a steady state trough. Based on this level, recommend continuing current dose. Could consider dose increase to 375mg TID, if needed based on clinical response.      Sunita Ford, PharmD, BCPP, BCPS  Clinical Pharmacy Specialist, Behavioral Health

## 2020-12-22 NOTE — PROGRESS NOTES
Problem: Falls - Risk of  Goal: *Absence of Falls  Description: Document Joanna Alberts Fall Risk and appropriate interventions in the flowsheet. Outcome: Progressing Towards Goal  Note: Fall Risk Interventions:  Mobility Interventions: Bed/chair exit alarm    Mentation Interventions: Adequate sleep, hydration, pain control    Medication Interventions: Bed/chair exit alarm    Elimination Interventions: Bed/chair exit alarm    History of Falls Interventions: Bed/chair exit alarm    Pt. Received resting in bed, NAD, respirations even and unlabored. Will continue q15 safety rounds.

## 2020-12-22 NOTE — PROGRESS NOTES
Problem: Discharge Planning  Goal: *Discharge to safe environment  Outcome: Not Progressing Towards Goal  Note: Patient is not safe to discharge home due to cognitive impairments and will requires placement for safe discharge. Goal: *Knowledge of medication management  Outcome: Not Progressing Towards Goal  Note: Patient does not verbalizes understanding of need for medications or hospitalization. Goal: *Knowledge of discharge instructions  Outcome: Not Progressing Towards Goal  Note: Patient does not verbalize understanding of goals for treatment or safe discharge due to cognitive impairments.

## 2020-12-22 NOTE — BH NOTES
Patient continuously asks to call her boyfriend in [place] who she calls \"oogie boogie\". She finally tells the tech that his name is Alok Jimenez. Her daughter, Verena Houston, states that this is someone that has been in her mother's life for quite some time, but is not someone that she encourages a relationship with. She states that she does not have any contact information for him and that she would prefer that her mother didn't either. Patient continues to ask for the phone to look up his number.

## 2020-12-22 NOTE — INTERDISCIPLINARY ROUNDS
Behavioral Health Interdisciplinary Rounds Patient Name: Sabas Montemayor  Age: 67 y.o. Room/Bed:  740/ Primary Diagnosis: <principal problem not specified> Admission Status: Involuntary Commitment Readmission within 30 days: no 
Power of  in place: no 
Patient requires a blocked bed: no          Reason for blocked bed:  
Sleep hours:  8 Participation in Care/Groups:  no 
Medication Compliant?: Yes PRNS (last 24 hours): Pain Restraints (last 24 hours):  no 
  
Alcohol screening (AUDIT) completed -  AUDIT Score: 0  If applicable, date SBIRT discussed in treatment team AND documented:   
Tobacco - patient is a smoker: Have You Used Tobacco in the Past 30 Days: No 
Illegal Drugs use: Have You Used Any Illegal Substances Over the Past 12 Months: No 
 
24 hour chart check complete: yes Patient goal(s) for today:  
Treatment team focus/goals: titrate medications and assess needs for discharge Progress note: Pt is involuntarily committed for inability to care for self. Pt is alert but disoriented and cannot safely discharge back home. [facility] has been completed and family is looking into housing options. Family Contact information: daughter, Joel Harden ([phone]) MSW spoke to daughter regarding plan for seeking placement for safe discharge. Patient's preferred phone number for follow up call : [phone] Patient's preferred e-mail address : 
 
LOS:  8  Expected LOS: TBD 
  
Participating treatment team members: Dr. Bartolo Navarro;  RULA Setwart; Jennifer Diego RN; America Gamez, ShivaD

## 2020-12-22 NOTE — PROGRESS NOTES
Problem: Falls - Risk of  Goal: *Absence of Falls  Description: Document Leafy Holden Fall Risk and appropriate interventions in the flowsheet. Outcome: Progressing Towards Goal  Note: Fall Risk Interventions:  Mobility Interventions: Bed/chair exit alarm, Utilize walker, cane, or other assistive device    Mentation Interventions: Adequate sleep, hydration, pain control, Bed/chair exit alarm, Door open when patient unattended, Room close to nurse's station    Medication Interventions: Bed/chair exit alarm, Teach patient to arise slowly    Elimination Interventions: Bed/chair exit alarm, Elevated toilet seat, Stay With Me (per policy)    History of Falls Interventions: Bed/chair exit alarm    1515: Patient received awake and sitting quietly in the dining room watching tv, interacting appropriately with staff and peers. Mood and affect; labile, anxious, however cooperative and pleasant. Denies SI/HI. Will continue to monitor q15 minutes for safety checks. Patient ate 100% of dinner tray.

## 2020-12-22 NOTE — PROGRESS NOTES
Problem: Falls - Risk of  Goal: *Absence of Falls  Description: Document Joanna Alberts Fall Risk and appropriate interventions in the flowsheet. Outcome: Progressing Towards Goal  Note: Fall Risk Interventions:  Mobility Interventions: Bed/chair exit alarm, Utilize walker, cane, or other assistive device    Mentation Interventions: Adequate sleep, hydration, pain control, Bed/chair exit alarm, Door open when patient unattended, Room close to nurse's station    Medication Interventions: Bed/chair exit alarm, Teach patient to arise slowly    Elimination Interventions: Bed/chair exit alarm, Elevated toilet seat, Stay With Me (per policy)    History of Falls Interventions: Bed/chair exit alarm    Free of falls. Falls tool completed and accurate. Bed alarm on the bed. Patient ambulating with walker, gait stable. Staff at side for stand by assistance. Staff to maintain safety during hospitalization.

## 2020-12-22 NOTE — PROGRESS NOTES
Problem: Altered Thought Process (Adult/Pediatric)  Goal: *STG: Participates in treatment plan  Outcome: Progressing Towards Goal   Received this morning sleeping in bed. Slept in till about 0900. Woke alert, but remains confused and labile. Smiling and pleasant, but then can easily become irritable and frustrated. Had a loose stool this morning, medicated with lomotil prn. Agreeable to taking a shower. Able to follow directions, but needs assistance. Came out to eat breakfast.  Noted to have red,rash area to groin during shower, will discuss during treatment team. Continues to need monitoring and assistance for safety reasons. 1145-Restless and irritated after treatment team. Shania Cannon to ask the same questions and staff is attempting to explain and re-a sure patient. Patient not taking re-direction. Asking to speak with parents. Getting up abruptly from table in an irritable manner and shutting her door to her room. Medicated with Zyprexa PRN. Remains sitting at the dining table eating. 1300-resting/sleeping in bed.

## 2020-12-23 PROCEDURE — 74011250637 HC RX REV CODE- 250/637: Performed by: PSYCHIATRY & NEUROLOGY

## 2020-12-23 PROCEDURE — 65220000003 HC RM SEMIPRIVATE PSYCH

## 2020-12-23 PROCEDURE — 74011250637 HC RX REV CODE- 250/637: Performed by: NURSE PRACTITIONER

## 2020-12-23 RX ORDER — VALPROIC ACID 250 MG/5ML
625 SOLUTION ORAL 2 TIMES DAILY
Status: DISCONTINUED | OUTPATIENT
Start: 2020-12-23 | End: 2021-01-13

## 2020-12-23 RX ADMIN — MIRTAZAPINE 15 MG: 15 TABLET, FILM COATED ORAL at 20:28

## 2020-12-23 RX ADMIN — OLANZAPINE 2.5 MG: 2.5 TABLET, FILM COATED ORAL at 14:16

## 2020-12-23 RX ADMIN — DONEPEZIL HYDROCHLORIDE 5 MG: 5 TABLET, FILM COATED ORAL at 20:27

## 2020-12-23 RX ADMIN — RISPERIDONE 1 MG: 1 TABLET ORAL at 20:28

## 2020-12-23 RX ADMIN — PROPRANOLOL HYDROCHLORIDE 10 MG: 10 TABLET ORAL at 08:17

## 2020-12-23 RX ADMIN — NYSTATIN: 100000 POWDER TOPICAL at 16:55

## 2020-12-23 RX ADMIN — DOCUSATE SODIUM 100 MG: 100 CAPSULE ORAL at 08:17

## 2020-12-23 RX ADMIN — VALPROIC ACID 250 MG: 250 SOLUTION ORAL at 08:18

## 2020-12-23 RX ADMIN — PROPRANOLOL HYDROCHLORIDE 10 MG: 10 TABLET ORAL at 16:54

## 2020-12-23 RX ADMIN — VALPROIC ACID 625 MG: 250 SOLUTION ORAL at 16:54

## 2020-12-23 RX ADMIN — LOPERAMIDE HYDROCHLORIDE 2 MG: 2 CAPSULE ORAL at 10:14

## 2020-12-23 RX ADMIN — RISPERIDONE 1 MG: 1 TABLET ORAL at 08:17

## 2020-12-23 NOTE — PROGRESS NOTES
PSYCHIATRIC PROGRESS NOTE       Patient: Ning Schroeder MRN: [id number]  SSN: [id number]    YOB: 1945  Age: 67 y.o. Sex: female      Admit Date: 12/14/2020    LOS: 9 days       Chief Complaint:  I am lost at times. Interval History:  Ning Schroeder is little changed. She was irritable in the milieu yesterday and had to be given PRN Zyprexa which helped. Says she is feeling lost and frustrated about delays in her placement. Her memory is quite poor and repeatedly asks the same questions. Denies any SI or plan. She had another episode of loose stools today. Compliant in taking her medications. Past Medical History:  Past Medical History:   Diagnosis Date    Anemia     Arthritis     Bipolar disorder ([address]     Burning with urination     Memory change     Psychiatric disorder     Bipolar Disorder    Stool color black     Tremor          ALLERGIES:(reviewed/updated 12/23/2020)  Allergies   Allergen Reactions    Lamisil [Terbinafine] Diarrhea and Nausea and Vomiting    Thorazine [Chlorpromazine] Rash       Laboratory report:  Lab Results   Component Value Date/Time    WBC 9.8 12/08/2020 02:44 AM    HGB 11.2 (L) 12/08/2020 02:44 AM    HCT 34.9 (L) 12/08/2020 02:44 AM    PLATELET 310 20/67/3125 02:44 AM    .5 (H) 12/08/2020 02:44 AM      Lab Results   Component Value Date/Time    Sodium 140 12/07/2020 03:04 AM    Potassium 3.9 12/07/2020 03:04 AM    Chloride 108 12/07/2020 03:04 AM    CO2 26 12/07/2020 03:04 AM    Anion gap 6 12/07/2020 03:04 AM    Glucose 95 12/15/2020 04:22 AM    BUN 12 12/07/2020 03:04 AM    Creatinine 0.73 12/07/2020 03:04 AM    BUN/Creatinine ratio 16 12/07/2020 03:04 AM    GFR est AA >60 12/07/2020 03:04 AM    GFR est non-AA >60 12/07/2020 03:04 AM    Calcium 9.9 12/07/2020 03:04 AM    Bilirubin, total 0.3 12/03/2020 03:30 AM    Alk.  phosphatase 110 12/03/2020 03:30 AM    Protein, total 6.6 12/03/2020 03:30 AM    Albumin 2.6 (L) 12/03/2020 03:30 AM    Globulin 4.0 12/03/2020 03:30 AM    A-G Ratio 0.7 (L) 12/03/2020 03:30 AM    ALT (SGPT) 33 12/03/2020 03:30 AM      Vitals:    12/22/20 2008 12/23/20 0730 12/23/20 0759 12/23/20 0943   BP: 131/78 (!) 146/84 136/84    Pulse: 78 (!) 104 (!) 120 92   Resp: 16 16 16    Temp: 99.3 °F (37.4 °C) 98.9 °F (37.2 °C) 98.9 °F (37.2 °C)    SpO2: 95% 94%     Weight:           Lab Results   Component Value Date/Time    Valproic acid 58 12/20/2020 06:20 AM     Lab Results   Component Value Date/Time    Lithium level 0.57 (L) 11/29/2020 05:27 PM       Vital Signs  Patient Vitals for the past 24 hrs:   Temp Pulse Resp BP SpO2   12/23/20 0943  92      12/23/20 0759 98.9 °F (37.2 °C) (!) 120 16 136/84    12/23/20 0730 98.9 °F (37.2 °C) (!) 104 16 (!) 146/84 94 %   12/22/20 2008 99.3 °F (37.4 °C) 78 16 131/78 95 %   12/22/20 1614 98.8 °F (37.1 °C) 83 16 118/64 94 %     Wt Readings from Last 3 Encounters:   12/20/20 69.3 kg (152 lb 11.2 oz)   11/29/20 75.4 kg (166 lb 3.6 oz)   12/02/20 75.4 kg (166 lb 3.6 oz)     Temp Readings from Last 3 Encounters:   12/23/20 98.9 °F (37.2 °C)   12/14/20 97.5 °F (36.4 °C)   09/12/20 98.1 °F (36.7 °C)     BP Readings from Last 3 Encounters:   12/23/20 136/84   12/14/20 (!) 113/58   09/12/20 128/72     Pulse Readings from Last 3 Encounters:   12/23/20 92   12/14/20 72   09/12/20 75       Radiology (reviewed/updated 12/23/2020)  Ct Head Wo Cont    Result Date: 11/29/2020  CLINICAL HISTORY: Encephalopathy INDICATION: Encephalopathy COMPARISON: 9/7/2018. CT dose reduction was achieved through use of a standardized protocol tailored for this examination and automatic exposure control for dose modulation. TECHNIQUE: Serial axial images with a collimation of 5 mm were obtained from the skull base through the vertex  FINDINGS: There is sulcal and ventricular prominence. Confluent periventricular and scattered foci of hypodensity in the cerebral white matter.  There is no evidence of an acute infarction, hemorrhage, or mass-effect. There is no evidence of midline shift or hydrocephalus. Posterior fossa structures are unremarkable. No extra-axial collections are seen. Mastoid air cells are well pneumatized and clear. Hypodensity in the central jerri. 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 IMPRESSION: No acute intracranial process. Small area of chronic encephalomalacia in the left frontal lobe. Imaging findings consistent with moderate chronic microvascular ischemic change. There is a mild degree of cerebral atrophy. Xr Chest Port    Result Date: 11/29/2020  EXAM: XR CHEST PORT INDICATION: fever COMPARISON: None. FINDINGS: A portable AP radiograph of the chest was obtained at 14:46 hours. The patient is on a cardiac monitor. The lungs are clear. The cardiac and mediastinal contours and pulmonary vascularity are normal.  The chest wall structures and visualized upper abdomen show no acute findings with incidental note of degenerative spine and shoulder changes as well as partial visualization of posterior windy and screw fixation hardware of the lumbar spine. IMPRESSION: No acute findings.       Current Facility-Administered Medications   Medication Dose Route Frequency Provider Last Rate Last Admin    valproic acid (as sodium salt) (DEPAKENE) 250 mg/5 mL (5 mL) oral solution 625 mg  625 mg Oral BID Ilya Smith MD        loperamide (IMODIUM) capsule 2 mg  2 mg Oral Q4H PRN Hemalatha Vinson NP   2 mg at 12/23/20 1014    nystatin (MYCOSTATIN) 100,000 unit/gram powder   Topical BID Hemalatha Vinson NP   Stopped at 12/22/20 1800    docusate sodium (COLACE) capsule 100 mg  100 mg Oral DAILY Hemalatha Vinson NP   100 mg at 12/23/20 0817    donepeziL (ARICEPT) tablet 5 mg  5 mg Oral QHS Hemalatha Vinson NP   5 mg at 12/22/20 2018    OLANZapine (ZyPREXA) tablet 2.5 mg  2.5 mg Oral Q6H PRN Kim Moore NP   2.5 mg at 12/22/20 1148    haloperidol lactate (HALDOL) injection 2.5 mg  2.5 mg IntraMUSCular Q6H PRN Doug Moore, NP        benztropine (COGENTIN) tablet 0.5 mg  0.5 mg Oral BID PRN Oscar, Doug Perezten, NP        diphenhydrAMINE (BENADRYL) injection 25 mg  25 mg IntraMUSCular BID PRN Doug Moore, NP        acetaminophen (TYLENOL) tablet 650 mg  650 mg Oral Q4H PRN Kim Moore, NP   650 mg at 12/22/20 1530    magnesium hydroxide (MILK OF MAGNESIA) 400 mg/5 mL oral suspension 30 mL  30 mL Oral DAILY PRN Kim Moore, NP   30 mL at 12/19/20 1221    traZODone (DESYREL) tablet 50 mg  50 mg Oral QHS PRN NorwoodDoug Rangel, NP        propranoloL (INDERAL) tablet 10 mg  10 mg Oral BID Kim Moore, NP   10 mg at 12/23/20 0817    mirtazapine (REMERON) tablet 15 mg  15 mg Oral QHS Kim Moore, NP   15 mg at 12/22/20 2019    risperiDONE (RisperDAL) tablet 1 mg  1 mg Oral BID Kim Moore, NP   1 mg at 12/23/20 0817       Side Effects: (reviewed/updated 12/23/2020)  None reported or admitted to. Review of Systems: (reviewed/updated 12/23/2020)  Appetite: good  Sleep: good   All other Review of Systems: negative    Mental Status Exam:  Eye contact: Good eye contact  Psychomotor activity: irritable   Speech is spontaneous  Thought process:confabulation  Mood is \"ok\"  Affect: constricted  Perception: No avh  Suicidal ideation: No si  Homicidal ideation: No hi  Insight/judgment: Poor  Cognition is impaired. MMSE-8      Physical Exam:  Musculoskeletal system: steady gait  Tremor not present  Cog wheeling not present      Assessment and Plan:  [address] meets criteria for a diagnosis of Unspecified dementia with behavioral disturbance. Bipolar 1 disorder by history. Stool for ova and cysts  Increased Depakote to 625mg BID. Continue current medications as prescribed.   We will closely monitor for safety. We will encourage reality orientation. Disposition planning to continue. I certify that this patients inpatient psychiatric hospital services furnished since the previous certification were, and continue to be, required for treatment that could reasonably be expected to improve the patient's condition, or for diagnostic study, and that the patient continues to need, on a daily basis, active treatment furnished directly by or requiring the supervision of inpatient psychiatric facility personnel. In addition, the hospital records show that services furnished were intensive treatment services, admission or related services, or equivalent services.       Signed:  Jhon Hamilton MD  12/23/2020

## 2020-12-23 NOTE — PROGRESS NOTES
Problem: Falls - Risk of  Goal: *Absence of Falls  Description: Document Joanna Alberts Fall Risk and appropriate interventions in the flowsheet. Outcome: Progressing Towards Goal  Note: Fall Risk Interventions:  Mobility Interventions: Bed/chair exit alarm    Mentation Interventions: Adequate sleep, hydration, pain control, Bed/chair exit alarm, Door open when patient unattended, Room close to nurse's station    Medication Interventions: Bed/chair exit alarm, Teach patient to arise slowly    Elimination Interventions: Bed/chair exit alarm, Elevated toilet seat, Stay With Me (per policy)    History of Falls Interventions: Bed/chair exit alarm    1515: Patient received awake and lying in bed quietly. Mood and affect; dull, flat, and at times labile; and remaining fairly isolative to room. Denies SI/HI. Will continue to monitor q15 minutes for safety checks. Patient ate 50% of meal tray.

## 2020-12-23 NOTE — PROGRESS NOTES
Problem: Falls - Risk of  Goal: *Absence of Falls  Description: Document Solis Lund Fall Risk and appropriate interventions in the flowsheet. Outcome: Progressing Towards Goal  Note: Fall Risk Interventions:  Mobility Interventions: Bed/chair exit alarm    Mentation Interventions: Adequate sleep, hydration, pain control, Bed/chair exit alarm, Door open when patient unattended, Room close to nurse's station    Medication Interventions: Bed/chair exit alarm, Teach patient to arise slowly    Elimination Interventions: Bed/chair exit alarm, Elevated toilet seat, Stay With Me (per policy)    History of Falls Interventions: Bed/chair exit alarm    Problem: Altered Thought Process (Adult/Pediatric)  Goal: *STG: Participates in treatment plan  Outcome: Progressing Towards Goal  Goal: *STG: Attends activities and groups  Outcome: Progressing Towards Goal  Goal: Interventions  Outcome: Progressing Towards Goal    Patient present in dayroom for mealtime. Consumes approximately 15 % of meal, reports not feeling well. Rushes to bathroom, stool loose. Staff assisted clean up. Offered ginger ale and crackers. Consumes 100%. Returns to bed. Requests a call be made to her mother. Inform patient that we may not be able to reach her mom but would call her daughter if she would like. Patient does not feel that will be helpful to her. Continue to ambulate, orient to day, time and situation.

## 2020-12-23 NOTE — PROGRESS NOTES
Problem: Falls - Risk of  Goal: *Absence of Falls  Description: Document Roshan David Fall Risk and appropriate interventions in the flowsheet. Outcome: Progressing Towards Goal  Note: Fall Risk Interventions:  Mobility Interventions: Bed/chair exit alarm, Utilize walker, cane, or other assistive device    Mentation Interventions: Adequate sleep, hydration, pain control, Bed/chair exit alarm, Door open when patient unattended, Room close to nurse's station    Medication Interventions: Bed/chair exit alarm, Teach patient to arise slowly    Elimination Interventions: Bed/chair exit alarm, Elevated toilet seat, Stay With Me (per policy)    History of Falls Interventions: Bed/chair exit alarm       Pt. Received resting in bed, NAD, respirations even and unlabored. Will continue q15 safety rounds.

## 2020-12-23 NOTE — BH NOTES
Behavioral Health Interdisciplinary Rounds     Patient Name: Maribeth Yun  Age: 67 y.o. Room/Bed:  740/  Primary Diagnosis: <principal problem not specified>   Admission Status: Involuntary Commitment     Readmission within 30 days: no  Power of  in place: no  Patient requires a blocked bed: no          Reason for blocked bed:   Sleep hours:  8+      Participation in Care/Groups:  no  Medication Compliant?: Yes  PRNS (last 24 hours): Antipsychotic (PO)    Restraints (last 24 hours):  no     Alcohol screening (AUDIT) completed -  AUDIT Score: 0  If applicable, date SBIRT discussed in treatment team AND documented:    Tobacco - patient is a smoker: Have You Used Tobacco in the Past 30 Days: No  Illegal Drugs use: Have You Used Any Illegal Substances Over the Past 12 Months: No    24 hour chart check complete: yes     Patient goal(s) for today:   Treatment team focus/goals: titrate medications and assess needs for discharge  Progress note: Patient remains confused, and lost. Continues to ask the same question about her dx. Memory is declining. Experienced loose stools today. MSW (Leelee Lara) returned call from [place] for [address] [place] [address], patient's PCP. Spoke to Jennifer Murphy (care manager) [phone] to give update on patient's admission status-Involuntary committed and patient's status/progress. MSW faxed over guardianship form to [phone]. Family Contact information: daughter, Bernadette Read ([phone]) [facility] spoke to daughter regarding plan for seeking placement for safe discharge. Patient's preferred phone number for follow up call : [phone]   Patient's preferred e-mail address:     LOS:  9  Expected LOS: TBD     Participating treatment team members: Maribeth Yun, Dr. Jero Whalen, MSW; Amparo Chance, RN; Todd Gu, PharmD; Aliyah Valdivia, RN.

## 2020-12-23 NOTE — BH NOTES
CM NOTE: MSW faxed over guardianship form to [phone]. 1308: MSW ([facility]) returned call from [address], [address] for [facility], patient's PCP. Spoke to Jarret (care manager) [phone] to give update on patient's admission status-Involuntary committed and patient's status/progress. James Matthew, Supervisee in Social Work.

## 2020-12-23 NOTE — BH NOTES
PRN Medication Documentation  @ 3648    Specific patient behavior that led to need for PRN medication: agitation, frustration, irritable  Staff interventions attempted prior to PRN being given: humor, redirection, therapeutic listening  PRN medication given: olanzapine 2.5 mg  Patient response/effectiveness of PRN medication: pending

## 2020-12-24 PROCEDURE — 74011250637 HC RX REV CODE- 250/637: Performed by: NURSE PRACTITIONER

## 2020-12-24 PROCEDURE — 65220000003 HC RM SEMIPRIVATE PSYCH

## 2020-12-24 PROCEDURE — 74011250637 HC RX REV CODE- 250/637: Performed by: PSYCHIATRY & NEUROLOGY

## 2020-12-24 RX ADMIN — VALPROIC ACID 625 MG: 250 SOLUTION ORAL at 09:38

## 2020-12-24 RX ADMIN — MIRTAZAPINE 15 MG: 15 TABLET, FILM COATED ORAL at 21:00

## 2020-12-24 RX ADMIN — NYSTATIN: 100000 POWDER TOPICAL at 10:08

## 2020-12-24 RX ADMIN — PROPRANOLOL HYDROCHLORIDE 10 MG: 10 TABLET ORAL at 09:38

## 2020-12-24 RX ADMIN — DONEPEZIL HYDROCHLORIDE 5 MG: 5 TABLET, FILM COATED ORAL at 20:47

## 2020-12-24 RX ADMIN — ACETAMINOPHEN 650 MG: 325 TABLET ORAL at 15:28

## 2020-12-24 RX ADMIN — PROPRANOLOL HYDROCHLORIDE 10 MG: 10 TABLET ORAL at 17:11

## 2020-12-24 RX ADMIN — NYSTATIN: 100000 POWDER TOPICAL at 20:48

## 2020-12-24 RX ADMIN — RISPERIDONE 1 MG: 1 TABLET ORAL at 09:38

## 2020-12-24 RX ADMIN — RISPERIDONE 1 MG: 1 TABLET ORAL at 20:47

## 2020-12-24 RX ADMIN — VALPROIC ACID 625 MG: 250 SOLUTION ORAL at 17:12

## 2020-12-24 NOTE — PROGRESS NOTES
Problem: Falls - Risk of  Goal: *Absence of Falls  Description: Document Clydene Bone Fall Risk and appropriate interventions in the flowsheet. Outcome: Progressing Towards Goal  Note: Fall Risk Interventions:  Mobility Interventions: Bed/chair exit alarm    Mentation Interventions: Adequate sleep, hydration, pain control, Bed/chair exit alarm, Door open when patient unattended, Room close to nurse's station    Medication Interventions: Bed/chair exit alarm, Teach patient to arise slowly    Elimination Interventions: Bed/chair exit alarm, Elevated toilet seat, Stay With Me (per policy)    History of Falls Interventions: Bed/chair exit alarm       Pt. Received resting in bed, NAD, respirations even and unlabored. Will continue q15 safety rounds.

## 2020-12-24 NOTE — PROGRESS NOTES
Problem: Falls - Risk of  Goal: *Absence of Falls  Description: Document Lupe Bonds Fall Risk and appropriate interventions in the flowsheet.   Outcome: Progressing Towards Goal  Note: Fall Risk Interventions:  Mobility Interventions: Bed/chair exit alarm    Mentation Interventions: Adequate sleep, hydration, pain control, Bed/chair exit alarm, Door open when patient unattended, Room close to nurse's station    Medication Interventions: Bed/chair exit alarm, Teach patient to arise slowly    Elimination Interventions: Bed/chair exit alarm, Elevated toilet seat, Stay With Me (per policy)    History of Falls Interventions: Bed/chair exit alarm         Problem: Altered Thought Process (Adult/Pediatric)  Goal: *STG: Participates in treatment plan  Outcome: Progressing Towards Goal  Goal: *STG: Complies with medication therapy  Outcome: Progressing Towards Goal  Goal: *STG: Attends activities and groups  Outcome: Progressing Towards Goal

## 2020-12-24 NOTE — INTERDISCIPLINARY ROUNDS
Behavioral Health Interdisciplinary Rounds Patient Name: Charlee Nance  Age: 67 y.o. Room/Bed:  0/ Primary Diagnosis: <principal problem not specified> Admission Status: Involuntary Commitment Readmission within 30 days: no 
Power of  in place: no 
Patient requires a blocked bed: no          Reason for blocked bed:  
Sleep hours: 7 Participation in Care/Groups:   
Medication Compliant?: Yes PRNS (last 24 hours): Antipsychotic (PO) Restraints (last 24 hours):  no 
  
Alcohol screening (AUDIT) completed -  AUDIT Score: 0  If applicable, date SBIRT discussed in treatment team AND documented:   
Tobacco - patient is a smoker: Have You Used Tobacco in the Past 30 Days: No 
Illegal Drugs use: Have You Used Any Illegal Substances Over the Past 12 Months: No 
 
24 hour chart check complete: yes Patient goal(s) for today:  
Treatment team focus/goals:  
Progress note Family Contact information:  
Patient's preferred phone number for follow up call :  
Patient's preferred e-mail address : 
LOS:  10  Expected LOS:  
 
Participating treatment team members: Charlee Nance, * (assigned SW),

## 2020-12-24 NOTE — BH NOTES
1530: PRN Medication Documentation    Specific patient behavior that led to need for PRN medication: c/o shoulder pain, ittermittent  Staff interventions attempted prior to PRN being given: position change  PRN medication given: tylenol PO  Patient response/effectiveness of PRN medication: reduced pain

## 2020-12-25 PROCEDURE — 74011250637 HC RX REV CODE- 250/637: Performed by: NURSE PRACTITIONER

## 2020-12-25 PROCEDURE — 65220000003 HC RM SEMIPRIVATE PSYCH

## 2020-12-25 PROCEDURE — 74011250637 HC RX REV CODE- 250/637: Performed by: PSYCHIATRY & NEUROLOGY

## 2020-12-25 RX ADMIN — NYSTATIN: 100000 POWDER TOPICAL at 09:43

## 2020-12-25 RX ADMIN — MIRTAZAPINE 15 MG: 15 TABLET, FILM COATED ORAL at 20:24

## 2020-12-25 RX ADMIN — DONEPEZIL HYDROCHLORIDE 5 MG: 5 TABLET, FILM COATED ORAL at 20:24

## 2020-12-25 RX ADMIN — NYSTATIN: 100000 POWDER TOPICAL at 16:47

## 2020-12-25 RX ADMIN — RISPERIDONE 1 MG: 1 TABLET ORAL at 09:43

## 2020-12-25 RX ADMIN — PROPRANOLOL HYDROCHLORIDE 10 MG: 10 TABLET ORAL at 09:35

## 2020-12-25 RX ADMIN — VALPROIC ACID 625 MG: 250 SOLUTION ORAL at 16:45

## 2020-12-25 RX ADMIN — RISPERIDONE 1 MG: 1 TABLET ORAL at 20:25

## 2020-12-25 RX ADMIN — PROPRANOLOL HYDROCHLORIDE 10 MG: 10 TABLET ORAL at 16:49

## 2020-12-25 RX ADMIN — VALPROIC ACID 625 MG: 250 SOLUTION ORAL at 09:33

## 2020-12-25 NOTE — PROGRESS NOTES
Problem: Falls - Risk of  Goal: *Absence of Falls  Description: Document [facility] Fall Risk and appropriate interventions in the flowsheet. Outcome: Progressing Towards Goal  Note: Fall Risk Interventions:  Mobility Interventions: Bed/chair exit alarm    Mentation Interventions: Adequate sleep, hydration, pain control, Reorient patient, Update white board    Medication Interventions: Teach patient to arise slowly    Elimination Interventions: Call light in reach    History of Falls Interventions: Bed/chair exit alarm      2300 - Received patient in bed. Appears to be sleeping, no signs of stress. Respirations even and unlabored. Will continue to monitor with q15min safety rounds.

## 2020-12-25 NOTE — PROGRESS NOTES
Problem: Falls - Risk of  Goal: *Absence of Falls  Description: Document Meg Farooq Fall Risk and appropriate interventions in the flowsheet. Outcome: Progressing Towards Goal  Note: Fall Risk Interventions:  Mobility Interventions: Assess mobility with egress test, Bed/chair exit alarm, Communicate number of staff needed for ambulation/transfer, Patient to call before getting OOB, Utilize walker, cane, or other assistive device    Mentation Interventions: Adequate sleep, hydration, pain control, Bed/chair exit alarm, Door open when patient unattended, More frequent rounding, Reorient patient, Room close to nurse's station    Medication Interventions: Bed/chair exit alarm, Patient to call before getting OOB, Teach patient to arise slowly    Elimination Interventions: Bed/chair exit alarm, Toilet paper/wipes in reach, Toileting schedule/hourly rounds    History of Falls Interventions: Bed/chair exit alarm, Door open when patient unattended, Room close to nurse's station         Problem: Altered Thought Process (Adult/Pediatric)  Goal: *STG: Participates in treatment plan  Outcome: Progressing Towards Goal  Note: Pt is visible out in the DR periodic confusion noted - redirection and reorientation offered   Independent in her ADL's uses a walker with minimal assistance   Med/meal compliant   Mood is anxious irritable at times. Positive coping methods encouraged  NAD noted   Will continue to monitor.

## 2020-12-25 NOTE — INTERDISCIPLINARY ROUNDS
Behavioral Health Interdisciplinary Rounds Patient Name: Wyatt Ashley  Age: 67 y.o. Room/Bed:  740/ Primary Diagnosis: <principal problem not specified> Admission Status: Involuntary Commitment Readmission within 30 days: no 
Power of  in place: no 
Patient requires a blocked bed: no          Reason for blocked bed:  
Sleep hours: 6 Participation in Care/Groups:   
Medication Compliant?: Yes PRNS (last 24 hours): Pain Restraints (last 24 hours):  no 
  
Alcohol screening (AUDIT) completed -  AUDIT Score: 0  If applicable, date SBIRT discussed in treatment team AND documented:   
Tobacco - patient is a smoker: Have You Used Tobacco in the Past 30 Days: No 
Illegal Drugs use: Have You Used Any Illegal Substances Over the Past 12 Months: No 
 
24 hour chart check complete:   
 
Patient goal(s) for today:  
Treatment team focus/goals:  
Progress note Family Contact information:  
Patient's preferred phone number for follow up call :  
Patient's preferred e-mail address : 
LOS:  11  Expected LOS:  
 
Participating treatment team members: Wyatt Ashley, * (assigned SW),

## 2020-12-25 NOTE — PROGRESS NOTES
PSYCHIATRIC PROGRESS NOTE       Patient: Ace Woodard MRN: [id number]  SSN: [id number]    YOB: 1945  Age: 67 y.o. Sex: female      Admit Date: 12/14/2020    LOS: 11 days       Chief Complaint:  I am okay I think     Interval History:  Ace Woodard is little changed. She remains forgetful and confused. Could not recall seeing this author 5 minutes after we had a long conversation. Otherwise has been calm and pleasant. No episodes of agitation or aggression. Denies any adverse events from her medications. Past Medical History:  Past Medical History:   Diagnosis Date    Anemia     Arthritis     [facility] disorder ([facility] [address]     Burning with urination     Memory change     Psychiatric disorder     Bipolar Disorder    Stool color black     Tremor          ALLERGIES:(reviewed/updated 12/25/2020)  Allergies   Allergen Reactions    Lamisil [Terbinafine] Diarrhea and Nausea and Vomiting    Thorazine [Chlorpromazine] Rash       Laboratory report:  Lab Results   Component Value Date/Time    WBC 9.8 12/08/2020 02:44 AM    HGB 11.2 (L) 12/08/2020 02:44 AM    HCT 34.9 (L) 12/08/2020 02:44 AM    PLATELET 099 93/75/0032 02:44 AM    .5 (H) 12/08/2020 02:44 AM      Lab Results   Component Value Date/Time    Sodium 140 12/07/2020 03:04 AM    Potassium 3.9 12/07/2020 03:04 AM    Chloride 108 12/07/2020 03:04 AM    CO2 26 12/07/2020 03:04 AM    Anion gap 6 12/07/2020 03:04 AM    Glucose 95 12/15/2020 04:22 AM    BUN 12 12/07/2020 03:04 AM    Creatinine 0.73 12/07/2020 03:04 AM    BUN/Creatinine ratio 16 12/07/2020 03:04 AM    GFR est AA >60 12/07/2020 03:04 AM    GFR est non-AA >60 12/07/2020 03:04 AM    Calcium 9.9 12/07/2020 03:04 AM    Bilirubin, total 0.3 12/03/2020 03:30 AM    Alk.  phosphatase 110 12/03/2020 03:30 AM    Protein, total 6.6 12/03/2020 03:30 AM    Albumin 2.6 (L) 12/03/2020 03:30 AM    Globulin 4.0 12/03/2020 03:30 AM    A-G Ratio 0.7 (L) 12/03/2020 03:30 AM    ALT (SGPT) 33 12/03/2020 03:30 AM      Vitals:    12/24/20 0716 12/24/20 0730 12/24/20 1534 12/25/20 0825   BP:  123/78 116/63 (!) 147/78   Pulse: (!) 118 88 94 87   Resp: 18 18 16 18   Temp: 98.8 °F (37.1 °C) 98.8 °F (37.1 °C) 97.4 °F (36.3 °C) 98.3 °F (36.8 °C)   SpO2: 93% 93% 94% 94%   Weight:           Lab Results   Component Value Date/Time    Valproic acid 58 12/20/2020 06:20 AM     Lab Results   Component Value Date/Time    Lithium level 0.57 (L) 11/29/2020 05:27 PM       Vital Signs  Patient Vitals for the past 24 hrs:   Temp Pulse Resp BP SpO2   12/25/20 0825 98.3 °F (36.8 °C) 87 18 (!) 147/78 94 %   12/24/20 1534 97.4 °F (36.3 °C) 94 16 116/63 94 %     Wt Readings from Last 3 Encounters:   12/20/20 69.3 kg (152 lb 11.2 oz)   11/29/20 75.4 kg (166 lb 3.6 oz)   12/02/20 75.4 kg (166 lb 3.6 oz)     Temp Readings from Last 3 Encounters:   12/25/20 98.3 °F (36.8 °C)   12/14/20 97.5 °F (36.4 °C)   09/12/20 98.1 °F (36.7 °C)     BP Readings from Last 3 Encounters:   12/25/20 (!) 147/78   12/14/20 (!) 113/58   09/12/20 128/72     Pulse Readings from Last 3 Encounters:   12/25/20 87   12/14/20 72   09/12/20 75       Radiology (reviewed/updated 12/25/2020)  Ct Head Wo Cont    Result Date: 11/29/2020  CLINICAL HISTORY: Encephalopathy INDICATION: Encephalopathy COMPARISON: 9/7/2018. CT dose reduction was achieved through use of a standardized protocol tailored for this examination and automatic exposure control for dose modulation. TECHNIQUE: Serial axial images with a collimation of 5 mm were obtained from the skull base through the vertex  FINDINGS: There is sulcal and ventricular prominence. Confluent periventricular and scattered foci of hypodensity in the cerebral white matter. There is no evidence of an acute infarction, hemorrhage, or mass-effect. There is no evidence of midline shift or hydrocephalus. Posterior fossa structures are unremarkable. No extra-axial collections are seen.  Mastoid air cells are well pneumatized and clear.  Hypodensity in the central jerri. 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 IMPRESSION: No acute intracranial process. Small area of chronic encephalomalacia in the left frontal lobe. Imaging findings consistent with moderate chronic microvascular ischemic change. There is a mild degree of cerebral atrophy. Xr Chest Port    Result Date: 11/29/2020  EXAM: XR CHEST PORT INDICATION: fever COMPARISON: None. FINDINGS: A portable AP radiograph of the chest was obtained at 14:46 hours. The patient is on a cardiac monitor. The lungs are clear. The cardiac and mediastinal contours and pulmonary vascularity are normal.  The chest wall structures and visualized upper abdomen show no acute findings with incidental note of degenerative spine and shoulder changes as well as partial visualization of posterior windy and screw fixation hardware of the lumbar spine. IMPRESSION: No acute findings.       Current Facility-Administered Medications   Medication Dose Route Frequency Provider Last Rate Last Admin    valproic acid (as sodium salt) (DEPAKENE) 250 mg/5 mL (5 mL) oral solution 625 mg  625 mg Oral BID Orestes Miller MD   625 mg at 12/25/20 7442    loperamide (IMODIUM) capsule 2 mg  2 mg Oral Q4H PRN Keiry Bump A, NP   2 mg at 12/23/20 1014    nystatin (MYCOSTATIN) 100,000 unit/gram powder   Topical BID Keiry Bump A, NP   Given at 12/25/20 0943    docusate sodium (COLACE) capsule 100 mg  100 mg Oral DAILY Hemalatha Vinson NP   Stopped at 12/24/20 0900    donepeziL (ARICEPT) tablet 5 mg  5 mg Oral QHS Hemalatha Vinson NP   5 mg at 12/24/20 2047    OLANZapine (ZyPREXA) tablet 2.5 mg  2.5 mg Oral Q6H PRN Kim Moore NP   2.5 mg at 12/23/20 1416    haloperidol lactate (HALDOL) injection 2.5 mg  2.5 mg IntraMUSCular Q6H PRN Kim Moore, NP        benztropine (COGENTIN) tablet 0.5 mg  0.5 mg Oral BID PRN Zara Round, NP        diphenhydrAMINE (BENADRYL) injection 25 mg  25 mg IntraMUSCular BID PRN Oscar, Phucb Span, NP        acetaminophen (TYLENOL) tablet 650 mg  650 mg Oral Q4H PRN Kim Moore, NP   650 mg at 12/24/20 1528    magnesium hydroxide (MILK OF MAGNESIA) 400 mg/5 mL oral suspension 30 mL  30 mL Oral DAILY PRN Kim Moore, NP   30 mL at 12/19/20 1221    traZODone (DESYREL) tablet 50 mg  50 mg Oral QHS PRN Oscar, Phucb Span, NP        propranoloL (INDERAL) tablet 10 mg  10 mg Oral BID Kim Moore, NP   10 mg at 12/25/20 0935    mirtazapine (REMERON) tablet 15 mg  15 mg Oral QHS Kim Moore, NP   15 mg at 12/24/20 2100    risperiDONE (RisperDAL) tablet 1 mg  1 mg Oral BID Kim Moore, NP   1 mg at 12/25/20 0943       Side Effects: (reviewed/updated 12/25/2020)  None reported or admitted to. Review of Systems: (reviewed/updated 12/25/2020)  Appetite: good  Sleep: good   All other Review of Systems: negative    Mental Status Exam:  Eye contact: Good eye contact  Psychomotor activity: irritable   Speech is spontaneous  Thought process:confabulation  Mood is \"ok\"  Affect: constricted  Perception: No avh  Suicidal ideation: No si  Homicidal ideation: No hi  Insight/judgment: Poor  Cognition is impaired. MMSE-8      Physical Exam:  Musculoskeletal system: steady gait  Tremor not present  Cog wheeling not present      Assessment and Plan:  Yasmin Vallejo meets criteria for a diagnosis of Unspecified dementia with behavioral disturbance. Bipolar 1 disorder by history. Continue current medications as prescribed. We will closely monitor for safety. We will encourage reality orientation. Disposition planning to continue.        I certify that this patients inpatient psychiatric hospital services furnished since the previous certification were, and continue to be, required for treatment that could reasonably be expected to improve the patient's condition, or for diagnostic study, and that the patient continues to need, on a daily basis, active treatment furnished directly by or requiring the supervision of inpatient psychiatric facility personnel. In addition, the hospital records show that services furnished were intensive treatment services, admission or related services, or equivalent services.       Signed:  Faiza Hammer MD  12/25/2020

## 2020-12-25 NOTE — PROGRESS NOTES
Problem: Falls - Risk of  Goal: *Absence of Falls  Description: Document Joanna Alberts Fall Risk and appropriate interventions in the flowsheet.   Outcome: Progressing Towards Goal  Note: Fall Risk Interventions:  Mobility Interventions: Utilize walker, cane, or other assistive device    Mentation Interventions: Adequate sleep, hydration, pain control    Medication Interventions: Teach patient to arise slowly    Elimination Interventions: Bed/chair exit alarm, Toilet paper/wipes in reach, Toileting schedule/hourly rounds    History of Falls Interventions: Bed/chair exit alarm, Door open when patient unattended, Room close to nurse's station         Problem: Altered Thought Process (Adult/Pediatric)  Goal: Interventions  Outcome: Progressing Towards Goal

## 2020-12-26 PROCEDURE — 65220000003 HC RM SEMIPRIVATE PSYCH

## 2020-12-26 PROCEDURE — 74011250637 HC RX REV CODE- 250/637: Performed by: NURSE PRACTITIONER

## 2020-12-26 PROCEDURE — 74011250637 HC RX REV CODE- 250/637: Performed by: PSYCHIATRY & NEUROLOGY

## 2020-12-26 RX ADMIN — RISPERIDONE 1 MG: 1 TABLET ORAL at 08:57

## 2020-12-26 RX ADMIN — PROPRANOLOL HYDROCHLORIDE 10 MG: 10 TABLET ORAL at 16:48

## 2020-12-26 RX ADMIN — VALPROIC ACID 625 MG: 250 SOLUTION ORAL at 08:57

## 2020-12-26 RX ADMIN — VALPROIC ACID 625 MG: 250 SOLUTION ORAL at 16:50

## 2020-12-26 RX ADMIN — NYSTATIN: 100000 POWDER TOPICAL at 16:52

## 2020-12-26 RX ADMIN — MIRTAZAPINE 15 MG: 15 TABLET, FILM COATED ORAL at 21:05

## 2020-12-26 RX ADMIN — NYSTATIN: 100000 POWDER TOPICAL at 08:59

## 2020-12-26 RX ADMIN — RISPERIDONE 1 MG: 1 TABLET ORAL at 21:05

## 2020-12-26 RX ADMIN — PROPRANOLOL HYDROCHLORIDE 10 MG: 10 TABLET ORAL at 08:57

## 2020-12-26 RX ADMIN — DONEPEZIL HYDROCHLORIDE 5 MG: 5 TABLET, FILM COATED ORAL at 21:05

## 2020-12-26 NOTE — PROGRESS NOTES
Problem: Falls - Risk of  Goal: *Absence of Falls  Description: Document Meg Farooq Fall Risk and appropriate interventions in the flowsheet. Outcome: Progressing Towards Goal  Note: Fall Risk Interventions:  Mobility Interventions: Assess mobility with egress test, Bed/chair exit alarm, Communicate number of staff needed for ambulation/transfer, Patient to call before getting OOB, Utilize walker, cane, or other assistive device    Mentation Interventions: Adequate sleep, hydration, pain control, Bed/chair exit alarm, Door open when patient unattended, More frequent rounding, Reorient patient, Room close to nurse's station    Medication Interventions: Bed/chair exit alarm, Patient to call before getting OOB, Teach patient to arise slowly    Elimination Interventions: Bed/chair exit alarm, Patient to call for help with toileting needs, Stay With Me (per policy), Toilet paper/wipes in reach, Toileting schedule/hourly rounds    History of Falls Interventions: Bed/chair exit alarm, Door open when patient unattended, Room close to nurse's station         Problem: Altered Thought Process (Adult/Pediatric)  Goal: *STG: Participates in treatment plan  Outcome: Progressing Towards Goal  Note: Pt is visible out in the DR calm cooperative and appropriate with staff and peers   Periodic confusion noted   Pt was alert today to place person month   Mood is labile  Able to answer staff questions and follow directions  Uses a walker to ambualte with minimal assistance   Med/meal comlpliant   Will continue to monitor.

## 2020-12-26 NOTE — BH NOTES
Chief Complaint:  \"I am a . \"    Interval History:  Patient is pleasantly confused and easily distractible and is unable to meaningfully participate in the evaluation. Mental Status Exam:  General Presentation:  Well groomed; maintains eye contact  Orientation:  Disorieted  Gait and Station:  Ambulates with a walker  Musculoskeletal System : WNL  Language: Aphasia  Speech:  Regular  Thought Processes:  Tangential  Thought Associations: goal directed  Thought Content:  Not delusional  Perception: Denies AH/VH  Suicidal Ideations:  Denies  Homicidal Ideations:  Denies  Mood:  OK  Affect:   Constricted  Insight:  Impaired  Judgment:  Fair    Assessment and Plan:  Unspecified dementia with behavioral disturbance. Bipolar 1 disorder by history    Continue the current medication regimen   Disposition planning to continue. I certify that this patients inpatient psychiatric hospital services furnished since the previous certification were, and continue to be, required for treatment that could reasonably be expected to improve the patient's condition, or for diagnostic study, and that the patient continues to need, on a daily basis, active treatment furnished directly by or requiring the supervision of inpatient psychiatric facility personnel. In addition, the hospital records show that services furnished were intensive treatment services, admission or related services, or equivalent services.      Current Facility-Administered Medications   Medication Dose Route Frequency    valproic acid (as sodium salt) (DEPAKENE) 250 mg/5 mL (5 mL) oral solution 625 mg  625 mg Oral BID    nystatin (MYCOSTATIN) 100,000 unit/gram powder   Topical BID    docusate sodium (COLACE) capsule 100 mg  100 mg Oral DAILY    donepeziL (ARICEPT) tablet 5 mg  5 mg Oral QHS    propranoloL (INDERAL) tablet 10 mg  10 mg Oral BID    mirtazapine (REMERON) tablet 15 mg  15 mg Oral QHS    risperiDONE (RisperDAL) tablet 1 mg  1 mg Oral BID       Physical Exam:         Past Medical History:  Past Medical History:   Diagnosis Date    Anemia     Arthritis     [facility]     Burning with urination     Memory change     Psychiatric disorder     Bipolar Disorder    Stool color black     Tremor            Labs:  Lab Results   Component Value Date/Time    WBC 9.8 12/08/2020 02:44 AM    HGB 11.2 (L) 12/08/2020 02:44 AM    HCT 34.9 (L) 12/08/2020 02:44 AM    PLATELET 587 63/87/7080 02:44 AM    .5 (H) 12/08/2020 02:44 AM      Lab Results   Component Value Date/Time    Sodium 140 12/07/2020 03:04 AM    Potassium 3.9 12/07/2020 03:04 AM    Chloride 108 12/07/2020 03:04 AM    CO2 26 12/07/2020 03:04 AM    Anion gap 6 12/07/2020 03:04 AM    Glucose 95 12/15/2020 04:22 AM    BUN 12 12/07/2020 03:04 AM    Creatinine 0.73 12/07/2020 03:04 AM    BUN/Creatinine ratio 16 12/07/2020 03:04 AM    GFR est AA >60 12/07/2020 03:04 AM    GFR est non-AA >60 12/07/2020 03:04 AM    Calcium 9.9 12/07/2020 03:04 AM    Bilirubin, total 0.3 12/03/2020 03:30 AM    Alk.  phosphatase 110 12/03/2020 03:30 AM    Protein, total 6.6 12/03/2020 03:30 AM    Albumin 2.6 (L) 12/03/2020 03:30 AM    Globulin 4.0 12/03/2020 03:30 AM    A-G Ratio 0.7 (L) 12/03/2020 03:30 AM    ALT (SGPT) 33 12/03/2020 03:30 AM      Vitals:    12/25/20 2056 12/26/20 0149 12/26/20 0855 12/26/20 1627   BP: 126/63  (!) 143/62 120/73   Pulse: 77  94 82   Resp: 16  16 16   Temp: 98.3 °F (36.8 °C)  98.9 °F (37.2 °C) 98.7 °F (37.1 °C)   SpO2: 95%  94%    Weight:       Height:  5' 2\" (1.575 m)

## 2020-12-26 NOTE — PROGRESS NOTES
2300: Resting quietly in bed with eyes closed. No apparent distress. Respirations are even and unlabored. Staff will continue to monitor q15 throughout the shift. Problem: Falls - Risk of  Goal: *Absence of Falls  Description: Document Stefanie Tobias Fall Risk and appropriate interventions in the flowsheet.   Outcome: Progressing Towards Goal  Note: Fall Risk Interventions:  Mobility Interventions: Utilize walker, cane, or other assistive device    Mentation Interventions: Adequate sleep, hydration, pain control    Medication Interventions: Teach patient to arise slowly    Elimination Interventions: Bed/chair exit alarm, Toilet paper/wipes in reach, Toileting schedule/hourly rounds    History of Falls Interventions: Bed/chair exit alarm, Door open when patient unattended, Room close to nurse's station

## 2020-12-26 NOTE — PROGRESS NOTES
Problem: Falls - Risk of  Goal: *Absence of Falls  Description: Document Jenelle Cat Fall Risk and appropriate interventions in the flowsheet. 12/26/2020 1523 by Penny Ashley RN  Outcome: Progressing Towards Goal  Note: Fall Risk Interventions:  Mobility Interventions: Assess mobility with egress test, Bed/chair exit alarm, Communicate number of staff needed for ambulation/transfer, Patient to call before getting OOB, Utilize walker, cane, or other assistive device    Mentation Interventions: Adequate sleep, hydration, pain control, Bed/chair exit alarm, Door open when patient unattended, More frequent rounding, Reorient patient, Room close to nurse's station    Medication Interventions: Bed/chair exit alarm, Patient to call before getting OOB, Teach patient to arise slowly    Elimination Interventions: Bed/chair exit alarm, Patient to call for help with toileting needs, Stay With Me (per policy), Toilet paper/wipes in reach, Toileting schedule/hourly rounds    History of Falls Interventions: Bed/chair exit alarm, Door open when patient unattended, Room close to nurse's station         Problem: Altered Thought Process (Adult/Pediatric)  Goal: *STG: Participates in treatment plan  12/26/2020 1523 by Penny Ashley RN  Outcome: Progressing Towards Goal     Problem: Altered Thought Process (Adult/Pediatric)  Goal: *STG: Complies with medication therapy  Outcome: Progressing Towards Goal     Problem: Altered Thought Process (Adult/Pediatric)  Goal: *STG: Attends activities and groups  Outcome: Progressing Towards Goal     Problem: Altered Thought Process (Adult/Pediatric)  Goal: *STG: Demonstrates ability to understand and use improved judgment in daily activities and relationships  Outcome: Not Progressing Towards Goal     Pt remains confused redirection and reorientation offered. Med/meal compliant. Calm and pleasant. Will continue to monitor.

## 2020-12-27 PROCEDURE — 74011250637 HC RX REV CODE- 250/637: Performed by: NURSE PRACTITIONER

## 2020-12-27 PROCEDURE — 74011250637 HC RX REV CODE- 250/637: Performed by: PSYCHIATRY & NEUROLOGY

## 2020-12-27 PROCEDURE — 65220000003 HC RM SEMIPRIVATE PSYCH

## 2020-12-27 RX ADMIN — RISPERIDONE 1 MG: 1 TABLET ORAL at 09:11

## 2020-12-27 RX ADMIN — PROPRANOLOL HYDROCHLORIDE 10 MG: 10 TABLET ORAL at 09:11

## 2020-12-27 RX ADMIN — DONEPEZIL HYDROCHLORIDE 5 MG: 5 TABLET, FILM COATED ORAL at 20:44

## 2020-12-27 RX ADMIN — PROPRANOLOL HYDROCHLORIDE 10 MG: 10 TABLET ORAL at 17:32

## 2020-12-27 RX ADMIN — NYSTATIN: 100000 POWDER TOPICAL at 17:32

## 2020-12-27 RX ADMIN — NYSTATIN: 100000 POWDER TOPICAL at 09:14

## 2020-12-27 RX ADMIN — VALPROIC ACID 625 MG: 250 SOLUTION ORAL at 17:32

## 2020-12-27 RX ADMIN — MIRTAZAPINE 15 MG: 15 TABLET, FILM COATED ORAL at 20:44

## 2020-12-27 RX ADMIN — VALPROIC ACID 625 MG: 250 SOLUTION ORAL at 09:11

## 2020-12-27 RX ADMIN — RISPERIDONE 1 MG: 1 TABLET ORAL at 20:44

## 2020-12-27 NOTE — PROGRESS NOTES
Problem: Falls - Risk of  Goal: *Absence of Falls  Description: Document Jesse Fernandez Fall Risk and appropriate interventions in the flowsheet. Outcome: Progressing Towards Goal  Note: Fall Risk Interventions:  Mobility Interventions: Assess mobility with egress test, Bed/chair exit alarm, Communicate number of staff needed for ambulation/transfer, Patient to call before getting OOB, Utilize walker, cane, or other assistive device    Mentation Interventions: Adequate sleep, hydration, pain control, Bed/chair exit alarm, Door open when patient unattended, More frequent rounding, Reorient patient, Room close to nurse's station    Medication Interventions: Bed/chair exit alarm, Patient to call before getting OOB, Teach patient to arise slowly    Elimination Interventions: Bed/chair exit alarm, Patient to call for help with toileting needs, Stay With Me (per policy), Toilet paper/wipes in reach, Toileting schedule/hourly rounds    History of Falls Interventions: Bed/chair exit alarm, Door open when patient unattended, Room close to nurse's station         Problem: Altered Thought Process (Adult/Pediatric)  Goal: *STG: Participates in treatment plan  Outcome: Progressing Towards Goal  Note: Pt is smiling and talking to peers and staff in the DR. Confusion noted with disorganized thinking. Independent in ADL's with minimal assistance   Uses a walker. Mood is labile  Med/meal compliant   NAD noted   Will continue to monitor.

## 2020-12-27 NOTE — PROGRESS NOTES
Problem: Falls - Risk of  Goal: *Absence of Falls  Description: Document Greyson Cease Fall Risk and appropriate interventions in the flowsheet. Outcome: Progressing Towards Goal  Note: Fall Risk Interventions:  Mobility Interventions: Assess mobility with egress test, Utilize walker, cane, or other assistive device    Mentation Interventions: Adequate sleep, hydration, pain control    Medication Interventions: Patient to call before getting OOB    Elimination Interventions: Patient to call for help with toileting needs    History of Falls Interventions: Room close to nurse's station      Pt. Received resting in bed, NAD, respirations even and unlabored. Will continue q15 safety rounds.        Sleep-8

## 2020-12-27 NOTE — BH NOTES
Chief Complaint:  Patient is tangential and unable to focus for evaluation     Interval History:  She remains confused. Per staff, patient is intermittently irritable.        Mental Status Exam:  General Presentation:  Well groomed; maintains eye contact  Orientation:      Disorieted  Gait and Station:          Ambulates with a walker  Musculoskeletal System         : WNL  Language:       Aphasia  Speech:           Regular  Thought Processes:     Tangential  Thought Associations:            goal directed  Thought Content:         Not delusional  Perception: Denies AH/VH  Suicidal Ideations:        Denies  Homicidal Ideations:    Denies  Mood:  OK  Affect:   Constricted  Insight:            Impaired  Judgment:       Fair     Assessment and Plan:  Unspecified dementia with behavioral disturbance. Bipolar 1 disorder by history    Continue the current medication regimen   Disposition planning to continue. I certify that this patients inpatient psychiatric hospital services furnished since the previous certification were, and continue to be, required for treatment that could reasonably be expected to improve the patient's condition, or for diagnostic study, and that the patient continues to need, on a daily basis, active treatment furnished directly by or requiring the supervision of inpatient psychiatric facility personnel. In addition, the hospital records show that services furnished were intensive treatment services, admission or related services, or equivalent services.      Current Facility-Administered Medications   Medication Dose Route Frequency    valproic acid (as sodium salt) (DEPAKENE) 250 mg/5 mL (5 mL) oral solution 625 mg  625 mg Oral BID    nystatin (MYCOSTATIN) 100,000 unit/gram powder   Topical BID    docusate sodium (COLACE) capsule 100 mg  100 mg Oral DAILY    donepeziL (ARICEPT) tablet 5 mg  5 mg Oral QHS    propranoloL (INDERAL) tablet 10 mg  10 mg Oral BID    mirtazapine (REMERON) tablet 15 mg  15 mg Oral QHS    risperiDONE (RisperDAL) tablet 1 mg  1 mg Oral BID       Physical Exam:         Past Medical History:  Past Medical History:   Diagnosis Date    Anemia     Arthritis     Bipolar disorder ([address]     Burning with urination     Memory change     Psychiatric disorder     Bipolar Disorder    Stool color black     Tremor            Labs:  Lab Results   Component Value Date/Time    WBC 9.8 12/08/2020 02:44 AM    HGB 11.2 (L) 12/08/2020 02:44 AM    HCT 34.9 (L) 12/08/2020 02:44 AM    PLATELET 106 74/72/5904 02:44 AM    .5 (H) 12/08/2020 02:44 AM      Lab Results   Component Value Date/Time    Sodium 140 12/07/2020 03:04 AM    Potassium 3.9 12/07/2020 03:04 AM    Chloride 108 12/07/2020 03:04 AM    CO2 26 12/07/2020 03:04 AM    Anion gap 6 12/07/2020 03:04 AM    Glucose 95 12/15/2020 04:22 AM    BUN 12 12/07/2020 03:04 AM    Creatinine 0.73 12/07/2020 03:04 AM    BUN/Creatinine ratio 16 12/07/2020 03:04 AM    GFR est AA >60 12/07/2020 03:04 AM    GFR est non-AA >60 12/07/2020 03:04 AM    Calcium 9.9 12/07/2020 03:04 AM    Bilirubin, total 0.3 12/03/2020 03:30 AM    Alk.  phosphatase 110 12/03/2020 03:30 AM    Protein, total 6.6 12/03/2020 03:30 AM    Albumin 2.6 (L) 12/03/2020 03:30 AM    Globulin 4.0 12/03/2020 03:30 AM    A-G Ratio 0.7 (L) 12/03/2020 03:30 AM    ALT (SGPT) 33 12/03/2020 03:30 AM      Vitals:    12/26/20 1627 12/27/20 0822 12/27/20 1032 12/27/20 1548   BP: 120/73 135/66  135/78   Pulse: 82 82  81   Resp: 16 16  16   Temp: 98.7 °F (37.1 °C) 97.6 °F (36.4 °C)  98 °F (36.7 °C)   SpO2:  95%  96%   Weight:   66.7 kg (147 lb)    Height:

## 2020-12-27 NOTE — PROGRESS NOTES
Problem: Falls - Risk of  Goal: *Absence of Falls  Description: Document Clydene Bone Fall Risk and appropriate interventions in the flowsheet. Outcome: Progressing Towards Goal  Note: Fall Risk Interventions:  Mobility Interventions: Assess mobility with egress test    Mentation Interventions: Adequate sleep, hydration, pain control    Medication Interventions: Bed/chair exit alarm    Elimination Interventions: Bed/chair exit alarm    History of Falls Interventions: Bed/chair exit alarm      8995 PRN Medication Documentation    Specific patient behavior that led to need for PRN medication: trouble sleeping and agitated   Staff interventions attempted prior to PRN being given: relaxation techniques  PRN medication given: trazodone    Blocked Bed Documentation:    Room number: 746  Type: Behavior  Rationale: Poor Self-Control  Anticipated duration: TBD  Additional comments: This RN blocked this pts room d/t pt being uncooperative, aggressive and inappropriate. Pt throwing things, confused, and being verbally inappropriate.

## 2020-12-27 NOTE — PROGRESS NOTES
Received pt lying awake in bed. Calm and cooperative. No distress noted. Respirations WNL. Will continue to monitor q15 for safety. Problem: Falls - Risk of  Goal: *Absence of Falls  Description: Document Clydene Bone Fall Risk and appropriate interventions in the flowsheet.   Outcome: Progressing Towards Goal  Note: Fall Risk Interventions:  Mobility Interventions: Utilize walker, cane, or other assistive device    Mentation Interventions: Adequate sleep, hydration, pain control    Medication Interventions: Patient to call before getting OOB    Elimination Interventions: Patient to call for help with toileting needs    History of Falls Interventions: Room close to nurse's station

## 2020-12-28 PROCEDURE — 74011250637 HC RX REV CODE- 250/637: Performed by: NURSE PRACTITIONER

## 2020-12-28 PROCEDURE — 74011250637 HC RX REV CODE- 250/637: Performed by: PSYCHIATRY & NEUROLOGY

## 2020-12-28 PROCEDURE — 65220000003 HC RM SEMIPRIVATE PSYCH

## 2020-12-28 RX ADMIN — RISPERIDONE 1 MG: 1 TABLET ORAL at 21:43

## 2020-12-28 RX ADMIN — VALPROIC ACID 625 MG: 250 SOLUTION ORAL at 09:12

## 2020-12-28 RX ADMIN — DOCUSATE SODIUM 100 MG: 100 CAPSULE ORAL at 09:14

## 2020-12-28 RX ADMIN — VALPROIC ACID 625 MG: 250 SOLUTION ORAL at 17:12

## 2020-12-28 RX ADMIN — PROPRANOLOL HYDROCHLORIDE 10 MG: 10 TABLET ORAL at 17:12

## 2020-12-28 RX ADMIN — NYSTATIN: 100000 POWDER TOPICAL at 09:12

## 2020-12-28 RX ADMIN — RISPERIDONE 1 MG: 1 TABLET ORAL at 09:14

## 2020-12-28 RX ADMIN — DONEPEZIL HYDROCHLORIDE 5 MG: 5 TABLET, FILM COATED ORAL at 21:43

## 2020-12-28 RX ADMIN — PROPRANOLOL HYDROCHLORIDE 10 MG: 10 TABLET ORAL at 09:14

## 2020-12-28 RX ADMIN — MIRTAZAPINE 15 MG: 15 TABLET, FILM COATED ORAL at 21:43

## 2020-12-28 NOTE — PROGRESS NOTES
Problem: Falls - Risk of  Goal: *Absence of Falls  Description: Document Marisa Blood Fall Risk and appropriate interventions in the flowsheet. Outcome: Progressing Towards Goal  Note: Fall Risk Interventions:  Mobility Interventions: Bed/chair exit alarm, Patient to call before getting OOB, Utilize walker, cane, or other assistive device    Mentation Interventions: Adequate sleep, hydration, pain control, Bed/chair exit alarm, Reorient patient, Room close to nurse's station    Medication Interventions: Patient to call before getting OOB, Bed/chair exit alarm, Teach patient to arise slowly    Elimination Interventions: Bed/chair exit alarm, Patient to call for help with toileting needs, Toilet paper/wipes in reach    History of Falls Interventions: Bed/chair exit alarm, Room close to nurse's station         Problem: Altered Thought Process (Adult/Pediatric)  Goal: *STG: Participates in treatment plan  Outcome: Progressing Towards Goal  Goal: *STG: Complies with medication therapy  Outcome: Progressing Towards Goal  Goal: *STG: Demonstrates ability to understand and use improved judgment in daily activities and relationships  Outcome: Progressing Towards Goal       Pt pleasantly confused in conversation. Able to ambulate to the bathroom. Assisted with brief change. Med and meal compliant. Refused shower when prompted, refused nystatin powder.       Blocked Bed Documentation:    Room number: 740-02  Type: Behavior  Rationale: Poor Self-Control  Anticipated duration: TBD  Additional comments:Pt is paranoid that roommate is taking her belongings, labile, confused

## 2020-12-28 NOTE — PROGRESS NOTES
Goals will be met by 01/04/21  Problem: Falls - Risk of  Goal: *Absence of Falls  Description: Document Madiha Wang Fall Risk and appropriate interventions in the flowsheet. Outcome: Progressing Towards Goal  Note: Fall Risk Interventions:  Patient is absent of falls. Mobility Interventions: Assess mobility with egress test, Bed/chair exit alarm    Mentation Interventions: Adequate sleep, hydration, pain control, Bed/chair exit alarm, Door open when patient unattended    Medication Interventions: Bed/chair exit alarm    Elimination Interventions: Bed/chair exit alarm, Elevated toilet seat, Stay With Me (per policy), Toileting schedule/hourly rounds    History of Falls Interventions: Bed/chair exit alarm       Problem: Altered Thought Process (Adult/Pediatric)  Goal: *STG: Complies with medication therapy  Outcome: Progressing Towards Goal  Note: Patient complies with medication therapy. Patient is med compliant, out on the unit, confused, displays a keen sense of humor. Patient ate 50% of breakfast and lunch. Smiling, labile, cooperative. Goal: *STG: Attends activities and groups  Outcome: Progressing Towards Goal  Note: Patient attends activities and groups. Patient is appropriate with staff and peers.    [address] [facility] 60  Master Treatment Plan for Coventry Amis    Date Treatment Plan Initiated: 12/28/20    Treatment Plan Modalities:  Type of Modality Amount  (x minutes) Frequency (x/week) Duration (x days) Name of Responsible Staff   [address] [place] [address] meetings to encourage peer interactions 15 7 1 MISAEL Muñoz     Group psychotherapy to assist in building coping skills and internal controls 60 7 1 Ulises Pino   Therapeutic activity groups to build coping skills 60 7 1 Ulises Pino   Psychoeducation in group setting to address:   Medication education   15 [address] PharmD   Coping skills   30 3 1 Ulises Pino   Relaxation techniques         Symptom management         Discharge planning   60 2 700 [address]   Recovery/AA/NA   60 3 1 volunteer   Physician medication management   15 7 1 Dr. Samara Mix   Family meeting/discharge planning   15 2 1400 [facility] and Yessica Chandler

## 2020-12-28 NOTE — PROGRESS NOTES
PSYCHIATRIC PROGRESS NOTE       Patient: Jessie Morocho MRN: [id number]  SSN: [id number]    YOB: 1945  Age: 67 y.o. Sex: female      Admit Date: 12/14/2020    LOS: 14 days       Chief Complaint:  I just want to go home. Interval History:  Jessie Morocho is doing fairly well today. Says her mood is \"fair\" and has been sleeping better. She struggled to recall meeting this Citlali Cinnamon and has marked difficulty finding words. Remains pleasant and cheerful in the milieu except for mild irritability. Denies any adverse events from her medications and has been compliant in taking them regularly. No SI or plan. Denies any AH or VH. Past Medical History:  Past Medical History:   Diagnosis Date    Anemia     Arthritis     Bipolar disorder ([address]     Burning with urination     Memory change     Psychiatric disorder     Bipolar Disorder    Stool color black     Tremor          ALLERGIES:(reviewed/updated 12/28/2020)  Allergies   Allergen Reactions    Lamisil [Terbinafine] Diarrhea and Nausea and Vomiting    Thorazine [Chlorpromazine] Rash       Laboratory report:  Lab Results   Component Value Date/Time    WBC 9.8 12/08/2020 02:44 AM    HGB 11.2 (L) 12/08/2020 02:44 AM    HCT 34.9 (L) 12/08/2020 02:44 AM    PLATELET 480 99/27/0320 02:44 AM    .5 (H) 12/08/2020 02:44 AM      Lab Results   Component Value Date/Time    Sodium 140 12/07/2020 03:04 AM    Potassium 3.9 12/07/2020 03:04 AM    Chloride 108 12/07/2020 03:04 AM    CO2 26 12/07/2020 03:04 AM    Anion gap 6 12/07/2020 03:04 AM    Glucose 95 12/15/2020 04:22 AM    BUN 12 12/07/2020 03:04 AM    Creatinine 0.73 12/07/2020 03:04 AM    BUN/Creatinine ratio 16 12/07/2020 03:04 AM    GFR est AA >60 12/07/2020 03:04 AM    GFR est non-AA >60 12/07/2020 03:04 AM    Calcium 9.9 12/07/2020 03:04 AM    Bilirubin, total 0.3 12/03/2020 03:30 AM    Alk.  phosphatase 110 12/03/2020 03:30 AM    Protein, total 6.6 12/03/2020 03:30 AM    Albumin 2.6 (L) 12/03/2020 03:30 AM    Globulin 4.0 12/03/2020 03:30 AM    A-G Ratio 0.7 (L) 12/03/2020 03:30 AM    ALT (SGPT) 33 12/03/2020 03:30 AM      Vitals:    12/27/20 0822 12/27/20 1032 12/27/20 1548 12/28/20 0800   BP: 135/66  135/78 136/77   Pulse: 82  81 98   Resp: 16  16 16   Temp: 97.6 °F (36.4 °C)  98 °F (36.7 °C) 98.4 °F (36.9 °C)   SpO2: 95%  96% 95%   Weight:  66.7 kg (147 lb)     Height:           Lab Results   Component Value Date/Time    Valproic acid 58 12/20/2020 06:20 AM     Lab Results   Component Value Date/Time    Lithium level 0.57 (L) 11/29/2020 05:27 PM       Vital Signs  Patient Vitals for the past 24 hrs:   Temp Pulse Resp BP SpO2   12/28/20 0800 98.4 °F (36.9 °C) 98 16 136/77 95 %   12/27/20 1548 98 °F (36.7 °C) 81 16 135/78 96 %     Wt Readings from Last 3 Encounters:   12/27/20 66.7 kg (147 lb)   11/29/20 75.4 kg (166 lb 3.6 oz)   12/02/20 75.4 kg (166 lb 3.6 oz)     Temp Readings from Last 3 Encounters:   12/28/20 98.4 °F (36.9 °C)   12/14/20 97.5 °F (36.4 °C)   09/12/20 98.1 °F (36.7 °C)     BP Readings from Last 3 Encounters:   12/28/20 136/77   12/14/20 (!) 113/58   09/12/20 128/72     Pulse Readings from Last 3 Encounters:   12/28/20 98   12/14/20 72   09/12/20 75       Radiology (reviewed/updated 12/28/2020)  Ct Head Wo Cont    Result Date: 11/29/2020  CLINICAL HISTORY: Encephalopathy INDICATION: Encephalopathy COMPARISON: 9/7/2018. CT dose reduction was achieved through use of a standardized protocol tailored for this examination and automatic exposure control for dose modulation. TECHNIQUE: Serial axial images with a collimation of 5 mm were obtained from the skull base through the vertex  FINDINGS: There is sulcal and ventricular prominence. Confluent periventricular and scattered foci of hypodensity in the cerebral white matter. There is no evidence of an acute infarction, hemorrhage, or mass-effect. There is no evidence of midline shift or hydrocephalus.  Posterior fossa structures are unremarkable. No extra-axial collections are seen. Mastoid air cells are well pneumatized and clear. Hypodensity in the central jerri. 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 IMPRESSION: No acute intracranial process. Small area of chronic encephalomalacia in the left frontal lobe. Imaging findings consistent with moderate chronic microvascular ischemic change. There is a mild degree of cerebral atrophy. Xr Chest Port    Result Date: 11/29/2020  EXAM: XR CHEST PORT INDICATION: fever COMPARISON: None. FINDINGS: A portable AP radiograph of the chest was obtained at 14:46 hours. The patient is on a cardiac monitor. The lungs are clear. The cardiac and mediastinal contours and pulmonary vascularity are normal.  The chest wall structures and visualized upper abdomen show no acute findings with incidental note of degenerative spine and shoulder changes as well as partial visualization of posterior windy and screw fixation hardware of the lumbar spine. IMPRESSION: No acute findings.       Current Facility-Administered Medications   Medication Dose Route Frequency Provider Last Rate Last Admin    valproic acid (as sodium salt) (DEPAKENE) 250 mg/5 mL (5 mL) oral solution 625 mg  625 mg Oral BID Bryce Arizmendi MD   625 mg at 12/28/20 0912    loperamide (IMODIUM) capsule 2 mg  2 mg Oral Q4H PRN Neena Balsherif A NP   2 mg at 12/23/20 1014    nystatin (MYCOSTATIN) 100,000 unit/gram powder   Topical BID Neean Balsherif A, NP   Given at 12/28/20 0912    docusate sodium (COLACE) capsule 100 mg  100 mg Oral DAILY Hemalatha Vinson NP   100 mg at 12/28/20 0914    donepeziL (ARICEPT) tablet 5 mg  5 mg Oral QHS Hemalatha Vinson NP   5 mg at 12/27/20 2044    OLANZapine (ZyPREXA) tablet 2.5 mg  2.5 mg Oral Q6H PRN Kim Moore NP   2.5 mg at 12/23/20 1416    haloperidol lactate (HALDOL) injection 2.5 mg  2.5 mg IntraMUSCular Q6H PRN Elyse Giles, MAX        benztropine (COGENTIN) tablet 0.5 mg  0.5 mg Oral BID PRN Purnima Moore NP        diphenhydrAMINE (BENADRYL) injection 25 mg  25 mg IntraMUSCular BID PRN Purnima Moore, MAX        acetaminophen (TYLENOL) tablet 650 mg  650 mg Oral Q4H PRN Kim Moore NP   650 mg at 12/24/20 1528    magnesium hydroxide (MILK OF MAGNESIA) 400 mg/5 mL oral suspension 30 mL  30 mL Oral DAILY PRN Kim Moore, NP   30 mL at 12/19/20 1221    traZODone (DESYREL) tablet 50 mg  50 mg Oral QHS PRN Purnima Moore NP        propranoloL (INDERAL) tablet 10 mg  10 mg Oral BID Kim Moore, NP   10 mg at 12/28/20 0914    mirtazapine (REMERON) tablet 15 mg  15 mg Oral QHS Kim Moore, NP   15 mg at 12/27/20 2044    risperiDONE (RisperDAL) tablet 1 mg  1 mg Oral BID Kim Moore, NP   1 mg at 12/28/20 2886       Side Effects: (reviewed/updated 12/28/2020)  None reported or admitted to. Review of Systems: (reviewed/updated 12/28/2020)  Appetite: good  Sleep: good   All other Review of Systems: negative    Mental Status Exam:  Eye contact: Good eye contact  Psychomotor activity: irritable   Speech is spontaneous  Thought process:confabulation  Mood is \"ok\"  Affect: constricted  Perception: No avh  Suicidal ideation: No si  Homicidal ideation: No hi  Insight/judgment: Poor  Cognition is impaired. MMSE-8      Physical Exam:  Musculoskeletal system: steady gait  Tremor not present  Cog wheeling not present      Assessment and Plan:  Chet Mckeon meets criteria for a diagnosis of Unspecified dementia with behavioral disturbance. Bipolar 1 disorder by history. Continue current medications as prescribed. We will closely monitor for safety. We will encourage reality orientation. Disposition planning to continue.        I certify that this patients inpatient psychiatric hospital services furnished since the previous certification were, and continue to be, required for treatment that could reasonably be expected to improve the patient's condition, or for diagnostic study, and that the patient continues to need, on a daily basis, active treatment furnished directly by or requiring the supervision of inpatient psychiatric facility personnel. In addition, the hospital records show that services furnished were intensive treatment services, admission or related services, or equivalent services.       Signed:  Emmanuel Salgado MD  12/28/2020

## 2020-12-28 NOTE — INTERDISCIPLINARY ROUNDS
Behavioral Health Interdisciplinary Rounds Patient Name: Otilia Healy  Age: 67 y.o. Room/Bed:  746/01 Primary Diagnosis: <principal problem not specified> Admission Status: Involuntary Commitment Readmission within 30 days: no 
Power of  in place: no 
Patient requires a blocked bed: yes          Reason for blocked bed: behavior Sleep hours:       
Participation in Care/Groups:  
Medication Compliant?: Yes PRNS (last 24 hours): None Restraints (last 24 hours):  no 
  
Alcohol screening (AUDIT) completed -  AUDIT Score: 0  If applicable, date SBIRT discussed in treatment team AND documented:   
Tobacco - patient is a smoker: Have You Used Tobacco in the Past 30 Days: No 
Illegal Drugs use: Have You Used Any Illegal Substances Over the Past 12 Months: No 
 
24 hour chart check complete: yes Patient goal(s) for today: take medications Treatment team focus/goals: titrate medications and assess needs for discharge Progress note: Pt is involuntarily committed for inability to care for self. Pt is alert but disoriented and cannot safely discharge back home. [facility]AI has been completed and family is looking into housing options. MSW updated daughter and faxed complete \"report of incapacity evaluation\" to [facility] per families request. 
Family Contact information: daughterJeff ([phone]) [facility] spoke to daughter regarding plan for seeking placement for safe discharge.  
Patient's preferred phone number for follow up call : [phone]  
Patient's preferred e-mail address : 
 
LOS:  14  Expected LOS: TBD 
  
Participating treatment team members: Dr. Smooth Damon MSW; Daniela Murguia RN

## 2020-12-29 PROCEDURE — 74011250637 HC RX REV CODE- 250/637: Performed by: NURSE PRACTITIONER

## 2020-12-29 PROCEDURE — 74011250637 HC RX REV CODE- 250/637: Performed by: PSYCHIATRY & NEUROLOGY

## 2020-12-29 PROCEDURE — 65220000003 HC RM SEMIPRIVATE PSYCH

## 2020-12-29 RX ADMIN — VALPROIC ACID 625 MG: 250 SOLUTION ORAL at 09:43

## 2020-12-29 RX ADMIN — RISPERIDONE 1 MG: 1 TABLET ORAL at 20:37

## 2020-12-29 RX ADMIN — DOCUSATE SODIUM 100 MG: 100 CAPSULE ORAL at 09:44

## 2020-12-29 RX ADMIN — RISPERIDONE 1 MG: 1 TABLET ORAL at 08:45

## 2020-12-29 RX ADMIN — PROPRANOLOL HYDROCHLORIDE 10 MG: 10 TABLET ORAL at 09:44

## 2020-12-29 RX ADMIN — NYSTATIN: 100000 POWDER TOPICAL at 09:48

## 2020-12-29 RX ADMIN — DONEPEZIL HYDROCHLORIDE 5 MG: 5 TABLET, FILM COATED ORAL at 20:37

## 2020-12-29 RX ADMIN — NYSTATIN: 100000 POWDER TOPICAL at 16:25

## 2020-12-29 NOTE — PROGRESS NOTES
Problem: Falls - Risk of  Goal: *Absence of Falls  Description: Document Lita Talbot Fall Risk and appropriate interventions in the flowsheet. Outcome: Progressing Towards Goal  Note: Fall Risk Interventions:  Mobility Interventions: Utilize walker, cane, or other assistive device    Mentation Interventions: Adequate sleep, hydration, pain control    Medication Interventions: Bed/chair exit alarm    Elimination Interventions: Bed/chair exit alarm    History of Falls Interventions: Bed/chair exit alarm       1645: Pt refusing meds despite multiple attempts to educate patient on benefits. 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 This RN redirected her and educated her but continued to refuse.

## 2020-12-29 NOTE — PROGRESS NOTES
Problem: Falls - Risk of  Goal: *Absence of Falls  Description: Document Ravi Saeed Fall Risk and appropriate interventions in the flowsheet. Outcome: Progressing Towards Goal  Note: Fall Risk Interventions:  Patient is absent of falls. Patient's walker, non-slip footwear and bed alarm in use. Patient is out on the unit, pleasant, confused, cooperative, med and meal compliant. Mobility Interventions: Utilize walker, cane, or other assistive device    Mentation Interventions: Adequate sleep, hydration, pain control, Bed/chair exit alarm, Door open when patient unattended    Medication Interventions: Bed/chair exit alarm    Elimination Interventions: Bed/chair exit alarm, Stay With Me (per policy)    History of Falls Interventions: Bed/chair exit alarm, Room close to nurse's station         Problem: Altered Thought Process (Adult/Pediatric)  Goal: *STG: Complies with medication therapy  Outcome: Progressing Towards Goal  Note: Patient complies with medication therapy.

## 2020-12-29 NOTE — BH NOTES
Behavioral Health Interdisciplinary Rounds     Patient Name: Juan Wyatt  Age: 67 y.o. Room/Bed:  740/  Primary Diagnosis: <principal problem not specified>   Admission Status:      Readmission within 30 days:   Power of  in place:   Patient requires a blocked bed:           Reason for blocked bed:     VTE Prophylaxis:   Mobility needs/Fall risk:   Flu Vaccine :    Nutritional Plan:   Consults:          Labs/Testing due today?:     Sleep hours:        Participation in Care/Groups:    Medication Compliant?:   PRNS (last 24 hours):     Restraints (last 24 hours):       CIWA (range last 24 hours):     COWS (range last 24 hours):      Alcohol screening (AUDIT) completed -   AUDIT Score: 0     If applicable, date SBIRT discussed in treatment team AND documented:   AUDIT Screen Score: AUDIT Score: 0      Document Brief Intervention (corresponds directly with the 5 A's, Ask, Advise, Assess, Assist, and Arrange):  **    At- Risk Patients (Score 7-15 for women; 8-15 for men)  Discuss concern patient is drinking at unhealthy levels known to increase risk of alcohol-related health problems. Is Patient ready to commit to change? If No:   Encourage reflection   Discuss short term and long term health risks of consuming alcohol   Barriers to change   Reaffirm willingness to help / Educational materials provided  If Yes:   Set goal  Intellinote provided    Harmful use or Dependence (Score 16 or greater)   Discuss short term and long term health risks of consuming alcohol   Recommendations   Negotiate drinking goal   Recommend addiction specialist/center   Arrange follow-up appointments.     Tobacco - patient is a smoker: Have You Used Tobacco in the Past 30 Days: No  Illegal Drugs use: Have You Used Any Illegal Substances Over the Past 12 Months: No    24 hour chart check complete:      Patient goal(s) for today: take medications  Treatment team focus/goals: titrate medications and assess needs for discharge  Progress note: [facility] is complete. Family is looking for housing options or patient. Patient is irritable today and with other peers on the unit. LOS:  15  Expected LOS: TBD     Family Contact information: daughter, Ayanna Perez ([phone]) OHZ spoke to daughter regarding plan for seeking placement for safe discharge. Patient's preferred phone number for follow up call : [phone]   Patient's preferred e-mail address:     Participating treatment team members: RULA Randle; Kp Hernandez NP; Trae Littlejohn, ShivaD; Papa Castillo RN.

## 2020-12-29 NOTE — INTERDISCIPLINARY ROUNDS
Behavioral Health Interdisciplinary Rounds Patient Name: Ursula Cogan  Age: 67 y.o. Room/Bed:  740/ Primary Diagnosis: <principal problem not specified> Admission Status: Involuntary Commitment Readmission within 30 days: no 
Power of  in place: no 
Patient requires a blocked bed: yes          Reason for blocked bed: behavior Sleep hours:  7 Participation in Care/Groups:  yes Medication Compliant?: Yes PRNS (last 24 hours): None Restraints (last 24 hours):  no 
  
Alcohol screening (AUDIT) completed -  AUDIT Score: 0  If applicable, date SBIRT discussed in treatment team AND documented:   
Tobacco - patient is a smoker: Have You Used Tobacco in the Past 30 Days: No 
Illegal Drugs use: Have You Used Any Illegal Substances Over the Past 12 Months: No 
 
24 hour chart check complete: yes Patient goal(s) for today:  
Treatment team focus/goals:  
Progress note Family Contact information:  
Patient's preferred phone number for follow up call :  
Patient's preferred e-mail address : 
LOS:  14  Expected LOS:  
 
Participating treatment team members: Ursula Cogan, * (assigned SW),

## 2020-12-29 NOTE — PROGRESS NOTES
Pt receiving continuous q15m safety checks, pt currently asleep resting comfortably in bed. No distress noted, respiratory WNL. Supplemental lighting utilized. Problem: Falls - Risk of  Goal: *Absence of Falls  Description: Document   Fall Risk and appropriate interventions in the flowsheet. Outcome: Progressing Towards Goal  Note: Fall Risk Interventions:  Mobility Interventions: Utilize walker, cane, or other assistive device    Mentation Interventions: Room close to nurse's station, Update white board, More frequent rounding, Adequate sleep, hydration, pain control    Medication Interventions: Teach patient to arise slowly    Elimination Interventions:  Toileting schedule/hourly rounds, Call light in reach, Elevated toilet seat, Patient to call for help with toileting needs    History of Falls Interventions: Bed/chair exit alarm, Room close to nurse's station         Problem: Altered Thought Process (Adult/Pediatric)  Goal: Interventions  Outcome: Progressing Towards Goal

## 2020-12-30 PROCEDURE — 74011250637 HC RX REV CODE- 250/637: Performed by: NURSE PRACTITIONER

## 2020-12-30 PROCEDURE — 65220000003 HC RM SEMIPRIVATE PSYCH

## 2020-12-30 PROCEDURE — 74011250637 HC RX REV CODE- 250/637: Performed by: PSYCHIATRY & NEUROLOGY

## 2020-12-30 RX ADMIN — PROPRANOLOL HYDROCHLORIDE 10 MG: 10 TABLET ORAL at 16:33

## 2020-12-30 RX ADMIN — NYSTATIN: 100000 POWDER TOPICAL at 09:39

## 2020-12-30 RX ADMIN — PROPRANOLOL HYDROCHLORIDE 10 MG: 10 TABLET ORAL at 08:33

## 2020-12-30 RX ADMIN — VALPROIC ACID 625 MG: 250 SOLUTION ORAL at 08:33

## 2020-12-30 RX ADMIN — NYSTATIN: 100000 POWDER TOPICAL at 16:32

## 2020-12-30 RX ADMIN — DOCUSATE SODIUM 100 MG: 100 CAPSULE ORAL at 08:33

## 2020-12-30 RX ADMIN — VALPROIC ACID 625 MG: 250 SOLUTION ORAL at 16:33

## 2020-12-30 RX ADMIN — RISPERIDONE 1 MG: 1 TABLET ORAL at 08:33

## 2020-12-30 NOTE — PROGRESS NOTES
Problem: Falls - Risk of  Goal: *Absence of Falls  Description: Document Solis Lund Fall Risk and appropriate interventions in the flowsheet. Outcome: Progressing Towards Goal  Note: Fall Risk Interventions:  Mobility Interventions: Utilize walker, cane, or other assistive device    Mentation Interventions: Adequate sleep, hydration, pain control    Medication Interventions: Bed/chair exit alarm    Elimination Interventions: Bed/chair exit alarm    History of Falls Interventions: Bed/chair exit alarm       Pt. Received resting in bed, NAD, respirations even and unlabored.  Will continue q15 safety rounds

## 2020-12-30 NOTE — BH NOTES
CM NOTE: Per daughter's request, MSW mailed original copy of guardianship form to [facility] on 12/30/20 at [address] to: Cherise Mata.       Ulises Pino, Supervisee in Social Work

## 2020-12-30 NOTE — INTERDISCIPLINARY ROUNDS
Behavioral Health Interdisciplinary Rounds Patient Name: Huey Blanchard  Age: 67 y.o. Room/Bed:  740/01 Primary Diagnosis: <principal problem not specified> Admission Status: Involuntary Commitment Readmission within 30 days: no 
Power of  in place: no 
Patient requires a blocked bed: no          Reason for blocked bed:  
Sleep hours: 7 Participation in Care/Groups:   
Medication Compliant?: Selective PRNS (last 24 hours): None Restraints (last 24 hours):  no 
  
Alcohol screening (AUDIT) completed -  AUDIT Score: 0  If applicable, date SBIRT discussed in treatment team AND documented:   
Tobacco - patient is a smoker: Have You Used Tobacco in the Past 30 Days: No 
Illegal Drugs use: Have You Used Any Illegal Substances Over the Past 12 Months: No 
 
24 hour chart check complete: yes Patient goal(s) for today: take medications Treatment team focus/goals: titrate medications and assess needs for discharge Progress note: [facility] is complete. Family is looking for housing options or patient. Patient is less irritable today. Family Contact information: daughter, Puja Osman ([phone]) WER spoke to daughter regarding plan for seeking placement for safe discharge. Patient's preferred phone number for follow up call : [phone]  
Patient's preferred e-mail address:  Unknown LOS:  16  Expected LOS: TBD Participating treatment team members: RULA Jauregui; Kenny Yun NP; Gi Manning, ShivaD; Praveen Sena RN.

## 2020-12-30 NOTE — PROGRESS NOTES
Problem: Falls - Risk of  Goal: *Absence of Falls  Description: Document Cheryle White Fall Risk and appropriate interventions in the flowsheet.   Outcome: Progressing Towards Goal  Note: Fall Risk Interventions:  Mobility Interventions: Utilize walker, cane, or other assistive device    Mentation Interventions: Adequate sleep, hydration, pain control    Medication Interventions: Bed/chair exit alarm    Elimination Interventions: Bed/chair exit alarm    History of Falls Interventions: Bed/chair exit alarm         Problem: Altered Thought Process (Adult/Pediatric)  Goal: *STG: Complies with medication therapy  Outcome: Progressing Towards Goal  Goal: *STG: Attends activities and groups  Outcome: Progressing Towards Goal

## 2020-12-30 NOTE — PROGRESS NOTES
PSYCHIATRIC PROGRESS NOTE       Patient: Wyatt Ashley MRN: [id number]  SSN: [id number]    YOB: 1945  Age: 67 y.o. Sex: female      Admit Date: 12/14/2020    LOS: 15 days       Chief Complaint:  I just want to go home. Interval History:  Wyatt Ashley is irritable today. She has a new roommate and is not happy about this. She also does not understand that a safe disposition is being sought but that this takes time. She is angry that she is in the hospital. She is not aggressive. Has been compliant with medications. Denies SI/H/AH/VH. Past Medical History:  Past Medical History:   Diagnosis Date    Anemia     Arthritis     Bipolar disorder ([address]     Burning with urination     Memory change     Psychiatric disorder     Bipolar Disorder    Stool color black     Tremor          ALLERGIES:(reviewed/updated 12/29/2020)  Allergies   Allergen Reactions    Lamisil [Terbinafine] Diarrhea and Nausea and Vomiting    Thorazine [Chlorpromazine] Rash       Laboratory report:  Lab Results   Component Value Date/Time    WBC 9.8 12/08/2020 02:44 AM    HGB 11.2 (L) 12/08/2020 02:44 AM    HCT 34.9 (L) 12/08/2020 02:44 AM    PLATELET 647 77/50/9588 02:44 AM    .5 (H) 12/08/2020 02:44 AM      Lab Results   Component Value Date/Time    Sodium 140 12/07/2020 03:04 AM    Potassium 3.9 12/07/2020 03:04 AM    Chloride 108 12/07/2020 03:04 AM    CO2 26 12/07/2020 03:04 AM    Anion gap 6 12/07/2020 03:04 AM    Glucose 95 12/15/2020 04:22 AM    BUN 12 12/07/2020 03:04 AM    Creatinine 0.73 12/07/2020 03:04 AM    BUN/Creatinine ratio 16 12/07/2020 03:04 AM    GFR est AA >60 12/07/2020 03:04 AM    GFR est non-AA >60 12/07/2020 03:04 AM    Calcium 9.9 12/07/2020 03:04 AM    Bilirubin, total 0.3 12/03/2020 03:30 AM    Alk.  phosphatase 110 12/03/2020 03:30 AM    Protein, total 6.6 12/03/2020 03:30 AM    Albumin 2.6 (L) 12/03/2020 03:30 AM    Globulin 4.0 12/03/2020 03:30 AM    A-G Ratio 0.7 (L) 12/03/2020 03:30 AM    ALT (SGPT) 33 12/03/2020 03:30 AM      Vitals:    12/28/20 1600 12/28/20 2030 12/29/20 0800 12/29/20 1550   BP: 132/83 (!) 148/79 (!) 141/80 136/81   Pulse: 86 81 80 78   Resp: 16 16 16 16   Temp: 97.9 °F (36.6 °C) 98.5 °F (36.9 °C) 98 °F (36.7 °C) 97.4 °F (36.3 °C)   SpO2: 96% 96% 95% 96%   Weight:       Height:           Lab Results   Component Value Date/Time    Valproic acid 58 12/20/2020 06:20 AM     Lab Results   Component Value Date/Time    Lithium level 0.57 (L) 11/29/2020 05:27 PM       Vital Signs  Patient Vitals for the past 24 hrs:   Temp Pulse Resp BP SpO2   12/29/20 1550 97.4 °F (36.3 °C) 78 16 136/81 96 %   12/29/20 0800 98 °F (36.7 °C) 80 16 (!) 141/80 95 %   12/28/20 2030 98.5 °F (36.9 °C) 81 16 (!) 148/79 96 %     Wt Readings from Last 3 Encounters:   12/27/20 66.7 kg (147 lb)   11/29/20 75.4 kg (166 lb 3.6 oz)   12/02/20 75.4 kg (166 lb 3.6 oz)     Temp Readings from Last 3 Encounters:   12/29/20 97.4 °F (36.3 °C)   12/14/20 97.5 °F (36.4 °C)   09/12/20 98.1 °F (36.7 °C)     BP Readings from Last 3 Encounters:   12/29/20 136/81   12/14/20 (!) 113/58   09/12/20 128/72     Pulse Readings from Last 3 Encounters:   12/29/20 78   12/14/20 72   09/12/20 75       Radiology (reviewed/updated 12/29/2020)  Ct Head Wo Cont    Result Date: 11/29/2020  CLINICAL HISTORY: Encephalopathy INDICATION: Encephalopathy COMPARISON: 9/7/2018. CT dose reduction was achieved through use of a standardized protocol tailored for this examination and automatic exposure control for dose modulation. TECHNIQUE: Serial axial images with a collimation of 5 mm were obtained from the skull base through the vertex  FINDINGS: There is sulcal and ventricular prominence. Confluent periventricular and scattered foci of hypodensity in the cerebral white matter. There is no evidence of an acute infarction, hemorrhage, or mass-effect. There is no evidence of midline shift or hydrocephalus.  Posterior fossa structures are unremarkable. No extra-axial collections are seen. Mastoid air cells are well pneumatized and clear. Hypodensity in the central jerri. 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 IMPRESSION: No acute intracranial process. Small area of chronic encephalomalacia in the left frontal lobe. Imaging findings consistent with moderate chronic microvascular ischemic change. There is a mild degree of cerebral atrophy. Xr Chest Port    Result Date: 11/29/2020  EXAM: XR CHEST PORT INDICATION: fever COMPARISON: None. FINDINGS: A portable AP radiograph of the chest was obtained at 14:46 hours. The patient is on a cardiac monitor. The lungs are clear. The cardiac and mediastinal contours and pulmonary vascularity are normal.  The chest wall structures and visualized upper abdomen show no acute findings with incidental note of degenerative spine and shoulder changes as well as partial visualization of posterior windy and screw fixation hardware of the lumbar spine. IMPRESSION: No acute findings.       Current Facility-Administered Medications   Medication Dose Route Frequency Provider Last Rate Last Admin    valproic acid (as sodium salt) (DEPAKENE) 250 mg/5 mL (5 mL) oral solution 625 mg  625 mg Oral BID Luna Casillas MD   625 mg at 12/29/20 0380    loperamide (IMODIUM) capsule 2 mg  2 mg Oral Q4H PRN Tere KIM NP   2 mg at 12/23/20 1014    nystatin (MYCOSTATIN) 100,000 unit/gram powder   Topical BID Hemalatha Vinson NP   Given at 12/29/20 1625    docusate sodium (COLACE) capsule 100 mg  100 mg Oral DAILY Hemalatha Vinson NP   100 mg at 12/29/20 0944    donepeziL (ARICEPT) tablet 5 mg  5 mg Oral QHS Hemalatha Vinson NP   5 mg at 12/28/20 2143    OLANZapine (ZyPREXA) tablet 2.5 mg  2.5 mg Oral Q6H PRN Kim Moore NP   2.5 mg at 12/23/20 1416    haloperidol lactate (HALDOL) injection 2.5 mg  2.5 mg IntraMUSCular Q6H PRN Ricardo Gonzalez, NP        benztropine (COGENTIN) tablet 0.5 mg  0.5 mg Oral BID PRN Doug Moore, NP        diphenhydrAMINE (BENADRYL) injection 25 mg  25 mg IntraMUSCular BID PRN Doug Moore, NP        acetaminophen (TYLENOL) tablet 650 mg  650 mg Oral Q4H PRN Kim Moore, NP   650 mg at 12/24/20 1528    magnesium hydroxide (MILK OF MAGNESIA) 400 mg/5 mL oral suspension 30 mL  30 mL Oral DAILY PRN Kim Moore, NP   30 mL at 12/19/20 1221    traZODone (DESYREL) tablet 50 mg  50 mg Oral QHS PRN Doug Moore, NP        propranoloL (INDERAL) tablet 10 mg  10 mg Oral BID Kim Moore, NP   10 mg at 12/29/20 0944    mirtazapine (REMERON) tablet 15 mg  15 mg Oral QHS [place]-Kim Harrell, NP   15 mg at 12/28/20 2143    risperiDONE (RisperDAL) tablet 1 mg  1 mg Oral BID Kim Moore, NP   1 mg at 12/29/20 0845       Side Effects: (reviewed/updated 12/29/2020)  None reported or admitted to. Review of Systems: (reviewed/updated 12/29/2020)  Appetite: good  Sleep: good   All other Review of Systems: negative    Mental Status Exam:  Eye contact: Good eye contact  Psychomotor activity: irritable   Speech is spontaneous  Thought process:confabulation  Mood is \"ok\"  Affect: constricted  Perception: No avh  Suicidal ideation: No si  Homicidal ideation: No hi  Insight/judgment: Poor  Cognition is impaired. MMSE-8      Physical Exam:  Musculoskeletal system: steady gait  Tremor not present  Cog wheeling not present      Assessment and Plan:  [address] meets criteria for a diagnosis of Unspecified dementia with behavioral disturbance. Bipolar 1 disorder by history. Continue current medications as prescribed. We will closely monitor for safety. We will encourage reality orientation. Disposition planning to continue.        I certify that this patients inpatient psychiatric hospital services furnished since the previous certification were, and continue to be, required for treatment that could reasonably be expected to improve the patient's condition, or for diagnostic study, and that the patient continues to need, on a daily basis, active treatment furnished directly by or requiring the supervision of inpatient psychiatric facility personnel. In addition, the hospital records show that services furnished were intensive treatment services, admission or related services, or equivalent services.       Signed:  Daron Vivas NP  12/29/2020

## 2020-12-30 NOTE — PROGRESS NOTES
Problem: Falls - Risk of  Goal: *Absence of Falls  Description: Document Primo Navarro Fall Risk and appropriate interventions in the flowsheet. Outcome: Progressing Towards Goal  Note: Fall Risk Interventions:  Mobility Interventions: Utilize walker, cane, or other assistive device    Mentation Interventions: Adequate sleep, hydration, pain control    Medication Interventions: Bed/chair exit alarm    Elimination Interventions: Bed/chair exit alarm    History of Falls Interventions: Bed/chair exit alarm    2100: Pt refused meds even after multiple attempts to educate. Pt states she knows her rights.

## 2020-12-31 PROCEDURE — 74011250637 HC RX REV CODE- 250/637: Performed by: PSYCHIATRY & NEUROLOGY

## 2020-12-31 PROCEDURE — 74011250637 HC RX REV CODE- 250/637: Performed by: NURSE PRACTITIONER

## 2020-12-31 PROCEDURE — 65220000003 HC RM SEMIPRIVATE PSYCH

## 2020-12-31 RX ADMIN — VALPROIC ACID 625 MG: 250 SOLUTION ORAL at 17:37

## 2020-12-31 RX ADMIN — ACETAMINOPHEN 650 MG: 325 TABLET ORAL at 04:07

## 2020-12-31 RX ADMIN — RISPERIDONE 1 MG: 1 TABLET ORAL at 08:53

## 2020-12-31 RX ADMIN — MIRTAZAPINE 15 MG: 15 TABLET, FILM COATED ORAL at 20:10

## 2020-12-31 RX ADMIN — NYSTATIN: 100000 POWDER TOPICAL at 10:24

## 2020-12-31 RX ADMIN — ACETAMINOPHEN 650 MG: 325 TABLET ORAL at 10:01

## 2020-12-31 RX ADMIN — VALPROIC ACID 625 MG: 250 SOLUTION ORAL at 08:54

## 2020-12-31 RX ADMIN — PROPRANOLOL HYDROCHLORIDE 10 MG: 10 TABLET ORAL at 08:53

## 2020-12-31 RX ADMIN — ACETAMINOPHEN 650 MG: 325 TABLET ORAL at 14:26

## 2020-12-31 RX ADMIN — DONEPEZIL HYDROCHLORIDE 5 MG: 5 TABLET, FILM COATED ORAL at 20:11

## 2020-12-31 RX ADMIN — PROPRANOLOL HYDROCHLORIDE 10 MG: 10 TABLET ORAL at 17:37

## 2020-12-31 RX ADMIN — RISPERIDONE 1 MG: 1 TABLET ORAL at 20:11

## 2020-12-31 NOTE — PROGRESS NOTES
Problem: Falls - Risk of  Goal: *Absence of Falls  Description: Document Starlelydia Freeze Fall Risk and appropriate interventions in the flowsheet. Outcome: Progressing Towards Goal  Note: Fall Risk Interventions:  Mobility Interventions: Communicate number of staff needed for ambulation/transfer    Mentation Interventions: Adequate sleep, hydration, pain control    Medication Interventions: Teach patient to arise slowly    Elimination Interventions: Patient to call for help with toileting needs    History of Falls Interventions: Door open when patient unattended         Problem: Patient Education: Go to Patient Education Activity  Goal: Patient/Family Education  Outcome: Progressing Towards Goal     Problem: Altered Thought Process (Adult/Pediatric)  Goal: *STG: Participates in treatment plan  Outcome: Progressing Towards Goal  Note: Received patient sleeping this morning. Pt had just fallen to sleep at 0600. Pt is labile, irritable mood has lessened. Eating well. Goal: *STG: Complies with medication therapy  Outcome: Progressing Towards Goal  Medication compliant.    Goal: *STG: Attends activities and groups  Outcome: Progressing Towards Goal  Goal: *STG: Demonstrates ability to understand and use improved judgment in daily activities and relationships  Outcome: Progressing Towards Goal  Goal: Interventions  Outcome: Progressing Towards Goal     Problem: Patient Education: Go to Patient Education Activity  Goal: Patient/Family Education  Outcome: Progressing Towards Goal

## 2020-12-31 NOTE — PROGRESS NOTES
PSYCHIATRIC PROGRESS NOTE       Patient: Charlee Nance MRN: [id number]  SSN: [id number]    YOB: 1945  Age: 67 y.o. Sex: female      Admit Date: 12/14/2020    LOS: 16 days       Chief Complaint:  I just want to go home. Interval History:  Charlee Nance is doing well today. She is more redirectable this morning. She is confused and continues to not understand why she is not able to be discharged yet. Social work continues to work on safe disposition. Past Medical History:  Past Medical History:   Diagnosis Date    Anemia     Arthritis     Bipolar disorder ([facility] [address]     Burning with urination     Memory change     Psychiatric disorder     Bipolar Disorder    Stool color black     Tremor          ALLERGIES:(reviewed/updated 12/30/2020)  Allergies   Allergen Reactions    Lamisil [Terbinafine] Diarrhea and Nausea and Vomiting    Thorazine [Chlorpromazine] Rash       Laboratory report:  Lab Results   Component Value Date/Time    WBC 9.8 12/08/2020 02:44 AM    HGB 11.2 (L) 12/08/2020 02:44 AM    HCT 34.9 (L) 12/08/2020 02:44 AM    PLATELET 989 54/37/7448 02:44 AM    .5 (H) 12/08/2020 02:44 AM      Lab Results   Component Value Date/Time    Sodium 140 12/07/2020 03:04 AM    Potassium 3.9 12/07/2020 03:04 AM    Chloride 108 12/07/2020 03:04 AM    CO2 26 12/07/2020 03:04 AM    Anion gap 6 12/07/2020 03:04 AM    Glucose 95 12/15/2020 04:22 AM    BUN 12 12/07/2020 03:04 AM    Creatinine 0.73 12/07/2020 03:04 AM    BUN/Creatinine ratio 16 12/07/2020 03:04 AM    GFR est AA >60 12/07/2020 03:04 AM    GFR est non-AA >60 12/07/2020 03:04 AM    Calcium 9.9 12/07/2020 03:04 AM    Bilirubin, total 0.3 12/03/2020 03:30 AM    Alk.  phosphatase 110 12/03/2020 03:30 AM    Protein, total 6.6 12/03/2020 03:30 AM    Albumin 2.6 (L) 12/03/2020 03:30 AM    Globulin 4.0 12/03/2020 03:30 AM    A-G Ratio 0.7 (L) 12/03/2020 03:30 AM    ALT (SGPT) 33 12/03/2020 03:30 AM      Vitals:    12/29/20 2030 12/30/20 5973 12/30/20 1151 12/30/20 1605   BP: 109/73 129/80 134/77 135/88   Pulse: 68 93 80 82   Resp: 16 16 16 20   Temp: 97.6 °F (36.4 °C) 98 °F (36.7 °C) 98.2 °F (36.8 °C) 97 °F (36.1 °C)   SpO2: 98% 95% 97% 97%   Weight:       Height:           Lab Results   Component Value Date/Time    Valproic acid 58 12/20/2020 06:20 AM     Lab Results   Component Value Date/Time    Lithium level 0.57 (L) 11/29/2020 05:27 PM       Vital Signs  Patient Vitals for the past 24 hrs:   Temp Pulse Resp BP SpO2   12/30/20 1605 97 °F (36.1 °C) 82 20 135/88 97 %   12/30/20 1151 98.2 °F (36.8 °C) 80 16 134/77 97 %   12/30/20 0728 98 °F (36.7 °C) 93 16 129/80 95 %   12/29/20 2030 97.6 °F (36.4 °C) 68 16 109/73 98 %     Wt Readings from Last 3 Encounters:   12/27/20 66.7 kg (147 lb)   11/29/20 75.4 kg (166 lb 3.6 oz)   12/02/20 75.4 kg (166 lb 3.6 oz)     Temp Readings from Last 3 Encounters:   12/30/20 97 °F (36.1 °C)   12/14/20 97.5 °F (36.4 °C)   09/12/20 98.1 °F (36.7 °C)     BP Readings from Last 3 Encounters:   12/30/20 135/88   12/14/20 (!) 113/58   09/12/20 128/72     Pulse Readings from Last 3 Encounters:   12/30/20 82   12/14/20 72   09/12/20 75       Radiology (reviewed/updated 12/30/2020)  Ct Head Wo Cont    Result Date: 11/29/2020  CLINICAL HISTORY: Encephalopathy INDICATION: Encephalopathy COMPARISON: 9/7/2018. CT dose reduction was achieved through use of a standardized protocol tailored for this examination and automatic exposure control for dose modulation. TECHNIQUE: Serial axial images with a collimation of 5 mm were obtained from the skull base through the vertex  FINDINGS: There is sulcal and ventricular prominence. Confluent periventricular and scattered foci of hypodensity in the cerebral white matter. There is no evidence of an acute infarction, hemorrhage, or mass-effect. There is no evidence of midline shift or hydrocephalus. Posterior fossa structures are unremarkable. No extra-axial collections are seen.  Mastoid air cells are well pneumatized and clear. Hypodensity in the central jerri. 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 IMPRESSION: No acute intracranial process. Small area of chronic encephalomalacia in the left frontal lobe. Imaging findings consistent with moderate chronic microvascular ischemic change. There is a mild degree of cerebral atrophy. Xr Chest Port    Result Date: 11/29/2020  EXAM: XR CHEST PORT INDICATION: fever COMPARISON: None. FINDINGS: A portable AP radiograph of the chest was obtained at 14:46 hours. The patient is on a cardiac monitor. The lungs are clear. The cardiac and mediastinal contours and pulmonary vascularity are normal.  The chest wall structures and visualized upper abdomen show no acute findings with incidental note of degenerative spine and shoulder changes as well as partial visualization of posterior windy and screw fixation hardware of the lumbar spine. IMPRESSION: No acute findings.       Current Facility-Administered Medications   Medication Dose Route Frequency Provider Last Rate Last Admin    valproic acid (as sodium salt) (DEPAKENE) 250 mg/5 mL (5 mL) oral solution 625 mg  625 mg Oral BID Parish Simmons MD   625 mg at 12/30/20 1633    loperamide (IMODIUM) capsule 2 mg  2 mg Oral Q4H PRN Dixon Primas A, NP   2 mg at 12/23/20 1014    nystatin (MYCOSTATIN) 100,000 unit/gram powder   Topical BID Dixon Primas A, NP   Given at 12/30/20 1632    docusate sodium (COLACE) capsule 100 mg  100 mg Oral DAILY Hemalatha Vinson NP   100 mg at 12/30/20 6447    donepeziL (ARICEPT) tablet 5 mg  5 mg Oral QHS Hemalatha Vinson NP   5 mg at 12/29/20 2037    OLANZapine (ZyPREXA) tablet 2.5 mg  2.5 mg Oral Q6H PRN Kim Moore NP   2.5 mg at 12/23/20 1416    haloperidol lactate (HALDOL) injection 2.5 mg  2.5 mg IntraMUSCular Q6H PRN Romeo Moore NP        benztropine (COGENTIN) tablet 0.5 mg  0.5 mg Oral BID PRN Chioma Moore, NP        diphenhydrAMINE (BENADRYL) injection 25 mg  25 mg IntraMUSCular BID PRN Chioma Moore, NP        acetaminophen (TYLENOL) tablet 650 mg  650 mg Oral Q4H PRN Kim Moore, NP   650 mg at 12/24/20 1528    magnesium hydroxide (MILK OF MAGNESIA) 400 mg/5 mL oral suspension 30 mL  30 mL Oral DAILY PRN Kim Moore, NP   30 mL at 12/19/20 1221    traZODone (DESYREL) tablet 50 mg  50 mg Oral QHS PRN Chioma Moore, NP        propranoloL (INDERAL) tablet 10 mg  10 mg Oral BID Kim Moore, NP   10 mg at 12/30/20 1633    mirtazapine (REMERON) tablet 15 mg  15 mg Oral QHS Kim Moore, NP   15 mg at 12/28/20 2143    risperiDONE (RisperDAL) tablet 1 mg  1 mg Oral BID Kim Moore, NP   1 mg at 12/30/20 0634       Side Effects: (reviewed/updated 12/30/2020)  None reported or admitted to. Review of Systems: (reviewed/updated 12/30/2020)  Appetite: good  Sleep: good   All other Review of Systems: negative    Mental Status Exam:  Eye contact: Good eye contact  Psychomotor activity: irritable   Speech is spontaneous  Thought process:confabulation  Mood is \"ok\"  Affect: constricted  Perception: No avh  Suicidal ideation: No si  Homicidal ideation: No hi  Insight/judgment: Poor  Cognition is impaired. MMSE-8      Physical Exam:  Musculoskeletal system: steady gait  Tremor not present  Cog wheeling not present      Assessment and Plan:  Sayda Navarro meets criteria for a diagnosis of Unspecified dementia with behavioral disturbance. Bipolar 1 disorder by history. Continue current medications as prescribed. We will closely monitor for safety. We will encourage reality orientation. Disposition planning to continue.        I certify that this patients inpatient psychiatric hospital services furnished since the previous certification were, and continue to be, required for treatment that could reasonably be expected to improve the patient's condition, or for diagnostic study, and that the patient continues to need, on a daily basis, active treatment furnished directly by or requiring the supervision of inpatient psychiatric facility personnel. In addition, the hospital records show that services furnished were intensive treatment services, admission or related services, or equivalent services.       Signed:  Arian Sims NP  12/30/2020

## 2020-12-31 NOTE — GROUP NOTE
OLEGARIO  GROUP DOCUMENTATION INDIVIDUAL Group Therapy Note Date: 12/30/2020 Group Start Time: 4065 Group End Time: 5513 Group Topic: Reflection/Relaxation [facility] FUNMILAYO Yanez  GROUP DOCUMENTATION GROUP Group Therapy Note Attendees:  
  
 
Attendance: Attended Patient's Goal:  Decrease anxiety Interventions/techniques: Supported Follows Directions: Followed directions Interactions: Interacted appropriately Mental Status: Calm Behavior/appearance: Cooperative Goals Achieved: Able to engage in interactions, Able to listen to others and Able to give feedback to another Additional Notes:  
Marcel Hopkins LPN

## 2020-12-31 NOTE — PROGRESS NOTES
Problem: Falls - Risk of  Goal: *Absence of Falls  Description: Document Jauregui Reason Fall Risk and appropriate interventions in the flowsheet.   Outcome: Progressing Towards Goal  Note: Fall Risk Interventions:  Mobility Interventions: Communicate number of staff needed for ambulation/transfer, Utilize walker, cane, or other assistive device    Mentation Interventions: Adequate sleep, hydration, pain control, Door open when patient unattended, More frequent rounding    Medication Interventions: Teach patient to arise slowly    Elimination Interventions: Patient to call for help with toileting needs, Stay With Me (per policy)    History of Falls Interventions: Door open when patient unattended

## 2020-12-31 NOTE — INTERDISCIPLINARY ROUNDS
Behavioral Health Interdisciplinary Rounds Patient Name: Chet Mckeon  Age: 67 y.o. Room/Bed:  740/ Primary Diagnosis: <principal problem not specified> Admission Status: Involuntary Commitment Readmission within 30 days: no 
Power of  in place: no 
Patient requires a blocked bed: no          Reason for blocked bed:  
Sleep hours: 4 Participation in Care/Groups:   
Medication Compliant?: Selective, did not take nighttime meds PRNS (last 24 hours): None Restraints (last 24 hours):  no 
  
Alcohol screening (AUDIT) completed -  AUDIT Score: 0  If applicable, date SBIRT discussed in treatment team AND documented:   
Tobacco - patient is a smoker: Have You Used Tobacco in the Past 30 Days: No 
Illegal Drugs use: Have You Used Any Illegal Substances Over the Past 12 Months: No 
 
24 hour chart check complete: yes Patient goal(s) for today:  
Treatment team focus/goals:  
Progress note Family Contact information:  
Patient's preferred phone number for follow up call :  
Patient's preferred e-mail address : 
LOS:  17  Expected LOS:  
 
Participating treatment team members: Chet Mckeon, * (assigned SW),

## 2020-12-31 NOTE — PROGRESS NOTES
Problem: Falls - Risk of  Goal: *Absence of Falls  Description: Document Earma Domingo Fall Risk and appropriate interventions in the flowsheet. Outcome: Progressing Towards Goal  Note: Fall Risk Interventions:  Mobility Interventions: Communicate number of staff needed for ambulation/transfer    Mentation Interventions: Adequate sleep, hydration, pain control    Medication Interventions: Teach patient to arise slowly    Elimination Interventions: Patient to call for help with toileting needs    History of Falls Interventions: Door open when patient unattended    [id number]: Patient received awake and alert and sitting quietly in the bed. Mood and affect; calm, cooperative, can be labile, and restless at times. Denies SI/HI. Will continue to monitor q15 minutes for safety checks. Patient ate 95% of dinner tray. 2000: Patient focused on discharge plans, and would like to have some clarification tomorrow during treatment team as to what plans are (unable to see what plans may be by notes), patient is a poor historian so unable to get reliable information from her.

## 2020-12-31 NOTE — BH NOTES
PRN Medication Documentation    Specific patient behavior that led to need for PRN medication: back pain  Staff interventions attempted prior to PRN being given: rest  PRN medication given: Tylenol 650 mg po prn  Patient response/effectiveness of PRN medication: 11:00 am-no complaints of pain at this time.

## 2021-01-01 PROCEDURE — 74011250637 HC RX REV CODE- 250/637: Performed by: PSYCHIATRY & NEUROLOGY

## 2021-01-01 PROCEDURE — 74011250637 HC RX REV CODE- 250/637: Performed by: NURSE PRACTITIONER

## 2021-01-01 PROCEDURE — 65220000003 HC RM SEMIPRIVATE PSYCH

## 2021-01-01 PROCEDURE — 74011000250 HC RX REV CODE- 250: Performed by: NURSE PRACTITIONER

## 2021-01-01 RX ORDER — LIDOCAINE 4 G/100G
1 PATCH TOPICAL EVERY 24 HOURS
Status: COMPLETED | OUTPATIENT
Start: 2021-01-01 | End: 2021-01-13

## 2021-01-01 RX ORDER — LORATADINE 10 MG/1
10 TABLET ORAL DAILY
Status: DISCONTINUED | OUTPATIENT
Start: 2021-01-01 | End: 2021-01-14 | Stop reason: HOSPADM

## 2021-01-01 RX ADMIN — RISPERIDONE 1 MG: 1 TABLET ORAL at 09:14

## 2021-01-01 RX ADMIN — PROPRANOLOL HYDROCHLORIDE 10 MG: 10 TABLET ORAL at 16:42

## 2021-01-01 RX ADMIN — VALPROIC ACID 625 MG: 250 SOLUTION ORAL at 16:43

## 2021-01-01 RX ADMIN — ACETAMINOPHEN 650 MG: 325 TABLET ORAL at 22:42

## 2021-01-01 RX ADMIN — RISPERIDONE 1 MG: 1 TABLET ORAL at 20:53

## 2021-01-01 RX ADMIN — PROPRANOLOL HYDROCHLORIDE 10 MG: 10 TABLET ORAL at 09:14

## 2021-01-01 RX ADMIN — LORATADINE 10 MG: 10 TABLET ORAL at 13:19

## 2021-01-01 RX ADMIN — VALPROIC ACID 625 MG: 250 SOLUTION ORAL at 09:14

## 2021-01-01 RX ADMIN — DONEPEZIL HYDROCHLORIDE 5 MG: 5 TABLET, FILM COATED ORAL at 20:53

## 2021-01-01 RX ADMIN — MIRTAZAPINE 15 MG: 15 TABLET, FILM COATED ORAL at 20:53

## 2021-01-01 RX ADMIN — NYSTATIN: 100000 POWDER TOPICAL at 16:46

## 2021-01-01 NOTE — PROGRESS NOTES
Problem: Falls - Risk of  Goal: *Absence of Falls  Description: Document Clydene Bone Fall Risk and appropriate interventions in the flowsheet. Outcome: Progressing Towards Goal  Note: Fall Risk Interventions:  Mobility Interventions: Assess mobility with egress test, Bed/chair exit alarm, Communicate number of staff needed for ambulation/transfer, Patient to call before getting OOB, Utilize walker, cane, or other assistive device    Mentation Interventions: Adequate sleep, hydration, pain control, Bed/chair exit alarm, Door open when patient unattended, More frequent rounding, Reorient patient, Room close to nurse's station    Medication Interventions: Bed/chair exit alarm, Patient to call before getting OOB, Teach patient to arise slowly    Elimination Interventions: Bed/chair exit alarm, Patient to call for help with toileting needs, Stay With Me (per policy), Toilet paper/wipes in reach, Toileting schedule/hourly rounds    History of Falls Interventions: Bed/chair exit alarm, Door open when patient unattended, Room close to nurse's station         Problem: Altered Thought Process (Adult/Pediatric)  Goal: *STG: Participates in treatment plan  Outcome: Progressing Towards Goal  Note: Pt visible in the DR remains confused. Redirection and reorientation offered  Uses a walker to ambualte - Needs minimal assistance   Alert to place and time. Mood is labile  Med/meal compliant   NAD noted   Will continue to monitor.

## 2021-01-01 NOTE — PROGRESS NOTES
PSYCHIATRIC PROGRESS NOTE       Patient: Vasquez Hartley MRN: [id number]  SSN: [id number]    YOB: 1945  Age: 67 y.o. Sex: female      Admit Date: 12/14/2020    LOS: 17 days       Chief Complaint:  I just want to go home. Interval History:  Vasquez Hartley is doing fairly today. She continues not to understand why she cannot be discharged yet. She denies SI/HI. She is forgetful and tangential this afternoon. She refused some of her medications last night. She slept poorly. Past Medical History:  Past Medical History:   Diagnosis Date    Anemia     Arthritis     Bipolar disorder ([facility] [address]     Burning with urination     Memory change     Psychiatric disorder     Bipolar Disorder    Stool color black     Tremor          ALLERGIES:(reviewed/updated 12/31/2020)  Allergies   Allergen Reactions    Lamisil [Terbinafine] Diarrhea and Nausea and Vomiting    Thorazine [Chlorpromazine] Rash       Laboratory report:  Lab Results   Component Value Date/Time    WBC 9.8 12/08/2020 02:44 AM    HGB 11.2 (L) 12/08/2020 02:44 AM    HCT 34.9 (L) 12/08/2020 02:44 AM    PLATELET 929 36/23/1679 02:44 AM    .5 (H) 12/08/2020 02:44 AM      Lab Results   Component Value Date/Time    Sodium 140 12/07/2020 03:04 AM    Potassium 3.9 12/07/2020 03:04 AM    Chloride 108 12/07/2020 03:04 AM    CO2 26 12/07/2020 03:04 AM    Anion gap 6 12/07/2020 03:04 AM    Glucose 95 12/15/2020 04:22 AM    BUN 12 12/07/2020 03:04 AM    Creatinine 0.73 12/07/2020 03:04 AM    BUN/Creatinine ratio 16 12/07/2020 03:04 AM    GFR est AA >60 12/07/2020 03:04 AM    GFR est non-AA >60 12/07/2020 03:04 AM    Calcium 9.9 12/07/2020 03:04 AM    Bilirubin, total 0.3 12/03/2020 03:30 AM    Alk.  phosphatase 110 12/03/2020 03:30 AM    Protein, total 6.6 12/03/2020 03:30 AM    Albumin 2.6 (L) 12/03/2020 03:30 AM    Globulin 4.0 12/03/2020 03:30 AM    A-G Ratio 0.7 (L) 12/03/2020 03:30 AM    ALT (SGPT) 33 12/03/2020 03:30 AM      Vitals:    12/30/20 1605 12/30/20 2204 12/31/20 0830 12/31/20 1736   BP: 135/88  118/78 118/75   Pulse: 82  94 76   Resp: 20 14 16 16   Temp: 97 °F (36.1 °C)  97.3 °F (36.3 °C)    SpO2: 97%  95%    Weight:       Height:           Lab Results   Component Value Date/Time    Valproic acid 58 12/20/2020 06:20 AM     Lab Results   Component Value Date/Time    Lithium level 0.57 (L) 11/29/2020 05:27 PM       Vital Signs  Patient Vitals for the past 24 hrs:   Temp Pulse Resp BP SpO2   12/31/20 1736  76 16 118/75    12/31/20 0830 97.3 °F (36.3 °C) 94 16 118/78 95 %   12/30/20 2204   14       Wt Readings from Last 3 Encounters:   12/27/20 66.7 kg (147 lb)   11/29/20 75.4 kg (166 lb 3.6 oz)   12/02/20 75.4 kg (166 lb 3.6 oz)     Temp Readings from Last 3 Encounters:   12/31/20 97.3 °F (36.3 °C)   12/14/20 97.5 °F (36.4 °C)   09/12/20 98.1 °F (36.7 °C)     BP Readings from Last 3 Encounters:   12/31/20 118/75   12/14/20 (!) 113/58   09/12/20 128/72     Pulse Readings from Last 3 Encounters:   12/31/20 76   12/14/20 72   09/12/20 75       Radiology (reviewed/updated 12/31/2020)  Ct Head Wo Cont    Result Date: 11/29/2020  CLINICAL HISTORY: Encephalopathy INDICATION: Encephalopathy COMPARISON: 9/7/2018. CT dose reduction was achieved through use of a standardized protocol tailored for this examination and automatic exposure control for dose modulation. TECHNIQUE: Serial axial images with a collimation of 5 mm were obtained from the skull base through the vertex  FINDINGS: There is sulcal and ventricular prominence. Confluent periventricular and scattered foci of hypodensity in the cerebral white matter. There is no evidence of an acute infarction, hemorrhage, or mass-effect. There is no evidence of midline shift or hydrocephalus. Posterior fossa structures are unremarkable. No extra-axial collections are seen. Mastoid air cells are well pneumatized and clear. Hypodensity in the central jerri.  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 IMPRESSION: No acute intracranial process. Small area of chronic encephalomalacia in the left frontal lobe. Imaging findings consistent with moderate chronic microvascular ischemic change. There is a mild degree of cerebral atrophy. Xr Chest Port    Result Date: 11/29/2020  EXAM: XR CHEST PORT INDICATION: fever COMPARISON: None. FINDINGS: A portable AP radiograph of the chest was obtained at 14:46 hours. The patient is on a cardiac monitor. The lungs are clear. The cardiac and mediastinal contours and pulmonary vascularity are normal.  The chest wall structures and visualized upper abdomen show no acute findings with incidental note of degenerative spine and shoulder changes as well as partial visualization of posterior windy and screw fixation hardware of the lumbar spine. IMPRESSION: No acute findings.       Current Facility-Administered Medications   Medication Dose Route Frequency Provider Last Rate Last Admin    valproic acid (as sodium salt) (DEPAKENE) 250 mg/5 mL (5 mL) oral solution 625 mg  625 mg Oral BID Yesy Carlin MD   625 mg at 12/31/20 1737    loperamide (IMODIUM) capsule 2 mg  2 mg Oral Q4H PRN Lupe KIM NP   2 mg at 12/23/20 1014    nystatin (MYCOSTATIN) 100,000 unit/gram powder   Topical BID Hemalatha Vinson NP   Stopped at 12/31/20 1800    docusate sodium (COLACE) capsule 100 mg  100 mg Oral DAILY Hemalatha Vinson NP   Stopped at 12/31/20 0900    donepeziL (ARICEPT) tablet 5 mg  5 mg Oral QHS Hemalatha Vinson NP   5 mg at 12/31/20 2011    OLANZapine (ZyPREXA) tablet 2.5 mg  2.5 mg Oral Q6H PRN Kim Moore NP   2.5 mg at 12/23/20 1416    haloperidol lactate (HALDOL) injection 2.5 mg  2.5 mg IntraMUSCular Q6H PRN Kim Moore NP        benztropine (COGENTIN) tablet 0.5 mg  0.5 mg Oral BID PRN Kim Moore, NP        diphenhydrAMINE (BENADRYL) injection 25 mg 25 mg IntraMUSCular BID PRN Phuong Moore, NP        acetaminophen (TYLENOL) tablet 650 mg  650 mg Oral Q4H PRN Kim Moore, NP   650 mg at 12/31/20 1426    magnesium hydroxide (MILK OF MAGNESIA) 400 mg/5 mL oral suspension 30 mL  30 mL Oral DAILY PRN Kim Moore, NP   30 mL at 12/19/20 1221    traZODone (DESYREL) tablet 50 mg  50 mg Oral QHS PRN Phuong Moore, NP        propranoloL (INDERAL) tablet 10 mg  10 mg Oral BID Kim Moore, NP   10 mg at 12/31/20 1737    mirtazapine (REMERON) tablet 15 mg  15 mg Oral QHS Kim Moore, NP   15 mg at 12/31/20 2010    risperiDONE (RisperDAL) tablet 1 mg  1 mg Oral BID Kim Moore, NP   1 mg at 12/31/20 2011       Side Effects: (reviewed/updated 12/31/2020)  None reported or admitted to. Review of Systems: (reviewed/updated 12/31/2020)  Appetite: good  Sleep: good   All other Review of Systems: negative    Mental Status Exam:  Eye contact: Good eye contact  Psychomotor activity: irritable   Speech is spontaneous  Thought process:confabulation  Mood is \"ok\"  Affect: constricted  Perception: No avh  Suicidal ideation: No si  Homicidal ideation: No hi  Insight/judgment: Poor  Cognition is impaired. MMSE-8      Physical Exam:  Musculoskeletal system: steady gait  Tremor not present  Cog wheeling not present      Assessment and Plan:  Florencio Stock meets criteria for a diagnosis of Unspecified dementia with behavioral disturbance. Bipolar 1 disorder by history. Continue current medications as prescribed. We will closely monitor for safety. We will encourage reality orientation. Disposition planning to continue.        I certify that this patients inpatient psychiatric hospital services furnished since the previous certification were, and continue to be, required for treatment that could reasonably be expected to improve the patient's condition, or for diagnostic study, and that the patient continues to need, on a daily basis, active treatment furnished directly by or requiring the supervision of inpatient psychiatric facility personnel. In addition, the hospital records show that services furnished were intensive treatment services, admission or related services, or equivalent services.       Signed:  Shyanne Way NP  12/31/2020

## 2021-01-01 NOTE — PROGRESS NOTES
Problem: Falls - Risk of  Goal: *Absence of Falls  Description: Document Stefanie Collado Fall Risk and appropriate interventions in the flowsheet. Outcome: Progressing Towards Goal  Note: Fall Risk Interventions:  Mobility Interventions: Assess mobility with egress test, Bed/chair exit alarm, Communicate number of staff needed for ambulation/transfer, Patient to call before getting OOB, Utilize walker, cane, or other assistive device    Mentation Interventions: Adequate sleep, hydration, pain control, Bed/chair exit alarm, Door open when patient unattended, More frequent rounding, Reorient patient, Room close to nurse's station    Medication Interventions: Bed/chair exit alarm, Patient to call before getting OOB, Teach patient to arise slowly    Elimination Interventions: Bed/chair exit alarm, Patient to call for help with toileting needs, Stay With Me (per policy), Toilet paper/wipes in reach, Toileting schedule/hourly rounds    History of Falls Interventions: Bed/chair exit alarm, Door open when patient unattended, Room close to nurse's station    1515: Patient received awake and alert and sitting in the chair in the dining room, interacting appropriately with staff and peers. Mood and affect; calm, cooperative, and labile at times. Denies SI/HI. Will continue to monitor q15 minutes for safety checks.

## 2021-01-01 NOTE — PROGRESS NOTES
Pt appears asleep in bed, NAD, even respirations, will continue to monitor q15min     Problem: Falls - Risk of  Goal: *Absence of Falls  Description: Document Bard  Fall Risk and appropriate interventions in the flowsheet.   Outcome: Progressing Towards Goal  Note: Fall Risk Interventions:  Mobility Interventions: Communicate number of staff needed for ambulation/transfer, Utilize walker, cane, or other assistive device    Mentation Interventions: Adequate sleep, hydration, pain control, Door open when patient unattended, More frequent rounding    Medication Interventions: Patient to call before getting OOB, Teach patient to arise slowly    Elimination Interventions: Patient to call for help with toileting needs    History of Falls Interventions: Door open when patient unattended

## 2021-01-01 NOTE — PROGRESS NOTES
PSYCHIATRIC PROGRESS NOTE Patient: Chet Mckeon MRN: [id number]  SSN: [id number] YOB: 1945  Age: 67 y.o. Sex: female Admit Date: 12/14/2020 LOS: 18 days Chief Complaint: 
  
 
Interval History: 
Chet Mckeon is Past Medical History: 
Past Medical History:  
Diagnosis Date  Anemia  Arthritis  Bipolar disorder ([address]  Burning with urination  Memory change  Psychiatric disorder Bipolar Disorder  Stool color black  Tremor ALLERGIES:(reviewed/updated 1/1/2021) Allergies Allergen Reactions  Lamisil [Terbinafine] Diarrhea and Nausea and Vomiting  Thorazine [Chlorpromazine] Rash Laboratory report: 
Lab Results Component Value Date/Time WBC 9.8 12/08/2020 02:44 AM  
 HGB 11.2 (L) 12/08/2020 02:44 AM  
 HCT 34.9 (L) 12/08/2020 02:44 AM  
 PLATELET 098 54/26/0100 02:44 AM  
 .5 (H) 12/08/2020 02:44 AM  
  
Lab Results Component Value Date/Time Sodium 140 12/07/2020 03:04 AM  
 Potassium 3.9 12/07/2020 03:04 AM  
 Chloride 108 12/07/2020 03:04 AM  
 CO2 26 12/07/2020 03:04 AM  
 Anion gap 6 12/07/2020 03:04 AM  
 Glucose 95 12/15/2020 04:22 AM  
 BUN 12 12/07/2020 03:04 AM  
 Creatinine 0.73 12/07/2020 03:04 AM  
 BUN/Creatinine ratio 16 12/07/2020 03:04 AM  
 GFR est AA >60 12/07/2020 03:04 AM  
 GFR est non-AA >60 12/07/2020 03:04 AM  
 Calcium 9.9 12/07/2020 03:04 AM  
 Bilirubin, total 0.3 12/03/2020 03:30 AM  
 Alk. phosphatase 110 12/03/2020 03:30 AM  
 Protein, total 6.6 12/03/2020 03:30 AM  
 Albumin 2.6 (L) 12/03/2020 03:30 AM  
 Globulin 4.0 12/03/2020 03:30 AM  
 A-G Ratio 0.7 (L) 12/03/2020 03:30 AM  
 ALT (SGPT) 33 12/03/2020 03:30 AM  
  
Vitals:  
 12/31/20 0830 12/31/20 1736 01/01/21 0813 01/01/21 7169 BP: 118/78 118/75 (!) 111/56 120/60 Pulse: 94 76 (!) 101 100 Resp: 16 16 16 Temp: 97.3 °F (36.3 °C) 97.8 °F (36.6 °C) 98 °F (36.7 °C) SpO2: 95%  95% Weight:      
Height: Lab Results Component Value Date/Time Valproic acid 58 12/20/2020 06:20 AM  
 
Lab Results Component Value Date/Time Lithium level 0.57 (L) 11/29/2020 05:27 PM  
 
 
Vital Signs Patient Vitals for the past 24 hrs: 
 Temp Pulse Resp BP SpO2  
01/01/21 0914  100  120/60   
01/01/21 0813 98 °F (36.7 °C) (!) 101 16 (!) 111/56 95 % 12/31/20 1736 97.8 °F (36.6 °C) 76 16 118/75  Wt Readings from Last 3 Encounters:  
12/27/20 66.7 kg (147 lb)  
11/29/20 75.4 kg (166 lb 3.6 oz) 12/02/20 75.4 kg (166 lb 3.6 oz) Temp Readings from Last 3 Encounters:  
01/01/21 98 °F (36.7 °C)  
12/14/20 97.5 °F (36.4 °C)  
09/12/20 98.1 °F (36.7 °C) BP Readings from Last 3 Encounters:  
01/01/21 120/60  
12/14/20 (!) 113/58  
09/12/20 128/72 Pulse Readings from Last 3 Encounters:  
01/01/21 100  
12/14/20 72  
09/12/20 75 Radiology (reviewed/updated 1/1/2021) Ct Head Wo Cont Result Date: 11/29/2020 CLINICAL HISTORY: Encephalopathy INDICATION: Encephalopathy COMPARISON: 9/7/2018. CT dose reduction was achieved through use of a standardized protocol tailored for this examination and automatic exposure control for dose modulation. TECHNIQUE: Serial axial images with a collimation of 5 mm were obtained from the skull base through the vertex  FINDINGS: There is sulcal and ventricular prominence. Confluent periventricular and scattered foci of hypodensity in the cerebral white matter. There is no evidence of an acute infarction, hemorrhage, or mass-effect. There is no evidence of midline shift or hydrocephalus. Posterior fossa structures are unremarkable. No extra-axial collections are seen. Mastoid air cells are well pneumatized and clear. Hypodensity in the central jerri. 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 IMPRESSION: No acute intracranial process. Small area of chronic encephalomalacia in the left frontal lobe. Imaging findings consistent with moderate chronic microvascular ischemic change. There is a mild degree of cerebral atrophy. Xr Chest [facility] Result Date: 11/29/2020 EXAM: XR CHEST PORT INDICATION: fever COMPARISON: None. FINDINGS: A portable AP radiograph of the chest was obtained at 14:46 hours. The patient is on a cardiac monitor. The lungs are clear. The cardiac and mediastinal contours and pulmonary vascularity are normal.  The chest wall structures and visualized upper abdomen show no acute findings with incidental note of degenerative spine and shoulder changes as well as partial visualization of posterior windy and screw fixation hardware of the lumbar spine. IMPRESSION: No acute findings. Current Facility-Administered Medications Medication Dose Route Frequency Provider Last Rate Last Admin  valproic acid (as sodium salt) (DEPAKENE) 250 mg/5 mL (5 mL) oral solution 625 mg  625 mg Oral BID Jake Allan MD   625 mg at 01/01/21 7462  loperamide (IMODIUM) capsule 2 mg  2 mg Oral Q4H PRN Hemalatha Vinson NP   2 mg at 12/23/20 1014  nystatin (MYCOSTATIN) 100,000 unit/gram powder   Topical BID Hemalatha Vinson NP   Stopped at 12/31/20 1800  
 docusate sodium (COLACE) capsule 100 mg  100 mg Oral DAILY Hemalatha Vinson NP   Stopped at 12/31/20 0900  
 donepeziL (ARICEPT) tablet 5 mg  5 mg Oral QHS Hemalatha Vinson NP   5 mg at 12/31/20 2011  OLANZapine (ZyPREXA) tablet 2.5 mg  2.5 mg Oral Q6H PRN Kim Moore NP   2.5 mg at 12/23/20 1416  
 haloperidol lactate (HALDOL) injection 2.5 mg  2.5 mg IntraMUSCular Q6H PRN Kim Moore NP      
 benztropine (COGENTIN) tablet 0.5 mg  0.5 mg Oral BID PRN Renu Moore NP      
  diphenhydrAMINE (BENADRYL) injection 25 mg  25 mg IntraMUSCular BID PRN Zachary Moore, MAX      
 acetaminophen (TYLENOL) tablet 650 mg  650 mg Oral Q4H PRN Kim Moore, NP   650 mg at 12/31/20 1426  
 magnesium hydroxide (MILK OF MAGNESIA) 400 mg/5 mL oral suspension 30 mL  30 mL Oral DAILY PRN Kim Moore, NP   30 mL at 12/19/20 1221  traZODone (DESYREL) tablet 50 mg  50 mg Oral QHS PRN Zachary Moore, MAX      
 propranoloL (INDERAL) tablet 10 mg  10 mg Oral BID Kim Moore NP   10 mg at 01/01/21 1351  mirtazapine (REMERON) tablet 15 mg  15 mg Oral QHS Kim Moore, NP   15 mg at 12/31/20 2010  risperiDONE (RisperDAL) tablet 1 mg  1 mg Oral BID Kim Moore, NP   1 mg at 01/01/21 7881 Side Effects: (reviewed/updated 1/1/2021) None reported or admitted to. Review of Systems: (reviewed/updated 1/1/2021) Appetite: good Sleep: good All other Review of Systems: negative Mental Status Exam: 
Eye contact: Good eye contact Psychomotor activity: irritable Speech is spontaneous Thought process:confabulation Mood is \"ok\" Affect: constricted Perception: No avh 
Suicidal ideation: No si Homicidal ideation: No hi 
Insight/judgment: Poor Cognition is impaired. MMSE-8 Physical Exam: Musculoskeletal system: steady gait Tremor not present Cog wheeling not present Assessment and Plan: 
Eleazar Bal meets criteria for a diagnosis of Unspecified dementia with behavioral disturbance. Bipolar 1 disorder by history. Continue current medications as prescribed. We will closely monitor for safety. We will encourage reality orientation. Disposition planning to continue. I certify that this patients inpatient psychiatric hospital services furnished since the previous certification were, and continue to be, required for treatment that could reasonably be expected to improve the patient's condition, or for diagnostic study, and that the patient continues to need, on a daily basis, active treatment furnished directly by or requiring the supervision of inpatient psychiatric facility personnel. In addition, the hospital records show that services furnished were intensive treatment services, admission or related services, or equivalent services. Signed: 
Kyle Etienne MD 
1/1/2021

## 2021-01-01 NOTE — PROGRESS NOTES
PSYCHIATRIC PROGRESS NOTE       Patient: Delvin Coelho MRN: [id number]  SSN: [id number]    YOB: 1945  Age: 67 y.o. Sex: female      Admit Date: 12/14/2020    LOS: 18 days       Chief Complaint:  I am okay today. Interval History:  Delvin Coelho is doing fairly well. She was sleeping in bed and was reluctant to talk today. Overall remains stable except for her confusion and poor memory. There was little interaction possible with her. Remains compliant with her medications and no adverse events have been noted. Past Medical History:  Past Medical History:   Diagnosis Date    Anemia     Arthritis     Bipolar disorder ([address]     Burning with urination     Memory change     Psychiatric disorder     Bipolar Disorder    Stool color black     Tremor          ALLERGIES:(reviewed/updated 1/1/2021)  Allergies   Allergen Reactions    Lamisil [Terbinafine] Diarrhea and Nausea and Vomiting    Thorazine [Chlorpromazine] Rash       Laboratory report:  Lab Results   Component Value Date/Time    WBC 9.8 12/08/2020 02:44 AM    HGB 11.2 (L) 12/08/2020 02:44 AM    HCT 34.9 (L) 12/08/2020 02:44 AM    PLATELET 914 55/58/5033 02:44 AM    .5 (H) 12/08/2020 02:44 AM      Lab Results   Component Value Date/Time    Sodium 140 12/07/2020 03:04 AM    Potassium 3.9 12/07/2020 03:04 AM    Chloride 108 12/07/2020 03:04 AM    CO2 26 12/07/2020 03:04 AM    Anion gap 6 12/07/2020 03:04 AM    Glucose 95 12/15/2020 04:22 AM    BUN 12 12/07/2020 03:04 AM    Creatinine 0.73 12/07/2020 03:04 AM    BUN/Creatinine ratio 16 12/07/2020 03:04 AM    GFR est AA >60 12/07/2020 03:04 AM    GFR est non-AA >60 12/07/2020 03:04 AM    Calcium 9.9 12/07/2020 03:04 AM    Bilirubin, total 0.3 12/03/2020 03:30 AM    Alk.  phosphatase 110 12/03/2020 03:30 AM    Protein, total 6.6 12/03/2020 03:30 AM    Albumin 2.6 (L) 12/03/2020 03:30 AM    Globulin 4.0 12/03/2020 03:30 AM    A-G Ratio 0.7 (L) 12/03/2020 03:30 AM    ALT (SGPT) 33 12/03/2020 03:30 AM      Vitals:    12/31/20 0830 12/31/20 1736 01/01/21 0813 01/01/21 0914   BP: 118/78 118/75 (!) 111/56 120/60   Pulse: 94 76 (!) 101 100   Resp: 16 16 16    Temp: 97.3 °F (36.3 °C) 97.8 °F (36.6 °C) 98 °F (36.7 °C)    SpO2: 95%  95%    Weight:       Height:           Lab Results   Component Value Date/Time    Valproic acid 58 12/20/2020 06:20 AM     Lab Results   Component Value Date/Time    Lithium level 0.57 (L) 11/29/2020 05:27 PM       Vital Signs  Patient Vitals for the past 24 hrs:   Temp Pulse Resp BP SpO2   01/01/21 0914  100  120/60    01/01/21 0813 98 °F (36.7 °C) (!) 101 16 (!) 111/56 95 %   12/31/20 1736 97.8 °F (36.6 °C) 76 16 118/75      Wt Readings from Last 3 Encounters:   12/27/20 66.7 kg (147 lb)   11/29/20 75.4 kg (166 lb 3.6 oz)   12/02/20 75.4 kg (166 lb 3.6 oz)     Temp Readings from Last 3 Encounters:   01/01/21 98 °F (36.7 °C)   12/14/20 97.5 °F (36.4 °C)   09/12/20 98.1 °F (36.7 °C)     BP Readings from Last 3 Encounters:   01/01/21 120/60   12/14/20 (!) 113/58   09/12/20 128/72     Pulse Readings from Last 3 Encounters:   01/01/21 100   12/14/20 72   09/12/20 75       Radiology (reviewed/updated 1/1/2021)  Ct Head Wo Cont    Result Date: 11/29/2020  CLINICAL HISTORY: Encephalopathy INDICATION: Encephalopathy COMPARISON: 9/7/2018. CT dose reduction was achieved through use of a standardized protocol tailored for this examination and automatic exposure control for dose modulation. TECHNIQUE: Serial axial images with a collimation of 5 mm were obtained from the skull base through the vertex  FINDINGS: There is sulcal and ventricular prominence. Confluent periventricular and scattered foci of hypodensity in the cerebral white matter. There is no evidence of an acute infarction, hemorrhage, or mass-effect. There is no evidence of midline shift or hydrocephalus. Posterior fossa structures are unremarkable. No extra-axial collections are seen.  Mastoid air cells are well pneumatized and clear. Hypodensity in the central jerri. 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 IMPRESSION: No acute intracranial process. Small area of chronic encephalomalacia in the left frontal lobe. Imaging findings consistent with moderate chronic microvascular ischemic change. There is a mild degree of cerebral atrophy. Xr Chest Port    Result Date: 11/29/2020  EXAM: XR CHEST PORT INDICATION: fever COMPARISON: None. FINDINGS: A portable AP radiograph of the chest was obtained at 14:46 hours. The patient is on a cardiac monitor. The lungs are clear. The cardiac and mediastinal contours and pulmonary vascularity are normal.  The chest wall structures and visualized upper abdomen show no acute findings with incidental note of degenerative spine and shoulder changes as well as partial visualization of posterior windy and screw fixation hardware of the lumbar spine. IMPRESSION: No acute findings.       Current Facility-Administered Medications   Medication Dose Route Frequency Provider Last Rate Last Admin    valproic acid (as sodium salt) (DEPAKENE) 250 mg/5 mL (5 mL) oral solution 625 mg  625 mg Oral BID Nick Lombardi MD   625 mg at 01/01/21 5503    loperamide (IMODIUM) capsule 2 mg  2 mg Oral Q4H PRN V[place] Mosqueda DAKOTA NP   2 mg at 12/23/20 1014    nystatin (MYCOSTATIN) 100,000 unit/gram powder   Topical BID Hemalatha Vinson NP   Stopped at 12/31/20 1800    docusate sodium (COLACE) capsule 100 mg  100 mg Oral DAILY Hemalatha Vinson NP   Stopped at 12/31/20 0900    donepeziL (ARICEPT) tablet 5 mg  5 mg Oral QHS Hemalatha Vinson NP   5 mg at 12/31/20 2011    OLANZapine (ZyPREXA) tablet 2.5 mg  2.5 mg Oral Q6H PRN Kim Moore NP   2.5 mg at 12/23/20 1416    haloperidol lactate (HALDOL) injection 2.5 mg  2.5 mg IntraMUSCular Q6H PRN Kim Moore, NP        benztropine (COGENTIN) tablet 0.5 mg 0.5 mg Oral BID PRN Chaneli, Redge Ill, NP        diphenhydrAMINE (BENADRYL) injection 25 mg  25 mg IntraMUSCular BID PRN Oscar, Chelsea Ill, NP        acetaminophen (TYLENOL) tablet 650 mg  650 mg Oral Q4H PRN Kim Moore, NP   650 mg at 12/31/20 1426    magnesium hydroxide (MILK OF MAGNESIA) 400 mg/5 mL oral suspension 30 mL  30 mL Oral DAILY PRN Kim Moore D, NP   30 mL at 12/19/20 1221    traZODone (DESYREL) tablet 50 mg  50 mg Oral QHS PRN Oscar, Chelsea Ill, NP        propranoloL (INDERAL) tablet 10 mg  10 mg Oral BID Kim Moore, NP   10 mg at 01/01/21 0914    mirtazapine (REMERON) tablet 15 mg  15 mg Oral QHS Kim Moore, NP   15 mg at 12/31/20 2010    risperiDONE (RisperDAL) tablet 1 mg  1 mg Oral BID Marielena Hinton D, NP   1 mg at 01/01/21 0914       Side Effects: (reviewed/updated 1/1/2021)  None reported or admitted to. Review of Systems: (reviewed/updated 1/1/2021)  Appetite: good  Sleep: good   All other Review of Systems: negative    Mental Status Exam:  Eye contact: Good eye contact  Psychomotor activity: irritable   Speech is spontaneous  Thought process:confabulation  Mood is \"ok\"  Affect: constricted  Perception: No avh  Suicidal ideation: No si  Homicidal ideation: No hi  Insight/judgment: Poor  Cognition is impaired. MMSE-8      Physical Exam:  Musculoskeletal system: steady gait  Tremor not present  Cog wheeling not present      Assessment and Plan:  Ning Schroeder meets criteria for a diagnosis of Unspecified dementia with behavioral disturbance. Bipolar 1 disorder by history. Continue current medications as prescribed. We will closely monitor for safety. We will encourage reality orientation. Disposition planning to continue.        I certify that this patients inpatient psychiatric hospital services furnished since the previous certification were, and continue to be, required for treatment that could reasonably be expected to improve the patient's condition, or for diagnostic study, and that the patient continues to need, on a daily basis, active treatment furnished directly by or requiring the supervision of inpatient psychiatric facility personnel. In addition, the hospital records show that services furnished were intensive treatment services, admission or related services, or equivalent services.       Signed:  Freeda Osgood, MD  1/1/2021

## 2021-01-02 LAB — VALPROATE SERPL-MCNC: 76 UG/ML (ref 50–100)

## 2021-01-02 PROCEDURE — 80164 ASSAY DIPROPYLACETIC ACD TOT: CPT

## 2021-01-02 PROCEDURE — 74011250637 HC RX REV CODE- 250/637: Performed by: PSYCHIATRY & NEUROLOGY

## 2021-01-02 PROCEDURE — 74011000250 HC RX REV CODE- 250: Performed by: NURSE PRACTITIONER

## 2021-01-02 PROCEDURE — 74011250637 HC RX REV CODE- 250/637: Performed by: NURSE PRACTITIONER

## 2021-01-02 PROCEDURE — 65220000003 HC RM SEMIPRIVATE PSYCH

## 2021-01-02 PROCEDURE — 36415 COLL VENOUS BLD VENIPUNCTURE: CPT

## 2021-01-02 RX ADMIN — VALPROIC ACID 625 MG: 250 SOLUTION ORAL at 08:48

## 2021-01-02 RX ADMIN — MIRTAZAPINE 15 MG: 15 TABLET, FILM COATED ORAL at 21:00

## 2021-01-02 RX ADMIN — PROPRANOLOL HYDROCHLORIDE 10 MG: 10 TABLET ORAL at 08:49

## 2021-01-02 RX ADMIN — PROPRANOLOL HYDROCHLORIDE 10 MG: 10 TABLET ORAL at 16:24

## 2021-01-02 RX ADMIN — VALPROIC ACID 625 MG: 250 SOLUTION ORAL at 16:22

## 2021-01-02 RX ADMIN — LORATADINE 10 MG: 10 TABLET ORAL at 08:50

## 2021-01-02 RX ADMIN — RISPERIDONE 1 MG: 1 TABLET ORAL at 08:48

## 2021-01-02 RX ADMIN — DONEPEZIL HYDROCHLORIDE 5 MG: 5 TABLET, FILM COATED ORAL at 21:00

## 2021-01-02 RX ADMIN — NYSTATIN: 100000 POWDER TOPICAL at 08:50

## 2021-01-02 RX ADMIN — RISPERIDONE 1 MG: 1 TABLET ORAL at 20:00

## 2021-01-02 NOTE — PROGRESS NOTES
PSYCHIATRIC PROGRESS NOTE       Patient: Maylin Fernandez MRN: [id number]  SSN: [id number]    YOB: 1945  Age: 67 y.o. Sex: female      Admit Date: 12/14/2020    LOS: 19 days       Chief Complaint:  I am okay today. Interval History:  Maylin Fernandez is the same. She remains confused and keeps asking about the same thing repeatedly. Asks to be placed \"urgently\" in an [facility] and did not recall seeing this author previously. Calm and pleasant during the interview. No periods of agitation or aggression and has been compliant in taking her medications. Denies any SI or plan today. Past Medical History:  Past Medical History:   Diagnosis Date    Anemia     Arthritis     Bipolar disorder ([address]     Burning with urination     Memory change     Psychiatric disorder     Bipolar Disorder    Stool color black     Tremor          ALLERGIES:(reviewed/updated 1/2/2021)  Allergies   Allergen Reactions    Lamisil [Terbinafine] Diarrhea and Nausea and Vomiting    Thorazine [Chlorpromazine] Rash       Laboratory report:  Lab Results   Component Value Date/Time    WBC 9.8 12/08/2020 02:44 AM    HGB 11.2 (L) 12/08/2020 02:44 AM    HCT 34.9 (L) 12/08/2020 02:44 AM    PLATELET 937 68/52/9515 02:44 AM    .5 (H) 12/08/2020 02:44 AM      Lab Results   Component Value Date/Time    Sodium 140 12/07/2020 03:04 AM    Potassium 3.9 12/07/2020 03:04 AM    Chloride 108 12/07/2020 03:04 AM    CO2 26 12/07/2020 03:04 AM    Anion gap 6 12/07/2020 03:04 AM    Glucose 95 12/15/2020 04:22 AM    BUN 12 12/07/2020 03:04 AM    Creatinine 0.73 12/07/2020 03:04 AM    BUN/Creatinine ratio 16 12/07/2020 03:04 AM    GFR est AA >60 12/07/2020 03:04 AM    GFR est non-AA >60 12/07/2020 03:04 AM    Calcium 9.9 12/07/2020 03:04 AM    Bilirubin, total 0.3 12/03/2020 03:30 AM    Alk.  phosphatase 110 12/03/2020 03:30 AM    Protein, total 6.6 12/03/2020 03:30 AM    Albumin 2.6 (L) 12/03/2020 03:30 AM    Globulin 4.0 12/03/2020 03:30 AM A-G Ratio 0.7 (L) 12/03/2020 03:30 AM    ALT (SGPT) 33 12/03/2020 03:30 AM      Vitals:    01/01/21 0914 01/01/21 1602 01/01/21 2012 01/02/21 0817   BP: 120/60 (!) 154/77 128/67 137/69   Pulse: 100 81 81 98   Resp:  16 15 16   Temp:  97.8 °F (36.6 °C) 97.9 °F (36.6 °C) 97.6 °F (36.4 °C)   SpO2:  97% 95% 94%   Weight:       Height:           Lab Results   Component Value Date/Time    Valproic acid 76 01/02/2021 05:05 AM     Lab Results   Component Value Date/Time    Lithium level 0.57 (L) 11/29/2020 05:27 PM       Vital Signs  Patient Vitals for the past 24 hrs:   Temp Pulse Resp BP SpO2   01/02/21 0817 97.6 °F (36.4 °C) 98 16 137/69 94 %   01/01/21 2012 97.9 °F (36.6 °C) 81 15 128/67 95 %   01/01/21 1602 97.8 °F (36.6 °C) 81 16 (!) 154/77 97 %     Wt Readings from Last 3 Encounters:   12/27/20 66.7 kg (147 lb)   11/29/20 75.4 kg (166 lb 3.6 oz)   12/02/20 75.4 kg (166 lb 3.6 oz)     Temp Readings from Last 3 Encounters:   01/02/21 97.6 °F (36.4 °C)   12/14/20 97.5 °F (36.4 °C)   09/12/20 98.1 °F (36.7 °C)     BP Readings from Last 3 Encounters:   01/02/21 137/69   12/14/20 (!) 113/58   09/12/20 128/72     Pulse Readings from Last 3 Encounters:   01/02/21 98   12/14/20 72   09/12/20 75       Radiology (reviewed/updated 1/2/2021)  Ct Head Wo Cont    Result Date: 11/29/2020  CLINICAL HISTORY: Encephalopathy INDICATION: Encephalopathy COMPARISON: 9/7/2018. CT dose reduction was achieved through use of a standardized protocol tailored for this examination and automatic exposure control for dose modulation. TECHNIQUE: Serial axial images with a collimation of 5 mm were obtained from the skull base through the vertex  FINDINGS: There is sulcal and ventricular prominence. Confluent periventricular and scattered foci of hypodensity in the cerebral white matter. There is no evidence of an acute infarction, hemorrhage, or mass-effect. There is no evidence of midline shift or hydrocephalus.  Posterior fossa structures are unremarkable. No extra-axial collections are seen. Mastoid air cells are well pneumatized and clear. Hypodensity in the central jerri. 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 IMPRESSION: No acute intracranial process. Small area of chronic encephalomalacia in the left frontal lobe. Imaging findings consistent with moderate chronic microvascular ischemic change. There is a mild degree of cerebral atrophy. Xr Chest Port    Result Date: 11/29/2020  EXAM: XR CHEST PORT INDICATION: fever COMPARISON: None. FINDINGS: A portable AP radiograph of the chest was obtained at 14:46 hours. The patient is on a cardiac monitor. The lungs are clear. The cardiac and mediastinal contours and pulmonary vascularity are normal.  The chest wall structures and visualized upper abdomen show no acute findings with incidental note of degenerative spine and shoulder changes as well as partial visualization of posterior windy and screw fixation hardware of the lumbar spine. IMPRESSION: No acute findings.       Current Facility-Administered Medications   Medication Dose Route Frequency Provider Last Rate Last Admin    loratadine (CLARITIN) tablet 10 mg  10 mg Oral DAILY Le Zamora MD   10 mg at 01/02/21 0850    lidocaine 4 % patch 1 Patch  1 Patch TransDERmal Q24H Jonytoshia Rosenberg' V, FNP   1 Patch at 01/01/21 2316    valproic acid (as sodium salt) (DEPAKENE) 250 mg/5 mL (5 mL) oral solution 625 mg  625 mg Oral BID Le Zamora MD   625 mg at 01/02/21 0848    loperamide (IMODIUM) capsule 2 mg  2 mg Oral Q4H PRN Hemalatha Vinson NP   2 mg at 12/23/20 1014    nystatin (MYCOSTATIN) 100,000 unit/gram powder   Topical BID Theron KIM NP   Given at 01/02/21 0850    docusate sodium (COLACE) capsule 100 mg  100 mg Oral DAILY Hemalatha Vinson NP   Stopped at 12/31/20 0900    donepeziL (ARICEPT) tablet 5 mg  5 mg Oral QHS Hemalatha Vinson NP   5 mg at 01/01/21 2053  OLANZapine (ZyPREXA) tablet 2.5 mg  2.5 mg Oral Q6H PRN Kim Moore, NP   2.5 mg at 12/23/20 1416    haloperidol lactate (HALDOL) injection 2.5 mg  2.5 mg IntraMUSCular Q6H PRN Renu Moore, NP        benztropine (COGENTIN) tablet 0.5 mg  0.5 mg Oral BID PRN Renu Moore, NP        diphenhydrAMINE (BENADRYL) injection 25 mg  25 mg IntraMUSCular BID PRN Renu Moore, NP        acetaminophen (TYLENOL) tablet 650 mg  650 mg Oral Q4H PRN Kim Moore, NP   650 mg at 01/01/21 2242    magnesium hydroxide (MILK OF MAGNESIA) 400 mg/5 mL oral suspension 30 mL  30 mL Oral DAILY PRN Kim Moore, NP   30 mL at 12/19/20 1221    traZODone (DESYREL) tablet 50 mg  50 mg Oral QHS PRN Renu Moore, NP        propranoloL (INDERAL) tablet 10 mg  10 mg Oral BID Kim Moore, NP   10 mg at 01/02/21 0849    mirtazapine (REMERON) tablet 15 mg  15 mg Oral QHS Kim Moore, NP   15 mg at 01/01/21 2053    risperiDONE (RisperDAL) tablet 1 mg  1 mg Oral BID Kim Moore, NP   1 mg at 01/02/21 0848       Side Effects: (reviewed/updated 1/2/2021)  None reported or admitted to. Review of Systems: (reviewed/updated 1/2/2021)  Appetite: good  Sleep: good   All other Review of Systems: negative    Mental Status Exam:  Eye contact: Good eye contact  Psychomotor activity: irritable   Speech is spontaneous  Thought process:confabulation  Mood is \"ok\"  Affect: constricted  Perception: No avh  Suicidal ideation: No si  Homicidal ideation: No hi  Insight/judgment: Poor  Cognition is impaired. MMSE-8      Physical Exam:  Musculoskeletal system: steady gait  Tremor not present  Cog wheeling not present      Assessment and Plan:  Hien Faye meets criteria for a diagnosis of Unspecified dementia with behavioral disturbance. Bipolar 1 disorder by history. Continue current medications as prescribed. We will closely monitor for safety. We will encourage reality orientation. Disposition planning to continue. I certify that this patients inpatient psychiatric hospital services furnished since the previous certification were, and continue to be, required for treatment that could reasonably be expected to improve the patient's condition, or for diagnostic study, and that the patient continues to need, on a daily basis, active treatment furnished directly by or requiring the supervision of inpatient psychiatric facility personnel. In addition, the hospital records show that services furnished were intensive treatment services, admission or related services, or equivalent services.       Signed:  Jhon Hamilton MD  1/2/2021

## 2021-01-02 NOTE — PROGRESS NOTES
Problem: Falls - Risk of  Goal: *Absence of Falls  Description: Document Vinny Quiroz Fall Risk and appropriate interventions in the flowsheet. Outcome: Progressing Towards Goal  Note: Fall Risk Interventions:  Mobility Interventions: Assess mobility with egress test, Bed/chair exit alarm, Communicate number of staff needed for ambulation/transfer, Patient to call before getting OOB, Utilize walker, cane, or other assistive device    Mentation Interventions: Adequate sleep, hydration, pain control, Bed/chair exit alarm, Door open when patient unattended, More frequent rounding, Reorient patient, Room close to nurse's station    Medication Interventions: Bed/chair exit alarm, Patient to call before getting OOB, Teach patient to arise slowly    Elimination Interventions: Bed/chair exit alarm, Toilet paper/wipes in reach, Toileting schedule/hourly rounds    History of Falls Interventions: Bed/chair exit alarm, Door open when patient unattended, Room close to nurse's station         Problem: Altered Thought Process (Adult/Pediatric)  Goal: *STG: Participates in treatment plan  Outcome: Progressing Towards Goal  Note: Pt is visible in the DR calm cooperative and pleasant   Mood is labile interacting with other peers  Alert to self only today   Med/meal compliant   Preoccupied about discharge and wanting to go home  Ambulates with a  walker partial assistance needed   NAD noted   Will continue to monitor.

## 2021-01-02 NOTE — PROGRESS NOTES
Problem: Falls - Risk of  Goal: *Absence of Falls  Description: Document Stefanie Collado Fall Risk and appropriate interventions in the flowsheet. Outcome: Progressing Towards Goal  Note: Fall Risk Interventions:  Mobility Interventions: Assess mobility with egress test    Mentation Interventions: Adequate sleep, hydration, pain control, Bed/chair exit alarm, Door open when patient unattended, More frequent rounding, Reorient patient, Room close to nurse's station    Medication Interventions: Teach patient to arise slowly    Elimination Interventions:  Toilet paper/wipes in reach, Bed/chair exit alarm, Stay With Me (per policy)    History of Falls Interventions: Door open when patient unattended       Will continue q 15 min safety checks  Pt continues to ambulate with a walker  Gait is steady at present

## 2021-01-02 NOTE — PROGRESS NOTES
Pt appears asleep in bed, NAD, even respirations, will continue to monitor q15min     Problem: Falls - Risk of  Goal: *Absence of Falls  Description: Document Lita Talbot Fall Risk and appropriate interventions in the flowsheet.   Outcome: Progressing Towards Goal  Note: Fall Risk Interventions:  Mobility Interventions: Communicate number of staff needed for ambulation/transfer, Utilize walker, cane, or other assistive device    Mentation Interventions: Bed/chair exit alarm, Adequate sleep, hydration, pain control, Door open when patient unattended, More frequent rounding    Medication Interventions: Teach patient to arise slowly, Patient to call before getting OOB    Elimination Interventions: Patient to call for help with toileting needs    History of Falls Interventions: Door open when patient unattended

## 2021-01-03 PROCEDURE — 74011250637 HC RX REV CODE- 250/637: Performed by: NURSE PRACTITIONER

## 2021-01-03 PROCEDURE — 74011250637 HC RX REV CODE- 250/637: Performed by: PSYCHIATRY & NEUROLOGY

## 2021-01-03 PROCEDURE — 65220000003 HC RM SEMIPRIVATE PSYCH

## 2021-01-03 PROCEDURE — 74011000250 HC RX REV CODE- 250: Performed by: NURSE PRACTITIONER

## 2021-01-03 RX ADMIN — ACETAMINOPHEN 650 MG: 325 TABLET ORAL at 16:38

## 2021-01-03 RX ADMIN — NYSTATIN: 100000 POWDER TOPICAL at 10:25

## 2021-01-03 RX ADMIN — VALPROIC ACID 625 MG: 250 SOLUTION ORAL at 10:22

## 2021-01-03 RX ADMIN — LORATADINE 10 MG: 10 TABLET ORAL at 10:22

## 2021-01-03 RX ADMIN — RISPERIDONE 1 MG: 1 TABLET ORAL at 20:52

## 2021-01-03 RX ADMIN — NYSTATIN: 100000 POWDER TOPICAL at 16:37

## 2021-01-03 RX ADMIN — VALPROIC ACID 625 MG: 250 SOLUTION ORAL at 16:37

## 2021-01-03 RX ADMIN — RISPERIDONE 1 MG: 1 TABLET ORAL at 10:22

## 2021-01-03 RX ADMIN — DONEPEZIL HYDROCHLORIDE 5 MG: 5 TABLET, FILM COATED ORAL at 20:52

## 2021-01-03 RX ADMIN — PROPRANOLOL HYDROCHLORIDE 10 MG: 10 TABLET ORAL at 10:22

## 2021-01-03 RX ADMIN — PROPRANOLOL HYDROCHLORIDE 10 MG: 10 TABLET ORAL at 16:37

## 2021-01-03 RX ADMIN — MIRTAZAPINE 15 MG: 15 TABLET, FILM COATED ORAL at 20:52

## 2021-01-03 NOTE — PROGRESS NOTES
Problem: Falls - Risk of  Goal: *Absence of Falls  Description: Document Meg Farooq Fall Risk and appropriate interventions in the flowsheet. Outcome: Progressing Towards Goal  Note: Fall Risk Interventions:  Mobility Interventions: Assess mobility with egress test, Bed/chair exit alarm, Communicate number of staff needed for ambulation/transfer, Patient to call before getting OOB, Utilize walker, cane, or other assistive device    Mentation Interventions: Adequate sleep, hydration, pain control, Bed/chair exit alarm, Door open when patient unattended, More frequent rounding, Reorient patient, Room close to nurse's station    Medication Interventions: Bed/chair exit alarm, Patient to call before getting OOB, Teach patient to arise slowly    Elimination Interventions: Bed/chair exit alarm, Patient to call for help with toileting needs, Stay With Me (per policy), Toilet paper/wipes in reach, Toileting schedule/hourly rounds    History of Falls Interventions: Bed/chair exit alarm, Door open when patient unattended, Room close to nurse's station         Problem: Altered Thought Process (Adult/Pediatric)  Goal: *STG: Participates in treatment plan  Outcome: Progressing Towards Goal  Note: Pt is visible in the DR talking to other peers and staff. Calm pleasant and cooperative   Ambulates with a walker partial assistance needed   Alert to self only   Med/meal compliant   NAD noted   Will continue to monitor.

## 2021-01-03 NOTE — PROGRESS NOTES
Pt appears asleep. No distress noted. Respirations WNL. Will continue to monitor q15 for safety. Problem: Falls - Risk of  Goal: *Absence of Falls  Description: Document Starleen Freeze Fall Risk and appropriate interventions in the flowsheet.   Outcome: Progressing Towards Goal  Note: Fall Risk Interventions:  Mobility Interventions: Assess mobility with egress test    Mentation Interventions: Adequate sleep, hydration, pain control, Bed/chair exit alarm    Medication Interventions: Teach patient to arise slowly    Elimination Interventions: Patient to call for help with toileting needs    History of Falls Interventions: Room close to nurse's station       Sleep= 7

## 2021-01-03 NOTE — PROGRESS NOTES
Problem: Altered Thought Process (Adult/Pediatric)  Goal: *STG: Participates in treatment plan  Outcome: Progressing Towards Goal  Goal: *STG: Complies with medication therapy  Outcome: Progressing Towards Goal  Goal: *STG: Attends activities and groups  Outcome: Progressing Towards Goal  Goal: *STG: Demonstrates ability to understand and use improved judgment in daily activities and relationships  Outcome: Progressing Towards Goal  Goal: Interventions  Outcome: Progressing Towards Goal     Problem: Patient Education: Go to Patient Education Activity  Goal: Patient/Family Education  Outcome: Progressing Towards Goal   Pt is calmer at present  Note no agitation at present  Pt is engaged on unit  Continues  to be engaged on unit

## 2021-01-03 NOTE — PROGRESS NOTES
PSYCHIATRIC PROGRESS NOTE       Patient: Vin Landis MRN: [id number]  SSN: [id number]    YOB: 1945  Age: 67 y.o. Sex: female      Admit Date: 12/14/2020    LOS: 20 days       Chief Complaint:  I want to leave soon    Interval History:  Vin Landis is the same. She keeps talking about being able to go home even thought there is very little chance of that. Mildly irritable when it was suggested that she is having memory loss. Pleasant and calm otherwise. No adverse events are noted form her medications. Pleasant and calm during the interview. Past Medical History:  Past Medical History:   Diagnosis Date    Anemia     Arthritis     Bipolar disorder ([address]     Burning with urination     Memory change     Psychiatric disorder     Bipolar Disorder    Stool color black     Tremor          ALLERGIES:(reviewed/updated 1/3/2021)  Allergies   Allergen Reactions    Lamisil [Terbinafine] Diarrhea and Nausea and Vomiting    Thorazine [Chlorpromazine] Rash       Laboratory report:  Lab Results   Component Value Date/Time    WBC 9.8 12/08/2020 02:44 AM    HGB 11.2 (L) 12/08/2020 02:44 AM    HCT 34.9 (L) 12/08/2020 02:44 AM    PLATELET 512 33/79/8178 02:44 AM    .5 (H) 12/08/2020 02:44 AM      Lab Results   Component Value Date/Time    Sodium 140 12/07/2020 03:04 AM    Potassium 3.9 12/07/2020 03:04 AM    Chloride 108 12/07/2020 03:04 AM    CO2 26 12/07/2020 03:04 AM    Anion gap 6 12/07/2020 03:04 AM    Glucose 95 12/15/2020 04:22 AM    BUN 12 12/07/2020 03:04 AM    Creatinine 0.73 12/07/2020 03:04 AM    BUN/Creatinine ratio 16 12/07/2020 03:04 AM    GFR est AA >60 12/07/2020 03:04 AM    GFR est non-AA >60 12/07/2020 03:04 AM    Calcium 9.9 12/07/2020 03:04 AM    Bilirubin, total 0.3 12/03/2020 03:30 AM    Alk.  phosphatase 110 12/03/2020 03:30 AM    Protein, total 6.6 12/03/2020 03:30 AM    Albumin 2.6 (L) 12/03/2020 03:30 AM    Globulin 4.0 12/03/2020 03:30 AM    A-G Ratio 0.7 (L) 12/03/2020 03:30 AM    ALT (SGPT) 33 12/03/2020 03:30 AM      Vitals:    01/02/21 1624 01/02/21 1628 01/02/21 2010 01/03/21 0845   BP: 121/74 121/74 124/78 123/81   Pulse: 81 81 82 84   Resp:  16 16 16   Temp:  98.8 °F (37.1 °C) 97.8 °F (36.6 °C) 97.9 °F (36.6 °C)   SpO2:  95% 98% 97%   Weight:       Height:           Lab Results   Component Value Date/Time    Valproic acid 76 01/02/2021 05:05 AM     Lab Results   Component Value Date/Time    Lithium level 0.57 (L) 11/29/2020 05:27 PM       Vital Signs  Patient Vitals for the past 24 hrs:   Temp Pulse Resp BP SpO2   01/03/21 0845 97.9 °F (36.6 °C) 84 16 123/81 97 %   01/02/21 2010 97.8 °F (36.6 °C) 82 16 124/78 98 %   01/02/21 1628 98.8 °F (37.1 °C) 81 16 121/74 95 %   01/02/21 1624  81  121/74      Wt Readings from Last 3 Encounters:   12/27/20 66.7 kg (147 lb)   11/29/20 75.4 kg (166 lb 3.6 oz)   12/02/20 75.4 kg (166 lb 3.6 oz)     Temp Readings from Last 3 Encounters:   01/03/21 97.9 °F (36.6 °C)   12/14/20 97.5 °F (36.4 °C)   09/12/20 98.1 °F (36.7 °C)     BP Readings from Last 3 Encounters:   01/03/21 123/81   12/14/20 (!) 113/58   09/12/20 128/72     Pulse Readings from Last 3 Encounters:   01/03/21 84   12/14/20 72   09/12/20 75       Radiology (reviewed/updated 1/3/2021)  Ct Head Wo Cont    Result Date: 11/29/2020  CLINICAL HISTORY: Encephalopathy INDICATION: Encephalopathy COMPARISON: 9/7/2018. CT dose reduction was achieved through use of a standardized protocol tailored for this examination and automatic exposure control for dose modulation. TECHNIQUE: Serial axial images with a collimation of 5 mm were obtained from the skull base through the vertex  FINDINGS: There is sulcal and ventricular prominence. Confluent periventricular and scattered foci of hypodensity in the cerebral white matter. There is no evidence of an acute infarction, hemorrhage, or mass-effect. There is no evidence of midline shift or hydrocephalus.  Posterior fossa structures are unremarkable. No extra-axial collections are seen. Mastoid air cells are well pneumatized and clear. Hypodensity in the central jerri. 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 IMPRESSION: No acute intracranial process. Small area of chronic encephalomalacia in the left frontal lobe. Imaging findings consistent with moderate chronic microvascular ischemic change. There is a mild degree of cerebral atrophy. Xr Chest Port    Result Date: 11/29/2020  EXAM: XR CHEST PORT INDICATION: fever COMPARISON: None. FINDINGS: A portable AP radiograph of the chest was obtained at 14:46 hours. The patient is on a cardiac monitor. The lungs are clear. The cardiac and mediastinal contours and pulmonary vascularity are normal.  The chest wall structures and visualized upper abdomen show no acute findings with incidental note of degenerative spine and shoulder changes as well as partial visualization of posterior windy and screw fixation hardware of the lumbar spine. IMPRESSION: No acute findings.       Current Facility-Administered Medications   Medication Dose Route Frequency Provider Last Rate Last Admin    loratadine (CLARITIN) tablet 10 mg  10 mg Oral DAILY Jenny Rolle MD   10 mg at 01/03/21 1022    lidocaine 4 % patch 1 Patch  1 Patch TransDERmal Q24H Robbie Hashimoto' GEOVANY, ROMELP   1 Patch at 01/02/21 2100    valproic acid (as sodium salt) (DEPAKENE) 250 mg/5 mL (5 mL) oral solution 625 mg  625 mg Oral BID Jenny Rolle MD   625 mg at 01/03/21 1022    loperamide (IMODIUM) capsule 2 mg  2 mg Oral Q4H PRN Hemalatha Vinson NP   2 mg at 12/23/20 1014    nystatin (MYCOSTATIN) 100,000 unit/gram powder   Topical BID Hemalatha Vinson NP   Given at 01/03/21 1025    docusate sodium (COLACE) capsule 100 mg  100 mg Oral DAILY Hemalatha Vinson NP   Stopped at 12/31/20 0900    donepeziL (ARICEPT) tablet 5 mg  5 mg Oral QHS Hemalatha Vinson NP   5 mg at 01/02/21 2100  OLANZapine (ZyPREXA) tablet 2.5 mg  2.5 mg Oral Q6H PRN Kim Moore, NP   2.5 mg at 12/23/20 1416    haloperidol lactate (HALDOL) injection 2.5 mg  2.5 mg IntraMUSCular Q6H PRN Pasquale Moore Se, NP        benztropine (COGENTIN) tablet 0.5 mg  0.5 mg Oral BID PRN Pasquale Moore Se, NP        diphenhydrAMINE (BENADRYL) injection 25 mg  25 mg IntraMUSCular BID PRN Pasquale Moore Se, NP        acetaminophen (TYLENOL) tablet 650 mg  650 mg Oral Q4H PRN Kim Moore, NP   650 mg at 01/01/21 2242    magnesium hydroxide (MILK OF MAGNESIA) 400 mg/5 mL oral suspension 30 mL  30 mL Oral DAILY PRN Kim Moore, NP   30 mL at 12/19/20 1221    traZODone (DESYREL) tablet 50 mg  50 mg Oral QHS PRN Pasquale Moore Se, NP        propranoloL (INDERAL) tablet 10 mg  10 mg Oral BID Kim Moore, NP   10 mg at 01/03/21 1022    mirtazapine (REMERON) tablet 15 mg  15 mg Oral QHS Kim Moore, NP   15 mg at 01/02/21 2100    risperiDONE (RisperDAL) tablet 1 mg  1 mg Oral BID Kim Moore, NP   1 mg at 01/03/21 1022       Side Effects: (reviewed/updated 1/3/2021)  None reported or admitted to. Review of Systems: (reviewed/updated 1/3/2021)  Appetite: good  Sleep: good   All other Review of Systems: negative    Mental Status Exam:  Eye contact: Good eye contact  Psychomotor activity: irritable   Speech is spontaneous  Thought process:confabulation  Mood is \"ok\"  Affect: constricted  Perception: No avh  Suicidal ideation: No si  Homicidal ideation: No hi  Insight/judgment: Poor  Cognition is impaired. MMSE-8      Physical Exam:  Musculoskeletal system: steady gait  Tremor not present  Cog wheeling not present      Assessment and Plan:  Willa Barton meets criteria for a diagnosis of Unspecified dementia with behavioral disturbance. Bipolar 1 disorder by history. Continue current medications as prescribed. We will closely monitor for safety. We will encourage reality orientation. Disposition planning to continue. I certify that this patients inpatient psychiatric hospital services furnished since the previous certification were, and continue to be, required for treatment that could reasonably be expected to improve the patient's condition, or for diagnostic study, and that the patient continues to need, on a daily basis, active treatment furnished directly by or requiring the supervision of inpatient psychiatric facility personnel. In addition, the hospital records show that services furnished were intensive treatment services, admission or related services, or equivalent services.       Signed:  Donny Shankar MD  1/3/2021

## 2021-01-04 PROCEDURE — 74011250637 HC RX REV CODE- 250/637: Performed by: NURSE PRACTITIONER

## 2021-01-04 PROCEDURE — 74011000250 HC RX REV CODE- 250: Performed by: NURSE PRACTITIONER

## 2021-01-04 PROCEDURE — 65220000003 HC RM SEMIPRIVATE PSYCH

## 2021-01-04 PROCEDURE — 74011250637 HC RX REV CODE- 250/637: Performed by: PSYCHIATRY & NEUROLOGY

## 2021-01-04 RX ADMIN — NYSTATIN: 100000 POWDER TOPICAL at 09:32

## 2021-01-04 RX ADMIN — RISPERIDONE 1 MG: 1 TABLET ORAL at 21:14

## 2021-01-04 RX ADMIN — RISPERIDONE 1 MG: 1 TABLET ORAL at 09:24

## 2021-01-04 RX ADMIN — MIRTAZAPINE 15 MG: 15 TABLET, FILM COATED ORAL at 21:14

## 2021-01-04 RX ADMIN — VALPROIC ACID 625 MG: 250 SOLUTION ORAL at 09:24

## 2021-01-04 RX ADMIN — LORATADINE 10 MG: 10 TABLET ORAL at 09:24

## 2021-01-04 RX ADMIN — DONEPEZIL HYDROCHLORIDE 5 MG: 5 TABLET, FILM COATED ORAL at 21:14

## 2021-01-04 RX ADMIN — NYSTATIN: 100000 POWDER TOPICAL at 16:39

## 2021-01-04 RX ADMIN — PROPRANOLOL HYDROCHLORIDE 10 MG: 10 TABLET ORAL at 16:33

## 2021-01-04 RX ADMIN — PROPRANOLOL HYDROCHLORIDE 10 MG: 10 TABLET ORAL at 09:24

## 2021-01-04 RX ADMIN — VALPROIC ACID 625 MG: 250 SOLUTION ORAL at 16:31

## 2021-01-04 RX ADMIN — DOCUSATE SODIUM 100 MG: 100 CAPSULE ORAL at 09:24

## 2021-01-04 NOTE — PROGRESS NOTES
Problem: Falls - Risk of  Goal: *Absence of Falls  Description: Document Primo Navarro Fall Risk and appropriate interventions in the flowsheet.   Outcome: Progressing Towards Goal  Note: Fall Risk Interventions:  Mobility Interventions: Bed/chair exit alarm    Mentation Interventions: Adequate sleep, hydration, pain control    Medication Interventions: Bed/chair exit alarm    Elimination Interventions: Patient to call for help with toileting needs    History of Falls Interventions: Bed/chair exit alarm

## 2021-01-04 NOTE — INTERDISCIPLINARY ROUNDS
Behavioral Health Interdisciplinary Rounds Patient Name: Yasmin Vallejo  Age: 67 y.o. Room/Bed:  740/ Primary Diagnosis: <principal problem not specified> Admission Status: Involuntary Commitment Readmission within 30 days: no 
Power of  in place: no 
Patient requires a blocked bed: no          Reason for blocked bed: VTE Prophylaxis: No 
 
Mobility needs/Fall risk: yes Flu Vaccine : Refused Nutritional Plan: no 
Consults:         
Labs/Testing due today?: no 
 
Sleep hours:  8 Participation in Care/Groups:  yes Medication Compliant?: Yes PRNS (last 24 hours): Pain Restraints (last 24 hours):  no 
  
CIWA (range last 24 hours): COWS (range last 24 hours): Alcohol screening (AUDIT) completed -   AUDIT Score: 0 If applicable, date SBIRT discussed in treatment team AND documented:  
AUDIT Screen Score: AUDIT Score: 0 Tobacco - patient is a smoker: Have You Used Tobacco in the Past 30 Days: No 
Illegal Drugs use: Have You Used Any Illegal Substances Over the Past 12 Months: No 
 
24 hour chart check complete: yes Patient goal(s) for today:  
Treatment team focus/goals: titrate medication, offer emotional support and behavioral redirections. Progress note: Pt is involuntarily committed to [facility] for inability to care for self. She cannot return home and requires placement. Daughter, Suze Braun, is not POA and is seeking guardianship. Pt is confused and said \"shit, I am looking my mind I need a diagnosis\" She states she has been at [facility] since 11/3/20 and is waiting for a written diagnosis and will complain to the board when she is discharged. 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 Family contact: daughter, Deni Birmingham ([phone]) Patient's preferred phone number for follow up call : [phone] LOS:  20  Expected LOS: TBD 
 
 Participating treatment team members: Otilia Healy, Lizette Nick NP, Cheyanne Aiken, MSW; La Fernández RN

## 2021-01-04 NOTE — PROGRESS NOTES
PSYCHIATRIC PROGRESS NOTE       Patient: Pauline Fuller MRN: [id number]  SSN: [id number]    YOB: 1945  Age: 67 y.o. Sex: female      Admit Date: 12/14/2020    LOS: 21 days       Chief Complaint:  I haven't talked to my daughter in 2 nights. Interval History:  Pauline Fuller is the same. She says she wants to have her diagnosis in writing, says she's been admitted since November 3rd. She is initially mildly irritable, demanding to be released but started smiling at the latter part of the interview. Profoundly confused, \"I have so much trouble with the language. \" She is unable to finish her sentence. She is tolerating her meds and denies side effects. Denies si or hi. Past Medical History:  Past Medical History:   Diagnosis Date    Anemia     Arthritis     Bipolar disorder ([facility] [address]     Burning with urination     Memory change     Psychiatric disorder     Bipolar Disorder    Stool color black     Tremor          ALLERGIES:(reviewed/updated 1/4/2021)  Allergies   Allergen Reactions    Lamisil [Terbinafine] Diarrhea and Nausea and Vomiting    Thorazine [Chlorpromazine] Rash       Laboratory report:  Lab Results   Component Value Date/Time    WBC 9.8 12/08/2020 02:44 AM    HGB 11.2 (L) 12/08/2020 02:44 AM    HCT 34.9 (L) 12/08/2020 02:44 AM    PLATELET 874 08/27/9314 02:44 AM    .5 (H) 12/08/2020 02:44 AM      Lab Results   Component Value Date/Time    Sodium 140 12/07/2020 03:04 AM    Potassium 3.9 12/07/2020 03:04 AM    Chloride 108 12/07/2020 03:04 AM    CO2 26 12/07/2020 03:04 AM    Anion gap 6 12/07/2020 03:04 AM    Glucose 95 12/15/2020 04:22 AM    BUN 12 12/07/2020 03:04 AM    Creatinine 0.73 12/07/2020 03:04 AM    BUN/Creatinine ratio 16 12/07/2020 03:04 AM    GFR est AA >60 12/07/2020 03:04 AM    GFR est non-AA >60 12/07/2020 03:04 AM    Calcium 9.9 12/07/2020 03:04 AM    Bilirubin, total 0.3 12/03/2020 03:30 AM    Alk.  phosphatase 110 12/03/2020 03:30 AM    Protein, total 6.6 12/03/2020 03:30 AM    Albumin 2.6 (L) 12/03/2020 03:30 AM    Globulin 4.0 12/03/2020 03:30 AM    A-G Ratio 0.7 (L) 12/03/2020 03:30 AM    ALT (SGPT) 33 12/03/2020 03:30 AM      Vitals:    01/03/21 1313 01/03/21 1718 01/03/21 2042 01/04/21 0742   BP:  123/81 127/75 (!) 145/74   Pulse:  85 76 99   Resp:  20 16 18   Temp:  97.2 °F (36.2 °C) 98.3 °F (36.8 °C) 98.1 °F (36.7 °C)   SpO2:  96% 95% 98%   Weight: 67.6 kg (149 lb)      Height:           Lab Results   Component Value Date/Time    Valproic acid 76 01/02/2021 05:05 AM     Lab Results   Component Value Date/Time    Lithium level 0.57 (L) 11/29/2020 05:27 PM       Vital Signs  Patient Vitals for the past 24 hrs:   Temp Pulse Resp BP SpO2   01/04/21 0742 98.1 °F (36.7 °C) 99 18 (!) 145/74 98 %   01/03/21 2042 98.3 °F (36.8 °C) 76 16 127/75 95 %   01/03/21 1718 97.2 °F (36.2 °C) 85 20 123/81 96 %     Wt Readings from Last 3 Encounters:   01/03/21 67.6 kg (149 lb)   11/29/20 75.4 kg (166 lb 3.6 oz)   12/02/20 75.4 kg (166 lb 3.6 oz)     Temp Readings from Last 3 Encounters:   01/04/21 98.1 °F (36.7 °C)   12/14/20 97.5 °F (36.4 °C)   09/12/20 98.1 °F (36.7 °C)     BP Readings from Last 3 Encounters:   01/04/21 (!) 145/74   12/14/20 (!) 113/58   09/12/20 128/72     Pulse Readings from Last 3 Encounters:   01/04/21 99   12/14/20 72   09/12/20 75       Radiology (reviewed/updated 1/4/2021)  Ct Head Wo Cont    Result Date: 11/29/2020  CLINICAL HISTORY: Encephalopathy INDICATION: Encephalopathy COMPARISON: 9/7/2018. CT dose reduction was achieved through use of a standardized protocol tailored for this examination and automatic exposure control for dose modulation. TECHNIQUE: Serial axial images with a collimation of 5 mm were obtained from the skull base through the vertex  FINDINGS: There is sulcal and ventricular prominence. Confluent periventricular and scattered foci of hypodensity in the cerebral white matter.  There is no evidence of an acute infarction, hemorrhage, or mass-effect. There is no evidence of midline shift or hydrocephalus. Posterior fossa structures are unremarkable. No extra-axial collections are seen. Mastoid air cells are well pneumatized and clear. Hypodensity in the central jerri. 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 IMPRESSION: No acute intracranial process. Small area of chronic encephalomalacia in the left frontal lobe. Imaging findings consistent with moderate chronic microvascular ischemic change. There is a mild degree of cerebral atrophy. Xr Chest Port    Result Date: 11/29/2020  EXAM: XR CHEST PORT INDICATION: fever COMPARISON: None. FINDINGS: A portable AP radiograph of the chest was obtained at 14:46 hours. The patient is on a cardiac monitor. The lungs are clear. The cardiac and mediastinal contours and pulmonary vascularity are normal.  The chest wall structures and visualized upper abdomen show no acute findings with incidental note of degenerative spine and shoulder changes as well as partial visualization of posterior windy and screw fixation hardware of the lumbar spine. IMPRESSION: No acute findings.       Current Facility-Administered Medications   Medication Dose Route Frequency Provider Last Rate Last Admin    loratadine (CLARITIN) tablet 10 mg  10 mg Oral DAILY Ila Daniels MD   10 mg at 01/04/21 0924    lidocaine 4 % patch 1 Patch  1 Patch TransDERmal Q24H BRITTANY Torres   1 Patch at 01/03/21 2052    valproic acid (as sodium salt) (DEPAKENE) 250 mg/5 mL (5 mL) oral solution 625 mg  625 mg Oral BID Ila Daniels MD   625 mg at 01/04/21 2468    loperamide (IMODIUM) capsule 2 mg  2 mg Oral Q4H PRN Akash KIM NP   2 mg at 12/23/20 1014    nystatin (MYCOSTATIN) 100,000 unit/gram powder   Topical BID Akash KIM NP   Given at 01/04/21 0932    docusate sodium (COLACE) capsule 100 mg  100 mg Oral DAILY Hemalatha Vinson NP   100 mg at 01/04/21 8412    donepeziL (ARICEPT) tablet 5 mg  5 mg Oral QHS KarelYolandaHemalatha A, NP   5 mg at 01/03/21 2052    OLANZapine (ZyPREXA) tablet 2.5 mg  2.5 mg Oral Q6H PRN Kim Moore, NP   2.5 mg at 12/23/20 1416    haloperidol lactate (HALDOL) injection 2.5 mg  2.5 mg IntraMUSCular Q6H PRN Hawa Moore, NP        benztropine (COGENTIN) tablet 0.5 mg  0.5 mg Oral BID PRN Hawa Moore, MAX        diphenhydrAMINE (BENADRYL) injection 25 mg  25 mg IntraMUSCular BID PRN Hawa Moore, NP        acetaminophen (TYLENOL) tablet 650 mg  650 mg Oral Q4H PRN Kim Moore, NP   650 mg at 01/03/21 1638    magnesium hydroxide (MILK OF MAGNESIA) 400 mg/5 mL oral suspension 30 mL  30 mL Oral DAILY PRN Kim Moore, NP   30 mL at 12/19/20 1221    traZODone (DESYREL) tablet 50 mg  50 mg Oral QHS PRN Hawa Moore NP        propranoloL (INDERAL) tablet 10 mg  10 mg Oral BID Kim Moore, NP   10 mg at 01/04/21 0924    mirtazapine (REMERON) tablet 15 mg  15 mg Oral QHS Kim Moore, NP   15 mg at 01/03/21 2052    risperiDONE (RisperDAL) tablet 1 mg  1 mg Oral BID Kim Moore, NP   1 mg at 01/04/21 7118       Side Effects: (reviewed/updated 1/4/2021)  None reported or admitted to. Review of Systems: (reviewed/updated 1/4/2021)  Appetite: good  Sleep: good   All other Review of Systems: negative    Mental Status Exam:  Eye contact: Good eye contact  Psychomotor activity: irritable   Speech is spontaneous  Thought process:confabulation  Mood is \"ok\"  Affect: constricted  Perception: No avh  Suicidal ideation: No si  Homicidal ideation: No hi  Insight/judgment: Poor  Cognition is impaired.  MMSE-8      Physical Exam:  Musculoskeletal system: steady gait  Tremor not present  Cog wheeling not present      Assessment and Plan:  Jessie Morocho meets criteria for a diagnosis of Unspecified dementia with behavioral disturbance. Bipolar 1 disorder by history. Continue current medications as prescribed. We will closely monitor for safety. We will encourage reality orientation. Disposition planning to continue. I certify that this patients inpatient psychiatric hospital services furnished since the previous certification were, and continue to be, required for treatment that could reasonably be expected to improve the patient's condition, or for diagnostic study, and that the patient continues to need, on a daily basis, active treatment furnished directly by or requiring the supervision of inpatient psychiatric facility personnel. In addition, the hospital records show that services furnished were intensive treatment services, admission or related services, or equivalent services.       Signed:  Jarocho Gale NP  1/4/2021

## 2021-01-04 NOTE — PROGRESS NOTES
Problem: Altered Thought Process (Adult/Pediatric)  Goal: *STG: Participates in treatment plan  Outcome: Progressing Towards Goal   Received this morning resting in bed. Is alert, but remains confused and labile. Became irritable with staff easily during am cares this morning. Appeared frustrated when discussing discharge. \" I just want to get out of here. \" Assisted with shower/am cares. Able to follow directions. Has been medication and meal compliant. Spent some time out on the unit this morning, then returned to bed. Remains sleeping quietly. Staff to continue to provide a safe environment during hospitalization.   [address] [facility] 60  Master Treatment Plan for Jose Doyle    Date Treatment Plan Initiated: 1/4/2021    Treatment Plan Modalities:  Type of Modality Amount  (x minutes) Frequency (x/week) Duration (x days) Name of Responsible Staff   Community & wrap-up meetings to encourage peer interactions 15 7 1 MMRN     Group psychotherapy to assist in building coping skills and internal controls 60 7 1 Ulises Pino   Therapeutic activity groups to build coping skills 60 7 1 Ulises Pino   Psychoeducation in group setting to address:   Medication education   [address] PharmD   Coping skills   30 3 1 Ulises Pino   Relaxation techniques         Symptom management         Discharge planning   60 2 17[facility]   60 1 1 volunteer   Recovery/AA/NA   61 1 1 volunteer   Physician medication management   15 7 1 SENP   Family meeting/discharge planning   15 2 [address] [facility] and [employer]

## 2021-01-04 NOTE — PROGRESS NOTES
Problem: Falls - Risk of  Goal: *Absence of Falls  Description: Document Brittany Gracia Fall Risk and appropriate interventions in the flowsheet. Outcome: Progressing Towards Goal  Note: Fall Risk Interventions:  Mobility Interventions: Bed/chair exit alarm, Utilize walker, cane, or other assistive device    Mentation Interventions: Adequate sleep, hydration, pain control, Bed/chair exit alarm, Door open when patient unattended, Room close to nurse's station, Toileting rounds    Medication Interventions: Bed/chair exit alarm, Teach patient to arise slowly    Elimination Interventions: Patient to call for help with toileting needs, Stay With Me (per policy)    History of Falls Interventions: Room close to nurse's station       Free of falls. Falls tool completed and accurate. Bed alarm on the bed. Patient ambulating on the unit with walker, gait stable. Staff to maintain safety during hospitalization.

## 2021-01-04 NOTE — PROGRESS NOTES
Received pt lying awake in bed. No distress noted. Respirations WNL. Will continue to monitor q15 for safety. Problem: Falls - Risk of  Goal: *Absence of Falls  Description: Document Berhane Long Fall Risk and appropriate interventions in the flowsheet.   Outcome: Progressing Towards Goal  Note: Fall Risk Interventions:  Mobility Interventions: Assess mobility with egress test    Mentation Interventions: Adequate sleep, hydration, pain control    Medication Interventions: Patient to call before getting OOB    Elimination Interventions: Patient to call for help with toileting needs    History of Falls Interventions: Room close to nurse's station

## 2021-01-04 NOTE — BH NOTES
GROUP THERAPY PROGRESS NOTE    Patient did not participate in Process Group.      Ulises Pino, Supervisee in Social Work

## 2021-01-04 NOTE — GROUP NOTE
OLEGARIO  GROUP DOCUMENTATION INDIVIDUAL Group Therapy Note Date: 1/3/2021 Group Start Time: 3290 Group End Time: 6011 Group Topic: Reflection/Relaxation [address] [facility] FUNMILAYO Lehman  GROUP DOCUMENTATION GROUP Group Therapy Note Attendees:  
  
 
Attendance: Attended Patient's Goal: decrease anxiety Interventions/techniques: Supported Follows Directions: Followed directions Interactions: Interacted appropriately Mental Status: confused Behavior/appearance: Cooperative Goals Achieved: Able to engage in interactions and Able to listen to others Additional Notes: less agitated Lisa Ovalles LPN

## 2021-01-05 PROCEDURE — 65220000003 HC RM SEMIPRIVATE PSYCH

## 2021-01-05 PROCEDURE — 74011250637 HC RX REV CODE- 250/637: Performed by: NURSE PRACTITIONER

## 2021-01-05 PROCEDURE — 74011000250 HC RX REV CODE- 250: Performed by: NURSE PRACTITIONER

## 2021-01-05 PROCEDURE — 74011250637 HC RX REV CODE- 250/637: Performed by: PSYCHIATRY & NEUROLOGY

## 2021-01-05 RX ADMIN — RISPERIDONE 1 MG: 1 TABLET ORAL at 08:36

## 2021-01-05 RX ADMIN — LORATADINE 10 MG: 10 TABLET ORAL at 08:36

## 2021-01-05 RX ADMIN — DONEPEZIL HYDROCHLORIDE 5 MG: 5 TABLET, FILM COATED ORAL at 21:01

## 2021-01-05 RX ADMIN — DOCUSATE SODIUM 100 MG: 100 CAPSULE ORAL at 08:36

## 2021-01-05 RX ADMIN — VALPROIC ACID 625 MG: 250 SOLUTION ORAL at 17:13

## 2021-01-05 RX ADMIN — RISPERIDONE 1 MG: 1 TABLET ORAL at 21:01

## 2021-01-05 RX ADMIN — PROPRANOLOL HYDROCHLORIDE 10 MG: 10 TABLET ORAL at 17:16

## 2021-01-05 RX ADMIN — PROPRANOLOL HYDROCHLORIDE 10 MG: 10 TABLET ORAL at 08:36

## 2021-01-05 RX ADMIN — NYSTATIN 1 UNITS: 100000 POWDER TOPICAL at 09:00

## 2021-01-05 RX ADMIN — MIRTAZAPINE 15 MG: 15 TABLET, FILM COATED ORAL at 21:00

## 2021-01-05 RX ADMIN — NYSTATIN: 100000 POWDER TOPICAL at 17:14

## 2021-01-05 RX ADMIN — VALPROIC ACID 625 MG: 250 SOLUTION ORAL at 08:36

## 2021-01-05 RX ADMIN — ACETAMINOPHEN 650 MG: 325 TABLET ORAL at 18:42

## 2021-01-05 NOTE — PROGRESS NOTES
PSYCHIATRIC PROGRESS NOTE       Patient: Maribeth Yun MRN: [id number]  SSN: [id number]    YOB: 1945  Age: 67 y.o. Sex: female      Admit Date: 12/14/2020    LOS: 22 days       Chief Complaint:  I want to go home. Interval History:  Maribeth Yun is the same. Calm and pleasant but very confused. She is sitting in the dayroom eating lunch with her peers. She keeps asking to be discharged home. Denies si or hi. No medication adverse effects. Awaiting placement. Past Medical History:  Past Medical History:   Diagnosis Date    Anemia     Arthritis     Bipolar disorder ([address] [address]     Burning with urination     Memory change     Psychiatric disorder     Bipolar Disorder    Stool color black     Tremor          ALLERGIES:(reviewed/updated 1/5/2021)  Allergies   Allergen Reactions    Lamisil [Terbinafine] Diarrhea and Nausea and Vomiting    Thorazine [Chlorpromazine] Rash       Laboratory report:  Lab Results   Component Value Date/Time    WBC 9.8 12/08/2020 02:44 AM    HGB 11.2 (L) 12/08/2020 02:44 AM    HCT 34.9 (L) 12/08/2020 02:44 AM    PLATELET 448 70/48/0079 02:44 AM    .5 (H) 12/08/2020 02:44 AM      Lab Results   Component Value Date/Time    Sodium 140 12/07/2020 03:04 AM    Potassium 3.9 12/07/2020 03:04 AM    Chloride 108 12/07/2020 03:04 AM    CO2 26 12/07/2020 03:04 AM    Anion gap 6 12/07/2020 03:04 AM    Glucose 95 12/15/2020 04:22 AM    BUN 12 12/07/2020 03:04 AM    Creatinine 0.73 12/07/2020 03:04 AM    BUN/Creatinine ratio 16 12/07/2020 03:04 AM    GFR est AA >60 12/07/2020 03:04 AM    GFR est non-AA >60 12/07/2020 03:04 AM    Calcium 9.9 12/07/2020 03:04 AM    Bilirubin, total 0.3 12/03/2020 03:30 AM    Alk.  phosphatase 110 12/03/2020 03:30 AM    Protein, total 6.6 12/03/2020 03:30 AM    Albumin 2.6 (L) 12/03/2020 03:30 AM    Globulin 4.0 12/03/2020 03:30 AM    A-G Ratio 0.7 (L) 12/03/2020 03:30 AM    ALT (SGPT) 33 12/03/2020 03:30 AM      Vitals:    01/04/21 0742 01/04/21 1633 01/04/21 2050 01/05/21 0814   BP: (!) 145/74 118/69 105/65 123/80   Pulse: 99 87 80 88   Resp: 18 12 14 14   Temp: 98.1 °F (36.7 °C) 98.3 °F (36.8 °C) 98.6 °F (37 °C) 97.1 °F (36.2 °C)   SpO2: 98% 97% 93% 97%   Weight:       Height:           Lab Results   Component Value Date/Time    Valproic acid 76 01/02/2021 05:05 AM     Lab Results   Component Value Date/Time    Lithium level 0.57 (L) 11/29/2020 05:27 PM       Vital Signs  Patient Vitals for the past 24 hrs:   Temp Pulse Resp BP SpO2   01/05/21 0814 97.1 °F (36.2 °C) 88 14 123/80 97 %   01/04/21 2050 98.6 °F (37 °C) 80 14 105/65 93 %   01/04/21 1633 98.3 °F (36.8 °C) 87 12 118/69 97 %     Wt Readings from Last 3 Encounters:   01/03/21 67.6 kg (149 lb)   11/29/20 75.4 kg (166 lb 3.6 oz)   12/02/20 75.4 kg (166 lb 3.6 oz)     Temp Readings from Last 3 Encounters:   01/05/21 97.1 °F (36.2 °C)   12/14/20 97.5 °F (36.4 °C)   09/12/20 98.1 °F (36.7 °C)     BP Readings from Last 3 Encounters:   01/05/21 123/80   12/14/20 (!) 113/58   09/12/20 128/72     Pulse Readings from Last 3 Encounters:   01/05/21 88   12/14/20 72   09/12/20 75       Radiology (reviewed/updated 1/5/2021)  Ct Head Wo Cont    Result Date: 11/29/2020  CLINICAL HISTORY: Encephalopathy INDICATION: Encephalopathy COMPARISON: 9/7/2018. CT dose reduction was achieved through use of a standardized protocol tailored for this examination and automatic exposure control for dose modulation. TECHNIQUE: Serial axial images with a collimation of 5 mm were obtained from the skull base through the vertex  FINDINGS: There is sulcal and ventricular prominence. Confluent periventricular and scattered foci of hypodensity in the cerebral white matter. There is no evidence of an acute infarction, hemorrhage, or mass-effect. There is no evidence of midline shift or hydrocephalus. Posterior fossa structures are unremarkable. No extra-axial collections are seen. Mastoid air cells are well pneumatized and clear. Hypodensity in the central jerri. 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 IMPRESSION: No acute intracranial process. Small area of chronic encephalomalacia in the left frontal lobe. Imaging findings consistent with moderate chronic microvascular ischemic change. There is a mild degree of cerebral atrophy. Xr Chest Port    Result Date: 11/29/2020  EXAM: XR CHEST PORT INDICATION: fever COMPARISON: None. FINDINGS: A portable AP radiograph of the chest was obtained at 14:46 hours. The patient is on a cardiac monitor. The lungs are clear. The cardiac and mediastinal contours and pulmonary vascularity are normal.  The chest wall structures and visualized upper abdomen show no acute findings with incidental note of degenerative spine and shoulder changes as well as partial visualization of posterior windy and screw fixation hardware of the lumbar spine. IMPRESSION: No acute findings.       Current Facility-Administered Medications   Medication Dose Route Frequency Provider Last Rate Last Admin    loratadine (CLARITIN) tablet 10 mg  10 mg Oral DAILY Clair Blackmon MD   10 mg at 01/05/21 0836    lidocaine 4 % patch 1 Patch  1 Patch TransDERmal Q24H BRITTANY Bruce   1 Patch at 01/04/21 2114    valproic acid (as sodium salt) (DEPAKENE) 250 mg/5 mL (5 mL) oral solution 625 mg  625 mg Oral BID Clair Blackmon MD   625 mg at 01/05/21 9952    loperamide (IMODIUM) capsule 2 mg  2 mg Oral Q4H PRN Wes KIM NP   2 mg at 12/23/20 1014    nystatin (MYCOSTATIN) 100,000 unit/gram powder   Topical BID Hemalatha Vinson NP   1 Units at 01/05/21 0900    docusate sodium (COLACE) capsule 100 mg  100 mg Oral DAILY Hemalatha Vinson NP   100 mg at 01/05/21 0836    donepeziL (ARICEPT) tablet 5 mg  5 mg Oral QHS Hemalatha Vinson NP   5 mg at 01/04/21 2114    OLANZapine (ZyPREXA) tablet 2.5 mg  2.5 mg Oral Q6H PRN Gurwinder Moore NP   2.5 mg at 12/23/20 1416    haloperidol lactate (HALDOL) injection 2.5 mg  2.5 mg IntraMUSCular Q6H PRN Lizette Moore, MAX        benztropine (COGENTIN) tablet 0.5 mg  0.5 mg Oral BID PRN Lizette Moore, NP        diphenhydrAMINE (BENADRYL) injection 25 mg  25 mg IntraMUSCular BID PRN Lizette Moore, NP        acetaminophen (TYLENOL) tablet 650 mg  650 mg Oral Q4H PRN EnmaKim Harrell, NP   650 mg at 01/03/21 1638    magnesium hydroxide (MILK OF MAGNESIA) 400 mg/5 mL oral suspension 30 mL  30 mL Oral DAILY PRN Kim Moore, NP   30 mL at 12/19/20 1221    traZODone (DESYREL) tablet 50 mg  50 mg Oral QHS PRN EnmaLizette Harrell, MAX        propranoloL (INDERAL) tablet 10 mg  10 mg Oral BID Kim Moore, NP   10 mg at 01/05/21 0836    mirtazapine (REMERON) tablet 15 mg  15 mg Oral QHS NorwoodKim Harrell, NP   15 mg at 01/04/21 2114    risperiDONE (RisperDAL) tablet 1 mg  1 mg Oral BID Kim Moore, NP   1 mg at 01/05/21 0836       Side Effects: (reviewed/updated 1/5/2021)  None reported or admitted to. Review of Systems: (reviewed/updated 1/5/2021)  Appetite: good  Sleep: good   All other Review of Systems: negative    Mental Status Exam:  Eye contact: Good eye contact  Psychomotor activity: irritable   Speech is spontaneous  Thought process:confabulation  Mood is \"ok\"  Affect: constricted  Perception: No avh  Suicidal ideation: No si  Homicidal ideation: No hi  Insight/judgment: Poor  Cognition is impaired. MMSE-8      Physical Exam:  Musculoskeletal system: steady gait  Tremor not present  Cog wheeling not present      Assessment and Plan:  Vin Landis meets criteria for a diagnosis of Unspecified dementia with behavioral disturbance. Bipolar 1 disorder by history. Continue current medications as prescribed. We will closely monitor for safety.   We will encourage reality orientation. Disposition planning to continue. I certify that this patients inpatient psychiatric hospital services furnished since the previous certification were, and continue to be, required for treatment that could reasonably be expected to improve the patient's condition, or for diagnostic study, and that the patient continues to need, on a daily basis, active treatment furnished directly by or requiring the supervision of inpatient psychiatric facility personnel. In addition, the hospital records show that services furnished were intensive treatment services, admission or related services, or equivalent services.       Signed:  Za Gaines NP  1/5/2021

## 2021-01-05 NOTE — INTERDISCIPLINARY ROUNDS
Behavioral Health Interdisciplinary Rounds Patient Name: Ning Schroeder  Age: 67 y.o. Room/Bed:  746/ Primary Diagnosis: <principal problem not specified> Admission Status: Involuntary Commitment Readmission within 30 days: no 
Power of  in place: no 
Patient requires a blocked bed: no          Reason for blocked bed:  
Sleep hours: 8 Participation in Care/Groups:  no 
Medication Compliant?: Yes PRNS (last 24 hours): None Restraints (last 24 hours):  no 
  
Alcohol screening (AUDIT) completed -  AUDIT Score: 0  If applicable, date SBIRT discussed in treatment team AND documented:   
Tobacco - patient is a smoker: Have You Used Tobacco in the Past 30 Days: No 
Illegal Drugs use: Have You Used Any Illegal Substances Over the Past 12 Months: No 
 
24 hour chart check complete: yes Patient goal(s) for today:  
Treatment team focus/goals: titrate medication, offer emotional support and behavioral redirections. Progress note: Pt is involuntarily committed to [facility] for inability to care for self. She cannot return home and requires placement. Daughter, Tamia Romo, is not POA and is seeking guardianship. Family contact: daughterAnny ([phone]) Patient's preferred phone number for follow up call : [phone]  
Patient's preferred e-mail address : 
 
LOS:  22  Expected LOS: TBD 
  
Participating treatment team members: Ning Schroeder, Hemalatha Das NP, Yessica Ignacio MSW; Sukumar Bhatt RN; Saddie Alpers, ShivaD

## 2021-01-05 NOTE — PROGRESS NOTES
Problem: Discharge Planning  Goal: *Discharge to safe environment  Outcome: Progressing Towards Goal  Note: Patient cannot safely live alone and requires placement for 24/7 care. Daughter is actively seeking guardians ship in order to assist with safe discharge planning to appropriate level of care. Goal: *Knowledge of medication management  Outcome: Progressing Towards Goal  Note: Patient is compliant with treatment but cannot verbalizes understanding. Problem: Discharge Planning  Goal: *Knowledge of discharge instructions  Outcome: Not Progressing Towards Goal  Note: Patient  cannot verbalize understanding of goals for treatment and safe discharge due to dementia.

## 2021-01-05 NOTE — PROGRESS NOTES
Problem: Falls - Risk of  Goal: *Absence of Falls  Description: Document Enid Fall Risk and appropriate interventions in the flowsheet.  Outcome: Progressing Towards Goal  Note: Fall Risk Interventions:  Mobility Interventions: Bed/chair exit alarm    Mentation Interventions: Adequate sleep, hydration, pain control    Medication Interventions: Bed/chair exit alarm    Elimination Interventions: Patient to call for help with toileting needs    History of Falls Interventions: Bed/chair exit alarm         Problem: Altered Thought Process (Adult/Pediatric)  Goal: *STG: Participates in treatment plan  Outcome: Progressing Towards Goal  Goal: *STG: Complies with medication therapy  Outcome: Progressing Towards Goal     Problem: Patient Education: Go to Patient Education Activity  Goal: Patient/Family Education  Outcome: Progressing Towards Goal

## 2021-01-05 NOTE — PROGRESS NOTES
Problem: Altered Thought Process (Adult/Pediatric)  Goal: *STG: Demonstrates ability to understand and use improved judgment in daily activities and relationships  Outcome: Progressing Towards Goal     Received pt in bed resting. Pt appears to be in no distress. Respirations even and unlabored. Continuing to monitor pt with q15 min safety rounds.

## 2021-01-06 PROCEDURE — 74011000250 HC RX REV CODE- 250: Performed by: NURSE PRACTITIONER

## 2021-01-06 PROCEDURE — 74011250637 HC RX REV CODE- 250/637: Performed by: NURSE PRACTITIONER

## 2021-01-06 PROCEDURE — 65220000003 HC RM SEMIPRIVATE PSYCH

## 2021-01-06 PROCEDURE — 74011250637 HC RX REV CODE- 250/637: Performed by: PSYCHIATRY & NEUROLOGY

## 2021-01-06 RX ORDER — MEMANTINE HYDROCHLORIDE 10 MG/1
5 TABLET ORAL 2 TIMES DAILY
Status: DISCONTINUED | OUTPATIENT
Start: 2021-01-06 | End: 2021-01-14 | Stop reason: HOSPADM

## 2021-01-06 RX ADMIN — MEMANTINE HYDROCHLORIDE 5 MG: 10 TABLET ORAL at 17:25

## 2021-01-06 RX ADMIN — MIRTAZAPINE 15 MG: 15 TABLET, FILM COATED ORAL at 20:38

## 2021-01-06 RX ADMIN — NYSTATIN: 100000 POWDER TOPICAL at 08:53

## 2021-01-06 RX ADMIN — VALPROIC ACID 625 MG: 250 SOLUTION ORAL at 17:26

## 2021-01-06 RX ADMIN — VALPROIC ACID 625 MG: 250 SOLUTION ORAL at 08:48

## 2021-01-06 RX ADMIN — DONEPEZIL HYDROCHLORIDE 5 MG: 5 TABLET, FILM COATED ORAL at 20:38

## 2021-01-06 RX ADMIN — RISPERIDONE 1 MG: 1 TABLET ORAL at 20:38

## 2021-01-06 RX ADMIN — LOPERAMIDE HYDROCHLORIDE 2 MG: 2 CAPSULE ORAL at 10:46

## 2021-01-06 RX ADMIN — RISPERIDONE 1 MG: 1 TABLET ORAL at 08:46

## 2021-01-06 RX ADMIN — LORATADINE 10 MG: 10 TABLET ORAL at 08:46

## 2021-01-06 RX ADMIN — DOCUSATE SODIUM 100 MG: 100 CAPSULE ORAL at 08:46

## 2021-01-06 NOTE — PROGRESS NOTES
Problem: Falls - Risk of  Goal: *Absence of Falls  Description: Document Sujatha Huynh Fall Risk and appropriate interventions in the flowsheet. Outcome: Progressing Towards Goal  Note: Fall Risk Interventions:  Mobility Interventions: Bed/chair exit alarm    Mentation Interventions: Door open when patient unattended, Adequate sleep, hydration, pain control    Medication Interventions: Teach patient to arise slowly, Evaluate medications/consider consulting pharmacy, Bed/chair exit alarm    Elimination Interventions: Toileting schedule/hourly rounds    History of Falls Interventions: Bed/chair exit alarm       Free of falls. Falls tool completed and accurate. Ambulating with walker. Gait stable, staff to [facility] safety during hospitalization.

## 2021-01-06 NOTE — PROGRESS NOTES
Comprehensive Nutrition Assessment      Type and Reason for Visit: Initial, RD nutrition re-screen/LOS        Nutrition Recommendations/Plan:    1. Regular diet   2. Please obtain new weight        Nutrition Assessment:     67 y.o. female who has a past medical history of Anemia, Arthritis, Bipolar disorder ([address] Burning with urination, Memory change, Psychiatric disorder, Stool color black, and Tremor. Moved up to psych 12/14 after admission to inpatient hospital with AMS.    Pt screened for LOS. No overt nutritional concerns. PO intake variable. Continue regular to allow more options. Continue to monitor. PO seems to be good during psych admission. Pt ordering meals and preferences. Pt wt does appear to be down ~20 lbs since admission to in. Please obtain new standing scale weight. No reported n/v. Wt Readings from Last 10 Encounters:   01/03/21 67.6 kg (149 lb)   11/29/20 75.4 kg (166 lb 3.6 oz)   12/02/20 75.4 kg (166 lb 3.6 oz)   09/07/18 77.6 kg (171 lb)   09/06/16 69.4 kg (152 lb 14.4 oz)   08/03/15 81.3 kg (179 lb 3.2 oz)   09/30/14 80.1 kg (176 lb 8 oz)   09/29/14 80.4 kg (177 lb 4.8 oz)   09/19/14 80.3 kg (177 lb)   08/07/14 80.5 kg (177 lb 6.4 oz)       Malnutrition Assessment:  Malnutrition Status:    Insufficient data  Context:          Estimated Daily Nutrient Needs:  Energy (kcal):  1600  Protein (g):  75       Fluid (ml/day):  1600    Meal intake: No data found. Nutrition Related Findings:     Last BM 1/5, no edema    Nutritionally Significant Medications:   Remeron, Colace.        Wounds:    None       Current Nutrition Therapies:  DIET REGULAR    Anthropometric Measures:  · Height:  5' 2\" (157.5 cm)  · Current Body Wt:  67.6 kg (149 lb)   · Admission Body Wt:  166 lb    · Usual Body Wt:   170 lbs     · Ideal Body Wt:   :   110 lbs      · BMI Category:    27.25 overweight      Nutrition Diagnosis:   No nutrition diagnosis at this time    Nutrition Interventions:   Food and/or Nutrient Delivery:    Nutrition Education and Counseling:    Coordination of Nutrition Care:      Goals:  PO >/= 50% of meals over next 2-3 weeks       Nutrition Monitoring and Evaluation:   Behavioral-Environmental Outcomes: Beliefs and attitudes  Food/Nutrient Intake Outcomes: Food and nutrient intake  Physical Signs/Symptoms Outcomes: Biochemical data, GI status, Meal time behavior, Weight    Discharge Planning:    Continue oral nutrition supplement, Continue current diet     Electronically signed by Lisseth Winston [address]: [phone]

## 2021-01-06 NOTE — PROGRESS NOTES
PSYCHIATRIC PROGRESS NOTE       Patient: Hien Faye MRN: [id number]  SSN: [id number]    YOB: 1945  Age: 67 y.o. Sex: female      Admit Date: 12/14/2020    LOS: 23 days       Chief Complaint:  Are you sending me home now? Interval History:  Hien Faye is about the same. She is calm and pleasant during the interview but the nursing staff report she can be irritable and labile at times but easily redirectable. Very confused. I met with her in room but did not recall seeing me. She had 2 episodes of loose stools today but nursing report cdiff is unlikely. Imodium was given. No medication adverse effects. Awaiting placement. Past Medical History:  Past Medical History:   Diagnosis Date    Anemia     Arthritis     Bipolar disorder ([address]     Burning with urination     Memory change     Psychiatric disorder     Bipolar Disorder    Stool color black     Tremor          ALLERGIES:(reviewed/updated 1/6/2021)  Allergies   Allergen Reactions    Lamisil [Terbinafine] Diarrhea and Nausea and Vomiting    Thorazine [Chlorpromazine] Rash       Laboratory report:  Lab Results   Component Value Date/Time    WBC 9.8 12/08/2020 02:44 AM    HGB 11.2 (L) 12/08/2020 02:44 AM    HCT 34.9 (L) 12/08/2020 02:44 AM    PLATELET 674 73/23/7947 02:44 AM    .5 (H) 12/08/2020 02:44 AM      Lab Results   Component Value Date/Time    Sodium 140 12/07/2020 03:04 AM    Potassium 3.9 12/07/2020 03:04 AM    Chloride 108 12/07/2020 03:04 AM    CO2 26 12/07/2020 03:04 AM    Anion gap 6 12/07/2020 03:04 AM    Glucose 95 12/15/2020 04:22 AM    BUN 12 12/07/2020 03:04 AM    Creatinine 0.73 12/07/2020 03:04 AM    BUN/Creatinine ratio 16 12/07/2020 03:04 AM    GFR est AA >60 12/07/2020 03:04 AM    GFR est non-AA >60 12/07/2020 03:04 AM    Calcium 9.9 12/07/2020 03:04 AM    Bilirubin, total 0.3 12/03/2020 03:30 AM    Alk.  phosphatase 110 12/03/2020 03:30 AM    Protein, total 6.6 12/03/2020 03:30 AM    Albumin 2.6 (L) 12/03/2020 03:30 AM    Globulin 4.0 12/03/2020 03:30 AM    A-G Ratio 0.7 (L) 12/03/2020 03:30 AM    ALT (SGPT) 33 12/03/2020 03:30 AM      Vitals:    01/05/21 0814 01/05/21 1716 01/05/21 1718 01/05/21 1929   BP: 123/80 116/68 116/68 116/84   Pulse: 88 90 90 85   Resp: 14  16 16   Temp: 97.1 °F (36.2 °C)  98.1 °F (36.7 °C) 98.3 °F (36.8 °C)   SpO2: 97%  97% 97%   Weight:       Height:           Lab Results   Component Value Date/Time    Valproic acid 76 01/02/2021 05:05 AM     Lab Results   Component Value Date/Time    Lithium level 0.57 (L) 11/29/2020 05:27 PM       Vital Signs  Patient Vitals for the past 24 hrs:   Temp Pulse Resp BP SpO2   01/05/21 1929 98.3 °F (36.8 °C) 85 16 116/84 97 %   01/05/21 1718 98.1 °F (36.7 °C) 90 16 116/68 97 %   01/05/21 1716  90  116/68      Wt Readings from Last 3 Encounters:   01/03/21 67.6 kg (149 lb)   11/29/20 75.4 kg (166 lb 3.6 oz)   12/02/20 75.4 kg (166 lb 3.6 oz)     Temp Readings from Last 3 Encounters:   01/05/21 98.3 °F (36.8 °C)   12/14/20 97.5 °F (36.4 °C)   09/12/20 98.1 °F (36.7 °C)     BP Readings from Last 3 Encounters:   01/05/21 116/84   12/14/20 (!) 113/58   09/12/20 128/72     Pulse Readings from Last 3 Encounters:   01/05/21 85   12/14/20 72   09/12/20 75       Radiology (reviewed/updated 1/6/2021)  Ct Head Wo Cont    Result Date: 11/29/2020  CLINICAL HISTORY: Encephalopathy INDICATION: Encephalopathy COMPARISON: 9/7/2018. CT dose reduction was achieved through use of a standardized protocol tailored for this examination and automatic exposure control for dose modulation. TECHNIQUE: Serial axial images with a collimation of 5 mm were obtained from the skull base through the vertex  FINDINGS: There is sulcal and ventricular prominence. Confluent periventricular and scattered foci of hypodensity in the cerebral white matter. There is no evidence of an acute infarction, hemorrhage, or mass-effect. There is no evidence of midline shift or hydrocephalus. Posterior fossa structures are unremarkable. No extra-axial collections are seen. Mastoid air cells are well pneumatized and clear. Hypodensity in the central jerri. 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 IMPRESSION: No acute intracranial process. Small area of chronic encephalomalacia in the left frontal lobe. Imaging findings consistent with moderate chronic microvascular ischemic change. There is a mild degree of cerebral atrophy. Xr Chest Port    Result Date: 11/29/2020  EXAM: XR CHEST PORT INDICATION: fever COMPARISON: None. FINDINGS: A portable AP radiograph of the chest was obtained at 14:46 hours. The patient is on a cardiac monitor. The lungs are clear. The cardiac and mediastinal contours and pulmonary vascularity are normal.  The chest wall structures and visualized upper abdomen show no acute findings with incidental note of degenerative spine and shoulder changes as well as partial visualization of posterior widny and screw fixation hardware of the lumbar spine. IMPRESSION: No acute findings.       Current Facility-Administered Medications   Medication Dose Route Frequency Provider Last Rate Last Admin    loratadine (CLARITIN) tablet 10 mg  10 mg Oral DAILY Jase Chino MD   10 mg at 01/06/21 0846    lidocaine 4 % patch 1 Patch  1 Patch TransDERmal Q24H Elfrieddakota Pulling' V, FNP   1 Patch at 01/05/21 2100    valproic acid (as sodium salt) (DEPAKENE) 250 mg/5 mL (5 mL) oral solution 625 mg  625 mg Oral BID Jase Chino MD   625 mg at 01/06/21 0848    loperamide (IMODIUM) capsule 2 mg  2 mg Oral Q4H PRN Hemalatha Vinson NP   2 mg at 01/06/21 1046    nystatin (MYCOSTATIN) 100,000 unit/gram powder   Topical BID Nicolasa KIM NP   Given at 01/06/21 0853    docusate sodium (COLACE) capsule 100 mg  100 mg Oral DAILY Hemalatha Vinson NP   100 mg at 01/06/21 0846    donepeziL (ARICEPT) tablet 5 mg  5 mg Oral QHS Hemalatha Vinson A, NP   5 mg at 01/05/21 2101    OLANZapine (ZyPREXA) tablet 2.5 mg  2.5 mg Oral Q6H PRN Kim Moore NP   2.5 mg at 12/23/20 1416    haloperidol lactate (HALDOL) injection 2.5 mg  2.5 mg IntraMUSCular Q6H PRN Gloria Moore NP        benztropine (COGENTIN) tablet 0.5 mg  0.5 mg Oral BID PRN Gloria Moore NP        diphenhydrAMINE (BENADRYL) injection 25 mg  25 mg IntraMUSCular BID PRN Gloria Moore NP        acetaminophen (TYLENOL) tablet 650 mg  650 mg Oral Q4H PRN Kim Moore NP   650 mg at 01/05/21 1842    magnesium hydroxide (MILK OF MAGNESIA) 400 mg/5 mL oral suspension 30 mL  30 mL Oral DAILY PRN Kim Moore NP   30 mL at 12/19/20 1221    traZODone (DESYREL) tablet 50 mg  50 mg Oral QHS PRN Gloria Moore NP        mirtazapine (REMERON) tablet 15 mg  15 mg Oral QHS Kim Moore NP   15 mg at 01/05/21 2100    risperiDONE (RisperDAL) tablet 1 mg  1 mg Oral BID Kim Moore NP   1 mg at 01/06/21 0846       Side Effects: (reviewed/updated 1/6/2021)  None reported or admitted to. Review of Systems: (reviewed/updated 1/6/2021)  Appetite: good  Sleep: good   All other Review of Systems: negative    Mental Status Exam:  Eye contact: Good eye contact  Psychomotor activity: irritable   Speech is spontaneous  Thought process:confabulation  Mood is \"ok\"  Affect: constricted  Perception: No avh  Suicidal ideation: No si  Homicidal ideation: No hi  Insight/judgment: Poor  Cognition is impaired. MMSE-8      Physical Exam:  Musculoskeletal system: steady gait  Tremor not present  Cog wheeling not present      Assessment and Plan:  Ela Hollis meets criteria for a diagnosis of Unspecified dementia with behavioral disturbance. Bipolar 1 disorder by history. Continue current medications as prescribed. We will closely monitor for safety.   We will encourage reality orientation. Disposition planning to continue. I certify that this patients inpatient psychiatric hospital services furnished since the previous certification were, and continue to be, required for treatment that could reasonably be expected to improve the patient's condition, or for diagnostic study, and that the patient continues to need, on a daily basis, active treatment furnished directly by or requiring the supervision of inpatient psychiatric facility personnel. In addition, the hospital records show that services furnished were intensive treatment services, admission or related services, or equivalent services.       Signed:  Amrita Singh NP  1/6/2021

## 2021-01-06 NOTE — INTERDISCIPLINARY ROUNDS
Behavioral Health Interdisciplinary Rounds Patient Name: Jah Mendez  Age: 67 y.o. Room/Bed:  746/ Primary Diagnosis: <principal problem not specified> Admission Status: Involuntary Commitment Readmission within 30 days: no 
Power of  in place: no 
Patient requires a blocked bed: no          Reason for blocked bed:  
Sleep hours: 6 Participation in Care/Groups:  no 
Medication Compliant?: Yes PRNS (last 24 hours): Pain Restraints (last 24 hours):  no 
  
Alcohol screening (AUDIT) completed -  AUDIT Score: 0  If applicable, date SBIRT discussed in treatment team AND documented:   
Tobacco - patient is a smoker: Have You Used Tobacco in the Past 30 Days: No 
Illegal Drugs use: Have You Used Any Illegal Substances Over the Past 12 Months: No 
 
24 hour chart check complete: yes Patient goal(s) for today:  
Treatment team focus/goals: titrate medication, offer emotional support and behavioral redirections.  
Progress note: Pt is involuntarily committed to [facility] for inability to care for self. She cannot return home and requires placement. Daughter, Imer Simmons, is not POA and is seeking guardianship. Pt is compliant with care and mostly pleasant. Her memory is impaired and she cannot remember talking to provider within 3 minutes of ending conversations. She cannot verbalize understanding of plan for treatment and discharge to [facility]. Family contact: daughter, Luz Mcnamara ([phone]) MSW spoke with daughter to provide update on treatment and answer questions. Patient's preferred phone number for follow up call : [phone]  
Patient's preferred e-mail address : 
 
LOS:  22  Expected LOS: TBD 
  
Participating treatment team members: Nida Toro NP, RULA Grossman; Jackelin Silva RN; Monica Harmon, PharmD

## 2021-01-06 NOTE — PROGRESS NOTES
Pt receiving continuous q15m safety checks, pt currently asleep resting comfortably in bed. No distress noted, respiratory WNL. Supplemental lighting utilized. Problem: Falls - Risk of  Goal: *Absence of Falls  Description: Document Stefanie Tobias Fall Risk and appropriate interventions in the flowsheet. Outcome: Progressing Towards Goal  Note: Fall Risk Interventions:  Mobility Interventions: Bed/chair exit alarm    Mentation Interventions: Door open when patient unattended, Adequate sleep, hydration, pain control    Medication Interventions: Teach patient to arise slowly, Evaluate medications/consider consulting pharmacy, Bed/chair exit alarm    Elimination Interventions:  Toileting schedule/hourly rounds    History of Falls Interventions: Bed/chair exit alarm         Problem: Altered Thought Process (Adult/Pediatric)  Goal: Interventions  Outcome: Progressing Towards Goal

## 2021-01-06 NOTE — PROGRESS NOTES
Problem: Altered Thought Process (Adult/Pediatric)  Goal: *STG: Complies with medication therapy  Outcome: Progressing Towards Goal     Visible on the unit. Ambulates with a walker. Labile mood noted. Complaint with meals and medications. 1800: Pt refused nystatin, education and encouragement provided.

## 2021-01-06 NOTE — PROGRESS NOTES
Problem: Altered Thought Process (Adult/Pediatric)  Goal: *STG: Participates in treatment plan  Outcome: Progressing Towards Goal   Received this morning in bed. Is alert, but remains confused. Continues to be labile and can become irritable at times. Is confused, asking to call her mother. Agreeable to taking a shower this am, was able to follow directions. Came out to the dining room for breakfast. Has been medication and meal compliant. Out on the unit a bit this am, returning to her room to rest later in the am. Has had 2 loose stools, medicated per orders. Returned back to dining room for lunch and is presently back in bed. Staff to maintain safety during hospitalization.

## 2021-01-07 PROCEDURE — 65220000003 HC RM SEMIPRIVATE PSYCH

## 2021-01-07 PROCEDURE — 74011000250 HC RX REV CODE- 250: Performed by: NURSE PRACTITIONER

## 2021-01-07 PROCEDURE — 74011250637 HC RX REV CODE- 250/637: Performed by: NURSE PRACTITIONER

## 2021-01-07 PROCEDURE — 74011250637 HC RX REV CODE- 250/637: Performed by: PSYCHIATRY & NEUROLOGY

## 2021-01-07 RX ADMIN — MEMANTINE HYDROCHLORIDE 5 MG: 10 TABLET ORAL at 09:28

## 2021-01-07 RX ADMIN — RISPERIDONE 1 MG: 1 TABLET ORAL at 09:27

## 2021-01-07 RX ADMIN — MIRTAZAPINE 15 MG: 15 TABLET, FILM COATED ORAL at 21:20

## 2021-01-07 RX ADMIN — VALPROIC ACID 625 MG: 250 SOLUTION ORAL at 17:34

## 2021-01-07 RX ADMIN — MEMANTINE HYDROCHLORIDE 5 MG: 10 TABLET ORAL at 17:34

## 2021-01-07 RX ADMIN — NYSTATIN: 100000 POWDER TOPICAL at 09:27

## 2021-01-07 RX ADMIN — DONEPEZIL HYDROCHLORIDE 5 MG: 5 TABLET, FILM COATED ORAL at 21:20

## 2021-01-07 RX ADMIN — RISPERIDONE 1 MG: 1 TABLET ORAL at 21:20

## 2021-01-07 RX ADMIN — VALPROIC ACID 625 MG: 250 SOLUTION ORAL at 09:27

## 2021-01-07 RX ADMIN — LORATADINE 10 MG: 10 TABLET ORAL at 09:28

## 2021-01-07 NOTE — INTERDISCIPLINARY ROUNDS
Behavioral Health Interdisciplinary Rounds Patient Name: Olga Buenrostro  Age: 67 y.o. Room/Bed:  746/01 Primary Diagnosis: <principal problem not specified> Admission Status: Involuntary Commitment Readmission within 30 days: no 
Power of  in place: no 
Patient requires a blocked bed: no          Reason for blocked bed:  
Sleep hours: 8 Participation in Care/Groups:  no 
Medication Compliant?: Yes PRNS (last 24 hours): None Restraints (last 24 hours):  no 
  
Alcohol screening (AUDIT) completed -  AUDIT Score: 0  If applicable, date SBIRT discussed in treatment team AND documented:   
Tobacco - patient is a smoker: Have You Used Tobacco in the Past 30 Days: No 
Illegal Drugs use: Have You Used Any Illegal Substances Over the Past 12 Months: No 
 
24 hour chart check complete: yes Patient goal(s) for today: meet with  virutally Treatment team focus/goals: facilitate virtual meeting with guardian ad litem, titrate medication, offer emotional support and behavioral redirections Progress note: Pt is involuntarily committed to [facility] for inability to care for self. She cannot return home and requires placement. Daughter, Alexus Guy, is not POA and is seeking guardianship. She cannot verbalize understanding of plan for treatment and discharge to [facility] with locked unit or memory care. Pt had virtual meeting with guardian ad litem today. She was calm and pleasant during treatment team but remains confused. Family contact: daughter, Javon Mosqueda ([phone]) MSW spoke with daughter to provide update on treatment and answer questions.   
Patient's preferred phone number for follow up call : [phone] Patient's preferred e-mail address : 
 
LOS:  24  Expected LOS: TBD 
  
Participating treatment team members: Nida Ware NP, RULA Grossman; Garfield Palencia RN

## 2021-01-07 NOTE — PROGRESS NOTES
Problem: Discharge Planning  Goal: *Discharge to safe environment  Outcome: Progressing Towards Goal  Note: Patient cannot return home and needs [facility] or memory care for safe discharge. Patient daughter is seeking guardianship in assist in discharge process. Goal: *Knowledge of medication management  Outcome: Progressing Towards Goal  Note: Patient is taking medications as prescribed. Problem: Discharge Planning  Goal: *Knowledge of discharge instructions  Outcome: Not Progressing Towards Goal  Note: Patient does not verbalize understanding of goals for treatment and safe discharge due to dementia.

## 2021-01-07 NOTE — PROGRESS NOTES
Problem: Falls - Risk of  Goal: *Absence of Falls  Description: Document Eugene Banks Fall Risk and appropriate interventions in the flowsheet. Outcome: Progressing Towards Goal  Note: Fall Risk Interventions:  Mobility Interventions: Assess mobility with egress test    Mentation Interventions: Adequate sleep, hydration, pain control    Medication Interventions: Teach patient to arise slowly    Elimination Interventions:  Toilet paper/wipes in reach    History of Falls Interventions: Door open when patient unattended  Will continue q 15 minute safety checks  Pt will continue to use the assistance of device for ambulation

## 2021-01-07 NOTE — PROGRESS NOTES
Problem: Falls - Risk of  Goal: *Absence of Falls  Description: Document Kaiden Carranza Fall Risk and appropriate interventions in the flowsheet. Outcome: Progressing Towards Goal  Note: Fall Risk Interventions:  Mobility Interventions: Bed/chair exit alarm    Mentation Interventions: Adequate sleep, hydration, pain control    Medication Interventions: Teach patient to arise slowly    Elimination Interventions: Bed/chair exit alarm    History of Falls Interventions: Bed/chair exit alarm       Pt. Received resting in bed, NAD, respirations even and unlabored. Will continue q15 safety rounds.

## 2021-01-07 NOTE — PROGRESS NOTES
PSYCHIATRIC PROGRESS NOTE       Patient: Vasquez Hartley MRN: [id number]  SSN: [id number]    YOB: 1945  Age: 67 y.o. Sex: female      Admit Date: 12/14/2020    LOS: 24 days       Chief Complaint:  Can you tell me about the hearing again? Interval History:  Vasquez Hartley is maintaining. She met with her  earlier today, she was calm but markedly confused. Daughter is pursuing guardianship and hearing is scheduled next Monday. She is sitting in the day room quiet, labile most times but no management issue. Denies si or hi. No medication adverse effects. Awaiting placement. Past Medical History:  Past Medical History:   Diagnosis Date    Anemia     Arthritis     Bipolar disorder ([address]     Burning with urination     Memory change     Psychiatric disorder     Bipolar Disorder    Stool color black     Tremor          ALLERGIES:(reviewed/updated 1/7/2021)  Allergies   Allergen Reactions    Lamisil [Terbinafine] Diarrhea and Nausea and Vomiting    Thorazine [Chlorpromazine] Rash       Laboratory report:  Lab Results   Component Value Date/Time    WBC 9.8 12/08/2020 02:44 AM    HGB 11.2 (L) 12/08/2020 02:44 AM    HCT 34.9 (L) 12/08/2020 02:44 AM    PLATELET 596 83/44/1609 02:44 AM    .5 (H) 12/08/2020 02:44 AM      Lab Results   Component Value Date/Time    Sodium 140 12/07/2020 03:04 AM    Potassium 3.9 12/07/2020 03:04 AM    Chloride 108 12/07/2020 03:04 AM    CO2 26 12/07/2020 03:04 AM    Anion gap 6 12/07/2020 03:04 AM    Glucose 95 12/15/2020 04:22 AM    BUN 12 12/07/2020 03:04 AM    Creatinine 0.73 12/07/2020 03:04 AM    BUN/Creatinine ratio 16 12/07/2020 03:04 AM    GFR est AA >60 12/07/2020 03:04 AM    GFR est non-AA >60 12/07/2020 03:04 AM    Calcium 9.9 12/07/2020 03:04 AM    Bilirubin, total 0.3 12/03/2020 03:30 AM    Alk.  phosphatase 110 12/03/2020 03:30 AM    Protein, total 6.6 12/03/2020 03:30 AM    Albumin 2.6 (L) 12/03/2020 03:30 AM    Globulin 4.0 12/03/2020 03:30 AM    A-G Ratio 0.7 (L) 12/03/2020 03:30 AM    ALT (SGPT) 33 12/03/2020 03:30 AM      Vitals:    01/05/21 1929 01/06/21 1612 01/06/21 2040 01/07/21 0758   BP: 116/84 (!) 152/85 (!) 153/80 132/72   Pulse: 85 98 98 86   Resp: 16 16 16 16   Temp: 98.3 °F (36.8 °C) 97.6 °F (36.4 °C) 98 °F (36.7 °C) 97.8 °F (36.6 °C)   SpO2: 97% 99% 95% 94%   Weight:       Height:           Lab Results   Component Value Date/Time    Valproic acid 76 01/02/2021 05:05 AM     Lab Results   Component Value Date/Time    Lithium level 0.57 (L) 11/29/2020 05:27 PM       Vital Signs  Patient Vitals for the past 24 hrs:   Temp Pulse Resp BP SpO2   01/07/21 0758 97.8 °F (36.6 °C) 86 16 132/72 94 %   01/06/21 2040 98 °F (36.7 °C) 98 16 (!) 153/80 95 %     Wt Readings from Last 3 Encounters:   01/03/21 67.6 kg (149 lb)   11/29/20 75.4 kg (166 lb 3.6 oz)   12/02/20 75.4 kg (166 lb 3.6 oz)     Temp Readings from Last 3 Encounters:   01/07/21 97.8 °F (36.6 °C)   12/14/20 97.5 °F (36.4 °C)   09/12/20 98.1 °F (36.7 °C)     BP Readings from Last 3 Encounters:   01/07/21 132/72   12/14/20 (!) 113/58   09/12/20 128/72     Pulse Readings from Last 3 Encounters:   01/07/21 86   12/14/20 72   09/12/20 75       Radiology (reviewed/updated 1/7/2021)  Ct Head Wo Cont    Result Date: 11/29/2020  CLINICAL HISTORY: Encephalopathy INDICATION: Encephalopathy COMPARISON: 9/7/2018. CT dose reduction was achieved through use of a standardized protocol tailored for this examination and automatic exposure control for dose modulation. TECHNIQUE: Serial axial images with a collimation of 5 mm were obtained from the skull base through the vertex  FINDINGS: There is sulcal and ventricular prominence. Confluent periventricular and scattered foci of hypodensity in the cerebral white matter. There is no evidence of an acute infarction, hemorrhage, or mass-effect. There is no evidence of midline shift or hydrocephalus. Posterior fossa structures are unremarkable. No extra-axial collections are seen. Mastoid air cells are well pneumatized and clear. Hypodensity in the central jerri. 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 IMPRESSION: No acute intracranial process. Small area of chronic encephalomalacia in the left frontal lobe. Imaging findings consistent with moderate chronic microvascular ischemic change. There is a mild degree of cerebral atrophy. Xr Chest Port    Result Date: 11/29/2020  EXAM: XR CHEST PORT INDICATION: fever COMPARISON: None. FINDINGS: A portable AP radiograph of the chest was obtained at 14:46 hours. The patient is on a cardiac monitor. The lungs are clear. The cardiac and mediastinal contours and pulmonary vascularity are normal.  The chest wall structures and visualized upper abdomen show no acute findings with incidental note of degenerative spine and shoulder changes as well as partial visualization of posterior windy and screw fixation hardware of the lumbar spine. IMPRESSION: No acute findings.       Current Facility-Administered Medications   Medication Dose Route Frequency Provider Last Rate Last Admin    memantine (NAMENDA) tablet 5 mg  5 mg Oral BID Hemalatha Vinson NP   5 mg at 01/07/21 0928    loratadine (CLARITIN) tablet 10 mg  10 mg Oral DAILY Mandeep Christianson MD   10 mg at 01/07/21 0928    lidocaine 4 % patch 1 Patch  1 Patch TransDERmal Q24H BRITTANY Mendes   1 Patch at 01/06/21 2039    valproic acid (as sodium salt) (DEPAKENE) 250 mg/5 mL (5 mL) oral solution 625 mg  625 mg Oral BID Mandeep Christianson MD   625 mg at 01/07/21 2861    loperamide (IMODIUM) capsule 2 mg  2 mg Oral Q4H PRN Elizabeth KIM NP   2 mg at 01/06/21 1046    nystatin (MYCOSTATIN) 100,000 unit/gram powder   Topical BID Elizabeth KIM NP   Given at 01/07/21 4132    docusate sodium (COLACE) capsule 100 mg  100 mg Oral DAILY Hemalatha Vinson NP   Stopped at 01/07/21 0900    donepeziL (ARICEPT) tablet 5 mg  5 mg Oral QHS Hemalatha Vinson, NP   5 mg at 01/06/21 2038    OLANZapine (ZyPREXA) tablet 2.5 mg  2.5 mg Oral Q6H PRN Kim Moore, NP   2.5 mg at 12/23/20 1416    haloperidol lactate (HALDOL) injection 2.5 mg  2.5 mg IntraMUSCular Q6H PRN Josh Moore NP        benztropine (COGENTIN) tablet 0.5 mg  0.5 mg Oral BID PRN Josh Moore NP        diphenhydrAMINE (BENADRYL) injection 25 mg  25 mg IntraMUSCular BID PRN Josh Moore NP        acetaminophen (TYLENOL) tablet 650 mg  650 mg Oral Q4H PRN Kim Moore NP   650 mg at 01/05/21 1842    magnesium hydroxide (MILK OF MAGNESIA) 400 mg/5 mL oral suspension 30 mL  30 mL Oral DAILY PRN Kim Moore, NP   30 mL at 12/19/20 1221    traZODone (DESYREL) tablet 50 mg  50 mg Oral QHS PRN Josh Moore NP        mirtazapine (REMERON) tablet 15 mg  15 mg Oral QHS Kim Moore, NP   15 mg at 01/06/21 2038    risperiDONE (RisperDAL) tablet 1 mg  1 mg Oral BID Kim Moore, NP   1 mg at 01/07/21 9725       Side Effects: (reviewed/updated 1/7/2021)  None reported or admitted to. Review of Systems: (reviewed/updated 1/7/2021)  Appetite: good  Sleep: good   All other Review of Systems: negative    Mental Status Exam:  Eye contact: Good eye contact  Psychomotor activity: irritable   Speech is spontaneous  Thought process:confabulation  Mood is \"ok\"  Affect: constricted  Perception: No avh  Suicidal ideation: No si  Homicidal ideation: No hi  Insight/judgment: Poor  Cognition is impaired. MMSE-8      Physical Exam:  Musculoskeletal system: steady gait  Tremor not present  Cog wheeling not present      Assessment and Plan:  Sabas Montemayor meets criteria for a diagnosis of Unspecified dementia with behavioral disturbance. Bipolar 1 disorder by history.      Continue current medications as prescribed. We will closely monitor for safety. We will encourage reality orientation. Disposition planning to continue. I certify that this patients inpatient psychiatric hospital services furnished since the previous certification were, and continue to be, required for treatment that could reasonably be expected to improve the patient's condition, or for diagnostic study, and that the patient continues to need, on a daily basis, active treatment furnished directly by or requiring the supervision of inpatient psychiatric facility personnel. In addition, the hospital records show that services furnished were intensive treatment services, admission or related services, or equivalent services.       Signed:  Lisseth Johns NP  1/7/2021

## 2021-01-08 PROCEDURE — 74011250637 HC RX REV CODE- 250/637: Performed by: NURSE PRACTITIONER

## 2021-01-08 PROCEDURE — 65220000003 HC RM SEMIPRIVATE PSYCH

## 2021-01-08 PROCEDURE — 74011250637 HC RX REV CODE- 250/637: Performed by: PSYCHIATRY & NEUROLOGY

## 2021-01-08 PROCEDURE — 74011000250 HC RX REV CODE- 250: Performed by: NURSE PRACTITIONER

## 2021-01-08 RX ADMIN — NYSTATIN: 100000 POWDER TOPICAL at 14:30

## 2021-01-08 RX ADMIN — VALPROIC ACID 625 MG: 250 SOLUTION ORAL at 10:10

## 2021-01-08 RX ADMIN — NYSTATIN: 100000 POWDER TOPICAL at 16:56

## 2021-01-08 RX ADMIN — RISPERIDONE 1 MG: 1 TABLET ORAL at 21:54

## 2021-01-08 RX ADMIN — MEMANTINE HYDROCHLORIDE 5 MG: 10 TABLET ORAL at 10:08

## 2021-01-08 RX ADMIN — MEMANTINE HYDROCHLORIDE 5 MG: 10 TABLET ORAL at 16:50

## 2021-01-08 RX ADMIN — MIRTAZAPINE 15 MG: 15 TABLET, FILM COATED ORAL at 21:00

## 2021-01-08 RX ADMIN — ACETAMINOPHEN 650 MG: 325 TABLET ORAL at 03:09

## 2021-01-08 RX ADMIN — ACETAMINOPHEN 650 MG: 325 TABLET ORAL at 10:06

## 2021-01-08 RX ADMIN — LORATADINE 10 MG: 10 TABLET ORAL at 10:07

## 2021-01-08 RX ADMIN — VALPROIC ACID 625 MG: 250 SOLUTION ORAL at 16:49

## 2021-01-08 RX ADMIN — RISPERIDONE 1 MG: 1 TABLET ORAL at 10:07

## 2021-01-08 RX ADMIN — DONEPEZIL HYDROCHLORIDE 5 MG: 5 TABLET, FILM COATED ORAL at 21:54

## 2021-01-08 NOTE — GROUP NOTE
[facility] GROUP DOCUMENTATION INDIVIDUAL Group Therapy Note Date: 1/7/2021 Group Start Time: 0156 Group End Time: 9282 Group Topic: Hygiene [address] [place], LPN 
 
IP  GROUP DOCUMENTATION GROUP Group Therapy Note Attendees:  
  
 
Attendance: Attended Patient's Goal: good hand washing Interventions/techniques: Supported Follows Directions: Followed directions Interactions: Interacted appropriately Mental Status: Anxious and confused Behavior/appearance: Cooperative Goals Achieved: Able to engage in interactions and Able to listen to others Additional Notes:  
 
Leora Wadsworth LPN

## 2021-01-08 NOTE — PROGRESS NOTES
Pt appears asleep in bed, NAD, even respirations, will continue to monitor q15min     Problem: Falls - Risk of  Goal: *Absence of Falls  Description: Document Meg Farooq Fall Risk and appropriate interventions in the flowsheet.   Outcome: Progressing Towards Goal  Note: Fall Risk Interventions:  Mobility Interventions: Bed/chair exit alarm, Communicate number of staff needed for ambulation/transfer, Utilize walker, cane, or other assistive device    Mentation Interventions: Adequate sleep, hydration, pain control    Medication Interventions: Teach patient to arise slowly, Bed/chair exit alarm    Elimination Interventions: Bed/chair exit alarm, Patient to call for help with toileting needs    History of Falls Interventions: Bed/chair exit alarm

## 2021-01-08 NOTE — INTERDISCIPLINARY ROUNDS
Behavioral Health Interdisciplinary Rounds Patient Name: Sandra Taveras  Age: 67 y.o. Room/Bed:  746/ Primary Diagnosis: <principal problem not specified> Admission Status: Involuntary Commitment Readmission within 30 days: no 
Power of  in place: no 
Patient requires a blocked bed: no          Reason for blocked bed:  
Sleep hours: 6.5 broken Participation in Care/Groups:  yes Medication Compliant?: Yes PRNS (last 24 hours): None Restraints (last 24 hours):  no 
  
Alcohol screening (AUDIT) completed -  AUDIT Score: 0  If applicable, date SBIRT discussed in treatment team AND documented:   
Tobacco - patient is a smoker: Have You Used Tobacco in the Past 30 Days: No 
Illegal Drugs use: Have You Used Any Illegal Substances Over the Past 12 Months: No 
 
24 hour chart check complete: yes Patient goal(s) for today: titrate medication, offer emotional support and behavioral redirections Progress note: Pt is involuntarily committed to [facility] for inability to care for self. She cannot return home and requires placement. Daughter, Abraham Daniels, is not POA and is seeking guardianship. She cannot verbalize understanding of plan for treatment and discharge to [facility] with locked unit or memory care. She talks about calling her mother and complements staff's fashion. Family contact: daughterArtemio ([phone]) Patient's preferred phone number for follow up call :  
Patient's preferred e-mail address : 
 
LOS:  25  Expected LOS: TBD 
  
Participating treatment team members: Nida Saavedra NP, Yessica Ignacio MSW; Duc Colbert RN

## 2021-01-08 NOTE — PROGRESS NOTES
Problem: Falls - Risk of  Goal: *Absence of Falls  Description: Document Joanna Alberts Fall Risk and appropriate interventions in the flowsheet. Outcome: Progressing Towards Goal  Note: Fall Risk Interventions:  Mobility Interventions: Bed/chair exit alarm    Mentation Interventions: Adequate sleep, hydration, pain control    Medication Interventions: Bed/chair exit alarm    Elimination Interventions: Bed/chair exit alarm    History of Falls Interventions: Bed/chair exit alarm    Pt received up in chair   Calm and cooperative   Will continue to monitor q15 min checks       Problem: Patient Education: Go to Patient Education Activity  Goal: Patient/Family Education  Outcome: Progressing Towards Goal    2128: patient requested to sleep in clothing offered gown patient refused.

## 2021-01-08 NOTE — PROGRESS NOTES
Problem: Falls - Risk of  Goal: *Absence of Falls  Description: Document Java Led Fall Risk and appropriate interventions in the flowsheet. Note: Fall Risk Interventions:  Mobility Interventions: Assess mobility with egress test, Bed/chair exit alarm, Communicate number of staff needed for ambulation/transfer, Patient to call before getting OOB, Utilize walker, cane, or other assistive device    Mentation Interventions: Adequate sleep, hydration, pain control, Bed/chair exit alarm, Door open when patient unattended, More frequent rounding, Reorient patient, Room close to nurse's station    Medication Interventions: Bed/chair exit alarm, Patient to call before getting OOB, Teach patient to arise slowly    Elimination Interventions: Bed/chair exit alarm, Toilet paper/wipes in reach, Toileting schedule/hourly rounds    History of Falls Interventions: Bed/chair exit alarm, Door open when patient unattended, Room close to nurse's station         Problem: Altered Thought Process (Adult/Pediatric)  Goal: *STG: Participates in treatment plan  Outcome: Progressing Towards Goal  Note: Pt is pleasently confused - redirection and reorientation offered   Asked several times to call her mother   Remains labile   Med/meal compliant   Refuses to attend groups  NAD noted   Will continue to monitor.

## 2021-01-08 NOTE — PROGRESS NOTES
PSYCHIATRIC PROGRESS NOTE       Patient: Willa Barton MRN: [id number]  SSN: [id number]    YOB: 1945  Age: 67 y.o. Sex: female      Admit Date: 12/14/2020    LOS: 25 days       Chief Complaint:  I don't think I need to be in that hearing. Interval History:  Willa Barton is maintaining. She states she is doing ok, resting well. Sleeping and eating well. She remains compliant with her meds and denies side effects. Calm during the interview, smiling appropriately but severely confused. Guardianship hearing scheduled on Monday. CMP and CBC in am. Denies si or hi. Awaiting placement. Past Medical History:  Past Medical History:   Diagnosis Date    Anemia     Arthritis     Bipolar disorder ([address]     Burning with urination     Memory change     Psychiatric disorder     Bipolar Disorder    Stool color black     Tremor          ALLERGIES:(reviewed/updated 1/8/2021)  Allergies   Allergen Reactions    Lamisil [Terbinafine] Diarrhea and Nausea and Vomiting    Thorazine [Chlorpromazine] Rash       Laboratory report:  Lab Results   Component Value Date/Time    WBC 9.8 12/08/2020 02:44 AM    HGB 11.2 (L) 12/08/2020 02:44 AM    HCT 34.9 (L) 12/08/2020 02:44 AM    PLATELET 898 90/82/0267 02:44 AM    .5 (H) 12/08/2020 02:44 AM      Lab Results   Component Value Date/Time    Sodium 140 12/07/2020 03:04 AM    Potassium 3.9 12/07/2020 03:04 AM    Chloride 108 12/07/2020 03:04 AM    CO2 26 12/07/2020 03:04 AM    Anion gap 6 12/07/2020 03:04 AM    Glucose 95 12/15/2020 04:22 AM    BUN 12 12/07/2020 03:04 AM    Creatinine 0.73 12/07/2020 03:04 AM    BUN/Creatinine ratio 16 12/07/2020 03:04 AM    GFR est AA >60 12/07/2020 03:04 AM    GFR est non-AA >60 12/07/2020 03:04 AM    Calcium 9.9 12/07/2020 03:04 AM    Bilirubin, total 0.3 12/03/2020 03:30 AM    Alk.  phosphatase 110 12/03/2020 03:30 AM    Protein, total 6.6 12/03/2020 03:30 AM    Albumin 2.6 (L) 12/03/2020 03:30 AM    Globulin 4.0 12/03/2020 03:30 AM    A-G Ratio 0.7 (L) 12/03/2020 03:30 AM    ALT (SGPT) 33 12/03/2020 03:30 AM      Vitals:    01/07/21 0758 01/07/21 1526 01/07/21 2215 01/08/21 0937   BP: 132/72  134/79 111/63   Pulse: 86  100 (!) 102   Resp: 16 18 20 18   Temp: 97.8 °F (36.6 °C)  98.4 °F (36.9 °C) 97.9 °F (36.6 °C)   SpO2: 94%  99% 99%   Weight:       Height:           Lab Results   Component Value Date/Time    Valproic acid 76 01/02/2021 05:05 AM     Lab Results   Component Value Date/Time    Lithium level 0.57 (L) 11/29/2020 05:27 PM       Vital Signs  Patient Vitals for the past 24 hrs:   Temp Pulse Resp BP SpO2   01/08/21 0937 97.9 °F (36.6 °C) (!) 102 18 111/63 99 %   01/07/21 2215 98.4 °F (36.9 °C) 100 20 134/79 99 %   01/07/21 1526   18       Wt Readings from Last 3 Encounters:   01/03/21 67.6 kg (149 lb)   11/29/20 75.4 kg (166 lb 3.6 oz)   12/02/20 75.4 kg (166 lb 3.6 oz)     Temp Readings from Last 3 Encounters:   01/08/21 97.9 °F (36.6 °C)   12/14/20 97.5 °F (36.4 °C)   09/12/20 98.1 °F (36.7 °C)     BP Readings from Last 3 Encounters:   01/08/21 111/63   12/14/20 (!) 113/58   09/12/20 128/72     Pulse Readings from Last 3 Encounters:   01/08/21 (!) 102   12/14/20 72   09/12/20 75       Radiology (reviewed/updated 1/8/2021)  Ct Head Wo Cont    Result Date: 11/29/2020  CLINICAL HISTORY: Encephalopathy INDICATION: Encephalopathy COMPARISON: 9/7/2018. CT dose reduction was achieved through use of a standardized protocol tailored for this examination and automatic exposure control for dose modulation. TECHNIQUE: Serial axial images with a collimation of 5 mm were obtained from the skull base through the vertex  FINDINGS: There is sulcal and ventricular prominence. Confluent periventricular and scattered foci of hypodensity in the cerebral white matter. There is no evidence of an acute infarction, hemorrhage, or mass-effect. There is no evidence of midline shift or hydrocephalus. Posterior fossa structures are unremarkable.  No extra-axial collections are seen. Mastoid air cells are well pneumatized and clear. Hypodensity in the central jerri. 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 IMPRESSION: No acute intracranial process. Small area of chronic encephalomalacia in the left frontal lobe. Imaging findings consistent with moderate chronic microvascular ischemic change. There is a mild degree of cerebral atrophy. Xr Chest Port    Result Date: 11/29/2020  EXAM: XR CHEST PORT INDICATION: fever COMPARISON: None. FINDINGS: A portable AP radiograph of the chest was obtained at 14:46 hours. The patient is on a cardiac monitor. The lungs are clear. The cardiac and mediastinal contours and pulmonary vascularity are normal.  The chest wall structures and visualized upper abdomen show no acute findings with incidental note of degenerative spine and shoulder changes as well as partial visualization of posterior windy and screw fixation hardware of the lumbar spine. IMPRESSION: No acute findings.       Current Facility-Administered Medications   Medication Dose Route Frequency Provider Last Rate Last Admin    memantine (NAMENDA) tablet 5 mg  5 mg Oral BID Hemalatha Vinson NP   5 mg at 01/08/21 1008    loratadine (CLARITIN) tablet 10 mg  10 mg Oral DAILY Bryce Arizmendi MD   10 mg at 01/08/21 1007    lidocaine 4 % patch 1 Patch  1 Patch TransDERmal Q24H Virgilio Fresh' BRITTANY ARMENTA   1 Patch at 01/07/21 2120    valproic acid (as sodium salt) (DEPAKENE) 250 mg/5 mL (5 mL) oral solution 625 mg  625 mg Oral BID Bryce Arizmendi MD   625 mg at 01/08/21 1010    loperamide (IMODIUM) capsule 2 mg  2 mg Oral Q4H PRN Hemalatha Vinson NP   2 mg at 01/06/21 1046    nystatin (MYCOSTATIN) 100,000 unit/gram powder   Topical BID Neenajaleesa KIM NP   Given at 01/08/21 1430    docusate sodium (COLACE) capsule 100 mg  100 mg Oral DAILY Hemalatha Vinson NP   Stopped at 01/07/21 0900    donepeziL (ARICEPT) tablet 5 mg  5 mg Oral QHS Hemalatha Vinson, NP   5 mg at 01/07/21 2120    OLANZapine (ZyPREXA) tablet 2.5 mg  2.5 mg Oral Q6H PRN Kim Moore, NP   2.5 mg at 12/23/20 1416    haloperidol lactate (HALDOL) injection 2.5 mg  2.5 mg IntraMUSCular Q6H PRN Josh Moore NP        benztropine (COGENTIN) tablet 0.5 mg  0.5 mg Oral BID PRN Josh Moore NP        diphenhydrAMINE (BENADRYL) injection 25 mg  25 mg IntraMUSCular BID PRN Josh Moore NP        acetaminophen (TYLENOL) tablet 650 mg  650 mg Oral Q4H PRN Kim Moore, NP   650 mg at 01/08/21 1006    magnesium hydroxide (MILK OF MAGNESIA) 400 mg/5 mL oral suspension 30 mL  30 mL Oral DAILY PRN Kim Moore, NP   30 mL at 12/19/20 1221    traZODone (DESYREL) tablet 50 mg  50 mg Oral QHS PRN Josh Moore NP        mirtazapine (REMERON) tablet 15 mg  15 mg Oral QHS Kim Moore, NP   15 mg at 01/07/21 2120    risperiDONE (RisperDAL) tablet 1 mg  1 mg Oral BID Kim Moore, NP   1 mg at 01/08/21 1007       Side Effects: (reviewed/updated 1/8/2021)  None reported or admitted to. Review of Systems: (reviewed/updated 1/8/2021)  Appetite: good  Sleep: good   All other Review of Systems: negative    Mental Status Exam:  Eye contact: Good eye contact  Psychomotor activity: irritable   Speech is spontaneous  Thought process:confabulation  Mood is \"ok\"  Affect: constricted  Perception: No avh  Suicidal ideation: No si  Homicidal ideation: No hi  Insight/judgment: Poor  Cognition is impaired. MMSE-8      Physical Exam:  Musculoskeletal system: steady gait  Tremor not present  Cog wheeling not present      Assessment and Plan:  Sabas Montemayor meets criteria for a diagnosis of Unspecified dementia with behavioral disturbance. Bipolar 1 disorder by history.      Cbc and CMP in am.  Continue current medications as prescribed. We will closely monitor for safety. We will encourage reality orientation. Disposition planning to continue. I certify that this patients inpatient psychiatric hospital services furnished since the previous certification were, and continue to be, required for treatment that could reasonably be expected to improve the patient's condition, or for diagnostic study, and that the patient continues to need, on a daily basis, active treatment furnished directly by or requiring the supervision of inpatient psychiatric facility personnel. In addition, the hospital records show that services furnished were intensive treatment services, admission or related services, or equivalent services.       Signed:  Caleb Davis NP  1/8/2021

## 2021-01-09 LAB
ALBUMIN SERPL-MCNC: 2.8 G/DL (ref 3.5–5)
ALBUMIN/GLOB SERPL: 0.8 {RATIO} (ref 1.1–2.2)
ALP SERPL-CCNC: 48 U/L (ref 45–117)
ALT SERPL-CCNC: 13 U/L (ref 12–78)
ANION GAP SERPL CALC-SCNC: 4 MMOL/L (ref 5–15)
AST SERPL-CCNC: 9 U/L (ref 15–37)
BASOPHILS # BLD: 0.1 K/UL (ref 0–0.1)
BASOPHILS NFR BLD: 1 % (ref 0–1)
BILIRUB SERPL-MCNC: 0.2 MG/DL (ref 0.2–1)
BUN SERPL-MCNC: 22 MG/DL (ref 6–20)
BUN/CREAT SERPL: 35 (ref 12–20)
CALCIUM SERPL-MCNC: 9.1 MG/DL (ref 8.5–10.1)
CHLORIDE SERPL-SCNC: 108 MMOL/L (ref 97–108)
CO2 SERPL-SCNC: 29 MMOL/L (ref 21–32)
CREAT SERPL-MCNC: 0.63 MG/DL (ref 0.55–1.02)
DIFFERENTIAL METHOD BLD: ABNORMAL
EOSINOPHIL # BLD: 0.2 K/UL (ref 0–0.4)
EOSINOPHIL NFR BLD: 2 % (ref 0–7)
ERYTHROCYTE [DISTWIDTH] IN BLOOD BY AUTOMATED COUNT: 12.8 % (ref 11.5–14.5)
GLOBULIN SER CALC-MCNC: 3.5 G/DL (ref 2–4)
GLUCOSE SERPL-MCNC: 76 MG/DL (ref 65–100)
HCT VFR BLD AUTO: 33.8 % (ref 35–47)
HGB BLD-MCNC: 10.6 G/DL (ref 11.5–16)
IMM GRANULOCYTES # BLD AUTO: 0 K/UL (ref 0–0.04)
IMM GRANULOCYTES NFR BLD AUTO: 0 % (ref 0–0.5)
LYMPHOCYTES # BLD: 2.1 K/UL (ref 0.8–3.5)
LYMPHOCYTES NFR BLD: 29 % (ref 12–49)
MCH RBC QN AUTO: 31.6 PG (ref 26–34)
MCHC RBC AUTO-ENTMCNC: 31.4 G/DL (ref 30–36.5)
MCV RBC AUTO: 100.9 FL (ref 80–99)
MONOCYTES # BLD: 0.8 K/UL (ref 0–1)
MONOCYTES NFR BLD: 11 % (ref 5–13)
NEUTS SEG # BLD: 4 K/UL (ref 1.8–8)
NEUTS SEG NFR BLD: 57 % (ref 32–75)
NRBC # BLD: 0 K/UL (ref 0–0.01)
NRBC BLD-RTO: 0 PER 100 WBC
PLATELET # BLD AUTO: 226 K/UL (ref 150–400)
PMV BLD AUTO: 9.9 FL (ref 8.9–12.9)
POTASSIUM SERPL-SCNC: 3.5 MMOL/L (ref 3.5–5.1)
PROT SERPL-MCNC: 6.3 G/DL (ref 6.4–8.2)
RBC # BLD AUTO: 3.35 M/UL (ref 3.8–5.2)
SODIUM SERPL-SCNC: 141 MMOL/L (ref 136–145)
WBC # BLD AUTO: 7.1 K/UL (ref 3.6–11)

## 2021-01-09 PROCEDURE — 36415 COLL VENOUS BLD VENIPUNCTURE: CPT

## 2021-01-09 PROCEDURE — 74011250637 HC RX REV CODE- 250/637: Performed by: NURSE PRACTITIONER

## 2021-01-09 PROCEDURE — 85025 COMPLETE CBC W/AUTO DIFF WBC: CPT

## 2021-01-09 PROCEDURE — 74011000250 HC RX REV CODE- 250: Performed by: NURSE PRACTITIONER

## 2021-01-09 PROCEDURE — 74011250637 HC RX REV CODE- 250/637: Performed by: PSYCHIATRY & NEUROLOGY

## 2021-01-09 PROCEDURE — 65220000003 HC RM SEMIPRIVATE PSYCH

## 2021-01-09 PROCEDURE — 80053 COMPREHEN METABOLIC PANEL: CPT

## 2021-01-09 RX ADMIN — VALPROIC ACID 625 MG: 250 SOLUTION ORAL at 16:44

## 2021-01-09 RX ADMIN — RISPERIDONE 1 MG: 1 TABLET ORAL at 20:49

## 2021-01-09 RX ADMIN — NYSTATIN: 100000 POWDER TOPICAL at 17:49

## 2021-01-09 RX ADMIN — LORATADINE 10 MG: 10 TABLET ORAL at 08:29

## 2021-01-09 RX ADMIN — RISPERIDONE 1 MG: 1 TABLET ORAL at 08:27

## 2021-01-09 RX ADMIN — VALPROIC ACID 625 MG: 250 SOLUTION ORAL at 08:26

## 2021-01-09 RX ADMIN — MEMANTINE HYDROCHLORIDE 5 MG: 10 TABLET ORAL at 16:48

## 2021-01-09 RX ADMIN — DONEPEZIL HYDROCHLORIDE 5 MG: 5 TABLET, FILM COATED ORAL at 20:49

## 2021-01-09 RX ADMIN — MIRTAZAPINE 15 MG: 15 TABLET, FILM COATED ORAL at 20:49

## 2021-01-09 RX ADMIN — NYSTATIN: 100000 POWDER TOPICAL at 08:34

## 2021-01-09 RX ADMIN — MEMANTINE HYDROCHLORIDE 5 MG: 10 TABLET ORAL at 08:27

## 2021-01-09 NOTE — PROGRESS NOTES
Problem: Falls - Risk of  Goal: *Absence of Falls  Description: Document Adolfo Sol Fall Risk and appropriate interventions in the flowsheet. 1/9/2021 1549 by Aurora Sanchez RN  Outcome: Progressing Towards Goal  Note: Fall Risk Interventions:  Mobility Interventions: Assess mobility with egress test, Bed/chair exit alarm, Communicate number of staff needed for ambulation/transfer, Patient to call before getting OOB, Utilize walker, cane, or other assistive device    Mentation Interventions: Adequate sleep, hydration, pain control, Bed/chair exit alarm, Door open when patient unattended, More frequent rounding, Reorient patient, Room close to nurse's station    Medication Interventions: Bed/chair exit alarm, Patient to call before getting OOB, Teach patient to arise slowly    Elimination Interventions: Bed/chair exit alarm, Patient to call for help with toileting needs, Stay With Me (per policy), Toilet paper/wipes in reach, Toileting schedule/hourly rounds    History of Falls Interventions: Bed/chair exit alarm, Door open when patient unattended, Room close to nurse's station         Problem: Altered Thought Process (Adult/Pediatric)  Goal: *STG: Participates in treatment plan  1/9/2021 [id number] by Aurora Sanchez RN  Outcome: Progressing Towards Goal     Problem: Altered Thought Process (Adult/Pediatric)  Goal: *STG: Complies with medication therapy  Outcome: Progressing Towards Goal     Problem: Altered Thought Process (Adult/Pediatric)  Goal: *STG: Attends activities and groups  Outcome: Not Progressing Towards Goal     Problem: Altered Thought Process (Adult/Pediatric)  Goal: *STG: Demonstrates ability to understand and use improved judgment in daily activities and relationships  Outcome: Not Progressing Towards Goal     Pt remains labile but she is pleasantly confused. No aggression or irritation noted. 1 person assist gait is unsteady. Med/meal compliant. Goal is to go back home per patient. NAD noted.  Will continue to monitor.

## 2021-01-09 NOTE — PROGRESS NOTES
Problem: Falls - Risk of  Goal: *Absence of Falls  Description: Document Berhane Long Fall Risk and appropriate interventions in the flowsheet. Outcome: Progressing Towards Goal  Note: Fall Risk Interventions:  Mobility Interventions: Assess mobility with egress test, Bed/chair exit alarm, Communicate number of staff needed for ambulation/transfer, Patient to call before getting OOB, Utilize walker, cane, or other assistive device    Mentation Interventions: Adequate sleep, hydration, pain control, Bed/chair exit alarm, Door open when patient unattended, More frequent rounding, Reorient patient, Room close to nurse's station    Medication Interventions: Bed/chair exit alarm, Patient to call before getting OOB, Teach patient to arise slowly    Elimination Interventions: Bed/chair exit alarm, Patient to call for help with toileting needs, Stay With Me (per policy), Toilet paper/wipes in reach, Toileting schedule/hourly rounds    History of Falls Interventions: Bed/chair exit alarm, Door open when patient unattended, Room close to nurse's station         Problem: Altered Thought Process (Adult/Pediatric)  Goal: *STG: Participates in treatment plan  Outcome: Progressing Towards Goal  Note: Pt is in her room quiet calm and cooperative   Periodic confusion noted - redirection and reorientation offered  Med/Meal compliant   Uses a walker with minimal assistance from staff   Able to follow staff direction  NAD noted   Will continue to monitor.

## 2021-01-09 NOTE — PROGRESS NOTES
Pt resting in bed, appears asleep. No distress noted. Respirations WNL. Will continue to monitor q15 for safety    Problem: Falls - Risk of  Goal: *Absence of Falls  Description: Document Clydene Bone Fall Risk and appropriate interventions in the flowsheet.   Outcome: Progressing Towards Goal  Note: Fall Risk Interventions:  Mobility Interventions: Assess mobility with egress test    Mentation Interventions: Adequate sleep, hydration, pain control    Medication Interventions: Patient to call before getting OOB    Elimination Interventions: Patient to call for help with toileting needs    History of Falls Interventions: Door open when patient unattended       Sleep= 4.5

## 2021-01-10 PROCEDURE — 74011250637 HC RX REV CODE- 250/637: Performed by: NURSE PRACTITIONER

## 2021-01-10 PROCEDURE — 74011000250 HC RX REV CODE- 250: Performed by: NURSE PRACTITIONER

## 2021-01-10 PROCEDURE — 74011250637 HC RX REV CODE- 250/637: Performed by: PSYCHIATRY & NEUROLOGY

## 2021-01-10 PROCEDURE — 65220000003 HC RM SEMIPRIVATE PSYCH

## 2021-01-10 RX ADMIN — MIRTAZAPINE 15 MG: 15 TABLET, FILM COATED ORAL at 20:28

## 2021-01-10 RX ADMIN — DONEPEZIL HYDROCHLORIDE 5 MG: 5 TABLET, FILM COATED ORAL at 20:28

## 2021-01-10 RX ADMIN — RISPERIDONE 1 MG: 1 TABLET ORAL at 09:55

## 2021-01-10 RX ADMIN — NYSTATIN: 100000 POWDER TOPICAL at 09:00

## 2021-01-10 RX ADMIN — MEMANTINE HYDROCHLORIDE 5 MG: 10 TABLET ORAL at 16:32

## 2021-01-10 RX ADMIN — MEMANTINE HYDROCHLORIDE 5 MG: 10 TABLET ORAL at 09:55

## 2021-01-10 RX ADMIN — RISPERIDONE 1 MG: 1 TABLET ORAL at 20:29

## 2021-01-10 RX ADMIN — VALPROIC ACID 625 MG: 250 SOLUTION ORAL at 16:31

## 2021-01-10 RX ADMIN — LORATADINE 10 MG: 10 TABLET ORAL at 09:55

## 2021-01-10 RX ADMIN — VALPROIC ACID 625 MG: 250 SOLUTION ORAL at 09:54

## 2021-01-10 RX ADMIN — NYSTATIN: 100000 POWDER TOPICAL at 16:32

## 2021-01-10 NOTE — PROGRESS NOTES
Problem: Falls - Risk of  Goal: *Absence of Falls  Description: Document Marisa Blood Fall Risk and appropriate interventions in the flowsheet. Outcome: Progressing Towards Goal  Note: Fall Risk Interventions:  Mobility Interventions: Assess mobility with egress test, Bed/chair exit alarm, Communicate number of staff needed for ambulation/transfer, Patient to call before getting OOB, Utilize walker, cane, or other assistive device    Mentation Interventions: Adequate sleep, hydration, pain control, Bed/chair exit alarm, Door open when patient unattended, More frequent rounding, Reorient patient, Room close to nurse's station    Medication Interventions: Bed/chair exit alarm, Patient to call before getting OOB, Teach patient to arise slowly    Elimination Interventions: Bed/chair exit alarm, Patient to call for help with toileting needs, Stay With Me (per policy), Toilet paper/wipes in reach, Toileting schedule/hourly rounds    History of Falls Interventions: Bed/chair exit alarm, Door open when patient unattended, Room close to nurse's station         Problem: Altered Thought Process (Adult/Pediatric)  Goal: *STG: Participates in treatment plan  Outcome: Progressing Towards Goal  Note: Pt is visible in the DR calm cooperative and appropriate with staff and peers   Able to follow staff directions   1 person assist gait in unsteady and uses a walker   Alert and oriented to place and self. Med/Meal compliant   NAD noted   Will continue to monitor.

## 2021-01-10 NOTE — PROGRESS NOTES
PSYCHIATRIC PROGRESS NOTE       Patient: Milagro Cole MRN: [id number]  SSN: [id number]    YOB: 1945  Age: 67 y.o. Sex: female      Admit Date: 12/14/2020    LOS: 27 days       Interval History:  Milagro Cole is maintaining well. She is up near the nursing station talking with other patients and eating her lunch. Sleeping and eating well. She remains compliant with her meds and denies side effects. Calm during the interview, smiling appropriately but severely confused, continuing to talk about her mother. Guardianship hearing scheduled on Monday. Denies si or hi. Awaiting placement. Past Medical History:  Past Medical History:   Diagnosis Date    Anemia     Arthritis     Bipolar disorder ([facility] [address]     Burning with urination     Memory change     Psychiatric disorder     Bipolar Disorder    Stool color black     Tremor          ALLERGIES:(reviewed/updated 1/10/2021)  Allergies   Allergen Reactions    Lamisil [Terbinafine] Diarrhea and Nausea and Vomiting    Thorazine [Chlorpromazine] Rash       Laboratory report:  Lab Results   Component Value Date/Time    WBC 7.1 01/09/2021 05:16 AM    HGB 10.6 (L) 01/09/2021 05:16 AM    HCT 33.8 (L) 01/09/2021 05:16 AM    PLATELET 209 35/63/9866 05:16 AM    .9 (H) 01/09/2021 05:16 AM      Lab Results   Component Value Date/Time    Sodium 141 01/09/2021 05:16 AM    Potassium 3.5 01/09/2021 05:16 AM    Chloride 108 01/09/2021 05:16 AM    CO2 29 01/09/2021 05:16 AM    Anion gap 4 (L) 01/09/2021 05:16 AM    Glucose 76 01/09/2021 05:16 AM    Glucose 95 12/15/2020 04:22 AM    BUN 22 (H) 01/09/2021 05:16 AM    Creatinine 0.63 01/09/2021 05:16 AM    BUN/Creatinine ratio 35 (H) 01/09/2021 05:16 AM    GFR est AA >60 01/09/2021 05:16 AM    GFR est non-AA >60 01/09/2021 05:16 AM    Calcium 9.1 01/09/2021 05:16 AM    Bilirubin, total 0.2 01/09/2021 05:16 AM    Alk.  phosphatase 48 01/09/2021 05:16 AM    Protein, total 6.3 (L) 01/09/2021 05:16 AM    Albumin 2.8 (L) 01/09/2021 05:16 AM    Globulin 3.5 01/09/2021 05:16 AM    A-G Ratio 0.8 (L) 01/09/2021 05:16 AM    ALT (SGPT) 13 01/09/2021 05:16 AM      Vitals:    01/09/21 1544 01/09/21 2049 01/10/21 0815 01/10/21 1503   BP: 116/69 116/84 120/72 135/83   Pulse: 90 83 92 93   Resp: 18 14 16 18   Temp: 98.3 °F (36.8 °C) 98.1 °F (36.7 °C) 98.2 °F (36.8 °C) 97.3 °F (36.3 °C)   SpO2: 96% 96% 97% 95%   Weight:       Height:           Lab Results   Component Value Date/Time    Valproic acid 76 01/02/2021 05:05 AM     Lab Results   Component Value Date/Time    Lithium level 0.57 (L) 11/29/2020 05:27 PM       Vital Signs  Patient Vitals for the past 24 hrs:   Temp Pulse Resp BP SpO2   01/10/21 1503 97.3 °F (36.3 °C) 93 18 135/83 95 %   01/10/21 0815 98.2 °F (36.8 °C) 92 16 120/72 97 %   01/09/21 2049 98.1 °F (36.7 °C) 83 14 116/84 96 %     Wt Readings from Last 3 Encounters:   01/03/21 67.6 kg (149 lb)   11/29/20 75.4 kg (166 lb 3.6 oz)   12/02/20 75.4 kg (166 lb 3.6 oz)     Temp Readings from Last 3 Encounters:   01/10/21 97.3 °F (36.3 °C)   12/14/20 97.5 °F (36.4 °C)   09/12/20 98.1 °F (36.7 °C)     BP Readings from Last 3 Encounters:   01/10/21 135/83   12/14/20 (!) 113/58   09/12/20 128/72     Pulse Readings from Last 3 Encounters:   01/10/21 93   12/14/20 72   09/12/20 75       Radiology (reviewed/updated 1/10/2021)  Ct Head Wo Cont    Result Date: 11/29/2020  CLINICAL HISTORY: Encephalopathy INDICATION: Encephalopathy COMPARISON: 9/7/2018. CT dose reduction was achieved through use of a standardized protocol tailored for this examination and automatic exposure control for dose modulation. TECHNIQUE: Serial axial images with a collimation of 5 mm were obtained from the skull base through the vertex  FINDINGS: There is sulcal and ventricular prominence. Confluent periventricular and scattered foci of hypodensity in the cerebral white matter. There is no evidence of an acute infarction, hemorrhage, or mass-effect.  There is no evidence of midline shift or hydrocephalus. Posterior fossa structures are unremarkable. No extra-axial collections are seen. Mastoid air cells are well pneumatized and clear.  Hypodensity in the central jerri. 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     IMPRESSION: No acute intracranial process. Small area of chronic encephalomalacia in the left frontal lobe. Imaging findings consistent with moderate chronic microvascular ischemic change. There is a mild degree of cerebral atrophy.     Xr Chest Port    Result Date: 11/29/2020  EXAM: XR CHEST PORT INDICATION: fever COMPARISON: None. FINDINGS: A portable AP radiograph of the chest was obtained at 14:46 hours. The patient is on a cardiac monitor. The lungs are clear. The cardiac and mediastinal contours and pulmonary vascularity are normal.  The chest wall structures and visualized upper abdomen show no acute findings with incidental note of degenerative spine and shoulder changes as well as partial visualization of posterior windy and screw fixation hardware of the lumbar spine.     IMPRESSION: No acute findings.      Current Facility-Administered Medications   Medication Dose Route Frequency Provider Last Rate Last Admin   • memantine (NAMENDA) tablet 5 mg  5 mg Oral BID Hemalatha Vinson NP   5 mg at 01/10/21 1632   • loratadine (CLARITIN) tablet 10 mg  10 mg Oral DAILY Raheem Chester MD   10 mg at 01/10/21 0955   • lidocaine 4 % patch 1 Patch  1 Patch TransDERmal Q24H Ej Dyson FNP   1 Patch at 01/09/21 2050   • valproic acid (as sodium salt) (DEPAKENE) 250 mg/5 mL (5 mL) oral solution 625 mg  625 mg Oral BID Raheem Chester MD   625 mg at 01/10/21 1631   • loperamide (IMODIUM) capsule 2 mg  2 mg Oral Q4H PRN Hemalatha Vinson NP   2 mg at 01/06/21 1046   • nystatin (MYCOSTATIN) 100,000 unit/gram powder   Topical BID Hemalatha Vinson NP   Given at 01/10/21 1632   • docusate sodium (COLACE) capsule  100 mg  100 mg Oral DAILY Hemalatha Vinson NP   Stopped at 01/07/21 0900    donepeziL (ARICEPT) tablet 5 mg  5 mg Oral QHS Hemalatha Vinson NP   5 mg at 01/09/21 2049    OLANZapine (ZyPREXA) tablet 2.5 mg  2.5 mg Oral Q6H PRN Kim Moore, NP   2.5 mg at 12/23/20 1416    haloperidol lactate (HALDOL) injection 2.5 mg  2.5 mg IntraMUSCular Q6H PRN Gloria Moore, MAX        benztropine (COGENTIN) tablet 0.5 mg  0.5 mg Oral BID PRN Gloria Moore NP        diphenhydrAMINE (BENADRYL) injection 25 mg  25 mg IntraMUSCular BID PRN Gloria Moore, MAX        acetaminophen (TYLENOL) tablet 650 mg  650 mg Oral Q4H PRN Kim Moore NP   650 mg at 01/08/21 1006    magnesium hydroxide (MILK OF MAGNESIA) 400 mg/5 mL oral suspension 30 mL  30 mL Oral DAILY PRN Kim Moore, NP   30 mL at 12/19/20 1221    traZODone (DESYREL) tablet 50 mg  50 mg Oral QHS PRN Gloria Moore, MAX        mirtazapine (REMERON) tablet 15 mg  15 mg Oral QHS Kim Moore NP   15 mg at 01/09/21 2049    risperiDONE (RisperDAL) tablet 1 mg  1 mg Oral BID Kim Moore, NP   1 mg at 01/10/21 0955       Side Effects: (reviewed/updated 1/10/2021)  None reported or admitted to. Review of Systems: (reviewed/updated 1/10/2021)  Appetite: good  Sleep: good   All other Review of Systems: negative    Mental Status Exam:  Eye contact: Good eye contact  Psychomotor activity: irritable   Speech is spontaneous  Thought process:confabulation  Mood is \"ok\"  Affect: constricted  Perception: No avh  Suicidal ideation: No si  Homicidal ideation: No hi  Insight/judgment: Poor  Cognition is impaired.  MMSE-8      Physical Exam:  Musculoskeletal system: steady gait  Tremor not present  Cog wheeling not present      Assessment and Plan:  Ela Hollis meets criteria for a diagnosis of Unspecified dementia with behavioral disturbance. Bipolar 1 disorder by history. Cbc and CMP in am.  Continue current medications as prescribed. We will closely monitor for safety. We will encourage reality orientation. Disposition planning to continue. I certify that this patients inpatient psychiatric hospital services furnished since the previous certification were, and continue to be, required for treatment that could reasonably be expected to improve the patient's condition, or for diagnostic study, and that the patient continues to need, on a daily basis, active treatment furnished directly by or requiring the supervision of inpatient psychiatric facility personnel. In addition, the hospital records show that services furnished were intensive treatment services, admission or related services, or equivalent services.       Signed:  Janee Hernadez NP  1/10/2021

## 2021-01-10 NOTE — PROGRESS NOTES
Problem: Falls - Risk of  Goal: *Absence of Falls  Description: Document Jesse Fernandez Fall Risk and appropriate interventions in the flowsheet.   Outcome: Progressing Towards Goal  Note: Fall Risk Interventions:  Mobility Interventions: Bed/chair exit alarm, Communicate number of staff needed for ambulation/transfer, Utilize walker, cane, or other assistive device    Mentation Interventions: Adequate sleep, hydration, pain control, Bed/chair exit alarm, Door open when patient unattended, More frequent rounding    Medication Interventions: Bed/chair exit alarm, Teach patient to arise slowly    Elimination Interventions: Bed/chair exit alarm, Toileting schedule/hourly rounds    History of Falls Interventions: Bed/chair exit alarm, Door open when patient unattended

## 2021-01-10 NOTE — PROGRESS NOTES
Problem: Falls - Risk of  Goal: *Absence of Falls  Description: Document Berhane Long Fall Risk and appropriate interventions in the flowsheet. 1/10/2021 1512 by Brain Rosales RN  Outcome: Progressing Towards Goal  Note: Fall Risk Interventions:  Mobility Interventions: Assess mobility with egress test, Bed/chair exit alarm, Communicate number of staff needed for ambulation/transfer, Patient to call before getting OOB, Utilize walker, cane, or other assistive device    Mentation Interventions: Adequate sleep, hydration, pain control, Bed/chair exit alarm, Door open when patient unattended, More frequent rounding, Reorient patient, Room close to nurse's station    Medication Interventions: Bed/chair exit alarm, Patient to call before getting OOB, Teach patient to arise slowly    Elimination Interventions: Bed/chair exit alarm, Patient to call for help with toileting needs, Stay With Me (per policy), Toilet paper/wipes in reach, Toileting schedule/hourly rounds    History of Falls Interventions: Bed/chair exit alarm, Door open when patient unattended, Room close to nurse's station         Problem: Altered Thought Process (Adult/Pediatric)  Goal: *STG: Participates in treatment plan  1/10/2021 1512 by Brain Rosales RN  Outcome: Progressing Towards Goal     Problem: Altered Thought Process (Adult/Pediatric)  Goal: *STG: Complies with medication therapy  Outcome: Progressing Towards Goal     Problem: Altered Thought Process (Adult/Pediatric)  Goal: *STG: Attends activities and groups  Outcome: Not Progressing Towards Goal     Problem: Altered Thought Process (Adult/Pediatric)  Goal: *STG: Demonstrates ability to understand and use improved judgment in daily activities and relationships  Outcome: Not Progressing Towards Goal    Remains calm and cooperative. Periodic confusion noted. 1 person assist   Med/Meal compliant. Keeps talking about her mother and that she wants to call her and talk to her. Reorientation offered.  NAD noted. Will continue to monitor.

## 2021-01-10 NOTE — PROGRESS NOTES
PSYCHIATRIC PROGRESS NOTE       Patient: Jose Doyle MRN: [id number]  SSN: [id number]    YOB: 1945  Age: 67 y.o. Sex: female      Admit Date: 12/14/2020    LOS: 26 days       Interval History:  Jose Doyle is maintaining. She states she is doing ok, resting well. Sleeping and eating well. She remains compliant with her meds and denies side effects. Calm during the interview, smiling appropriately but severely confused, stating that her mother is coming to see her tomorrow. Guardianship hearing scheduled on Monday. Denies si or hi. Awaiting placement. Past Medical History:  Past Medical History:   Diagnosis Date    Anemia     Arthritis     Bipolar disorder ([address]     Burning with urination     Memory change     Psychiatric disorder     Bipolar Disorder    Stool color black     Tremor          ALLERGIES:(reviewed/updated 1/9/2021)  Allergies   Allergen Reactions    Lamisil [Terbinafine] Diarrhea and Nausea and Vomiting    Thorazine [Chlorpromazine] Rash       Laboratory report:  Lab Results   Component Value Date/Time    WBC 7.1 01/09/2021 05:16 AM    HGB 10.6 (L) 01/09/2021 05:16 AM    HCT 33.8 (L) 01/09/2021 05:16 AM    PLATELET 357 99/01/7798 05:16 AM    .9 (H) 01/09/2021 05:16 AM      Lab Results   Component Value Date/Time    Sodium 141 01/09/2021 05:16 AM    Potassium 3.5 01/09/2021 05:16 AM    Chloride 108 01/09/2021 05:16 AM    CO2 29 01/09/2021 05:16 AM    Anion gap 4 (L) 01/09/2021 05:16 AM    Glucose 76 01/09/2021 05:16 AM    Glucose 95 12/15/2020 04:22 AM    BUN 22 (H) 01/09/2021 05:16 AM    Creatinine 0.63 01/09/2021 05:16 AM    BUN/Creatinine ratio 35 (H) 01/09/2021 05:16 AM    GFR est AA >60 01/09/2021 05:16 AM    GFR est non-AA >60 01/09/2021 05:16 AM    Calcium 9.1 01/09/2021 05:16 AM    Bilirubin, total 0.2 01/09/2021 05:16 AM    Alk.  phosphatase 48 01/09/2021 05:16 AM    Protein, total 6.3 (L) 01/09/2021 05:16 AM    Albumin 2.8 (L) 01/09/2021 05:16 AM Globulin 3.5 01/09/2021 05:16 AM    A-G Ratio 0.8 (L) 01/09/2021 05:16 AM    ALT (SGPT) 13 01/09/2021 05:16 AM      Vitals:    01/08/21 1553 01/08/21 2014 01/09/21 0754 01/09/21 1544   BP: (!) 146/78 129/80 127/76 116/69   Pulse: 96 87 89 90   Resp: 18 18 18 18   Temp: 98.2 °F (36.8 °C) 97.9 °F (36.6 °C) 97.5 °F (36.4 °C) 98.3 °F (36.8 °C)   SpO2: 94% 95% 96% 96%   Weight:       Height:           Lab Results   Component Value Date/Time    Valproic acid 76 01/02/2021 05:05 AM     Lab Results   Component Value Date/Time    Lithium level 0.57 (L) 11/29/2020 05:27 PM       Vital Signs  Patient Vitals for the past 24 hrs:   Temp Pulse Resp BP SpO2   01/09/21 1544 98.3 °F (36.8 °C) 90 18 116/69 96 %   01/09/21 0754 97.5 °F (36.4 °C) 89 18 127/76 96 %   01/08/21 2014 97.9 °F (36.6 °C) 87 18 129/80 95 %     Wt Readings from Last 3 Encounters:   01/03/21 67.6 kg (149 lb)   11/29/20 75.4 kg (166 lb 3.6 oz)   12/02/20 75.4 kg (166 lb 3.6 oz)     Temp Readings from Last 3 Encounters:   01/09/21 98.3 °F (36.8 °C)   12/14/20 97.5 °F (36.4 °C)   09/12/20 98.1 °F (36.7 °C)     BP Readings from Last 3 Encounters:   01/09/21 116/69   12/14/20 (!) 113/58   09/12/20 128/72     Pulse Readings from Last 3 Encounters:   01/09/21 90   12/14/20 72   09/12/20 75       Radiology (reviewed/updated 1/9/2021)  Ct Head Wo Cont    Result Date: 11/29/2020  CLINICAL HISTORY: Encephalopathy INDICATION: Encephalopathy COMPARISON: 9/7/2018. CT dose reduction was achieved through use of a standardized protocol tailored for this examination and automatic exposure control for dose modulation. TECHNIQUE: Serial axial images with a collimation of 5 mm were obtained from the skull base through the vertex  FINDINGS: There is sulcal and ventricular prominence. Confluent periventricular and scattered foci of hypodensity in the cerebral white matter. There is no evidence of an acute infarction, hemorrhage, or mass-effect.  There is no evidence of midline shift or hydrocephalus. Posterior fossa structures are unremarkable. No extra-axial collections are seen. Mastoid air cells are well pneumatized and clear. Hypodensity in the central jerri. 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 IMPRESSION: No acute intracranial process. Small area of chronic encephalomalacia in the left frontal lobe. Imaging findings consistent with moderate chronic microvascular ischemic change. There is a mild degree of cerebral atrophy. Xr Chest Port    Result Date: 11/29/2020  EXAM: XR CHEST PORT INDICATION: fever COMPARISON: None. FINDINGS: A portable AP radiograph of the chest was obtained at 14:46 hours. The patient is on a cardiac monitor. The lungs are clear. The cardiac and mediastinal contours and pulmonary vascularity are normal.  The chest wall structures and visualized upper abdomen show no acute findings with incidental note of degenerative spine and shoulder changes as well as partial visualization of posterior windy and screw fixation hardware of the lumbar spine. IMPRESSION: No acute findings.       Current Facility-Administered Medications   Medication Dose Route Frequency Provider Last Rate Last Admin    memantine (NAMENDA) tablet 5 mg  5 mg Oral BID Hemalatha Vinson NP   5 mg at 01/09/21 1648    loratadine (CLARITIN) tablet 10 mg  10 mg Oral DAILY Moira Gibbons MD   10 mg at 01/09/21 0829    lidocaine 4 % patch 1 Patch  1 Patch TransDERmal Q24H BRITTANY Stokes   1 Patch at 01/08/21 2100    valproic acid (as sodium salt) (DEPAKENE) 250 mg/5 mL (5 mL) oral solution 625 mg  625 mg Oral BID Moira Gibbons MD   625 mg at 01/09/21 1644    loperamide (IMODIUM) capsule 2 mg  2 mg Oral Q4H PRN Hemalatha Vinson NP   2 mg at 01/06/21 1046    nystatin (MYCOSTATIN) 100,000 unit/gram powder   Topical BID Caryn Section A, NP   Given at 01/09/21 1749    docusate sodium (COLACE) capsule 100 mg  100 mg Oral DAILY Bev Pump A, NP   Stopped at 01/07/21 0900    donepeziL (ARICEPT) tablet 5 mg  5 mg Oral QHS Hemalatha Vinson, NP   5 mg at 01/08/21 2154    OLANZapine (ZyPREXA) tablet 2.5 mg  2.5 mg Oral Q6H PRN Kim Moore, NP   2.5 mg at 12/23/20 1416    haloperidol lactate (HALDOL) injection 2.5 mg  2.5 mg IntraMUSCular Q6H PRN EnmaRito Harrell, NP        benztropine (COGENTIN) tablet 0.5 mg  0.5 mg Oral BID PRN EnmaRito Harrell, NP        diphenhydrAMINE (BENADRYL) injection 25 mg  25 mg IntraMUSCular BID PRN Rito Moore, NP        acetaminophen (TYLENOL) tablet 650 mg  650 mg Oral Q4H PRN Kim Moore, NP   650 mg at 01/08/21 1006    magnesium hydroxide (MILK OF MAGNESIA) 400 mg/5 mL oral suspension 30 mL  30 mL Oral DAILY PRN Kim Moore, NP   30 mL at 12/19/20 1221    traZODone (DESYREL) tablet 50 mg  50 mg Oral QHS PRN Rito Moore, NP        mirtazapine (REMERON) tablet 15 mg  15 mg Oral QHS EnmaKim Harrell, NP   15 mg at 01/08/21 2100    risperiDONE (RisperDAL) tablet 1 mg  1 mg Oral BID Kim Moore, NP   1 mg at 01/09/21 0827       Side Effects: (reviewed/updated 1/9/2021)  None reported or admitted to. Review of Systems: (reviewed/updated 1/9/2021)  Appetite: good  Sleep: good   All other Review of Systems: negative    Mental Status Exam:  Eye contact: Good eye contact  Psychomotor activity: irritable   Speech is spontaneous  Thought process:confabulation  Mood is \"ok\"  Affect: constricted  Perception: No avh  Suicidal ideation: No si  Homicidal ideation: No hi  Insight/judgment: Poor  Cognition is impaired. MMSE-8      Physical Exam:  Musculoskeletal system: steady gait  Tremor not present  Cog wheeling not present      Assessment and Plan:  Juan Wyatt meets criteria for a diagnosis of Unspecified dementia with behavioral disturbance. Bipolar 1 disorder by history. Cbc and CMP in am.  Continue current medications as prescribed. We will closely monitor for safety. We will encourage reality orientation. Disposition planning to continue. I certify that this patients inpatient psychiatric hospital services furnished since the previous certification were, and continue to be, required for treatment that could reasonably be expected to improve the patient's condition, or for diagnostic study, and that the patient continues to need, on a daily basis, active treatment furnished directly by or requiring the supervision of inpatient psychiatric facility personnel. In addition, the hospital records show that services furnished were intensive treatment services, admission or related services, or equivalent services.       Signed:  Sherita Perez NP  1/9/2021

## 2021-01-11 PROCEDURE — 65220000003 HC RM SEMIPRIVATE PSYCH

## 2021-01-11 PROCEDURE — 74011000250 HC RX REV CODE- 250: Performed by: NURSE PRACTITIONER

## 2021-01-11 PROCEDURE — 74011250637 HC RX REV CODE- 250/637: Performed by: PSYCHIATRY & NEUROLOGY

## 2021-01-11 PROCEDURE — 74011250637 HC RX REV CODE- 250/637: Performed by: NURSE PRACTITIONER

## 2021-01-11 RX ADMIN — MIRTAZAPINE 15 MG: 15 TABLET, FILM COATED ORAL at 20:23

## 2021-01-11 RX ADMIN — VALPROIC ACID 625 MG: 250 SOLUTION ORAL at 07:45

## 2021-01-11 RX ADMIN — DONEPEZIL HYDROCHLORIDE 5 MG: 5 TABLET, FILM COATED ORAL at 20:23

## 2021-01-11 RX ADMIN — RISPERIDONE 1 MG: 1 TABLET ORAL at 20:23

## 2021-01-11 RX ADMIN — RISPERIDONE 1 MG: 1 TABLET ORAL at 07:45

## 2021-01-11 RX ADMIN — MEMANTINE HYDROCHLORIDE 5 MG: 10 TABLET ORAL at 07:45

## 2021-01-11 RX ADMIN — ACETAMINOPHEN 650 MG: 325 TABLET ORAL at 07:46

## 2021-01-11 RX ADMIN — NYSTATIN: 100000 POWDER TOPICAL at 07:47

## 2021-01-11 RX ADMIN — MEMANTINE HYDROCHLORIDE 5 MG: 10 TABLET ORAL at 17:28

## 2021-01-11 RX ADMIN — LORATADINE 10 MG: 10 TABLET ORAL at 07:44

## 2021-01-11 RX ADMIN — ACETAMINOPHEN 650 MG: 325 TABLET ORAL at 17:27

## 2021-01-11 RX ADMIN — VALPROIC ACID 625 MG: 250 SOLUTION ORAL at 17:28

## 2021-01-11 RX ADMIN — DOCUSATE SODIUM 100 MG: 100 CAPSULE ORAL at 07:43

## 2021-01-11 NOTE — PROGRESS NOTES
PSYCHIATRIC PROGRESS NOTE       Patient: Chet Mckeon MRN: [id number]  SSN: [id number]    YOB: 1945  Age: 67 y.o. Sex: female      Admit Date: 12/14/2020    LOS: 28 days       Chief Complaint:  I don't think I need to be in that hearing. Interval History:  Chet Mckeon is maintaining. She is sitting in the day room, calm and pleasantly confused. She slept 8 well last night. She states she does not need to be in her guardianship hearing this afternoon. She is eating well, taking her meds with no side effects noted. No issues noted. Past Medical History:  Past Medical History:   Diagnosis Date    Anemia     Arthritis     Bipolar disorder ([address]     Burning with urination     Memory change     Psychiatric disorder     Bipolar Disorder    Stool color black     Tremor          ALLERGIES:(reviewed/updated 1/11/2021)  Allergies   Allergen Reactions    Lamisil [Terbinafine] Diarrhea and Nausea and Vomiting    Thorazine [Chlorpromazine] Rash       Laboratory report:  Lab Results   Component Value Date/Time    WBC 7.1 01/09/2021 05:16 AM    HGB 10.6 (L) 01/09/2021 05:16 AM    HCT 33.8 (L) 01/09/2021 05:16 AM    PLATELET 134 91/55/7520 05:16 AM    .9 (H) 01/09/2021 05:16 AM      Lab Results   Component Value Date/Time    Sodium 141 01/09/2021 05:16 AM    Potassium 3.5 01/09/2021 05:16 AM    Chloride 108 01/09/2021 05:16 AM    CO2 29 01/09/2021 05:16 AM    Anion gap 4 (L) 01/09/2021 05:16 AM    Glucose 76 01/09/2021 05:16 AM    Glucose 95 12/15/2020 04:22 AM    BUN 22 (H) 01/09/2021 05:16 AM    Creatinine 0.63 01/09/2021 05:16 AM    BUN/Creatinine ratio 35 (H) 01/09/2021 05:16 AM    GFR est AA >60 01/09/2021 05:16 AM    GFR est non-AA >60 01/09/2021 05:16 AM    Calcium 9.1 01/09/2021 05:16 AM    Bilirubin, total 0.2 01/09/2021 05:16 AM    Alk.  phosphatase 48 01/09/2021 05:16 AM    Protein, total 6.3 (L) 01/09/2021 05:16 AM    Albumin 2.8 (L) 01/09/2021 05:16 AM    Globulin 3.5 01/09/2021 05:16 AM    A-G Ratio 0.8 (L) 01/09/2021 05:16 AM    ALT (SGPT) 13 01/09/2021 05:16 AM      Vitals:    01/10/21 1503 01/10/21 1736 01/10/21 2040 01/11/21 0738   BP: 135/83  126/76 114/69   Pulse: 93  88 96   Resp: 18  18 16   Temp: 97.3 °F (36.3 °C)  98.1 °F (36.7 °C) 98.3 °F (36.8 °C)   SpO2: 95%  95% 95%   Weight:  68.5 kg (151 lb)     Height:           Lab Results   Component Value Date/Time    Valproic acid 76 01/02/2021 05:05 AM     Lab Results   Component Value Date/Time    Lithium level 0.57 (L) 11/29/2020 05:27 PM       Vital Signs  Patient Vitals for the past 24 hrs:   Temp Pulse Resp BP SpO2   01/11/21 0738 98.3 °F (36.8 °C) 96 16 114/69 95 %   01/10/21 2040 98.1 °F (36.7 °C) 88 18 126/76 95 %   01/10/21 1503 97.3 °F (36.3 °C) 93 18 135/83 95 %     Wt Readings from Last 3 Encounters:   01/10/21 68.5 kg (151 lb)   11/29/20 75.4 kg (166 lb 3.6 oz)   12/02/20 75.4 kg (166 lb 3.6 oz)     Temp Readings from Last 3 Encounters:   01/11/21 98.3 °F (36.8 °C)   12/14/20 97.5 °F (36.4 °C)   09/12/20 98.1 °F (36.7 °C)     BP Readings from Last 3 Encounters:   01/11/21 114/69   12/14/20 (!) 113/58   09/12/20 128/72     Pulse Readings from Last 3 Encounters:   01/11/21 96   12/14/20 72   09/12/20 75       Radiology (reviewed/updated 1/11/2021)  Ct Head Wo Cont    Result Date: 11/29/2020  CLINICAL HISTORY: Encephalopathy INDICATION: Encephalopathy COMPARISON: 9/7/2018. CT dose reduction was achieved through use of a standardized protocol tailored for this examination and automatic exposure control for dose modulation. TECHNIQUE: Serial axial images with a collimation of 5 mm were obtained from the skull base through the vertex  FINDINGS: There is sulcal and ventricular prominence. Confluent periventricular and scattered foci of hypodensity in the cerebral white matter. There is no evidence of an acute infarction, hemorrhage, or mass-effect. There is no evidence of midline shift or hydrocephalus.  Posterior fossa structures are unremarkable. No extra-axial collections are seen. Mastoid air cells are well pneumatized and clear. Hypodensity in the central jerri. 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 IMPRESSION: No acute intracranial process. Small area of chronic encephalomalacia in the left frontal lobe. Imaging findings consistent with moderate chronic microvascular ischemic change. There is a mild degree of cerebral atrophy. Xr Chest Port    Result Date: 11/29/2020  EXAM: XR CHEST PORT INDICATION: fever COMPARISON: None. FINDINGS: A portable AP radiograph of the chest was obtained at 14:46 hours. The patient is on a cardiac monitor. The lungs are clear. The cardiac and mediastinal contours and pulmonary vascularity are normal.  The chest wall structures and visualized upper abdomen show no acute findings with incidental note of degenerative spine and shoulder changes as well as partial visualization of posterior windy and screw fixation hardware of the lumbar spine. IMPRESSION: No acute findings.       Current Facility-Administered Medications   Medication Dose Route Frequency Provider Last Rate Last Admin    memantine (NAMENDA) tablet 5 mg  5 mg Oral BID Hemalatha Vinson NP   5 mg at 01/11/21 0745    loratadine (CLARITIN) tablet 10 mg  10 mg Oral DAILY Moira Gibbons MD   10 mg at 01/11/21 0744    lidocaine 4 % patch 1 Patch  1 Patch TransDERmal Q24H BRITTANY Stokes   1 Patch at 01/10/21 2027    valproic acid (as sodium salt) (DEPAKENE) 250 mg/5 mL (5 mL) oral solution 625 mg  625 mg Oral BID Moira Gibbons MD   625 mg at 01/11/21 0745    loperamide (IMODIUM) capsule 2 mg  2 mg Oral Q4H PRN Hemalatha Vinson NP   2 mg at 01/06/21 1046    nystatin (MYCOSTATIN) 100,000 unit/gram powder   Topical BID Caryn Section A, NP   Given at 01/11/21 0747    docusate sodium (COLACE) capsule 100 mg  100 mg Oral DAILY Hemalatha Vinson NP   100 mg at 01/11/21 0743    donepeziL (ARICEPT) tablet 5 mg  5 mg Oral QHS Hemalatha Vinson, NP   5 mg at 01/10/21 2028    OLANZapine (ZyPREXA) tablet 2.5 mg  2.5 mg Oral Q6H PRN Kim Moore, NP   2.5 mg at 12/23/20 1416    haloperidol lactate (HALDOL) injection 2.5 mg  2.5 mg IntraMUSCular Q6H PRN Chica Moore, MAX        benztropine (COGENTIN) tablet 0.5 mg  0.5 mg Oral BID PRN Chica Moore, NP        diphenhydrAMINE (BENADRYL) injection 25 mg  25 mg IntraMUSCular BID PRN Chica Moore, MAX        acetaminophen (TYLENOL) tablet 650 mg  650 mg Oral Q4H PRN Kim Moore, NP   650 mg at 01/11/21 0746    magnesium hydroxide (MILK OF MAGNESIA) 400 mg/5 mL oral suspension 30 mL  30 mL Oral DAILY PRN Kim Moore, NP   30 mL at 12/19/20 1221    traZODone (DESYREL) tablet 50 mg  50 mg Oral QHS PRN Chica Moore, MAX        mirtazapine (REMERON) tablet 15 mg  15 mg Oral QHS Kim Moore, NP   15 mg at 01/10/21 2028    risperiDONE (RisperDAL) tablet 1 mg  1 mg Oral BID Kim Moore, NP   1 mg at 01/11/21 0745       Side Effects: (reviewed/updated 1/11/2021)  None reported or admitted to. Review of Systems: (reviewed/updated 1/11/2021)  Appetite: good  Sleep: good   All other Review of Systems: negative    Mental Status Exam:  Eye contact: Good eye contact  Psychomotor activity: irritable   Speech is spontaneous  Thought process:confabulation  Mood is \"ok\"  Affect: constricted  Perception: No avh  Suicidal ideation: No si  Homicidal ideation: No hi  Insight/judgment: Poor  Cognition is impaired. MMSE-8      Physical Exam:  Musculoskeletal system: steady gait  Tremor not present  Cog wheeling not present      Assessment and Plan:  Jah Mendez meets criteria for a diagnosis of Unspecified dementia with behavioral disturbance.  Bipolar 1 disorder by history. Continue current medications as prescribed. We will closely monitor for safety. We will encourage reality orientation. Disposition planning to continue. I certify that this patients inpatient psychiatric hospital services furnished since the previous certification were, and continue to be, required for treatment that could reasonably be expected to improve the patient's condition, or for diagnostic study, and that the patient continues to need, on a daily basis, active treatment furnished directly by or requiring the supervision of inpatient psychiatric facility personnel. In addition, the hospital records show that services furnished were intensive treatment services, admission or related services, or equivalent services.       Signed:  Jarocho Gale NP  1/11/2021

## 2021-01-11 NOTE — PROGRESS NOTES
Problem: Falls - Risk of  Goal: *Absence of Falls  Description: Document Lita Talbot Fall Risk and appropriate interventions in the flowsheet.   Note: Fall Risk Interventions:  Mobility Interventions: Communicate number of staff needed for ambulation/transfer, Bed/chair exit alarm, Utilize walker, cane, or other assistive device    Mentation Interventions: Adequate sleep, hydration, pain control, Bed/chair exit alarm, Door open when patient unattended, More frequent rounding    Medication Interventions: Bed/chair exit alarm, Teach patient to arise slowly    Elimination Interventions: Bed/chair exit alarm    History of Falls Interventions: Bed/chair exit alarm

## 2021-01-11 NOTE — PROGRESS NOTES
Problem: Falls - Risk of  Goal: *Absence of Falls  Description: Document Jenelle Cat Fall Risk and appropriate interventions in the flowsheet. Outcome: Progressing Towards Goal  Note: Fall Risk Interventions:  Mobility Interventions: Assess mobility with egress test    Mentation Interventions: Adequate sleep, hydration, pain control    Medication Interventions: Bed/chair exit alarm    Elimination Interventions: Bed/chair exit alarm    History of Falls Interventions: Bed/chair exit alarm         Problem: Altered Thought Process (Adult/Pediatric)  Goal: *STG: Participates in treatment plan  Outcome: Progressing Towards Goal  Goal: *STG: Complies with medication therapy  Outcome: Progressing Towards Goal  Goal: *STG: Demonstrates ability to understand and use improved judgment in daily activities and relationships  Outcome: Progressing Towards Goal    Patient presents in dayroom pleasant and engaging in conversation. Ambulates ad radha with walker. Periodically confused, aware of guardian hearing this afternoon, asks to make sure her mother is aware of the situation. Does not want to shower today as she said she had one yesterday, but does want to clean up and \"fix this hair\" Medication compliant. Consumed 80% of meals.

## 2021-01-11 NOTE — PROGRESS NOTES
Problem: Falls - Risk of  Goal: *Absence of Falls  Description: Document Clydene Bone Fall Risk and appropriate interventions in the flowsheet. Note: Fall Risk Interventions:  Mobility Interventions: Assess mobility with egress test    Mentation Interventions: Adequate sleep, hydration, pain control    Medication Interventions: Bed/chair exit alarm    Elimination Interventions: Bed/chair exit alarm    History of Falls Interventions: Bed/chair exit alarm    1515: Patient received awake and alert and sitting in the dining room, talking with staff and peers. Mood and affect; dull, flat, calm and cooperative. Denies SI/HI. Will continue to monitor q15 minutes for safety checks. 1800: Admissions coordinator from [address] called and was \"15-20 minutes away and coming to do an admission assessment to see if patient was eligible for assisted living facility per the daughter\" however writer informed individual that there are no visitors allowed on the unit, and typically social workers handle this and will do this via [address] [place] [address] Avenue. Stated, \"I have a face shield and mask and been tested negative for COVID. \" After informing individual he would be unable to come to the unit due to no order being in the computer, and I would be happy to transfer to the , gentleman said he will just let the daughter know and hung up. \"

## 2021-01-11 NOTE — INTERDISCIPLINARY ROUNDS
Behavioral Health Interdisciplinary Rounds Patient Name: Jessie Morocho  Age: 67 y.o. Room/Bed:  744/ Primary Diagnosis: <principal problem not specified> Admission Status: Involuntary Commitment Readmission within 30 days: no 
Power of  in place: no 
Patient requires a blocked bed: no          Reason for blocked bed:  
Sleep hours: 8 Participation in Care/Groups:  yes Medication Compliant?: Yes PRNS (last 24 hours): None Restraints (last 24 hours):  no 
  
Alcohol screening (AUDIT) completed -  AUDIT Score: 0  If applicable, date SBIRT discussed in treatment team AND documented:   
Tobacco - patient is a smoker: Have You Used Tobacco in the Past 30 Days: No 
Illegal Drugs use: Have You Used Any Illegal Substances Over the Past 12 Months: No 
 
24 hour chart check complete: yes Patient goal(s) for today:  
Treatment team focus/goals:  titrate medication, offer emotional support and behavioral redirections Progress note: Pt is involuntarily committed to [facility] for inability to care for self. She cannot return home and requires placement. Daughter, Herbert Cam, is not POA and is seeking guardianship. There is a hearing today, last week when Pt meet with  virtually she declined to attend hearing stating \"I don't need to go; you can represent me well enough. \" Pt is still confused today asking when she will see her mother and when she will be leaving. She is not verbally or physically combative when staff redirect her that she will not be discharging today. Family contact: daughterMaribeth Later ([phone]) Patient's preferred phone number for follow up call :  
Patient's preferred e-mail address : 
 
LOS:  28  Expected LOS: TBD 
  
Participating treatment team members: Nida Velez NP, Yessica Ignacio, MSW; Marybeth Farrell, MYRANDA

## 2021-01-12 PROCEDURE — 74011250637 HC RX REV CODE- 250/637: Performed by: PSYCHIATRY & NEUROLOGY

## 2021-01-12 PROCEDURE — 65220000003 HC RM SEMIPRIVATE PSYCH

## 2021-01-12 PROCEDURE — 74011250637 HC RX REV CODE- 250/637: Performed by: NURSE PRACTITIONER

## 2021-01-12 PROCEDURE — 74011000250 HC RX REV CODE- 250: Performed by: NURSE PRACTITIONER

## 2021-01-12 RX ADMIN — MEMANTINE HYDROCHLORIDE 5 MG: 10 TABLET ORAL at 17:01

## 2021-01-12 RX ADMIN — DONEPEZIL HYDROCHLORIDE 5 MG: 5 TABLET, FILM COATED ORAL at 20:28

## 2021-01-12 RX ADMIN — RISPERIDONE 1 MG: 1 TABLET ORAL at 20:28

## 2021-01-12 RX ADMIN — ACETAMINOPHEN 650 MG: 325 TABLET ORAL at 20:03

## 2021-01-12 RX ADMIN — NYSTATIN: 100000 POWDER TOPICAL at 09:44

## 2021-01-12 RX ADMIN — VALPROIC ACID 625 MG: 250 SOLUTION ORAL at 09:36

## 2021-01-12 RX ADMIN — NYSTATIN: 100000 POWDER TOPICAL at 20:27

## 2021-01-12 RX ADMIN — LORATADINE 10 MG: 10 TABLET ORAL at 09:37

## 2021-01-12 RX ADMIN — VALPROIC ACID 625 MG: 250 SOLUTION ORAL at 17:01

## 2021-01-12 RX ADMIN — ACETAMINOPHEN 650 MG: 325 TABLET ORAL at 14:04

## 2021-01-12 RX ADMIN — RISPERIDONE 1 MG: 1 TABLET ORAL at 09:37

## 2021-01-12 RX ADMIN — MIRTAZAPINE 15 MG: 15 TABLET, FILM COATED ORAL at 20:28

## 2021-01-12 RX ADMIN — MEMANTINE HYDROCHLORIDE 5 MG: 10 TABLET ORAL at 09:37

## 2021-01-12 RX ADMIN — ACETAMINOPHEN 650 MG: 325 TABLET ORAL at 07:05

## 2021-01-12 NOTE — PROGRESS NOTES
Problem: Falls - Risk of  Goal: *Absence of Falls  Description: Document Clydene Bone Fall Risk and appropriate interventions in the flowsheet. Outcome: Progressing Towards Goal  Note: Fall Risk Interventions:  Mobility Interventions: Assess mobility with egress test    Mentation Interventions: Adequate sleep, hydration, pain control    Medication Interventions: Bed/chair exit alarm    Elimination Interventions: Bed/chair exit alarm    History of Falls Interventions: Bed/chair exit alarm       Pt. Received resting in bed, NAD, respirations even and unlabored. Will continue q15 safety rounds.     PRN Medication Documentation    Specific patient behavior that led to need for PRN medication: c/o back pain  Staff interventions attempted prior to PRN being given: sitting up in chair  PRN medication given: tylenol  Patient response/effectiveness of PRN medication: will assess

## 2021-01-12 NOTE — PROGRESS NOTES
Problem: Falls - Risk of  Goal: *Absence of Falls  Description: Document Marisa Blood Fall Risk and appropriate interventions in the flowsheet. Outcome: Progressing Towards Goal  Note: Fall Risk Interventions:  Mobility Interventions: Utilize walker, cane, or other assistive device    Mentation Interventions: Adequate sleep, hydration, pain control, Bed/chair exit alarm, Door open when patient unattended    Medication Interventions: Teach patient to arise slowly    Elimination Interventions: Bed/chair exit alarm    History of Falls Interventions: Bed/chair exit alarm       Free of falls. Falls tool completed and accurate. Able to ambulate with walker, gait is stable. Staff at side for safety reasons.

## 2021-01-12 NOTE — INTERDISCIPLINARY ROUNDS
Behavioral Health Interdisciplinary Rounds Patient Name: Henok Elkins  Age: 67 y.o. Room/Bed:  4/ Primary Diagnosis: <principal problem not specified> Admission Status: Involuntary Commitment Readmission within 30 days: no 
Power of  in place: no 
Patient requires a blocked bed: no          Reason for blocked bed:  
Sleep hours: 7 Participation in Care/Groups:   
Medication Compliant?: Yes PRNS (last 24 hours): Pain Restraints (last 24 hours):  no 
  
Alcohol screening (AUDIT) completed -  AUDIT Score: 0  If applicable, date SBIRT discussed in treatment team AND documented:   
Tobacco - patient is a smoker: Have You Used Tobacco in the Past 30 Days: No 
Illegal Drugs use: Have You Used Any Illegal Substances Over the Past 12 Months: No 
 
24 hour chart check complete: yes Patient goal(s) for today:  
Treatment team focus/goals:  titrate medication, offer emotional support and behavioral redirections Progress note: Pt is involuntarily committed to [facility] for inability to care for self. She cannot return home and requires placement. Daughter, Rosa Maria Batista, was granted guardianship by the courts yesterday. Jojo Pena from [facility] will visit [facility] to assess patient face to face today. Family contact: daughterMary Jane ([phone]) MSW left message for daughter Patient's preferred e-mail address : 
 
LOS:  29  Expected LOS: TBD 
  
Participating treatment team members: Nida Bernardo NP, Yessica Ignacio, MSDAE; Julianne Loja RN

## 2021-01-12 NOTE — PROGRESS NOTES
Problem: Discharge Planning  Goal: *Discharge to safe environment  Outcome: Progressing Towards Goal  Note: Patient's daughter has guardianship and is pursuing admission to [facility] for Safe Discharge. Goal: *Knowledge of medication management  Outcome: Progressing Towards Goal  Note: Patient is compliant with care. Problem: Discharge Planning  Goal: *Knowledge of discharge instructions  Outcome: Not Progressing Towards Goal  Note: Patient cannot verbalize understanding of goals for treatment and safe discharge, due to dementia diagnosis.

## 2021-01-12 NOTE — BH NOTES
[facility] plans to send RN to [facility] at 11:00am for face-to-face assessment for admission determination.

## 2021-01-12 NOTE — PROGRESS NOTES
Problem: Altered Thought Process (Adult/Pediatric)  Goal: *STG: Participates in treatment plan  Outcome: Progressing Towards Goal   Received this morning out on the unit. Is alert, but remains confused. Still has periods where she is labile. Able to express needs and thoughts and joking with staff. Agreeable to taking a shower after eating breakfast. Able to follow directions. Aware that a long term facility is coming to see her today. Brissaciarra Nagy were supposed to come yesterday. \"  Has been out on the unit all morning. Is medication and meal compliant. No complications/ needs at present. Staff to maintain safety during hospitalization.

## 2021-01-12 NOTE — PROGRESS NOTES
PSYCHIATRIC PROGRESS NOTE       Patient: Charlee Nance MRN: [id number]  SSN: [id number]    YOB: 1945  Age: 67 y.o. Sex: female      Admit Date: 12/14/2020    LOS: 29 days       Chief Complaint:  I think I am going home soon. Interval History:  Charlee Nance is maintaining. She is pleasant and cheerful. She had her guardianship hearing this morning and was granted. She also was interviewed by a facility and was accepted with plan for discharge this Thursday. She is sleeping and eating well. Taking her meds with no side effects noted. No issues noted. Past Medical History:  Past Medical History:   Diagnosis Date    Anemia     Arthritis     Bipolar disorder ([address]     Burning with urination     Memory change     Psychiatric disorder     Bipolar Disorder    Stool color black     Tremor          ALLERGIES:(reviewed/updated 1/12/2021)  Allergies   Allergen Reactions    Lamisil [Terbinafine] Diarrhea and Nausea and Vomiting    Thorazine [Chlorpromazine] Rash       Laboratory report:  Lab Results   Component Value Date/Time    WBC 7.1 01/09/2021 05:16 AM    HGB 10.6 (L) 01/09/2021 05:16 AM    HCT 33.8 (L) 01/09/2021 05:16 AM    PLATELET 669 68/19/9182 05:16 AM    .9 (H) 01/09/2021 05:16 AM      Lab Results   Component Value Date/Time    Sodium 141 01/09/2021 05:16 AM    Potassium 3.5 01/09/2021 05:16 AM    Chloride 108 01/09/2021 05:16 AM    CO2 29 01/09/2021 05:16 AM    Anion gap 4 (L) 01/09/2021 05:16 AM    Glucose 76 01/09/2021 05:16 AM    Glucose 95 12/15/2020 04:22 AM    BUN 22 (H) 01/09/2021 05:16 AM    Creatinine 0.63 01/09/2021 05:16 AM    BUN/Creatinine ratio 35 (H) 01/09/2021 05:16 AM    GFR est AA >60 01/09/2021 05:16 AM    GFR est non-AA >60 01/09/2021 05:16 AM    Calcium 9.1 01/09/2021 05:16 AM    Bilirubin, total 0.2 01/09/2021 05:16 AM    Alk.  phosphatase 48 01/09/2021 05:16 AM    Protein, total 6.3 (L) 01/09/2021 05:16 AM    Albumin 2.8 (L) 01/09/2021 05:16 AM Globulin 3.5 01/09/2021 05:16 AM    A-G Ratio 0.8 (L) 01/09/2021 05:16 AM    ALT (SGPT) 13 01/09/2021 05:16 AM      Vitals:    01/11/21 1541 01/11/21 2015 01/12/21 0759 01/12/21 1551   BP: (!) 151/78 121/68 110/68 113/77   Pulse: 71 88 86 85   Resp: 16 16 16 16   Temp: 97.8 °F (36.6 °C) 97.8 °F (36.6 °C) 97.8 °F (36.6 °C) 97.5 °F (36.4 °C)   SpO2: 98% 96% 95% 95%   Weight:       Height:           Lab Results   Component Value Date/Time    Valproic acid 76 01/02/2021 05:05 AM     Lab Results   Component Value Date/Time    Lithium level 0.57 (L) 11/29/2020 05:27 PM       Vital Signs  Patient Vitals for the past 24 hrs:   Temp Pulse Resp BP SpO2   01/12/21 1551 97.5 °F (36.4 °C) 85 16 113/77 95 %   01/12/21 0759 97.8 °F (36.6 °C) 86 16 110/68 95 %   01/11/21 2015 97.8 °F (36.6 °C) 88 16 121/68 96 %     Wt Readings from Last 3 Encounters:   01/10/21 68.5 kg (151 lb)   11/29/20 75.4 kg (166 lb 3.6 oz)   12/02/20 75.4 kg (166 lb 3.6 oz)     Temp Readings from Last 3 Encounters:   01/12/21 97.5 °F (36.4 °C)   12/14/20 97.5 °F (36.4 °C)   09/12/20 98.1 °F (36.7 °C)     BP Readings from Last 3 Encounters:   01/12/21 113/77   12/14/20 (!) 113/58   09/12/20 128/72     Pulse Readings from Last 3 Encounters:   01/12/21 85   12/14/20 72   09/12/20 75       Radiology (reviewed/updated 1/12/2021)  Ct Head Wo Cont    Result Date: 11/29/2020  CLINICAL HISTORY: Encephalopathy INDICATION: Encephalopathy COMPARISON: 9/7/2018. CT dose reduction was achieved through use of a standardized protocol tailored for this examination and automatic exposure control for dose modulation. TECHNIQUE: Serial axial images with a collimation of 5 mm were obtained from the skull base through the vertex  FINDINGS: There is sulcal and ventricular prominence. Confluent periventricular and scattered foci of hypodensity in the cerebral white matter. There is no evidence of an acute infarction, hemorrhage, or mass-effect.  There is no evidence of midline shift or hydrocephalus. Posterior fossa structures are unremarkable. No extra-axial collections are seen. Mastoid air cells are well pneumatized and clear. Hypodensity in the central jerri. 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 IMPRESSION: No acute intracranial process. Small area of chronic encephalomalacia in the left frontal lobe. Imaging findings consistent with moderate chronic microvascular ischemic change. There is a mild degree of cerebral atrophy. Xr Chest Port    Result Date: 11/29/2020  EXAM: XR CHEST PORT INDICATION: fever COMPARISON: None. FINDINGS: A portable AP radiograph of the chest was obtained at 14:46 hours. The patient is on a cardiac monitor. The lungs are clear. The cardiac and mediastinal contours and pulmonary vascularity are normal.  The chest wall structures and visualized upper abdomen show no acute findings with incidental note of degenerative spine and shoulder changes as well as partial visualization of posterior windy and screw fixation hardware of the lumbar spine. IMPRESSION: No acute findings.       Current Facility-Administered Medications   Medication Dose Route Frequency Provider Last Rate Last Admin    memantine (NAMENDA) tablet 5 mg  5 mg Oral BID Hemalatha Vinson NP   5 mg at 01/12/21 1701    loratadine (CLARITIN) tablet 10 mg  10 mg Oral DAILY Emilia Pennington MD   10 mg at 01/12/21 9543    lidocaine 4 % patch 1 Patch  1 Patch TransDERmal Q24H BRITTANY Borges   1 Patch at 01/11/21 2024    valproic acid (as sodium salt) (DEPAKENE) 250 mg/5 mL (5 mL) oral solution 625 mg  625 mg Oral BID Emilia Pennington MD   625 mg at 01/12/21 1701    loperamide (IMODIUM) capsule 2 mg  2 mg Oral Q4H PRN Hemalatha Vinson NP   2 mg at 01/06/21 1046    nystatin (MYCOSTATIN) 100,000 unit/gram powder   Topical BID Cam KIM NP   Given at 01/12/21 0944    docusate sodium (COLACE) capsule 100 mg  100 mg Oral DAILY Leslie Rojo A, NP   100 mg at 01/11/21 0743    donepeziL (ARICEPT) tablet 5 mg  5 mg Oral QHS Hemalatha Vinson, NP   5 mg at 01/11/21 2023    OLANZapine (ZyPREXA) tablet 2.5 mg  2.5 mg Oral Q6H PRN Kim Moore, NP   2.5 mg at 12/23/20 1416    haloperidol lactate (HALDOL) injection 2.5 mg  2.5 mg IntraMUSCular Q6H PRN Oscar, Doug Mitten, NP        benztropine (COGENTIN) tablet 0.5 mg  0.5 mg Oral BID PRN Doug Mooreten, NP        diphenhydrAMINE (BENADRYL) injection 25 mg  25 mg IntraMUSCular BID PRN Oscar, Doug Mitten, NP        acetaminophen (TYLENOL) tablet 650 mg  650 mg Oral Q4H PRN Kim Moore, NP   650 mg at 01/12/21 1404    magnesium hydroxide (MILK OF MAGNESIA) 400 mg/5 mL oral suspension 30 mL  30 mL Oral DAILY PRN Kim Moore, NP   30 mL at 12/19/20 1221    traZODone (DESYREL) tablet 50 mg  50 mg Oral QHS PRN Oscar, Doug Perezten, NP        mirtazapine (REMERON) tablet 15 mg  15 mg Oral QHS Kim Moore, NP   15 mg at 01/11/21 2023    risperiDONE (RisperDAL) tablet 1 mg  1 mg Oral BID Kim Moore, NP   1 mg at 01/12/21 0937       Side Effects: (reviewed/updated 1/12/2021)  None reported or admitted to. Review of Systems: (reviewed/updated 1/12/2021)  Appetite: good  Sleep: good   All other Review of Systems: negative    Mental Status Exam:  Eye contact: Good eye contact  Psychomotor activity: irritable   Speech is spontaneous  Thought process:confabulation  Mood is \"ok\"  Affect: constricted  Perception: No avh  Suicidal ideation: No si  Homicidal ideation: No hi  Insight/judgment: Poor  Cognition is impaired. MMSE-8      Physical Exam:  Musculoskeletal system: steady gait  Tremor not present  Cog wheeling not present      Assessment and Plan:  Wyatt Ashley meets criteria for a diagnosis of Unspecified dementia with behavioral disturbance. Bipolar 1 disorder by history. Continue current medications as prescribed. We will closely monitor for safety. We will encourage reality orientation. Disposition planning to continue. I certify that this patients inpatient psychiatric hospital services furnished since the previous certification were, and continue to be, required for treatment that could reasonably be expected to improve the patient's condition, or for diagnostic study, and that the patient continues to need, on a daily basis, active treatment furnished directly by or requiring the supervision of inpatient psychiatric facility personnel. In addition, the hospital records show that services furnished were intensive treatment services, admission or related services, or equivalent services.       Signed:  Benjamin Osorio NP  1/12/2021

## 2021-01-13 PROCEDURE — 87635 SARS-COV-2 COVID-19 AMP PRB: CPT

## 2021-01-13 PROCEDURE — U0005 INFEC AGEN DETEC AMPLI PROBE: HCPCS

## 2021-01-13 PROCEDURE — 74011250637 HC RX REV CODE- 250/637: Performed by: NURSE PRACTITIONER

## 2021-01-13 PROCEDURE — 74011250637 HC RX REV CODE- 250/637: Performed by: PSYCHIATRY & NEUROLOGY

## 2021-01-13 PROCEDURE — 65220000003 HC RM SEMIPRIVATE PSYCH

## 2021-01-13 RX ORDER — NYSTATIN 100000 [USP'U]/G
POWDER TOPICAL 2 TIMES DAILY
Qty: 1 BOTTLE | Refills: 0 | Status: SHIPPED | OUTPATIENT
Start: 2021-01-13

## 2021-01-13 RX ORDER — LORATADINE 10 MG/1
10 TABLET ORAL DAILY
Qty: 30 TAB | Refills: 0 | Status: SHIPPED | OUTPATIENT
Start: 2021-01-14

## 2021-01-13 RX ORDER — DOCUSATE SODIUM 100 MG/1
100 CAPSULE, LIQUID FILLED ORAL DAILY
Qty: 30 CAP | Refills: 0 | Status: SHIPPED | OUTPATIENT
Start: 2021-01-14 | End: 2021-02-13

## 2021-01-13 RX ORDER — MIRTAZAPINE 15 MG/1
15 TABLET, FILM COATED ORAL
Qty: 30 TAB | Refills: 0 | Status: SHIPPED | OUTPATIENT
Start: 2021-01-13

## 2021-01-13 RX ORDER — PROPRANOLOL HYDROCHLORIDE 10 MG/1
10 TABLET ORAL 2 TIMES DAILY
Qty: 60 TAB | Refills: 0 | Status: SHIPPED | OUTPATIENT
Start: 2021-01-13

## 2021-01-13 RX ORDER — DONEPEZIL HYDROCHLORIDE 5 MG/1
5 TABLET, FILM COATED ORAL
Qty: 30 TAB | Refills: 0 | Status: SHIPPED | OUTPATIENT
Start: 2021-01-13

## 2021-01-13 RX ORDER — DIVALPROEX SODIUM 125 MG/1
625 CAPSULE, COATED PELLETS ORAL 2 TIMES DAILY
Qty: 300 CAP | Refills: 0 | Status: SHIPPED | OUTPATIENT
Start: 2021-01-13

## 2021-01-13 RX ORDER — RISPERIDONE 1 MG/1
1 TABLET, FILM COATED ORAL 2 TIMES DAILY
Qty: 60 TAB | Refills: 0 | Status: SHIPPED | OUTPATIENT
Start: 2021-01-13

## 2021-01-13 RX ORDER — DIVALPROEX SODIUM 125 MG/1
625 CAPSULE, COATED PELLETS ORAL 2 TIMES DAILY
Status: DISCONTINUED | OUTPATIENT
Start: 2021-01-13 | End: 2021-01-14 | Stop reason: HOSPADM

## 2021-01-13 RX ORDER — PROPRANOLOL HYDROCHLORIDE 10 MG/1
10 TABLET ORAL 2 TIMES DAILY
Status: DISCONTINUED | OUTPATIENT
Start: 2021-01-13 | End: 2021-01-14 | Stop reason: HOSPADM

## 2021-01-13 RX ORDER — MEMANTINE HYDROCHLORIDE 5 MG/1
5 TABLET ORAL 2 TIMES DAILY
Qty: 60 TAB | Refills: 0 | Status: SHIPPED | OUTPATIENT
Start: 2021-01-13

## 2021-01-13 RX ADMIN — DIVALPROEX SODIUM 625 MG: 125 CAPSULE ORAL at 21:21

## 2021-01-13 RX ADMIN — MEMANTINE HYDROCHLORIDE 5 MG: 10 TABLET ORAL at 16:47

## 2021-01-13 RX ADMIN — LORATADINE 10 MG: 10 TABLET ORAL at 07:33

## 2021-01-13 RX ADMIN — ACETAMINOPHEN 650 MG: 325 TABLET ORAL at 12:28

## 2021-01-13 RX ADMIN — DOCUSATE SODIUM 100 MG: 100 CAPSULE ORAL at 07:33

## 2021-01-13 RX ADMIN — MIRTAZAPINE 15 MG: 15 TABLET, FILM COATED ORAL at 21:21

## 2021-01-13 RX ADMIN — MEMANTINE HYDROCHLORIDE 5 MG: 10 TABLET ORAL at 07:34

## 2021-01-13 RX ADMIN — PROPRANOLOL HYDROCHLORIDE 10 MG: 10 TABLET ORAL at 21:21

## 2021-01-13 RX ADMIN — ACETAMINOPHEN 650 MG: 325 TABLET ORAL at 07:32

## 2021-01-13 RX ADMIN — NYSTATIN: 100000 POWDER TOPICAL at 16:48

## 2021-01-13 RX ADMIN — VALPROIC ACID 625 MG: 250 SOLUTION ORAL at 07:34

## 2021-01-13 RX ADMIN — RISPERIDONE 1 MG: 1 TABLET ORAL at 21:21

## 2021-01-13 RX ADMIN — PROPRANOLOL HYDROCHLORIDE 10 MG: 10 TABLET ORAL at 10:56

## 2021-01-13 RX ADMIN — NYSTATIN: 100000 POWDER TOPICAL at 07:36

## 2021-01-13 RX ADMIN — DONEPEZIL HYDROCHLORIDE 5 MG: 5 TABLET, FILM COATED ORAL at 21:21

## 2021-01-13 RX ADMIN — RISPERIDONE 1 MG: 1 TABLET ORAL at 07:34

## 2021-01-13 RX ADMIN — ACETAMINOPHEN 650 MG: 325 TABLET ORAL at 16:47

## 2021-01-13 NOTE — PROGRESS NOTES
Problem: Falls - Risk of  Goal: *Absence of Falls  Description: Document Roshan David Fall Risk and appropriate interventions in the flowsheet. Outcome: Progressing Towards Goal  Note: Fall Risk Interventions:  Mobility Interventions: Utilize walker, cane, or other assistive device    Mentation Interventions: Adequate sleep, hydration, pain control    Medication Interventions: Bed/chair exit alarm    Elimination Interventions: Bed/chair exit alarm    History of Falls Interventions: Bed/chair exit alarm         Problem: Altered Thought Process (Adult/Pediatric)  Goal: *STG: Participates in treatment plan  Outcome: Progressing Towards Goal  Goal: *STG: Complies with medication therapy  Outcome: Progressing Towards Goal  Goal: *STG: Attends activities and groups  Outcome: Progressing Towards Goal    Patient presents as calm and pleasant. Focused on discharge.  Medication compliant, consumes 75% of meal

## 2021-01-13 NOTE — BH NOTES
PRN Medication Documentation    Specific patient behavior that led to need for PRN medication: pt c/o left arm pain /10  PRN medication given: tylenol  Patient response/effectiveness of PRN medication: will assess

## 2021-01-13 NOTE — PROGRESS NOTES
PSYCHIATRIC PROGRESS NOTE       Patient: Juan Wyatt MRN: [id number]  SSN: [id number]    YOB: 1945  Age: 67 y.o. Sex: female      Admit Date: 12/14/2020    LOS: 30 days       Chief Complaint:  Can I go home today? Interval History:  Juan Wyatt is maintaining. She is calm and pleasantly confused. She is sleeping and eating well. She is compliant with her meds with no adverse effects noted. Minimal tremors noted, we will restart low dose inderal. She has been accepted at [facility] and will be discharge tomorrow. She denies si or hi. Past Medical History:  Past Medical History:   Diagnosis Date    Anemia     Arthritis     Bipolar disorder ([address]     Burning with urination     Memory change     Psychiatric disorder     Bipolar Disorder    Stool color black     Tremor          ALLERGIES:(reviewed/updated 1/13/2021)  Allergies   Allergen Reactions    Lamisil [Terbinafine] Diarrhea and Nausea and Vomiting    Thorazine [Chlorpromazine] Rash       Laboratory report:  Lab Results   Component Value Date/Time    WBC 7.1 01/09/2021 05:16 AM    HGB 10.6 (L) 01/09/2021 05:16 AM    HCT 33.8 (L) 01/09/2021 05:16 AM    PLATELET 191 01/63/5235 05:16 AM    .9 (H) 01/09/2021 05:16 AM      Lab Results   Component Value Date/Time    Sodium 141 01/09/2021 05:16 AM    Potassium 3.5 01/09/2021 05:16 AM    Chloride 108 01/09/2021 05:16 AM    CO2 29 01/09/2021 05:16 AM    Anion gap 4 (L) 01/09/2021 05:16 AM    Glucose 76 01/09/2021 05:16 AM    Glucose 95 12/15/2020 04:22 AM    BUN 22 (H) 01/09/2021 05:16 AM    Creatinine 0.63 01/09/2021 05:16 AM    BUN/Creatinine ratio 35 (H) 01/09/2021 05:16 AM    GFR est AA >60 01/09/2021 05:16 AM    GFR est non-AA >60 01/09/2021 05:16 AM    Calcium 9.1 01/09/2021 05:16 AM    Bilirubin, total 0.2 01/09/2021 05:16 AM    Alk.  phosphatase 48 01/09/2021 05:16 AM    Protein, total 6.3 (L) 01/09/2021 05:16 AM    Albumin 2.8 (L) 01/09/2021 05:16 AM    Globulin 3.5 01/09/2021 05:16 AM    A-G Ratio 0.8 (L) 01/09/2021 05:16 AM    ALT (SGPT) 13 01/09/2021 05:16 AM      Vitals:    01/12/21 0759 01/12/21 1551 01/12/21 2013 01/13/21 0730   BP: 110/68 113/77 133/86 132/82   Pulse: 86 85 91 98   Resp: 16 16 16 16   Temp: 97.8 °F (36.6 °C) 97.5 °F (36.4 °C) 97.5 °F (36.4 °C) 98.4 °F (36.9 °C)   SpO2: 95% 95% 94% 95%   Weight:       Height:           Lab Results   Component Value Date/Time    Valproic acid 76 01/02/2021 05:05 AM     Lab Results   Component Value Date/Time    Lithium level 0.57 (L) 11/29/2020 05:27 PM       Vital Signs  Patient Vitals for the past 24 hrs:   Temp Pulse Resp BP SpO2   01/13/21 0730 98.4 °F (36.9 °C) 98 16 132/82 95 %   01/12/21 2013 97.5 °F (36.4 °C) 91 16 133/86 94 %     Wt Readings from Last 3 Encounters:   01/10/21 68.5 kg (151 lb)   11/29/20 75.4 kg (166 lb 3.6 oz)   12/02/20 75.4 kg (166 lb 3.6 oz)     Temp Readings from Last 3 Encounters:   01/13/21 98.4 °F (36.9 °C)   12/14/20 97.5 °F (36.4 °C)   09/12/20 98.1 °F (36.7 °C)     BP Readings from Last 3 Encounters:   01/13/21 132/82   12/14/20 (!) 113/58   09/12/20 128/72     Pulse Readings from Last 3 Encounters:   01/13/21 98   12/14/20 72   09/12/20 75       Radiology (reviewed/updated 1/13/2021)  Ct Head Wo Cont    Result Date: 11/29/2020  CLINICAL HISTORY: Encephalopathy INDICATION: Encephalopathy COMPARISON: 9/7/2018. CT dose reduction was achieved through use of a standardized protocol tailored for this examination and automatic exposure control for dose modulation. TECHNIQUE: Serial axial images with a collimation of 5 mm were obtained from the skull base through the vertex  FINDINGS: There is sulcal and ventricular prominence. Confluent periventricular and scattered foci of hypodensity in the cerebral white matter. There is no evidence of an acute infarction, hemorrhage, or mass-effect. There is no evidence of midline shift or hydrocephalus.  Posterior fossa structures are unremarkable. No extra-axial collections are seen. Mastoid air cells are well pneumatized and clear. Hypodensity in the central jerri. 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 IMPRESSION: No acute intracranial process. Small area of chronic encephalomalacia in the left frontal lobe. Imaging findings consistent with moderate chronic microvascular ischemic change. There is a mild degree of cerebral atrophy. Xr Chest Port    Result Date: 11/29/2020  EXAM: XR CHEST PORT INDICATION: fever COMPARISON: None. FINDINGS: A portable AP radiograph of the chest was obtained at 14:46 hours. The patient is on a cardiac monitor. The lungs are clear. The cardiac and mediastinal contours and pulmonary vascularity are normal.  The chest wall structures and visualized upper abdomen show no acute findings with incidental note of degenerative spine and shoulder changes as well as partial visualization of posterior windy and screw fixation hardware of the lumbar spine. IMPRESSION: No acute findings.       Current Facility-Administered Medications   Medication Dose Route Frequency Provider Last Rate Last Admin    propranoloL (INDERAL) tablet 10 mg  10 mg Oral BID Hemalatha Vinson NP   10 mg at 01/13/21 1056    divalproex (DEPAKOTE SPRINKLE) capsule 625 mg  625 mg Oral BID Hemalatha Vinson NP        memantine (NAMENDA) tablet 5 mg  5 mg Oral BID Hemalatha Vinson NP   5 mg at 01/13/21 1647    loratadine (CLARITIN) tablet 10 mg  10 mg Oral DAILY Emilia Pennington MD   10 mg at 01/13/21 3949    loperamide (IMODIUM) capsule 2 mg  2 mg Oral Q4H PRN Hemalatha Vinson NP   2 mg at 01/06/21 1046    nystatin (MYCOSTATIN) 100,000 unit/gram powder   Topical BID Nelta Minerdalton KIM NP   Given at 01/13/21 1648    docusate sodium (COLACE) capsule 100 mg  100 mg Oral DAILY Hemalatha Vinson NP   100 mg at 01/13/21 0733    donepeziL (ARICEPT) tablet 5 mg  5 mg Oral QHS Khadra KIM, NP   5 mg at 01/12/21 2028    OLANZapine (ZyPREXA) tablet 2.5 mg  2.5 mg Oral Q6H PRN Kim Moore, NP   2.5 mg at 12/23/20 1416    haloperidol lactate (HALDOL) injection 2.5 mg  2.5 mg IntraMUSCular Q6H PRN Emilyedi, Ardath Blinks, NP        benztropine (COGENTIN) tablet 0.5 mg  0.5 mg Oral BID PRN Chaneli, Ardath Blinks, NP        diphenhydrAMINE (BENADRYL) injection 25 mg  25 mg IntraMUSCular BID PRN Oscar, Ardath Blinks, NP        acetaminophen (TYLENOL) tablet 650 mg  650 mg Oral Q4H PRN Kim Moore, NP   650 mg at 01/13/21 1647    magnesium hydroxide (MILK OF MAGNESIA) 400 mg/5 mL oral suspension 30 mL  30 mL Oral DAILY PRN Kim Moore, NP   30 mL at 12/19/20 1221    traZODone (DESYREL) tablet 50 mg  50 mg Oral QHS PRN Oscar, Diamond Blinks, NP        mirtazapine (REMERON) tablet 15 mg  15 mg Oral QHS Kim Moore, NP   15 mg at 01/12/21 2028    risperiDONE (RisperDAL) tablet 1 mg  1 mg Oral BID Kim Moore, NP   1 mg at 01/13/21 0734       Side Effects: (reviewed/updated 1/13/2021)  None reported or admitted to. Review of Systems: (reviewed/updated 1/13/2021)  Appetite: good  Sleep: good   All other Review of Systems: negative    Mental Status Exam:  Eye contact: Good eye contact  Psychomotor activity: relaxed  Speech is spontaneous  Thought process:confabulation  Mood is \"ok\"  Affect: constricted  Perception: No avh  Suicidal ideation: No si  Homicidal ideation: No hi  Insight/judgment: Poor  Cognition is impaired. MMSE-8      Physical Exam:  Musculoskeletal system: steady gait  Tremor not present  Cog wheeling not present      Assessment and Plan:  Grinnell Raw meets criteria for a diagnosis of Unspecified dementia with behavioral disturbance. Bipolar 1 disorder by history. Inderal 10mg bid. Continue rest of medications as prescribed.   We will closely monitor for safety. We will encourage reality orientation. Plan for dc in am.       I certify that this patients inpatient psychiatric hospital services furnished since the previous certification were, and continue to be, required for treatment that could reasonably be expected to improve the patient's condition, or for diagnostic study, and that the patient continues to need, on a daily basis, active treatment furnished directly by or requiring the supervision of inpatient psychiatric facility personnel. In addition, the hospital records show that services furnished were intensive treatment services, admission or related services, or equivalent services.       Signed:  Martina Alfaro NP  1/13/2021

## 2021-01-13 NOTE — BH NOTES
GROUP THERAPY PROGRESS NOTE     Nida attended but did not participate in [id number]  Group time: 50 min     Personal goal for participation: Practicing self-healing, self-expression, and/or self-exploration through the process of creating a mandala. Goal orientation: Practicing gratitude to positively affect participant's thoughts and mood. Group therapy participation: passive     Therapeutic interventions reviewed and discussed: Pt was given opportunity to listen relaxing meditation music while complete a mandala. After complete mandala patient was engaged in conversation to process what colors, shapes, images, and designs where used in the mandala and connect them to thoughts, memories and feelings they were having during the exercise. Impression of participation: Patient attended group but chose not to color a mandala. She listened to music and appeared relaxed.

## 2021-01-13 NOTE — PROGRESS NOTES
Pt receiving continuous q15m safety checks, pt currently asleep resting comfortably in bed. No distress noted, respiratory WNL. Supplemental lighting utilized. Problem: Falls - Risk of  Goal: *Absence of Falls  Description: Document Leafy Holden Fall Risk and appropriate interventions in the flowsheet.   Outcome: Progressing Towards Goal  Note: Fall Risk Interventions:  Mobility Interventions: Utilize walker, cane, or other assistive device    Mentation Interventions: Adequate sleep, hydration, pain control    Medication Interventions: Bed/chair exit alarm    Elimination Interventions: Bed/chair exit alarm    History of Falls Interventions: Bed/chair exit alarm         Problem: Altered Thought Process (Adult/Pediatric)  Goal: Interventions  Outcome: Progressing Towards Goal

## 2021-01-13 NOTE — PROGRESS NOTES
Problem: Falls - Risk of  Goal: *Absence of Falls  Description: Document Starleen Freeze Fall Risk and appropriate interventions in the flowsheet. Outcome: Progressing Towards Goal  Note: Fall Risk Interventions:  Mobility Interventions: Assess mobility with egress test    Mentation Interventions: Adequate sleep, hydration, pain control    Medication Interventions: Bed/chair exit alarm    Elimination Interventions:  Toilet paper/wipes in reach    History of Falls Interventions: Door open when patient unattended       Will continue q 15 minute safety checks as ordered

## 2021-01-13 NOTE — PROGRESS NOTES
Pharmacist Discharge Medication Reconciliation (anticipated date of discharge: 1/14/21)    Discharging Provider: Adriana Harris NP    Significant PMH:   Past Medical History:   Diagnosis Date    Anemia     Arthritis     Bipolar disorder ([address]     Burning with urination     Memory change     Psychiatric disorder     Bipolar Disorder    Stool color black     Tremor      Chief Complaint for this Admission: No chief complaint on file. Allergies: Lamisil [terbinafine] and Thorazine [chlorpromazine]    Discharge Medications:   Current Discharge Medication List        START taking these medications    Details   divalproex (DEPAKOTE SPRINKLE) 125 mg capsule Take 5 Caps by mouth two (2) times a day. Indications: mood  Qty: 300 Cap, Refills: 0      docusate sodium (COLACE) 100 mg capsule Take 1 Cap by mouth daily for 30 days. Indications: constipation  Qty: 30 Cap, Refills: 0      donepeziL (ARICEPT) 5 mg tablet Take 1 Tab by mouth nightly. 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 Indications: sneezing  Qty: 30 Tab, Refills: 0      memantine (NAMENDA) 5 mg tablet Take 1 Tab by mouth two (2) times a day. 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 Indications: major depressive disorder  Qty: 30 Tab, Refills: 0      nystatin (MYCOSTATIN) powder Apply  to affected area two (2) times a day. Indications: diaper rash  Qty: 1 Bottle, Refills: 0      propranoloL (INDERAL) 10 mg tablet Take 1 Tab by mouth two (2) times a day. Indications: essential tremor  Qty: 60 Tab, Refills: 0      risperiDONE (RisperDAL) 1 mg tablet Take 1 Tab by mouth two (2) times a day.  Indications: mood  Qty: 60 Tab, Refills: 0           STOP taking these medications       traZODone (DESYREL) 100 mg tablet Comments:   Reason for Stopping: diclofenac EC (VOLTAREN) 75 mg EC tablet Comments:   Reason for Stopping:         tiZANidine (ZANAFLEX) 4 mg capsule Comments:   Reason for Stopping:         mirabegron ER (Myrbetriq) 50 mg ER tablet Comments:   Reason for Stopping:         gabapentin (Neurontin) 400 mg capsule Comments:   Reason for Stopping:         alendronate (Fosamax) 70 mg tablet Comments:   Reason for Stopping:         atorvastatin (LIPITOR) 10 mg tablet Comments:   Reason for Stopping:         omeprazole (PRILOSEC) 20 mg capsule Comments:   Reason for Stopping:         multivitamin (ONE A DAY) tablet Comments:   Reason for Stopping:         calcium carbonate (TUMS) 200 mg calcium (500 mg) chew Comments:   Reason for Stopping:         Cholecalciferol, Vitamin D3, (VITAMIN D3) 1,000 unit cap Comments:   Reason for Stopping:         omega-3 fatty acids-vitamin e (FISH OIL) 1,000 mg cap Comments:   Reason for Stopping:             The patient's chart, MAR and AVS were reviewed by Demetria Nyhan, PHARMD.

## 2021-01-13 NOTE — INTERDISCIPLINARY ROUNDS
Behavioral Health Interdisciplinary Rounds Patient Name: Jessie Morocho  Age: 67 y.o. Room/Bed:  744/ Primary Diagnosis: <principal problem not specified> Admission Status: Involuntary Commitment Readmission within 30 days: no 
Power of  in place: no 
Patient requires a blocked bed: no          Reason for blocked bed:  
Sleep hours:  8 Participation in Care/Groups:  yes Medication Compliant?: Yes PRNS (last 24 hours): Pain Restraints (last 24 hours):  no 
  
Alcohol screening (AUDIT) completed -  AUDIT Score: 0  If applicable, date SBIRT discussed in treatment team AND documented:   
Tobacco - patient is a smoker: Have You Used Tobacco in the Past 30 Days: No 
Illegal Drugs use: Have You Used Any Illegal Substances Over the Past 12 Months: No 
 
24 hour chart check complete: yes Patient goal(s) for today:  
Treatment team focus/goals: titrate medication, offer emotional support and behavioral redirections Progress note: Pt is involuntarily committed to [facility] for inability to care for self. She cannot return home and requires placement. Daughter, Herbert Cam, was granted guardianship. [facility] accepted patient for admission. Goal is to discharge Thursday after negative Covid test results are received. Patient is eager for discharge and think she has been here since 1964. Family contact: daughterMaribeth ([phone]) MSW left message for daughter Patient's preferred e-mail address : 
  
LOS:  30  Expected LOS: TBD 
  
Participating treatment team members: Nida Velez NP, RULA Grossman; Sofia Kumar RN

## 2021-01-13 NOTE — BH NOTES
GROUP THERAPY PROGRESS NOTE    Patient is participating in Coping Skills Group. Group time: 30 minutes    Personal goal for participation: Use grounding techniques     Goal orientation: Personal    Group therapy participation: passive    Therapeutic interventions reviewed and discussed: Group members were asked to share their feelings around the day. Encouraged to focus on the present, not the past or future. Members were taught the 5-4-3-2-1 using their 5 senses. They were asked to identify what 5 things they say in the room, 4 things they physically feel, 3 things they can hear and two/one thing they can taste or smell. Impression of participation: Joyce Ramsey passively participated in group. Patient was present in group, and then left in the beginning of group because she did not want to be here.      Ulises Pino, Supervisee in Social Work

## 2021-01-14 VITALS
HEART RATE: 89 BPM | RESPIRATION RATE: 16 BRPM | DIASTOLIC BLOOD PRESSURE: 81 MMHG | OXYGEN SATURATION: 97 % | HEIGHT: 62 IN | WEIGHT: 151 LBS | BODY MASS INDEX: 27.79 KG/M2 | SYSTOLIC BLOOD PRESSURE: 125 MMHG | TEMPERATURE: 98 F

## 2021-01-14 PROCEDURE — 74011250637 HC RX REV CODE- 250/637: Performed by: NURSE PRACTITIONER

## 2021-01-14 PROCEDURE — 74011250637 HC RX REV CODE- 250/637: Performed by: PSYCHIATRY & NEUROLOGY

## 2021-01-14 RX ADMIN — RISPERIDONE 1 MG: 1 TABLET ORAL at 07:38

## 2021-01-14 RX ADMIN — NYSTATIN: 100000 POWDER TOPICAL at 07:39

## 2021-01-14 RX ADMIN — PROPRANOLOL HYDROCHLORIDE 10 MG: 10 TABLET ORAL at 07:38

## 2021-01-14 RX ADMIN — DIVALPROEX SODIUM 625 MG: 125 CAPSULE ORAL at 07:36

## 2021-01-14 RX ADMIN — LORATADINE 10 MG: 10 TABLET ORAL at 07:37

## 2021-01-14 RX ADMIN — ACETAMINOPHEN 650 MG: 325 TABLET ORAL at 11:40

## 2021-01-14 RX ADMIN — MEMANTINE HYDROCHLORIDE 5 MG: 10 TABLET ORAL at 07:38

## 2021-01-14 RX ADMIN — ACETAMINOPHEN 650 MG: 325 TABLET ORAL at 07:38

## 2021-01-14 RX ADMIN — DOCUSATE SODIUM 100 MG: 100 CAPSULE ORAL at 07:37

## 2021-01-14 NOTE — DISCHARGE INSTRUCTIONS
DISCHARGE SUMMARY    NAME:Nida Carrillo  : 1945  MRN: [id number]    The patient Eleazar Bal exhibits the ability to control behavior in a less restrictive environment. Patient's level of functioning is improving. No assaultive/destructive behavior has been observed for the past 24 hours. No suicidal/homicidal threat or behavior has been observed for the past 24 hours. There is no evidence of serious medication side effects. Patient has not been in physical or protective restraints for at least the past 24 hours. If weapons involved, how are they secured? NA    Is patient aware of and in agreement with discharge plan? yes    Arrangements for medication:  Prescriptions given to patient, faxed to [facility] at [address] [place] [address]. Copy of discharge instructions to provider?: [facility] at [address] [place] [address]. ([phone])    Arrangements for transportation home:  [facility] transport to  at 12:30pm.    Keep all follow up appointments as scheduled, continue to take prescribed medications per physician instructions. Mental health crisis number:  122 or your local [place] mental health crisis line number at [phone].

## 2021-01-14 NOTE — GROUP NOTE
OLEGARIO  GROUP DOCUMENTATION INDIVIDUAL Group Therapy Note Date: 1/13/2021 Group Start Time: 0232 Group End Time: 8131 Group Topic: Reflection/Relaxation [address] [facility] FUNMILAYO Tamayo  GROUP DOCUMENTATION GROUP Group Therapy Note Attendees:  
  
 
Attendance: Attended Patient's Goal: decrease anxiety Interventions/techniques: Supported Follows Directions: Followed directions Interactions: Interacted appropriately Mental Status: Anxious and confused Behavior/appearance: Cooperative Goals Achieved: Able to engage in interactions Additional Notes:  
Vamsi Magaña LPN

## 2021-01-14 NOTE — DISCHARGE SUMMARY
PSYCHIATRIC DISCHARGE SUMMARY      Patient: Ning Schroeder MRN: [id number]  SSN: [id number]    YOB: 1945  Age: 67 y.o. Sex: female        Date of Admission: 12/14/2020  Date of Discharge:1/14/2021       Type of Discharge:  REGULAR     Admission data:  CHIEF COMPLAINT: \"Am I going home? \"     HISTORY OF PRESENTING ILLNESS:  The patient is a 29-year-old female who is currently admitted at [facility] inpatient psychiatric unit on a temporary snf order. Her TDO hearing is scheduled in the morning. She is well known to me from, I have been following her on the medical unit for psychiatric consultations. She is a poor historian. She remains markedly confused, she confabulates. No history is obtained from her. Her past medical history is significant for CVA, chronic tremor, and anemia.  she also carries a long standing diagnosis of Bipolar 1 disorder and has been seeing JACOBY Murphy, at [facility]. She has been stabilized on lithium monotherapy for many years. She was initially sent to [place] ED for worsening mentation, disorientation, and delusion.  Her daughter noted that the patient was walking around her house covered in stool and confused.  She also was refusing all help from her daughter. Elda Ortega lives by herself. She was treated for UTI and acute delirium.  She has been kicking, screaming, and biting. She has been agitated and yelling in the medical unit.   Throwing milk carton, combative. Code atlas was called and IM medication had to be given quite a few times. She is also not sleeping. She also has tremors which according to her daughter has been ongoing for at least 15 years. She has been noncompliant with her medication and has been refusing her medications.  She is admitted to the inpatient psychiatric unit for further stabilization and treatment.     PAST MEDICAL HISTORY:  See H and P.             Past Medical History:   Diagnosis Date    Anemia      Arthritis      Bipolar disorder (HCC)      Burning with urination      Memory change      Psychiatric disorder       Bipolar Disorder    Stool color black      Tremor           Labs: (reviewed/updated 12/16/2020)  Patient Vitals for the past 8 hrs:    BP Temp Pulse Resp SpO2   12/16/20 0829 110/65 98.7 °F (37.1 °C) 84 16 95 %   12/16/20 0742 (!) 143/80 98.1 °F (36.7 °C) 81 16 95 %            Labs Reviewed   LIPID PANEL - Abnormal; Notable for the following components:       Result Value      Triglyceride 257 (*)       All other components within normal limits   URINALYSIS W/MICROSCOPIC - Abnormal; Notable for the following components:     Blood SMALL (*)       Leukocyte Esterase MODERATE (*)       All other components within normal limits   URINE CULTURE HOLD SAMPLE   GLUCOSE, FASTING   HEMOGLOBIN A1C WITH EAG            Lab Results   Component Value Date/Time     Sodium 140 12/07/2020 03:04 AM     Potassium 3.9 12/07/2020 03:04 AM     Chloride 108 12/07/2020 03:04 AM     CO2 26 12/07/2020 03:04 AM     Anion gap 6 12/07/2020 03:04 AM     Glucose 95 12/15/2020 04:22 AM     BUN 12 12/07/2020 03:04 AM     Creatinine 0.73 12/07/2020 03:04 AM     BUN/Creatinine ratio 16 12/07/2020 03:04 AM     GFR est AA >60 12/07/2020 03:04 AM     GFR est non-AA >60 12/07/2020 03:04 AM     Calcium 9.9 12/07/2020 03:04 AM     Bilirubin, total 0.3 12/03/2020 03:30 AM     Alk.  phosphatase 110 12/03/2020 03:30 AM     Protein, total 6.6 12/03/2020 03:30 AM     Albumin 2.6 (L) 12/03/2020 03:30 AM     Globulin 4.0 12/03/2020 03:30 AM     A-G Ratio 0.7 (L) 12/03/2020 03:30 AM     ALT (SGPT) 33 12/03/2020 03:30 AM              Admission on 12/14/2020   Component Date Value Ref Range Status    LIPID PROFILE 12/15/2020        Final    Cholesterol, total 12/15/2020 176  <200 MG/DL Final    Triglyceride 12/15/2020 257* <150 MG/DL Final    HDL Cholesterol 12/15/2020 48  MG/DL Final    LDL, calculated 12/15/2020 76.6  0 - 100 MG/DL Final  VLDL, calculated 12/15/2020 51.4  MG/DL Final    CHOL/HDL Ratio 12/15/2020 3.7  0.0 - 5.0   Final    Glucose 12/15/2020 95  65 - 100 MG/DL Final    Color 12/15/2020 YELLOW/STRAW    Final    Appearance 12/15/2020 CLEAR  CLEAR   Final    Specific gravity 12/15/2020 1.011  1.003 - 1.030   Final    pH (UA) 12/15/2020 7.0  5.0 - 8.0   Final    Protein 12/15/2020 Negative  NEG mg/dL Final    Glucose 12/15/2020 Negative  NEG mg/dL Final    Ketone 12/15/2020 Negative  NEG mg/dL Final    Bilirubin 12/15/2020 Negative  NEG   Final    Blood 12/15/2020 SMALL* NEG   Final    Urobilinogen 12/15/2020 0.2  0.2 - 1.0 EU/dL Final    Nitrites 12/15/2020 Negative  NEG   Final    Leukocyte Esterase 12/15/2020 MODERATE* NEG   Final    WBC 12/15/2020 5-10  0 - 4 /hpf Final    RBC 12/15/2020 0-5  0 - 5 /hpf Final    Epithelial cells 12/15/2020 FEW  FEW /lpf Final    Bacteria 12/15/2020 Negative  NEG /hpf Final    Hemoglobin A1c 12/15/2020 5.0  4.0 - 5.6 % Final    Est. average glucose 12/15/2020 97  mg/dL Final    Urine culture hold 12/15/2020 Urine on hold in Microbiology dept for 2 days. If unpreserved urine is submitted, it cannot be used for addtional testing after 24 hours, recollection will be required. Final             Vitals:     12/15/20 1613 12/15/20 1931 12/16/20 0742 12/16/20 0829   BP: 107/64 108/65 (!) 143/80 110/65   Pulse: 70 80 81 84   Resp: 16 14 16 16   Temp: 98 °F (36.7 °C) 97.1 °F (36.2 °C) 98.1 °F (36.7 °C) 98.7 °F (37.1 °C)   SpO2: 94% 94% 95% 95%      Recent Results   No results found for this or any previous visit (from the past 24 hour(s)).        RADIOLOGY REPORTS:       Results from Hospital Encounter encounter on 11/29/20   XR CHEST PORT     Narrative EXAM: XR CHEST PORT     INDICATION: fever     COMPARISON: None.     FINDINGS: A portable AP radiograph of the chest was obtained at 14:46 hours. The  patient is on a cardiac monitor. The lungs are clear.  The cardiac and  mediastinal contours and pulmonary vascularity are normal.  The chest wall  structures and visualized upper abdomen show no acute findings with incidental  note of degenerative spine and shoulder changes as well as partial visualization  of posterior windy and screw fixation hardware of the lumbar spine.         Impression IMPRESSION: No acute findings. Ct Head Wo Cont     Result Date: 11/29/2020  CLINICAL HISTORY: Encephalopathy INDICATION: Encephalopathy COMPARISON: 9/7/2018. CT dose reduction was achieved through use of a standardized protocol tailored for this examination and automatic exposure control for dose modulation. TECHNIQUE: Serial axial images with a collimation of 5 mm were obtained from the skull base through the vertex  FINDINGS: There is sulcal and ventricular prominence. Confluent periventricular and scattered foci of hypodensity in the cerebral white matter. There is no evidence of an acute infarction, hemorrhage, or mass-effect. There is no evidence of midline shift or hydrocephalus. Posterior fossa structures are unremarkable. No extra-axial collections are seen. Mastoid air cells are well pneumatized and clear. Hypodensity in the central jerri. 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      IMPRESSION: No acute intracranial process. Small area of chronic encephalomalacia in the left frontal lobe. Imaging findings consistent with moderate chronic microvascular ischemic change. There is a mild degree of cerebral atrophy.      Xr Chest Port     Result Date: 11/29/2020  EXAM: XR CHEST PORT INDICATION: fever COMPARISON: None. FINDINGS: A portable AP radiograph of the chest was obtained at 14:46 hours. The patient is on a cardiac monitor. The lungs are clear.  The cardiac and mediastinal contours and pulmonary vascularity are normal.  The chest wall structures and visualized upper abdomen show no acute findings with incidental note of degenerative spine and shoulder changes as well as partial visualization of posterior windy and screw fixation hardware of the lumbar spine.      IMPRESSION: No acute findings.              PAST PSYCHIATRIC HISTORY:  She carries a diagnosis of bipolar disorder.  She has been on lithium for many years. Diamond Raya was hospitalized at [facility], and she was diagnosed with bipolar disorder in the 65's.  She has a history of 3 manic episodes.      PSYCHOSOCIAL HISTORY: Diamond Raya has a daughter. Diamond Raya lives by herself. She is a retired .     DIAGNOSIS: Unspecified dementia with behavioral disturbance. Bipolar 1 disorder by history. Hospital Course:    Patient was admitted to the Psychiatric services for acute psychiatric stabilization in regards to symptomatology as described in the HPI above and placed on Q15 minute checks and suicide precautions. Standing medications were ordered. She was started on depakote, colace, aricept, claritin, namenda, remeron, nystatin, inderal, risperdal. Subsequent to admission, she was markedly confused, labile and irritable most times. She scored 8 on her MMSE. While on the unit Vasquez Hartley was involved in individual, group, occupational and milieu therapy. She improved gradually and was able to integrate into the milieu with help from the nursing staff. Patients symptoms improved gradually including agitation, aggression. She was appropriate in her interactions, and cooperative with medications and the unit routine. Please see individual progress notes for more specific details regarding patient's hospitalization course. Patient was discharged as per the plan. She had been doing well on the unit as per the report of the nursing staff and my observations. No PRN medication for agitation, seclusion or restraints were required during the last 48 hours of her stay. Vasquez Hartley had improved progressively to the point of being stable for discharge and outpatient FU. At this time she did not offer any complaints.  Patient denied any SI or HI. Denied any AH or VH. She denied any delusions. Was not considered a danger to self or to others and is safe for discharge. Will FU with her appointments and remains motivated to be in treatment. The patient verbalized understanding of her discharge instructions. Allergies:(reviewed/updated 1/14/2021)  Allergies   Allergen Reactions    Lamisil [Terbinafine] Diarrhea and Nausea and Vomiting    Thorazine [Chlorpromazine] Rash       Side Effects: (reviewed/updated 1/14/2021)  None reported or admitted to.     Vital Signs:  Patient Vitals for the past 24 hrs:   Temp Pulse Resp BP SpO2   01/14/21 0730 98 °F (36.7 °C) 89 16 125/81 97 %   01/13/21 2040 97.9 °F (36.6 °C) 77 16 122/65 94 %   01/13/21 1825   16       Wt Readings from Last 3 Encounters:   01/10/21 68.5 kg (151 lb)   11/29/20 75.4 kg (166 lb 3.6 oz)   12/02/20 75.4 kg (166 lb 3.6 oz)     Temp Readings from Last 3 Encounters:   01/14/21 98 °F (36.7 °C)   12/14/20 97.5 °F (36.4 °C)   09/12/20 98.1 °F (36.7 °C)     BP Readings from Last 3 Encounters:   01/14/21 125/81   12/14/20 (!) 113/58   09/12/20 128/72     Pulse Readings from Last 3 Encounters:   01/14/21 89   12/14/20 72   09/12/20 75       Labs: (reviewed/updated 1/14/2021)  Recent Results (from the past 24 hour(s))   SARS-COV-2    Collection Time: 01/13/21 11:22 AM   Result Value Ref Range    Specimen source Nasopharyngeal      Specimen source Nasopharyngeal      COVID-19 rapid test Not detected NOTD      Specimen type NP Swab     SARS-COV-2, PCR    Collection Time: 01/13/21 12:45 PM    Specimen: Nasopharyngeal   Result Value Ref Range    Specimen source Nasopharyngeal      SARS-CoV-2 Not detected NOTD       Lab Results   Component Value Date/Time    Valproic acid 76 01/02/2021 05:05 AM     Lab Results   Component Value Date/Time    Lithium level 0.57 (L) 11/29/2020 05:27 PM       Radiology (reviewed/updated 1/14/2021)  Ct Head Wo Cont    Result Date: 11/29/2020  CLINICAL HISTORY: Encephalopathy INDICATION: Encephalopathy COMPARISON: 9/7/2018. CT dose reduction was achieved through use of a standardized protocol tailored for this examination and automatic exposure control for dose modulation. TECHNIQUE: Serial axial images with a collimation of 5 mm were obtained from the skull base through the vertex  FINDINGS: There is sulcal and ventricular prominence. Confluent periventricular and scattered foci of hypodensity in the cerebral white matter. There is no evidence of an acute infarction, hemorrhage, or mass-effect. There is no evidence of midline shift or hydrocephalus. Posterior fossa structures are unremarkable. No extra-axial collections are seen. Mastoid air cells are well pneumatized and clear. Hypodensity in the central jerri. 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 IMPRESSION: No acute intracranial process. Small area of chronic encephalomalacia in the left frontal lobe. Imaging findings consistent with moderate chronic microvascular ischemic change. There is a mild degree of cerebral atrophy. Xr Chest Port    Result Date: 11/29/2020  EXAM: XR CHEST PORT INDICATION: fever COMPARISON: None. FINDINGS: A portable AP radiograph of the chest was obtained at 14:46 hours. The patient is on a cardiac monitor. The lungs are clear. The cardiac and mediastinal contours and pulmonary vascularity are normal.  The chest wall structures and visualized upper abdomen show no acute findings with incidental note of degenerative spine and shoulder changes as well as partial visualization of posterior windy and screw fixation hardware of the lumbar spine. IMPRESSION: No acute findings. Mental Status Exam on Discharge:  General appearance:   Chet Mckeon is a 67 y.o.  WHITE OR  female who is well groomed, psychomotor activity is WNL  Eye contact: makes good eye contact  Speech: Spontaneous and coherent  Affect : Euthymic  Mood: \"OK\"  Thought Process: Logical  Perception: Denies any AH or VH. Thought Content: Denies any SI or Plan  Insight: Poor  Judgement: Poor  Cognition: Impaired     Discharge Diagnoses:  Dementia with behavioral disturbance. Bipolar 1 disorder by history. Current Discharge Medication List      START taking these medications    Details   divalproex (DEPAKOTE SPRINKLE) 125 mg capsule Take 5 Caps by mouth two (2) times a day. Indications: mood  Qty: 300 Cap, Refills: 0      docusate sodium (COLACE) 100 mg capsule Take 1 Cap by mouth daily for 30 days. Indications: constipation  Qty: 30 Cap, Refills: 0      donepeziL (ARICEPT) 5 mg tablet Take 1 Tab by mouth nightly. 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 Indications: sneezing  Qty: 30 Tab, Refills: 0      memantine (NAMENDA) 5 mg tablet Take 1 Tab by mouth two (2) times a day. 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 Indications: major depressive disorder  Qty: 30 Tab, Refills: 0      nystatin (MYCOSTATIN) powder Apply  to affected area two (2) times a day. Indications: diaper rash  Qty: 1 Bottle, Refills: 0      propranoloL (INDERAL) 10 mg tablet Take 1 Tab by mouth two (2) times a day. Indications: essential tremor  Qty: 60 Tab, Refills: 0      risperiDONE (RisperDAL) 1 mg tablet Take 1 Tab by mouth two (2) times a day.  Indications: mood  Qty: 60 Tab, Refills: 0         STOP taking these medications       traZODone (DESYREL) 100 mg tablet Comments:   Reason for Stopping:         diclofenac EC (VOLTAREN) 75 mg EC tablet Comments:   Reason for Stopping:         tiZANidine (ZANAFLEX) 4 mg capsule Comments:   Reason for Stopping:         mirabegron ER (Myrbetriq) 50 mg ER tablet Comments:   Reason for Stopping:         gabapentin (Neurontin) 400 mg capsule Comments:   Reason for Stopping:         alendronate (Fosamax) 70 mg tablet Comments:   Reason for Stopping:         atorvastatin (LIPITOR) 10 mg tablet Comments:   Reason for Stopping:         omeprazole (PRILOSEC) 20 mg capsule Comments:   Reason for Stopping:         multivitamin (ONE A DAY) tablet Comments:   Reason for Stopping:         calcium carbonate (TUMS) 200 mg calcium (500 mg) chew Comments:   Reason for Stopping:         Cholecalciferol, Vitamin D3, (VITAMIN D3) 1,000 unit cap Comments:   Reason for Stopping:         omega-3 fatty acids-vitamin e (FISH OIL) 1,000 mg cap Comments:   Reason for Stopping: Follow-up Information     Follow up With Specialties Details Why Addy Levi M.D., PH.D. On 5/26/2021 neurology follow up appointment [address] [facility], and [facility] ([facility])  [address] [facility] [address] [facility], [address] [facility]  Phone: [phone]  General Neurology   Phone: [phone]  Fax: [phone] at the [facility] on 1/14/2021  [facility] at the [address], [address] [place] [address]  [phone]    Leesa Robertson MD Family Medicine Schedule an appointment as soon as possible for a visit  [address]      Trish Burnette MD  Schedule an appointment as soon as possible for a visit Staff at [facility] will coordinate your appointments to be seen by a psychiatrist for medication management. [facility] at the [address]  [phone]        WOUND CARE: none needed. Prognosis:   Good / [place] Pac based on nature of patient's pathology/ies and treatment compliance issues.   Prognosis is greatly dependent upon patient's ability to  follow up on psychiatric/psychotherapy appointments as well as to comply with psychiatric medications as prescribed. I certify that this patients inpatient psychiatric hospital services furnished since the previous certification were, and continue to be, required for treatment that could reasonably be expected to improve the patient's condition, or for diagnostic study, and that the patient continues to need, on a daily basis, active treatment furnished directly by or requiring the supervision of inpatient psychiatric facility personnel. In addition, the hospital records show that services furnished were intensive treatment services, admission or related services, or equivalent services.      Signed:  Amrita Singh NP  1/14/2021

## 2021-01-14 NOTE — PROGRESS NOTES
Problem: Falls - Risk of  Goal: *Absence of Falls  Description: Document Clydene Bone Fall Risk and appropriate interventions in the flowsheet. Outcome: Progressing Towards Goal  Note: Fall Risk Interventions:  Mobility Interventions: Patient to call before getting OOB    Mentation Interventions: Toileting rounds    Medication Interventions: Bed/chair exit alarm    Elimination Interventions:  Toileting schedule/hourly rounds    History of Falls Interventions: Door open when patient unattended     Problem: Altered Thought Process (Adult/Pediatric)  Goal: *STG: Complies with medication therapy  Outcome: Progressing Towards Goal     Problem: Patient Education: Go to Patient Education Activity  Goal: Patient/Family Education  Outcome: Progressing Towards Goal

## 2021-01-14 NOTE — SUICIDE SAFETY PLAN
SAFETY PLAN    A suicide Safety Plan is a document that supports someone when they are having thoughts of suicide. Warning Signs that indicate a suicidal crisis may be developing: What (situations, thoughts, feelings, body sensations, behaviors, etc.) do you experience that lets you know you are beginning to think about suicide? 1. Isolating to self and room  2. Poor hygiene and lack of motivation to care for self  3. Missing doses of medication. Internal Coping Strategies:  What things can I do (relaxation techniques, hobbies, physical activities, etc.) to take my mind off my problems without contacting another person? 1. Talk to daughter on phone  2. Take a walk outside  3. Talking to staff at [place] [address] and social settings that provide distraction: Who can I call or where can I go to distract me? 1. Name: Imer michel Phone: ([phone])  2. Name: staff at [facility]  Phone: find in person to talk to for support/distraction  3. Place: dinning room and common areas of [facility]            4. Place: outside on patio of [facility]    People whom I can ask for help: Who can I call when I need help - for example, friends, family, clergy, someone else? 1. Name: Imer michel  Phone: ([phone])  2. Name: staff at [facility]  Phone: find in person to talk to for support/distraction    Professionals or [address] [place] [address] Blvd I can contact during a crisis: Who can I call for help - for example, my doctor, my psychiatrist, my psychologist, a mental health provider, a suicide hotline? 1. Clinician Name: Dr Zara Osorio  Phone: she will come to you at your [facility]    2. Suicide Prevention Lifeline: 2-975-231-TALK (1944)    3.  [address] [place] [address] Emergency Services -  for example, [facility] suicide hotline, [facility] Hotline: 211      Emergency Services Address: [address] [place] [address] [address] [place] [address], [address] [place] [address]      Emergency Services Phone: [phone]    Making the environment safe: How can I make my environment (house/apartment/living space) safer? For example, can I remove guns, medications, and other items? 1. Program you local crisis number at [phone] into your phone. 2. Have your support system program your local crisis number into their phone.

## 2021-01-14 NOTE — PROGRESS NOTES
MUSIC THERAPY GROUP PROGRESS NOTE        1/14/2021    The patient Sandra duncan 67 y.o. female participated in [facility] group on the [address] unit. Group time: 11:20-12:00     Personal goal for participation: Based on this music therapist's assessment, the patient (pt) will actively participate in at least two thirds of the group session time.     Goal orientation: Positive social interaction     Group therapy participation: Active     Therapeutic interventions: Sing along, rhythm instrument playing, group dialogue.     Observation of participation: The pt actively participated in one third of the group session time. This music therapist (MT) assessed that pt was able to participate fully, but the pt was distracted by her focus on anticipating her discharge, which she'd been awaiting for at least a day. Pt was most engaged and her affect brightened when the MT asked what she wanted to do first upon arriving home (see her dog). She responded when asked direct questions, and declined participating in singing along and sharing her music preferences. She briefly played an egg shaker before setting this on the table for most of the song.     Anthony Manrique MT-BC (Music Therapist-Board Certified)  Spiritual Care Department  Referral-based service

## 2021-01-14 NOTE — PROGRESS NOTES
Pt receiving continuous q15m safety checks, pt currently asleep resting comfortably in bed. No distress noted, respiratory WNL. Supplemental lighting utilized. Problem: Falls - Risk of  Goal: *Absence of Falls  Description: Document Joanna Alberts Fall Risk and appropriate interventions in the flowsheet. Outcome: Progressing Towards Goal  Note: Fall Risk Interventions:  Mobility Interventions: Patient to call before getting OOB    Mentation Interventions: Toileting rounds    Medication Interventions: Bed/chair exit alarm    Elimination Interventions:  Toileting schedule/hourly rounds    History of Falls Interventions: Door open when patient unattended         Problem: Altered Thought Process (Adult/Pediatric)  Goal: Interventions  Outcome: Progressing Towards Goal

## 2021-01-14 NOTE — INTERDISCIPLINARY ROUNDS
Behavioral Health Interdisciplinary Rounds Patient Name: Delvin Coelho  Age: 67 y.o. Room/Bed:  744/ Primary Diagnosis: <principal problem not specified> Admission Status: Involuntary Commitment Readmission within 30 days: no 
Power of  in place: no 
Patient requires a blocked bed: no          Reason for blocked bed:  
Sleep hours:  7 Participation in Care/Groups:  yes Medication Compliant?: Yes PRNS (last 24 hours): Pain Restraints (last 24 hours):  no 
  
Alcohol screening (AUDIT) completed -  AUDIT Score: 0  If applicable, date SBIRT discussed in treatment team AND documented:   
Tobacco - patient is a smoker: Have You Used Tobacco in the Past 30 Days: No 
Illegal Drugs use: Have You Used Any Illegal Substances Over the Past 12 Months: No 
 
24 hour chart check complete: yes Patient goal(s) for today: Patient goal(s) for today: prepare for discharge Treatment team focus/goals: reconcile medications and facilitate discharge Progress note: Pt is alert, oriented, clam, cooperative and psychiatrically stable for discharge.  
Family Contact information: daughterMoody ([phone])  
Patient's preferred phone number for follow up call :  
Patient's preferred e-mail address : 
 
LOS:  31  Expected LOS: 31 
  
Participating treatment team members: Nida Gonzales NP, RULA Grossman; Frieda Smyth RN

## 2021-01-14 NOTE — BH NOTES
Behavioral Health Transition Record to Provider    Patient Name: Ursula Cogan  YOB: 1945  Medical Record Number: [id number]  Date of Admission: 12/14/2020  Date of Discharge: 1/14/2021    Attending Provider: Nick Lombardi MD  Discharging Provider: Xochitl Mcclain NP  To contact this individual call [phone] and ask the  to page. If unavailable, ask to be transferred to [facility] Provider on call. [facility] Provider will be available on call 24/7 and during holidays. Primary Care Provider: Arias Fraser MD    Allergies   Allergen Reactions    Lamisil [Terbinafine] Diarrhea and Nausea and Vomiting    Thorazine [Chlorpromazine] Rash       Reason for Admission:     Admission Diagnosis: Bipolar 1 disorder ([facility] [address] [F31.9]    * No surgery found *    Results for orders placed or performed during the hospital encounter of 12/14/20   URINE CULTURE HOLD SAMPLE    Specimen: Serum   Result Value Ref Range    Urine culture hold        Urine on hold in Microbiology dept for 2 days. If unpreserved urine is submitted, it cannot be used for addtional testing after 24 hours, recollection will be required.    SARS-COV-2, PCR    Specimen: Nasopharyngeal   Result Value Ref Range    Specimen source Nasopharyngeal      SARS-CoV-2 Not detected NOTD     LIPID PANEL   Result Value Ref Range    LIPID PROFILE          Cholesterol, total 176 <200 MG/DL    Triglyceride 257 (H) <150 MG/DL    HDL Cholesterol 48 MG/DL    LDL, calculated 76.6 0 - 100 MG/DL    VLDL, calculated 51.4 MG/DL    CHOL/HDL Ratio 3.7 0.0 - 5.0     GLUCOSE, FASTING   Result Value Ref Range    Glucose 95 65 - 100 MG/DL   URINALYSIS W/MICROSCOPIC   Result Value Ref Range    Color YELLOW/STRAW      Appearance CLEAR CLEAR      Specific gravity 1.011 1.003 - 1.030      pH (UA) 7.0 5.0 - 8.0      Protein Negative NEG mg/dL    Glucose Negative NEG mg/dL    Ketone Negative NEG mg/dL    Bilirubin Negative NEG Blood SMALL (A) NEG      Urobilinogen 0.2 0.2 - 1.0 EU/dL    Nitrites Negative NEG      Leukocyte Esterase MODERATE (A) NEG      WBC 5-10 0 - 4 /hpf    RBC 0-5 0 - 5 /hpf    Epithelial cells FEW FEW /lpf    Bacteria Negative NEG /hpf   HEMOGLOBIN A1C WITH EAG   Result Value Ref Range    Hemoglobin A1c 5.0 4.0 - 5.6 %    Est. average glucose 97 mg/dL   VALPROIC ACID   Result Value Ref Range    Valproic acid 58 50 - 100 ug/ml   VALPROIC ACID   Result Value Ref Range    Valproic acid 76 50 - 100 ug/ml   CBC WITH AUTOMATED DIFF   Result Value Ref Range    WBC 7.1 3.6 - 11.0 K/uL    RBC 3.35 (L) 3.80 - 5.20 M/uL    HGB 10.6 (L) 11.5 - 16.0 g/dL    HCT 33.8 (L) 35.0 - 47.0 %    .9 (H) 80.0 - 99.0 FL    MCH 31.6 26.0 - 34.0 PG    MCHC 31.4 30.0 - 36.5 g/dL    RDW 12.8 11.5 - 14.5 %    PLATELET 114 293 - 055 K/uL    MPV 9.9 8.9 - 12.9 FL    NRBC 0.0 0  WBC    ABSOLUTE NRBC 0.00 0.00 - 0.01 K/uL    NEUTROPHILS 57 32 - 75 %    LYMPHOCYTES 29 12 - 49 %    MONOCYTES 11 5 - 13 %    EOSINOPHILS 2 0 - 7 %    BASOPHILS 1 0 - 1 %    IMMATURE GRANULOCYTES 0 0.0 - 0.5 %    ABS. NEUTROPHILS 4.0 1.8 - 8.0 K/UL    ABS. LYMPHOCYTES 2.1 0.8 - 3.5 K/UL    ABS. MONOCYTES 0.8 0.0 - 1.0 K/UL    ABS. EOSINOPHILS 0.2 0.0 - 0.4 K/UL    ABS. BASOPHILS 0.1 0.0 - 0.1 K/UL    ABS. IMM. GRANS. 0.0 0.00 - 0.04 K/UL    DF AUTOMATED     METABOLIC PANEL, COMPREHENSIVE   Result Value Ref Range    Sodium 141 136 - 145 mmol/L    Potassium 3.5 3.5 - 5.1 mmol/L    Chloride 108 97 - 108 mmol/L    CO2 29 21 - 32 mmol/L    Anion gap 4 (L) 5 - 15 mmol/L    Glucose 76 65 - 100 mg/dL    BUN 22 (H) 6 - 20 MG/DL    Creatinine 0.63 0.55 - 1.02 MG/DL    BUN/Creatinine ratio 35 (H) 12 - 20      GFR est AA >60 >60 ml/min/1.73m2    GFR est non-AA >60 >60 ml/min/1.73m2    Calcium 9.1 8.5 - 10.1 MG/DL    Bilirubin, total 0.2 0.2 - 1.0 MG/DL    ALT (SGPT) 13 12 - 78 U/L    AST (SGOT) 9 (L) 15 - 37 U/L    Alk.  phosphatase 48 45 - 117 U/L    Protein, total 6.3 (L) 6.4 - 8.2 g/dL    Albumin 2.8 (L) 3.5 - 5.0 g/dL    Globulin 3.5 2.0 - 4.0 g/dL    A-G Ratio 0.8 (L) 1.1 - 2.2     SARS-COV-2   Result Value Ref Range    Specimen source Nasopharyngeal      Specimen source Nasopharyngeal      COVID-19 rapid test Not detected NOTD      Specimen type NP Swab         Immunizations administered during this encounter:   Immunization History   Administered Date(s) Administered    Influenza Vaccine 10/01/2014       Screening for Metabolic Disorders for Patients on Antipsychotic Medications  (Data obtained from the EMR)    Estimated Body Mass Index  Estimated body mass index is 27.62 kg/m² as calculated from the following:    Height as of this encounter: 5' 2\" (1.575 m). Weight as of this encounter: 68.5 kg (151 lb). Vital Signs/Blood Pressure  Visit Vitals  /81   Pulse 89   Temp 98 °F (36.7 °C)   Resp 16   Ht 5' 2\" (1.575 m)   Wt 68.5 kg (151 lb)   SpO2 97%   BMI 27.62 kg/m²       Blood Glucose/Hemoglobin A1c  Lab Results   Component Value Date/Time    Glucose 76 01/09/2021 05:16 AM    Glucose 95 12/15/2020 04:22 AM       Lab Results   Component Value Date/Time    Hemoglobin A1c 5.0 12/15/2020 04:30 AM        Lipid Panel  Lab Results   Component Value Date/Time    Cholesterol, total 176 12/15/2020 04:22 AM    HDL Cholesterol 48 12/15/2020 04:22 AM    LDL, calculated 76.6 12/15/2020 04:22 AM    Triglyceride 257 (H) 12/15/2020 04:22 AM    CHOL/HDL Ratio 3.7 12/15/2020 04:22 AM        Discharge Diagnosis: CHIEF COMPLAINT: \"Am I going home? \"     HISTORY OF PRESENTING ILLNESS:  The patient is a 74-year-old female who is currently admitted at [facility] inpatient psychiatric unit on a temporary snf order. Her TDO hearing is scheduled in the morning. She is well known to me from, I have been following her on the medical unit for psychiatric consultations. She is a poor historian. She remains markedly confused, she confabulates.  No history is obtained from her. Her past medical history is significant for CVA, chronic tremor, and anemia.  she also carries a long standing diagnosis of Bipolar 1 disorder and has been seeing JACOBY Browne, at [facility]. She has been stabilized on lithium monotherapy for many years. She was initially sent to [place] ED for worsening mentation, disorientation, and delusion.  Her daughter noted that the patient was walking around her house covered in stool and confused.  She also was refusing all help from her daughter. Dianna Redmond lives by herself. She was treated for UTI and acute delirium.  She has been kicking, screaming, and biting. She has been agitated and yelling in the medical unit.   Throwing milk carton, combative. Code atlas was called and IM medication had to be given quite a few times. She is also not sleeping. She also has tremors which according to her daughter has been ongoing for at least 15 years. She has been noncompliant with her medication and has been refusing her medications. She is admitted to the inpatient psychiatric unit for further stabilization and treatment. Discharge Plan: Patient discharged to [facility] with follow up through Erin Callaway MD who comes to the facility. The patient Milagro Brawn exhibits the ability to control behavior in a less restrictive environment. Patient's level of functioning is improving. No assaultive/destructive behavior has been observed for the past 24 hours. No suicidal/homicidal threat or behavior has been observed for the past 24 hours. There is no evidence of serious medication side effects. Patient has not been in physical or protective restraints for at least the past 24 hours. If weapons involved, how are they secured? NA    Is patient aware of and in agreement with discharge plan? yes    Arrangements for medication:  Prescriptions given to patient, faxed to [facility] at [facility].     Copy of discharge instructions to provider?: [facility]. ([phone])    Arrangements for transportation home:  [facility] transport to  at 12:30pm.    Keep all follow up appointments as scheduled, continue to take prescribed medications per physician instructions. Mental health crisis number:  675 or your local [place] mental health crisis line number at [phone]. Discharge Medication List and Instructions:   Current Discharge Medication List      START taking these medications    Details   divalproex (DEPAKOTE SPRINKLE) 125 mg capsule Take 5 Caps by mouth two (2) times a day. Indications: mood  Qty: 300 Cap, Refills: 0      docusate sodium (COLACE) 100 mg capsule Take 1 Cap by mouth daily for 30 days. Indications: constipation  Qty: 30 Cap, Refills: 0      donepeziL (ARICEPT) 5 mg tablet Take 1 Tab by mouth nightly. 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 Indications: sneezing  Qty: 30 Tab, Refills: 0      memantine (NAMENDA) 5 mg tablet Take 1 Tab by mouth two (2) times a day. 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 Indications: major depressive disorder  Qty: 30 Tab, Refills: 0      nystatin (MYCOSTATIN) powder Apply  to affected area two (2) times a day. Indications: diaper rash  Qty: 1 Bottle, Refills: 0      propranoloL (INDERAL) 10 mg tablet Take 1 Tab by mouth two (2) times a day. Indications: essential tremor  Qty: 60 Tab, Refills: 0      risperiDONE (RisperDAL) 1 mg tablet Take 1 Tab by mouth two (2) times a day.  Indications: mood  Qty: 60 Tab, Refills: 0         STOP taking these medications       traZODone (DESYREL) 100 mg tablet Comments:   Reason for Stopping:         diclofenac EC (VOLTAREN) 75 mg EC tablet Comments:   Reason for Stopping: tiZANidine (ZANAFLEX) 4 mg capsule Comments:   Reason for Stopping:         mirabegron ER (Myrbetriq) 50 mg ER tablet Comments:   Reason for Stopping:         gabapentin (Neurontin) 400 mg capsule Comments:   Reason for Stopping:         alendronate (Fosamax) 70 mg tablet Comments:   Reason for Stopping:         atorvastatin (LIPITOR) 10 mg tablet Comments:   Reason for Stopping:         omeprazole (PRILOSEC) 20 mg capsule Comments:   Reason for Stopping:         multivitamin (ONE A DAY) tablet Comments:   Reason for Stopping:         calcium carbonate (TUMS) 200 mg calcium (500 mg) chew Comments:   Reason for Stopping:         Cholecalciferol, Vitamin D3, (VITAMIN D3) 1,000 unit cap Comments:   Reason for Stopping:         omega-3 fatty acids-vitamin e (FISH OIL) 1,000 mg cap Comments:   Reason for Stopping:               Unresulted Labs (24h ago, onward)     Start     Ordered    01/13/21 1245  SARS-COV-2  ONE TIME,   S     Question Answer Comment   Specimen source Nasopharyngeal    Is this test for diagnosis or screening? Screening    Symptomatic for COVID-19 as defined by CDC? No    Employed in healthcare setting? No    Resident in a congregate (group) care setting? Yes    Pregnant? No    Previously tested for COVID-19? No        01/13/21 1243              To obtain results of studies pending at discharge, please contact [phone]    Follow-up Information     Follow up With Specialties Details Why Amador Coppola M.D., PH.D.   On 5/26/2021 neurology follow up appointment [address] [facility], and [facility] ([address] [facility])  [address] [facility] [address] [facility], [address] [facility]  Phone: [phone]  General Neurology   Phone: [phone]  Fax: [phone]    Garry Ruelas MD  Schedule an appointment as soon as possible for a visit PCP appointment  [facility]  [address] [facility], [address] [facility]  [id number] [facility], [address] [facility]   [phone]    [facility] at the [address] [facility] on 1/14/2021  [facility] at the [place]  [address] [facility], [address] [place] [address]  [phone]    Neela Jarrett MD [facility] Medicine Schedule an appointment as soon as possible for a visit  [address]      Jeanette Tineo MD  Schedule an appointment as soon as possible for a visit Staff at [facility] will coordinate your appointments to be seen by a psychiatrist for medication management. [facility] at the [facility] [address]  [phone]          Advanced Directive:   Does the patient have an appointed surrogate decision maker? Yes  Does the patient have a Medical Advance Directive? No  Does the patient have a Psychiatric Advance Directive? No  If the patient does not have a surrogate or Medical Advance Directive AND Psychiatric Advance Directive, the patient was offered information on these advance directives Patient declined to complete    Patient Instructions: Please continue all medications until otherwise directed by physician. Tobacco Cessation Discharge Plan:   Is the patient a smoker and needs referral for smoking cessation? No  Patient referred to the following for smoking cessation with an appointment? Not applicable     Patient was offered medication to assist with smoking cessation at discharge? Not applicable  Was education for smoking cessation added to the discharge instructions? Yes    Alcohol/Substance Abuse Discharge Plan:   Does the patient have a history of substance/alcohol abuse and requires a referral for treatment? No  Patient referred to the following for substance/alcohol abuse treatment with an appointment? Not applicable  Patient was offered medication to assist with alcohol cessation at discharge?  Not applicable  Was education for substance/alcohol abuse added to discharge instructions? No    Patient discharged to Home; discussed with patient/caregiver and provided to the patient/caregiver either in hard copy or electronically.

## 2021-01-14 NOTE — BH NOTES
7308 Report called to [facility], [facility]. TRANSFER - OUT REPORT:    Verbal report given to Abhilash Naqvi RN(name) on [address]  being transferred to [facility] for routine progression of care       Report consisted of patients Situation, Background, Assessment and   Recommendations(SBAR). Information from the following report(s) SBAR, Kardex, ED Summary, OR Summary, Procedure Summary, Intake/Output, MAR, Accordion and Recent Results was reviewed with the receiving nurse. Lines:       Opportunity for questions and clarification was provided.       Patient transported with:   [place]

## 2021-01-18 NOTE — PROGRESS NOTES
Reached out to patient at [phone] for follow up. Voicemail came on and no message left due to confidentiality.

## 2021-02-09 ENCOUNTER — HOSPITAL ENCOUNTER (INPATIENT)
Age: 76
LOS: 4 days | Discharge: HOME | DRG: 871 | End: 2021-02-13
Attending: EMERGENCY MEDICINE | Admitting: FAMILY MEDICINE
Payer: MEDICARE

## 2021-02-09 ENCOUNTER — APPOINTMENT (OUTPATIENT)
Dept: GENERAL RADIOLOGY | Age: 76
DRG: 871 | End: 2021-02-09
Attending: EMERGENCY MEDICINE
Payer: MEDICARE

## 2021-02-09 ENCOUNTER — APPOINTMENT (OUTPATIENT)
Dept: CT IMAGING | Age: 76
DRG: 871 | End: 2021-02-09
Attending: EMERGENCY MEDICINE
Payer: MEDICARE

## 2021-02-09 DIAGNOSIS — G93.40 ACUTE ENCEPHALOPATHY: ICD-10-CM

## 2021-02-09 DIAGNOSIS — N30.00 ACUTE CYSTITIS WITHOUT HEMATURIA: Primary | ICD-10-CM

## 2021-02-09 DIAGNOSIS — A41.9 SEPSIS, DUE TO UNSPECIFIED ORGANISM, UNSPECIFIED WHETHER ACUTE ORGAN DYSFUNCTION PRESENT (HCC): ICD-10-CM

## 2021-02-09 LAB
ALBUMIN SERPL-MCNC: 2.6 G/DL (ref 3.5–5)
ALBUMIN/GLOB SERPL: 0.5 {RATIO} (ref 1.1–2.2)
ALP SERPL-CCNC: 68 U/L (ref 45–117)
ALT SERPL-CCNC: 32 U/L (ref 12–78)
AMMONIA PLAS-SCNC: 42 UMOL/L
ANION GAP SERPL CALC-SCNC: 4 MMOL/L (ref 5–15)
APPEARANCE UR: ABNORMAL
AST SERPL-CCNC: 32 U/L (ref 15–37)
ATRIAL RATE: 110 BPM
BACTERIA URNS QL MICRO: ABNORMAL /HPF
BASOPHILS # BLD: 0 K/UL (ref 0–0.1)
BASOPHILS NFR BLD: 0 % (ref 0–1)
BILIRUB SERPL-MCNC: 0.4 MG/DL (ref 0.2–1)
BILIRUB UR QL: NEGATIVE
BUN SERPL-MCNC: 45 MG/DL (ref 6–20)
BUN/CREAT SERPL: 66 (ref 12–20)
CALCIUM SERPL-MCNC: 10.4 MG/DL (ref 8.5–10.1)
CALCULATED P AXIS, ECG09: 45 DEGREES
CALCULATED R AXIS, ECG10: 61 DEGREES
CALCULATED T AXIS, ECG11: 26 DEGREES
CHLORIDE SERPL-SCNC: 114 MMOL/L (ref 97–108)
CHOLEST SERPL-MCNC: 129 MG/DL
CK SERPL-CCNC: 28 U/L (ref 26–192)
CO2 SERPL-SCNC: 28 MMOL/L (ref 21–32)
COLOR UR: ABNORMAL
COMMENT, HOLDF: NORMAL
COMMENT, HOLDF: NORMAL
COVID-19 RAPID TEST, COVR: DETECTED
CREAT SERPL-MCNC: 0.68 MG/DL (ref 0.55–1.02)
DIAGNOSIS, 93000: NORMAL
DIFFERENTIAL METHOD BLD: ABNORMAL
EOSINOPHIL # BLD: 0 K/UL (ref 0–0.4)
EOSINOPHIL NFR BLD: 0 % (ref 0–7)
EPITH CASTS URNS QL MICRO: ABNORMAL /LPF
ERYTHROCYTE [DISTWIDTH] IN BLOOD BY AUTOMATED COUNT: 13.2 % (ref 11.5–14.5)
GLOBULIN SER CALC-MCNC: 5.6 G/DL (ref 2–4)
GLUCOSE SERPL-MCNC: 127 MG/DL (ref 65–100)
GLUCOSE UR STRIP.AUTO-MCNC: NEGATIVE MG/DL
HCT VFR BLD AUTO: 42.3 % (ref 35–47)
HDLC SERPL-MCNC: 42 MG/DL
HDLC SERPL: 3.1 {RATIO} (ref 0–5)
HGB BLD-MCNC: 13.6 G/DL (ref 11.5–16)
HGB UR QL STRIP: ABNORMAL
IMM GRANULOCYTES # BLD AUTO: 0.1 K/UL (ref 0–0.04)
IMM GRANULOCYTES NFR BLD AUTO: 0 % (ref 0–0.5)
KETONES UR QL STRIP.AUTO: ABNORMAL MG/DL
LACTATE SERPL-SCNC: 1.3 MMOL/L (ref 0.4–2)
LACTATE SERPL-SCNC: 1.5 MMOL/L (ref 0.4–2)
LACTATE SERPL-SCNC: 1.7 MMOL/L (ref 0.4–2)
LDLC SERPL CALC-MCNC: 66.2 MG/DL (ref 0–100)
LEUKOCYTE ESTERASE UR QL STRIP.AUTO: ABNORMAL
LIPID PROFILE,FLP: NORMAL
LYMPHOCYTES # BLD: 1.3 K/UL (ref 0.8–3.5)
LYMPHOCYTES NFR BLD: 11 % (ref 12–49)
MCH RBC QN AUTO: 31.9 PG (ref 26–34)
MCHC RBC AUTO-ENTMCNC: 32.2 G/DL (ref 30–36.5)
MCV RBC AUTO: 99.3 FL (ref 80–99)
MONOCYTES # BLD: 1.5 K/UL (ref 0–1)
MONOCYTES NFR BLD: 12 % (ref 5–13)
MUCOUS THREADS URNS QL MICRO: ABNORMAL /LPF
NEUTS SEG # BLD: 9.3 K/UL (ref 1.8–8)
NEUTS SEG NFR BLD: 77 % (ref 32–75)
NITRITE UR QL STRIP.AUTO: POSITIVE
NRBC # BLD: 0 K/UL (ref 0–0.01)
NRBC BLD-RTO: 0 PER 100 WBC
P-R INTERVAL, ECG05: 150 MS
PH UR STRIP: 6.5 [PH] (ref 5–8)
PLATELET # BLD AUTO: 165 K/UL (ref 150–400)
PMV BLD AUTO: 9.4 FL (ref 8.9–12.9)
POTASSIUM SERPL-SCNC: 3.6 MMOL/L (ref 3.5–5.1)
PROT SERPL-MCNC: 8.2 G/DL (ref 6.4–8.2)
PROT UR STRIP-MCNC: 100 MG/DL
Q-T INTERVAL, ECG07: 312 MS
QRS DURATION, ECG06: 88 MS
QTC CALCULATION (BEZET), ECG08: 422 MS
RBC # BLD AUTO: 4.26 M/UL (ref 3.8–5.2)
RBC #/AREA URNS HPF: ABNORMAL /HPF (ref 0–5)
SAMPLES BEING HELD,HOLD: NORMAL
SAMPLES BEING HELD,HOLD: NORMAL
SARS-COV-2, COV2: NORMAL
SODIUM SERPL-SCNC: 146 MMOL/L (ref 136–145)
SOURCE, COVRS: ABNORMAL
SP GR UR REFRACTOMETRY: 1.02 (ref 1–1.03)
TRIGL SERPL-MCNC: 104 MG/DL (ref ?–150)
TROPONIN I SERPL-MCNC: <0.05 NG/ML
UROBILINOGEN UR QL STRIP.AUTO: 1 EU/DL (ref 0.2–1)
VALPROATE SERPL-MCNC: 50 UG/ML (ref 50–100)
VENTRICULAR RATE, ECG03: 110 BPM
VLDLC SERPL CALC-MCNC: 20.8 MG/DL
WBC # BLD AUTO: 12.2 K/UL (ref 3.6–11)
WBC URNS QL MICRO: >100 /HPF (ref 0–4)

## 2021-02-09 PROCEDURE — 36415 COLL VENOUS BLD VENIPUNCTURE: CPT

## 2021-02-09 PROCEDURE — 84484 ASSAY OF TROPONIN QUANT: CPT

## 2021-02-09 PROCEDURE — 93005 ELECTROCARDIOGRAM TRACING: CPT

## 2021-02-09 PROCEDURE — 85025 COMPLETE CBC W/AUTO DIFF WBC: CPT

## 2021-02-09 PROCEDURE — 74011250637 HC RX REV CODE- 250/637: Performed by: FAMILY MEDICINE

## 2021-02-09 PROCEDURE — 80053 COMPREHEN METABOLIC PANEL: CPT

## 2021-02-09 PROCEDURE — 74011000258 HC RX REV CODE- 258: Performed by: FAMILY MEDICINE

## 2021-02-09 PROCEDURE — 71045 X-RAY EXAM CHEST 1 VIEW: CPT

## 2021-02-09 PROCEDURE — 87040 BLOOD CULTURE FOR BACTERIA: CPT

## 2021-02-09 PROCEDURE — 70450 CT HEAD/BRAIN W/O DYE: CPT

## 2021-02-09 PROCEDURE — 87635 SARS-COV-2 COVID-19 AMP PRB: CPT

## 2021-02-09 PROCEDURE — 65660000000 HC RM CCU STEPDOWN

## 2021-02-09 PROCEDURE — 96365 THER/PROPH/DIAG IV INF INIT: CPT

## 2021-02-09 PROCEDURE — 74011250636 HC RX REV CODE- 250/636: Performed by: EMERGENCY MEDICINE

## 2021-02-09 PROCEDURE — 99285 EMERGENCY DEPT VISIT HI MDM: CPT

## 2021-02-09 PROCEDURE — 83605 ASSAY OF LACTIC ACID: CPT

## 2021-02-09 PROCEDURE — 80164 ASSAY DIPROPYLACETIC ACD TOT: CPT

## 2021-02-09 PROCEDURE — 80061 LIPID PANEL: CPT

## 2021-02-09 PROCEDURE — 74011000258 HC RX REV CODE- 258: Performed by: EMERGENCY MEDICINE

## 2021-02-09 PROCEDURE — 82550 ASSAY OF CK (CPK): CPT

## 2021-02-09 PROCEDURE — 74011250636 HC RX REV CODE- 250/636: Performed by: FAMILY MEDICINE

## 2021-02-09 PROCEDURE — 81001 URINALYSIS AUTO W/SCOPE: CPT

## 2021-02-09 PROCEDURE — 82140 ASSAY OF AMMONIA: CPT

## 2021-02-09 PROCEDURE — 84443 ASSAY THYROID STIM HORMONE: CPT

## 2021-02-09 RX ORDER — SODIUM CHLORIDE 9 MG/ML
75 INJECTION, SOLUTION INTRAVENOUS CONTINUOUS
Status: CANCELLED | OUTPATIENT
Start: 2021-02-09

## 2021-02-09 RX ORDER — BENZTROPINE MESYLATE 0.5 MG/1
0.5 TABLET ORAL
COMMUNITY

## 2021-02-09 RX ORDER — THERA TABS 400 MCG
1 TAB ORAL DAILY
Status: ON HOLD | COMMUNITY
End: 2021-02-10

## 2021-02-09 RX ORDER — MIRTAZAPINE 15 MG/1
15 TABLET, FILM COATED ORAL
Status: DISCONTINUED | OUTPATIENT
Start: 2021-02-09 | End: 2021-02-13 | Stop reason: HOSPADM

## 2021-02-09 RX ORDER — SODIUM CHLORIDE 0.9 % (FLUSH) 0.9 %
5-40 SYRINGE (ML) INJECTION EVERY 8 HOURS
Status: DISCONTINUED | OUTPATIENT
Start: 2021-02-09 | End: 2021-02-13 | Stop reason: HOSPADM

## 2021-02-09 RX ORDER — RISPERIDONE 1 MG/1
1 TABLET, FILM COATED ORAL 2 TIMES DAILY
Status: DISCONTINUED | OUTPATIENT
Start: 2021-02-09 | End: 2021-02-13 | Stop reason: HOSPADM

## 2021-02-09 RX ORDER — SODIUM CHLORIDE 450 MG/100ML
75 INJECTION, SOLUTION INTRAVENOUS CONTINUOUS
Status: DISCONTINUED | OUTPATIENT
Start: 2021-02-09 | End: 2021-02-10

## 2021-02-09 RX ORDER — LIDOCAINE 4 G/100G
1 PATCH TOPICAL EVERY 24 HOURS
COMMUNITY

## 2021-02-09 RX ORDER — ACETAMINOPHEN 325 MG/1
650 TABLET ORAL
Status: DISCONTINUED | OUTPATIENT
Start: 2021-02-09 | End: 2021-02-13 | Stop reason: HOSPADM

## 2021-02-09 RX ORDER — SODIUM CHLORIDE 0.9 % (FLUSH) 0.9 %
5-40 SYRINGE (ML) INJECTION AS NEEDED
Status: DISCONTINUED | OUTPATIENT
Start: 2021-02-09 | End: 2021-02-13 | Stop reason: HOSPADM

## 2021-02-09 RX ORDER — LOPERAMIDE HYDROCHLORIDE 2 MG/1
2 CAPSULE ORAL
COMMUNITY

## 2021-02-09 RX ORDER — HEPARIN SODIUM 5000 [USP'U]/ML
5000 INJECTION, SOLUTION INTRAVENOUS; SUBCUTANEOUS EVERY 8 HOURS
Status: DISCONTINUED | OUTPATIENT
Start: 2021-02-09 | End: 2021-02-10

## 2021-02-09 RX ORDER — DIVALPROEX SODIUM 125 MG/1
625 CAPSULE, COATED PELLETS ORAL 2 TIMES DAILY
Status: DISCONTINUED | OUTPATIENT
Start: 2021-02-09 | End: 2021-02-13 | Stop reason: HOSPADM

## 2021-02-09 RX ORDER — ZINC SULFATE 50(220)MG
220 CAPSULE ORAL DAILY
Status: ON HOLD | COMMUNITY
End: 2021-02-10

## 2021-02-09 RX ORDER — TRAZODONE HYDROCHLORIDE 50 MG/1
50 TABLET ORAL
Status: ON HOLD | COMMUNITY
End: 2021-02-10

## 2021-02-09 RX ORDER — OLANZAPINE 2.5 MG/1
2.5 TABLET ORAL
COMMUNITY

## 2021-02-09 RX ORDER — ASCORBIC ACID 500 MG
500 TABLET ORAL 2 TIMES DAILY
Status: DISCONTINUED | OUTPATIENT
Start: 2021-02-09 | End: 2021-02-13 | Stop reason: HOSPADM

## 2021-02-09 RX ORDER — GLUCOSAMINE SULFATE 1500 MG
1000 POWDER IN PACKET (EA) ORAL DAILY
Status: ON HOLD | COMMUNITY
End: 2021-02-10

## 2021-02-09 RX ORDER — ACETAMINOPHEN 325 MG/1
650 TABLET ORAL
COMMUNITY

## 2021-02-09 RX ORDER — ASCORBIC ACID 500 MG
500 TABLET ORAL DAILY
Status: ON HOLD | COMMUNITY
End: 2021-02-10

## 2021-02-09 RX ORDER — ZINC SULFATE 50(220)MG
1 CAPSULE ORAL DAILY
Status: DISCONTINUED | OUTPATIENT
Start: 2021-02-10 | End: 2021-02-13 | Stop reason: HOSPADM

## 2021-02-09 RX ORDER — MEMANTINE HYDROCHLORIDE 10 MG/1
5 TABLET ORAL 2 TIMES DAILY
Status: DISCONTINUED | OUTPATIENT
Start: 2021-02-09 | End: 2021-02-13 | Stop reason: HOSPADM

## 2021-02-09 RX ORDER — DONEPEZIL HYDROCHLORIDE 5 MG/1
5 TABLET, FILM COATED ORAL
Status: DISCONTINUED | OUTPATIENT
Start: 2021-02-09 | End: 2021-02-13 | Stop reason: HOSPADM

## 2021-02-09 RX ORDER — PROPRANOLOL HYDROCHLORIDE 20 MG/1
10 TABLET ORAL 2 TIMES DAILY
Status: DISCONTINUED | OUTPATIENT
Start: 2021-02-09 | End: 2021-02-13 | Stop reason: HOSPADM

## 2021-02-09 RX ORDER — NYSTATIN 100000 [USP'U]/G
POWDER TOPICAL 2 TIMES DAILY
Status: DISCONTINUED | OUTPATIENT
Start: 2021-02-09 | End: 2021-02-13 | Stop reason: HOSPADM

## 2021-02-09 RX ORDER — ADHESIVE BANDAGE
30 BANDAGE TOPICAL DAILY PRN
COMMUNITY

## 2021-02-09 RX ADMIN — Medication 10 ML: at 22:43

## 2021-02-09 RX ADMIN — DONEPEZIL HYDROCHLORIDE 5 MG: 5 TABLET, FILM COATED ORAL at 22:21

## 2021-02-09 RX ADMIN — CEFTRIAXONE SODIUM 1 G: 1 INJECTION, POWDER, FOR SOLUTION INTRAMUSCULAR; INTRAVENOUS at 13:02

## 2021-02-09 RX ADMIN — SODIUM CHLORIDE 1000 ML: 9 INJECTION, SOLUTION INTRAVENOUS at 10:58

## 2021-02-09 RX ADMIN — SODIUM CHLORIDE 1000 ML: 9 INJECTION, SOLUTION INTRAVENOUS at 13:02

## 2021-02-09 RX ADMIN — HEPARIN SODIUM 5000 UNITS: 5000 INJECTION INTRAVENOUS; SUBCUTANEOUS at 22:20

## 2021-02-09 RX ADMIN — RISPERIDONE 1 MG: 1 TABLET ORAL at 22:20

## 2021-02-09 RX ADMIN — MIRTAZAPINE 15 MG: 15 TABLET, FILM COATED ORAL at 22:20

## 2021-02-09 RX ADMIN — DIVALPROEX SODIUM 625 MG: 125 CAPSULE ORAL at 22:27

## 2021-02-09 RX ADMIN — MEMANTINE 5 MG: 5 TABLET ORAL at 22:21

## 2021-02-09 RX ADMIN — SODIUM CHLORIDE 75 ML/HR: 4.5 INJECTION, SOLUTION INTRAVENOUS at 17:51

## 2021-02-09 RX ADMIN — PROPRANOLOL HYDROCHLORIDE 10 MG: 20 TABLET ORAL at 22:21

## 2021-02-09 RX ADMIN — NYSTATIN: 100000 POWDER TOPICAL at 22:29

## 2021-02-09 NOTE — PROGRESS NOTES
Admission Medication Reconciliation:      PTA med list compiled from [facility] Transfer documents, which were faxed to inpatient pharmacy. PS text to update Dr. Kraig Perez. Added:  Myrbetriq  Aspercreme/lidocaine 4% patch  Benztropine  Trazodone  Olanzapine  Ascorbic acid  Zinc sulfate  Multivitamin  Vit D3  MOM  Loperamide  APAP    Thank you for allowing me to participate in the care of your patient. Mikey Torres PharmD, RN #[id number] benefit data reflects medications filled and processed through the patient's insurance, however   this data does NOT capture whether the medication was picked up or is currently being taken by the patient. Allergies:  Lamisil [terbinafine] and Thorazine [chlorpromazine]    Significant PMH/Disease States:   Past Medical History:   Diagnosis Date    Anemia     Arthritis     Bipolar disorder ([facility] [address]     Burning with urination     Dementia ([facility] [address]     Memory change     Psychiatric disorder     Bipolar Disorder    Stool color black     Tremor      Chief Complaint for this Admission:    Chief Complaint   Patient presents with    Fatigue     Prior to Admission Medications:   Prior to Admission Medications   Prescriptions Last Dose Informant Taking? OLANZapine (ZyPREXA) 2.5 mg tablet   Yes   Sig: Take 2.5 mg by mouth every six (6) hours as needed for Other (Agitation, psychosis). acetaminophen (TYLENOL) 325 mg tablet   Yes   Sig: Take 650 mg by mouth every four (4) hours as needed for Pain. ascorbic acid, vitamin C, (VITAMIN C) 500 mg tablet 2/8/2021 at Unknown time  Yes   Sig: Take 500 mg by mouth daily. benztropine (COGENTIN) 0.5 mg tablet   Yes   Sig: Take 0.5 mg by mouth two (2) times daily as needed for Other (Extrapyramidal symptoms). cholecalciferol (Vitamin D3) 25 mcg (1,000 unit) cap 2/8/2021 at Unknown time  Yes   Sig: Take 1,000 Units by mouth daily.    divalproex (DEPAKOTE SPRINKLE) 125 mg capsule 2/8/2021 at Unknown time  Yes   Sig: Take 5 Caps by mouth two (2) times a day. Indications: mood   docusate sodium (COLACE) 100 mg capsule 2021 at Unknown time  Yes   Sig: Take 1 Cap by mouth daily for 30 days. Indications: constipation   donepeziL (ARICEPT) 5 mg tablet 2021 at Unknown time  Yes   Sig: Take 1 Tab by mouth nightly. 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 loperamide (IMODIUM) 2 mg capsule   Yes   Sig: Take 2 mg by mouth four (4) times daily as needed for Diarrhea.   loratadine (CLARITIN) 10 mg tablet 2021 at Unknown time  Yes   Sig: Take 1 Tab by mouth daily. Indications: sneezing   magnesium hydroxide (Milk of Magnesia) 400 mg/5 mL suspension   Yes   Sig: Take 30 mL by mouth daily as needed for Constipation. memantine (NAMENDA) 5 mg tablet 2021 at Unknown time  Yes   Sig: Take 1 Tab by mouth two (2) times a day. 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 mirtazapine (REMERON) 15 mg tablet 2021 at Unknown time  Yes   Sig: Take 1 Tab by mouth nightly. Indications: major depressive disorder   nystatin (MYCOSTATIN) powder 2021 at Unknown time  Yes   Sig: Apply  to affected area two (2) times a day. Indications: diaper rash   propranoloL (INDERAL) 10 mg tablet 2021 at Unknown time  Yes   Sig: Take 1 Tab by mouth two (2) times a day. Indications: essential tremor   risperiDONE (RisperDAL) 1 mg tablet 2021 at Unknown time  Yes   Sig: Take 1 Tab by mouth two (2) times a day. Indications: mood   therapeutic multivitamin (THERAGRAN) tablet 2021 at Unknown time  Yes   Sig: Take 1 Tab by mouth daily.    traZODone (DESYREL) 50 mg tablet   Yes   Sig: Take 50 mg by mouth nightly as needed for Sleep.   zinc sulfate (ZINCATE) 220 (50) mg capsule 2021 at Unknown time  Yes   Sig: Take 220 mg by mouth daily. Facility-Administered Medications: None     Please contact the main inpatient pharmacy with any questions or concerns at [phone] and we will direct you to the clinical pharmacist covering this patient's care while in-house.    Allan Krabbe, [place]

## 2021-02-09 NOTE — PROGRESS NOTES
Admission Medication Reconciliation: In progress:    Spoke with daughter Geno Wadsworth) by telephone @ [phone], unable to speak with patient face to face at this time due to general isolation precautions in the ED related to COVID-19 pandemic. Stated that patient is at [facility] @ [phone]. Called them and was referred to Director of Nursing @ [phone]. DON stated she would fax MAR and has not taken any medications today. Will update PTA med list and let Mr. Valero know once current information becomes available. Thank you for allowing me to participate in the care of your patient. Barber Burciaga PharmD, RN # [phone]       [facility] pharmacy benefit data reflects medications filled and processed through the patient's insurance, however   this data does NOT capture whether the medication was picked up or is currently being taken by the patient. Allergies:  Lamisil [terbinafine] and Thorazine [chlorpromazine]    Significant PMH/Disease States:   Past Medical History:   Diagnosis Date    Anemia     Arthritis     Bipolar disorder ([facility] [address]     Burning with urination     Dementia ([facility] [address]     Memory change     Psychiatric disorder     Bipolar Disorder    Stool color black     Tremor      Chief Complaint for this Admission:    Chief Complaint   Patient presents with    Fatigue     Prior to Admission Medications:   Prior to Admission Medications   Prescriptions Last Dose Informant Taking?   divalproex (DEPAKOTE SPRINKLE) 125 mg capsule   No   Sig: Take 5 Caps by mouth two (2) times a day. Indications: mood   docusate sodium (COLACE) 100 mg capsule   No   Sig: Take 1 Cap by mouth daily for 30 days. Indications: constipation   donepeziL (ARICEPT) 5 mg tablet   No   Sig: Take 1 Tab by mouth nightly. 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  Indications: sneezing   memantine (NAMENDA) 5 mg tablet   No Sig: Take 1 Tab by mouth two (2) times a day. Indications: moderate to severe Alzheimer's type dementia   mirtazapine (REMERON) 15 mg tablet   No   Sig: Take 1 Tab by mouth nightly. Indications: major depressive disorder   nystatin (MYCOSTATIN) powder   No   Sig: Apply  to affected area two (2) times a day. Indications: diaper rash   propranoloL (INDERAL) 10 mg tablet   No   Sig: Take 1 Tab by mouth two (2) times a day. Indications: essential tremor   risperiDONE (RisperDAL) 1 mg tablet   No   Sig: Take 1 Tab by mouth two (2) times a day. Indications: mood      Facility-Administered Medications: None     Please contact the main inpatient pharmacy with any questions or concerns at [phone] and we will direct you to the clinical pharmacist covering this patient's care while in-house.    RICK Alcocer

## 2021-02-09 NOTE — H&P
History & Physical    Primary Care Provider: Joanne Cortes MD  Source of Information: Patient     History of Presenting Illness:   Sabas Montemayor is a 67 y.o. female who presents with weakness    Patient has history of expressive and receptive aphasia and underlying dementia history was extremely limited. History is primarily obtained from the daughter Ada Ricardo. Gracia Escobar reports that patient has history of stroke, expressive and receptive aphasia post stroke, also has some underlying dementia. Daughter reports that patient lives at [address] [place] [address] and was recently diagnosed with Covid on 1/29/2021. Apparently for the last few days patient has not been eating drinking well. The facility today noted that her blood pressure was slightly elevated, she was having some hypoxia and was more lethargic than usual and was not sounding as she usually does. The facility got concerned and sent the patient to the ER. In the ER patient was found to have a UTI and was requested to be admitted to the hospital service. Patient currently resting in bed, alert x1, denies any complaints or problems. No other history can be obtained from the patient           Review of Systems:  Review of systems not obtained due to patient factors. Expressive and receptive aphasia    Past Medical History:   Diagnosis Date    Anemia     Arthritis     B[facility] disorder ([facility] [address]     Burning with urination     Dementia (HCC)     Memory change     Psychiatric disorder     Bipolar Disorder    Stool color black     Tremor       Past Surgical History:   Procedure Laterality Date    HX BREAST BIOPSY Left 1990's    benign    HX CHOLECYSTECTOMY      HX GI      HX GYN       Prior to Admission medications    Medication Sig Start Date End Date Taking? Authorizing Provider   divalproex (DEPAKOTE SPRINKLE) 125 mg capsule Take 5 Caps by mouth two (2) times a day.  Indications: mood 1/13/21 Denisse KIM NP   docusate sodium (COLACE) 100 mg capsule Take 1 Cap by mouth daily for 30 days. Indications: constipation 1/14/21 2/13/21  Denisse KIM NP   donepeziL (ARICEPT) 5 mg tablet Take 1 Tab by mouth nightly. Indications: moderate to severe Alzheimer's type dementia 1/13/21   Denisse KIM NP   loratadine (CLARITIN) 10 mg tablet Take 1 Tab by mouth daily. Indications: sneezing 1/14/21   Denisse KIM NP   memantine (NAMENDA) 5 mg tablet Take 1 Tab by mouth two (2) times a day. Indications: moderate to severe Alzheimer's type dementia 1/13/21   Denisse KIM NP   mirtazapine (REMERON) 15 mg tablet Take 1 Tab by mouth nightly. Indications: major depressive disorder 1/13/21   Denisse KIM NP   nystatin (MYCOSTATIN) powder Apply  to affected area two (2) times a day. Indications: diaper rash 1/13/21   Denisse KIM NP   propranoloL (INDERAL) 10 mg tablet Take 1 Tab by mouth two (2) times a day. Indications: essential tremor 1/13/21   Denisse KIM NP   risperiDONE (RisperDAL) 1 mg tablet Take 1 Tab by mouth two (2) times a day.  Indications: mood 1/13/21   Denisse KIM NP     Allergies   Allergen Reactions    Lamisil [Terbinafine] Diarrhea and Nausea and Vomiting    Thorazine [Chlorpromazine] Rash      Family History   Problem Relation Age of Onset    Alzheimer Mother         SOCIAL HISTORY:  Patient resides:  Independently x   Assisted Living    SNF    With family care       Smoking history:   None    Former x   Chronic      Alcohol history:   None    Social x   Chronic      Ambulates:   Independently    w/cane    w/walker x   w/wc    CODE STATUS:  DNR    Full x   Other      Objective:     Physical Exam:     Visit Vitals  BP (!) 145/86   Pulse (!) 108   Temp 100 °F (37.8 °C)   Resp 22   SpO2 95%      O2 Device: Room air    General : alert x 1, patient with expressive and receptive aphasia  HEENT: PEERL, moist mucus membrane, TM clear  Neck: supple,   Chest: Shallow respirations, decreased basal breath sounds  CVS: S1 S2 heard, Capillary refill less than 2 seconds  Abd: soft/ Non tender, non distended, BS physiological,   Ext: no clubbing, no cyanosis,   Neuro/Psych: Patient with expressive and receptive aphasia, DTR 1+ to 4, rest of the exam could not be performed as patient not cooperative  Skin: warm        EKG: Sinus tachycardia with nonspecific ST changes      Data Review:     Recent Days:  Recent Labs     02/09/21  1010   WBC 12.2*   HGB 13.6   HCT 42.3        Recent Labs     02/09/21  1010   *   K 3.6   *   CO2 28   *   BUN 45*   CREA 0.68   CA 10.4*   ALB 2.6*   ALT 32     No results for input(s): PH, PCO2, PO2, HCO3, FIO2 in the last 72 hours.     24 Hour Results:  Recent Results (from the past 24 hour(s))   EKG, 12 LEAD, INITIAL    Collection Time: 02/09/21 10:05 AM   Result Value Ref Range    Ventricular Rate 110 BPM    Atrial Rate 110 BPM    P-R Interval 150 ms    QRS Duration 88 ms    Q-T Interval 312 ms    QTC Calculation (Bezet) 422 ms    Calculated P Axis 45 degrees    Calculated R Axis 61 degrees    Calculated T Axis 26 degrees    Diagnosis       Sinus tachycardia  When compared with ECG of 03-DEC-2020 07:44,  ST now depressed in Inferior leads  Confirmed by Akosua Farah M.D., Anabel Renteria ([id number]) on 2/9/2021 10:27:18 AM     CBC WITH AUTOMATED DIFF    Collection Time: 02/09/21 10:10 AM   Result Value Ref Range    WBC 12.2 (H) 3.6 - 11.0 K/uL    RBC 4.26 3.80 - 5.20 M/uL    HGB 13.6 11.5 - 16.0 g/dL    HCT 42.3 35.0 - 47.0 %    MCV 99.3 (H) 80.0 - 99.0 FL    MCH 31.9 26.0 - 34.0 PG    MCHC 32.2 30.0 - 36.5 g/dL    RDW 13.2 11.5 - 14.5 %    PLATELET 532 115 - 860 K/uL    MPV 9.4 8.9 - 12.9 FL    NRBC 0.0 0  WBC    ABSOLUTE NRBC 0.00 0.00 - 0.01 K/uL    NEUTROPHILS 77 (H) 32 - 75 %    LYMPHOCYTES 11 (L) 12 - 49 %    MONOCYTES 12 5 - 13 %    EOSINOPHILS 0 0 - 7 %    BASOPHILS 0 0 - 1 %    IMMATURE GRANULOCYTES 0 0.0 - 0.5 %    ABS. NEUTROPHILS 9.3 (H) 1.8 - 8.0 K/UL    ABS. LYMPHOCYTES 1.3 0.8 - 3.5 K/UL    ABS. MONOCYTES 1.5 (H) 0.0 - 1.0 K/UL    ABS. EOSINOPHILS 0.0 0.0 - 0.4 K/UL    ABS. BASOPHILS 0.0 0.0 - 0.1 K/UL    ABS. IMM. GRANS. 0.1 (H) 0.00 - 0.04 K/UL    DF AUTOMATED     METABOLIC PANEL, COMPREHENSIVE    Collection Time: 02/09/21 10:10 AM   Result Value Ref Range    Sodium 146 (H) 136 - 145 mmol/L    Potassium 3.6 3.5 - 5.1 mmol/L    Chloride 114 (H) 97 - 108 mmol/L    CO2 28 21 - 32 mmol/L    Anion gap 4 (L) 5 - 15 mmol/L    Glucose 127 (H) 65 - 100 mg/dL    BUN 45 (H) 6 - 20 MG/DL    Creatinine 0.68 0.55 - 1.02 MG/DL    BUN/Creatinine ratio 66 (H) 12 - 20      GFR est AA >60 >60 ml/min/1.73m2    GFR est non-AA >60 >60 ml/min/1.73m2    Calcium 10.4 (H) 8.5 - 10.1 MG/DL    Bilirubin, total 0.4 0.2 - 1.0 MG/DL    ALT (SGPT) 32 12 - 78 U/L    AST (SGOT) 32 15 - 37 U/L    Alk. phosphatase 68 45 - 117 U/L    Protein, total 8.2 6.4 - 8.2 g/dL    Albumin 2.6 (L) 3.5 - 5.0 g/dL    Globulin 5.6 (H) 2.0 - 4.0 g/dL    A-G Ratio 0.5 (L) 1.1 - 2.2     LACTIC ACID    Collection Time: 02/09/21 10:10 AM   Result Value Ref Range    Lactic acid 1.5 0.4 - 2.0 MMOL/L   SAMPLES BEING HELD    Collection Time: 02/09/21 10:10 AM   Result Value Ref Range    SAMPLES BEING HELD 1red     COMMENT        Add-on orders for these samples will be processed based on acceptable specimen integrity and analyte stability, which may vary by analyte.    URINALYSIS W/ RFLX MICROSCOPIC    Collection Time: 02/09/21 11:08 AM   Result Value Ref Range    Color DARK YELLOW      Appearance TURBID (A) CLEAR      Specific gravity 1.022 1.003 - 1.030      pH (UA) 6.5 5.0 - 8.0      Protein 100 (A) NEG mg/dL    Glucose Negative NEG mg/dL    Ketone TRACE (A) NEG mg/dL    Bilirubin Negative NEG      Blood LARGE (A) NEG      Urobilinogen 1.0 0.2 - 1.0 EU/dL    Nitrites Positive (A) NEG      Leukocyte Esterase LARGE (A) NEG      WBC >100 (H) 0 - 4 /hpf RBC 20-50 0 - 5 /hpf    Epithelial cells MANY (A) FEW /lpf    Bacteria 4+ (A) NEG /hpf    Mucus 1+ (A) NEG /lpf   SAMPLES BEING HELD    Collection Time: 02/09/21 11:08 AM   Result Value Ref Range    SAMPLES BEING HELD 1 URINE CUP     COMMENT        Add-on orders for these samples will be processed based on acceptable specimen integrity and analyte stability, which may vary by analyte. SARS-COV-2    Collection Time: 02/09/21  1:08 PM   Result Value Ref Range    SARS-CoV-2 Please find results under separate order     COVID-19 RAPID TEST    Collection Time: 02/09/21  1:08 PM   Result Value Ref Range    Specimen source Nasopharyngeal      COVID-19 rapid test Detected (AA) NOTD           Imaging:   Ct Head Wo Cont    Result Date: 2/9/2021  No change or acute abnormality     Xr Chest Port    Result Date: 2/9/2021  No acute process.      Assessment:     UTI with SIRS: Patient will be admitted on a telemetry bed, start patient on broad-spectrum IV antibiotics, urine culture, IV fluids, supportive care and close monitoring, further intervention per hospital course, obtain blood cultures    Acute metabolic encephalopathy: Likely secondary to above, IV hydration, IV antibiotics, MRI of the brain, neurovascular checks, TSH and B12 levels, supportive care, close monitoring, if persist may consider further intervention and diagnostics, continue to monitor, patient has history of seizures, continue divalproex , seizure prophylaxis may consider EEG if symptoms persist    Hypernatremia: Start patient on half-normal saline infusions,  repeat labs in the morning    Acute hypoxic respiratory failure: Transient, likely secondary to COVID-19 infection, obtain ABG, continue vitamin C and ascorbic acid, patient currently not hypoxic thus will hold Decadron, continue to monitor and reassess as needed, supportive care, oxygen support, pulse ox monitoring and reassess as needed    Dehydration: IV hydration, repeat labs in the morning    Bipolar disorder: Continue to monitor, continue divalproex    GI DVT prophylaxis: Patient will be on heparin             Signed By: Jacob French MD     February 9, 2021

## 2021-02-09 NOTE — ROUTINE PROCESS
TRANSFER - OUT REPORT: 
 
Verbal report given to Juliane(name) on [address]  being transferred to (unit) for routine progression of care Report consisted of patients Situation, Background, Assessment and  
Recommendations(SBAR). Information from the following report(s) ED Summary was reviewed with the receiving nurse. Lines:  
Peripheral IV 02/09/21 Right Forearm (Active) Opportunity for questions and clarification was provided. Patient transported with: 
 [employer]

## 2021-02-09 NOTE — ED PROVIDER NOTES
Ms. Dalia Cordova is a 75yo female who presents from nursing facility with lethargy. Per EMS, the patient was recently diagnosed with COVID-19. She has had generalized weakness and failure to thrive over the last week or so. She has not been eating or drinking much. Apparently, she has been complaining of body aches. To me, she had no complaints. She denied chest pain, trouble breathing, cough, or any other complaints. She stated \"I do not want to guys to take care of me. \"      The patient's history is limited by her baseline dementia. Past Medical History:   Diagnosis Date    Anemia     Arthritis     Bipolar disorder ([address]     Burning with urination     Dementia ([facility])     Memory change     Psychiatric disorder     Bipolar Disorder    Stool color black     Tremor        Past Surgical History:   Procedure Laterality Date    HX BREAST BIOPSY Left     benign    HX CHOLECYSTECTOMY      HX GI      HX GYN           Family History:   Problem Relation Age of Onset    Alzheimer Mother        Social History     Socioeconomic History    Marital status: SINGLE     Spouse name: Not on file    Number of children: Not on file    Years of education: Not on file    Highest education level: Not on file   Occupational History    Not on file   Social Needs    Financial resource strain: Not on file    Food insecurity     Worry: Not on file     Inability: Not on file    Transportation needs     Medical: Not on file     Non-medical: Not on file   Tobacco Use    Smoking status: Former Smoker     Packs/day: 0.25     Years: 10.00     Pack years: 2.50     Quit date: 1997     Years since quittin.6    Smokeless tobacco: Never Used   Substance and Sexual Activity    Alcohol use:  Yes     Alcohol/week: 2.5 standard drinks     Types: 3 Glasses of wine per week    Drug use: No    Sexual activity: Yes   Lifestyle    Physical activity     Days per week: Not on file     Minutes per session: Not on file    Stress: Not on file   Relationships    Social connections     Talks on phone: Not on file     Gets together: Not on file     Attends Orthodoxy service: Not on file     Active member of club or organization: Not on file     Attends meetings of clubs or organizations: Not on file     Relationship status: Not on file    Intimate partner violence     Fear of current or ex partner: Not on file     Emotionally abused: Not on file     Physically abused: Not on file     Forced sexual activity: Not on file   Other Topics Concern    Not on file   Social History Narrative    Not on file         ALLERGIES: Lamisil [terbinafine] and Thorazine [chlorpromazine]    Review of Systems   Unable to perform ROS: Dementia   Constitutional: Positive for fatigue. Skin: Negative for rash. Vitals:    02/09/21 0955 02/09/21 0958   BP:  (!) 151/85   Pulse: (!) 112 (!) 112   Resp: 20 20   Temp: 99.4 °F (37.4 °C) 99.4 °F (37.4 °C)   SpO2: 93% 94%            Physical Exam     Vital signs reviewed. Nursing notes reviewed. Const:  No acute distress  Head:  Atraumatic, normocephalic  Eyes:  PERRL, conjunctiva normal, no scleral icterus  Neck:  Supple, trachea midline  Cardiovascular: Tachycardic   resp: Mild resp distress, no increased work of breathing  Abd:  Soft, non-tender, non-distended, no rebound, no guarding  MSK:  No pedal edema, normal ROM  Neuro:  Alert and awake, no cranial nerve defect  Skin:  Warm, dry, intact  Psych: Mildly agitated          MDM  Number of Diagnoses or Management Options     Amount and/or Complexity of Data Reviewed  Clinical lab tests: ordered and reviewed  Tests in the radiology section of CPT®: ordered and reviewed  Review and summarize past medical records: yes    Patient Progress  Patient progress: stable         Procedures          Perfect Serve Consult for Admission  12:47 PM    ED Room Number: ER13/13  Patient Name and age:  Willa Barton 67 y.o.  female  Working Diagnosis:   1.  Acute cystitis without hematuria    2. Sepsis, due to unspecified organism, unspecified whether acute organ dysfunction present ([facility] [address]    3. Acute encephalopathy        COVID-19 Suspicion:  yes  Sepsis present:  yes  Reassessment needed: yes  Code Status:  Full Code  Readmission: No  Isolation Requirements:  yes  Recommended Level of Care:  med/surg  Department:[facility] Adult ED - [phone]       Ms. Becerra is a 73yo female who presents to the ER with complaints of AMS. Pt. Was reportedly recently diagnosed with covid-19. Pt. Found to have a UTI and sepsis in the ER. Pt.  To be evaluated for admission by the hospitalist. No documentation was sent showing that she is covid positive, so we will resend a covid test.

## 2021-02-09 NOTE — ED TRIAGE NOTES
Pt. From facility whose staff state that pt. Is Covid + and has had loss of appetite and body aches. Pt. Is demented at baseline.

## 2021-02-10 ENCOUNTER — APPOINTMENT (OUTPATIENT)
Dept: GENERAL RADIOLOGY | Age: 76
DRG: 871 | End: 2021-02-10
Attending: INTERNAL MEDICINE
Payer: MEDICARE

## 2021-02-10 LAB
ALBUMIN SERPL-MCNC: 2 G/DL (ref 3.5–5)
ALBUMIN/GLOB SERPL: 0.4 {RATIO} (ref 1.1–2.2)
ALP SERPL-CCNC: 59 U/L (ref 45–117)
ALT SERPL-CCNC: 25 U/L (ref 12–78)
ANION GAP SERPL CALC-SCNC: 6 MMOL/L (ref 5–15)
ARTERIAL PATENCY WRIST A: YES
AST SERPL-CCNC: 23 U/L (ref 15–37)
BASE EXCESS BLD CALC-SCNC: 3 MMOL/L
BASOPHILS # BLD: 0 K/UL (ref 0–0.1)
BASOPHILS NFR BLD: 0 % (ref 0–1)
BDY SITE: ABNORMAL
BILIRUB SERPL-MCNC: 0.3 MG/DL (ref 0.2–1)
BUN SERPL-MCNC: 30 MG/DL (ref 6–20)
BUN/CREAT SERPL: 61 (ref 12–20)
CA-I BLD-SCNC: 1.32 MMOL/L (ref 1.12–1.32)
CALCIUM SERPL-MCNC: 9.5 MG/DL (ref 8.5–10.1)
CHLORIDE SERPL-SCNC: 119 MMOL/L (ref 97–108)
CK SERPL-CCNC: 22 U/L (ref 26–192)
CO2 SERPL-SCNC: 23 MMOL/L (ref 21–32)
CREAT SERPL-MCNC: 0.49 MG/DL (ref 0.55–1.02)
DIFFERENTIAL METHOD BLD: ABNORMAL
EOSINOPHIL # BLD: 0 K/UL (ref 0–0.4)
EOSINOPHIL NFR BLD: 0 % (ref 0–7)
ERYTHROCYTE [DISTWIDTH] IN BLOOD BY AUTOMATED COUNT: 13.5 % (ref 11.5–14.5)
FOLATE SERPL-MCNC: 17.3 NG/ML (ref 5–21)
GAS FLOW.O2 O2 DELIVERY SYS: ABNORMAL L/MIN
GLOBULIN SER CALC-MCNC: 4.7 G/DL (ref 2–4)
GLUCOSE SERPL-MCNC: 88 MG/DL (ref 65–100)
HCO3 BLD-SCNC: 26.7 MMOL/L (ref 22–26)
HCT VFR BLD AUTO: 34.7 % (ref 35–47)
HGB BLD-MCNC: 10.8 G/DL (ref 11.5–16)
IMM GRANULOCYTES # BLD AUTO: 0.1 K/UL (ref 0–0.04)
IMM GRANULOCYTES NFR BLD AUTO: 1 % (ref 0–0.5)
LACTATE SERPL-SCNC: 1 MMOL/L (ref 0.4–2)
LYMPHOCYTES # BLD: 1.7 K/UL (ref 0.8–3.5)
LYMPHOCYTES NFR BLD: 16 % (ref 12–49)
MAGNESIUM SERPL-MCNC: 1.9 MG/DL (ref 1.6–2.4)
MCH RBC QN AUTO: 31.9 PG (ref 26–34)
MCHC RBC AUTO-ENTMCNC: 31.1 G/DL (ref 30–36.5)
MCV RBC AUTO: 102.4 FL (ref 80–99)
MONOCYTES # BLD: 1.5 K/UL (ref 0–1)
MONOCYTES NFR BLD: 14 % (ref 5–13)
NEUTS SEG # BLD: 7.3 K/UL (ref 1.8–8)
NEUTS SEG NFR BLD: 69 % (ref 32–75)
NRBC # BLD: 0 K/UL (ref 0–0.01)
NRBC BLD-RTO: 0 PER 100 WBC
O2/TOTAL GAS SETTING VFR VENT: 0.21 %
PCO2 BLD: 37.3 MMHG (ref 35–45)
PH BLD: 7.46 [PH] (ref 7.35–7.45)
PLATELET # BLD AUTO: 142 K/UL (ref 150–400)
PMV BLD AUTO: 9.7 FL (ref 8.9–12.9)
PO2 BLD: 77 MMHG (ref 80–100)
POTASSIUM SERPL-SCNC: 3.4 MMOL/L (ref 3.5–5.1)
PROT SERPL-MCNC: 6.7 G/DL (ref 6.4–8.2)
RBC # BLD AUTO: 3.39 M/UL (ref 3.8–5.2)
SAO2 % BLD: 96 % (ref 92–97)
SODIUM SERPL-SCNC: 148 MMOL/L (ref 136–145)
SPECIMEN TYPE: ABNORMAL
TOTAL RESP. RATE, ITRR: 20
TROPONIN I SERPL-MCNC: <0.05 NG/ML
TSH SERPL DL<=0.05 MIU/L-ACNC: 0.64 UIU/ML (ref 0.36–3.74)
VIT B12 SERPL-MCNC: 811 PG/ML (ref 193–986)
WBC # BLD AUTO: 10.6 K/UL (ref 3.6–11)

## 2021-02-10 PROCEDURE — 74011250636 HC RX REV CODE- 250/636: Performed by: INTERNAL MEDICINE

## 2021-02-10 PROCEDURE — 82550 ASSAY OF CK (CPK): CPT

## 2021-02-10 PROCEDURE — 84484 ASSAY OF TROPONIN QUANT: CPT

## 2021-02-10 PROCEDURE — 83605 ASSAY OF LACTIC ACID: CPT

## 2021-02-10 PROCEDURE — 73090 X-RAY EXAM OF FOREARM: CPT

## 2021-02-10 PROCEDURE — 83735 ASSAY OF MAGNESIUM: CPT

## 2021-02-10 PROCEDURE — 85025 COMPLETE CBC W/AUTO DIFF WBC: CPT

## 2021-02-10 PROCEDURE — 36600 WITHDRAWAL OF ARTERIAL BLOOD: CPT

## 2021-02-10 PROCEDURE — 74011250637 HC RX REV CODE- 250/637: Performed by: FAMILY MEDICINE

## 2021-02-10 PROCEDURE — 82803 BLOOD GASES ANY COMBINATION: CPT

## 2021-02-10 PROCEDURE — 87086 URINE CULTURE/COLONY COUNT: CPT

## 2021-02-10 PROCEDURE — 36415 COLL VENOUS BLD VENIPUNCTURE: CPT

## 2021-02-10 PROCEDURE — 73100 X-RAY EXAM OF WRIST: CPT

## 2021-02-10 PROCEDURE — 80053 COMPREHEN METABOLIC PANEL: CPT

## 2021-02-10 PROCEDURE — 74011250637 HC RX REV CODE- 250/637: Performed by: INTERNAL MEDICINE

## 2021-02-10 PROCEDURE — 74011250636 HC RX REV CODE- 250/636: Performed by: FAMILY MEDICINE

## 2021-02-10 PROCEDURE — 82746 ASSAY OF FOLIC ACID SERUM: CPT

## 2021-02-10 PROCEDURE — 82607 VITAMIN B-12: CPT

## 2021-02-10 PROCEDURE — 65270000029 HC RM PRIVATE

## 2021-02-10 PROCEDURE — 74011000258 HC RX REV CODE- 258: Performed by: FAMILY MEDICINE

## 2021-02-10 RX ORDER — BALSAM PERU/CASTOR OIL
OINTMENT (GRAM) TOPICAL 3 TIMES DAILY
Status: DISCONTINUED | OUTPATIENT
Start: 2021-02-10 | End: 2021-02-13 | Stop reason: HOSPADM

## 2021-02-10 RX ORDER — DEXTROSE, SODIUM CHLORIDE, AND POTASSIUM CHLORIDE 5; .2; .15 G/100ML; G/100ML; G/100ML
75 INJECTION INTRAVENOUS CONTINUOUS
Status: DISCONTINUED | OUTPATIENT
Start: 2021-02-10 | End: 2021-02-11

## 2021-02-10 RX ORDER — ENOXAPARIN SODIUM 100 MG/ML
40 INJECTION SUBCUTANEOUS EVERY 24 HOURS
Status: DISCONTINUED | OUTPATIENT
Start: 2021-02-10 | End: 2021-02-11 | Stop reason: DRUGHIGH

## 2021-02-10 RX ADMIN — POTASSIUM BICARBONATE 10 MEQ: 391 TABLET, EFFERVESCENT ORAL at 14:28

## 2021-02-10 RX ADMIN — CASTOR OIL AND BALSAM, PERU: 788; 87 OINTMENT TOPICAL at 16:40

## 2021-02-10 RX ADMIN — MEMANTINE 5 MG: 5 TABLET ORAL at 17:27

## 2021-02-10 RX ADMIN — DIVALPROEX SODIUM 625 MG: 125 CAPSULE ORAL at 17:27

## 2021-02-10 RX ADMIN — ENOXAPARIN SODIUM 40 MG: 100 INJECTION SUBCUTANEOUS at 14:27

## 2021-02-10 RX ADMIN — POTASSIUM BICARBONATE 40 MEQ: 391 TABLET, EFFERVESCENT ORAL at 14:27

## 2021-02-10 RX ADMIN — Medication 10 ML: at 14:37

## 2021-02-10 RX ADMIN — PROPRANOLOL HYDROCHLORIDE 10 MG: 20 TABLET ORAL at 17:27

## 2021-02-10 RX ADMIN — NYSTATIN: 100000 POWDER TOPICAL at 09:46

## 2021-02-10 RX ADMIN — RISPERIDONE 1 MG: 1 TABLET ORAL at 09:46

## 2021-02-10 RX ADMIN — CEFTRIAXONE SODIUM 1 G: 1 INJECTION, POWDER, FOR SOLUTION INTRAMUSCULAR; INTRAVENOUS at 14:28

## 2021-02-10 RX ADMIN — POTASSIUM CHLORIDE, DEXTROSE MONOHYDRATE AND SODIUM CHLORIDE 75 ML/HR: 150; 5; 200 INJECTION, SOLUTION INTRAVENOUS at 14:27

## 2021-02-10 RX ADMIN — SODIUM CHLORIDE 75 ML/HR: 4.5 INJECTION, SOLUTION INTRAVENOUS at 09:39

## 2021-02-10 RX ADMIN — RISPERIDONE 1 MG: 1 TABLET ORAL at 21:12

## 2021-02-10 RX ADMIN — NYSTATIN: 100000 POWDER TOPICAL at 17:28

## 2021-02-10 RX ADMIN — CASTOR OIL AND BALSAM, PERU: 788; 87 OINTMENT TOPICAL at 21:12

## 2021-02-10 RX ADMIN — PROPRANOLOL HYDROCHLORIDE 10 MG: 20 TABLET ORAL at 09:46

## 2021-02-10 RX ADMIN — MEMANTINE 5 MG: 5 TABLET ORAL at 09:45

## 2021-02-10 RX ADMIN — OXYCODONE HYDROCHLORIDE AND ACETAMINOPHEN 500 MG: 500 TABLET ORAL at 09:46

## 2021-02-10 RX ADMIN — Medication 10 ML: at 06:00

## 2021-02-10 RX ADMIN — Medication 1 CAPSULE: at 09:45

## 2021-02-10 RX ADMIN — DONEPEZIL HYDROCHLORIDE 5 MG: 5 TABLET, FILM COATED ORAL at 21:12

## 2021-02-10 RX ADMIN — Medication 10 ML: at 21:13

## 2021-02-10 RX ADMIN — HEPARIN SODIUM 5000 UNITS: 5000 INJECTION INTRAVENOUS; SUBCUTANEOUS at 06:19

## 2021-02-10 RX ADMIN — MIRTAZAPINE 15 MG: 15 TABLET, FILM COATED ORAL at 21:12

## 2021-02-10 RX ADMIN — OXYCODONE HYDROCHLORIDE AND ACETAMINOPHEN 500 MG: 500 TABLET ORAL at 17:27

## 2021-02-10 RX ADMIN — DIVALPROEX SODIUM 625 MG: 125 CAPSULE ORAL at 09:45

## 2021-02-10 NOTE — PROGRESS NOTES
Bedside shift change report given to Shyann Gavin (oncoming nurse) by Dondra Schirmer (offgoing nurse). Report included the following information SBAR.

## 2021-02-10 NOTE — PROGRESS NOTES
TRANSFER - OUT REPORT:    Verbal report given to Nurse (name) on Jessie Morocho  being transferred to [facility](unit) for routine progression of care       Report consisted of patients Situation, Background, Assessment and   Recommendations(SBAR). Information from the following report(s) SBAR, Intake/Output, MAR, Med Rec Status and Cardiac Rhythm nsr was reviewed with the receiving nurse. Lines:   Peripheral IV 02/09/21 Right Forearm (Active)   Site Assessment Clean, dry, & intact 02/10/21 0937   Phlebitis Assessment 0 02/10/21 0937   Infiltration Assessment 0 02/10/21 0937   Dressing Status Clean, dry, & intact 02/10/21 0937   Dressing Type Transparent 02/10/21 0937   Hub Color/Line Status Infusing 02/10/21 1191   Action Taken Open ports on tubing capped 02/10/21 0937   Alcohol Cap Used Yes 02/10/21 4634        Opportunity for questions and clarification was provided.       Patient transported with:   [employer]

## 2021-02-10 NOTE — PROGRESS NOTES
Called respiratory twice for ABG that was ordered when patient came to the ER Stat. The respiratory therapist who is working [facility] does not go into [facility]. Called main respiratory therapist line and was told that they would get to it this morning.

## 2021-02-10 NOTE — PROGRESS NOTES
TRANSFER - IN REPORT:    Verbal report received from MYRANDA Xavier(name) on [address]  being received from 4W(unit) for routine progression of care      Report consisted of patients Situation, Background, Assessment and   Recommendations(SBAR). Information from the following report(s) SBAR, Kardex, Intake/Output, MAR, Recent Results and Med Rec Status was reviewed with the receiving nurse. Opportunity for questions and clarification was provided. Assessment completed upon patients arrival to unit and care assumed.

## 2021-02-10 NOTE — PROGRESS NOTES
Consult to wound care placed due to open area on pt sacrum. Was not told about in report however found on admission assessment last night once pt arrived to unit.

## 2021-02-10 NOTE — PROGRESS NOTES
Bedside shift change report given to Marnie Tian 43 (oncoming nurse) by Shari Carrasquillo RN (offgoing nurse). Report included the following information SBAR, ED Summary, Intake/Output, MAR, Med Rec Status and Cardiac Rhythm sinus tach.

## 2021-02-10 NOTE — PROGRESS NOTES
Hospitalist Progress Note    NAME: Henok Elkins   :  1945   MRN:  [id number]       Admission HPI/Chief Complaint:  Henok Elkins is a 67 y.o. female who presents with weakness   Patient has history of expressive and receptive aphasia and underlying dementia history was extremely limited. History is primarily obtained from the daughter Olayinka Bourgeois. [facility] reports that patient has history of stroke, expressive and receptive aphasia post stroke, also has some underlying dementia. Daughter reports that patient lives at [address] [place] [address] and was recently diagnosed with Covid on 2021. Apparently for the last few days patient has not been eating drinking well. The facility today noted that her blood pressure was slightly elevated, she was having some hypoxia and was more lethargic than usual and was not sounding as she usually does. The facility got concerned and sent the patient to the ER. In the ER patient was found to have a UTI and was requested to be admitted to the hospital service.  Wendi Paula currently resting in bed, alert x1, denies any complaints or problems. No other history can be obtained from the patient  Subjective:   No acute overnight events. Tmax 100.5. Pt remains with global aphasia and can follow commands but does not answer appropriately. NAD. Pleasant    Objective:   VITALS:   Last 24hrs VS reviewed since prior progress note.  Most recent are:  Patient Vitals for the past 24 hrs:   Temp Pulse Resp BP SpO2   02/10/21 0937 100.3 °F (37.9 °C) (!) 101 27 (!) 142/61 91 %   02/10/21 0617 98.7 °F (37.1 °C) (!) 106 25 (!) 161/72 94 %   21 2215 98.5 °F (36.9 °C)       21 1949 (!) 100.5 °F (38.1 °C) (!) 106 26 (!) 145/71 93 %   21 1837 99.8 °F (37.7 °C)       21 1830  (!) 112 26 (!) 148/55 95 %   21 1800  (!) 103 22 137/60 95 %   21 1730  (!) 107 25 130/62 95 %   21 1700  (!) 114 23 (!) 146/76 94 %   21 1615  (!) 114 26 137/66 93 %   02/09/21 1600  (!) 113 28 (!) 121/54 93 %   02/09/21 1545  (!) 115 (!) 31 (!) 156/75 96 %   02/09/21 1530  (!) 117 28 100/83 95 %   02/09/21 1500  (!) 123 30 (!) 152/71 95 %   02/09/21 1445  (!) 114 23 134/65 94 %   02/09/21 1430  (!) 109 20 138/72 92 %   02/09/21 1400  (!) 105 15 (!) 148/83 (!) 88 %   02/09/21 1330  (!) 103 25 130/65 95 %   02/09/21 1315  (!) 106 21 (!) 149/70 93 %   02/09/21 1305 100 °F (37.8 °C)       02/09/21 1300  (!) 108 22 (!) 145/86 95 %   02/09/21 1245  (!) 106 19 135/64 96 %   02/09/21 1230  (!) 109 16 139/67 94 %   02/09/21 1215  (!) 107 19 129/72 91 %   02/09/21 1200  (!) 109 19 (!) 141/90 (!) 87 %   02/09/21 1145  (!) 112 25 132/87 94 %   02/09/21 1130  (!) 105 22 105/83 95 %       Intake/Output Summary (Last 24 hours) at 2/10/2021 1129  Last data filed at 2/10/2021 0000  Gross per 24 hour   Intake 2050 ml   Output 700 ml   Net 1350 ml        I had a face to face encounter and independently examined this patient on 2/10/2021, as outlined below:  PHYSICAL EXAM:  General: WD, WN. Alert, cooperative, no acute distress    EENT:  EOMI. Anicteric sclerae. MMM  Resp:  CTA bilaterally, no wheezing or rales. No accessory muscle use  CV:  RRR, No edema  GI:  Soft, Non distended, Non tender  Neurologic:  A&Ox0 (cannot state name), global aphasia at baseline but does follow commands, GUZMAN except RUE (hand/arm) tender and unable to move d/t pain, right upper arm/shoulder nontender, no significant swelling or bruising or obvious evidence of trauma  Psych:   Not anxious or agitated  Skin:  No rashes.  No jaundice    Reviewed most current lab test results and cultures  YES  Reviewed most current radiology test results   YES  Reviewed patient's current orders and MAR    YES  PMH/SH reviewed - no change compared to H&P    Procedures: see electronic medical records for all procedures/Xrays and details which were not copied into this note but were reviewed prior to creation of Plan. LABS:  I reviewed today's most current labs and imaging studies. Pertinent labs include:  Recent Labs     02/10/21  0623 02/09/21  1010   WBC 10.6 12.2*   HGB 10.8* 13.6   HCT 34.7* 42.3   * 165     Recent Labs     02/10/21  0623 02/09/21  1010   * 146*   K 3.4* 3.6   * 114*   CO2 23 28   GLU 88 127*   BUN 30* 45*   CREA 0.49* 0.68   CA 9.5 10.4*   ALB 2.0* 2.6*   TBILI 0.3 0.4   ALT 25 32       Care Plan discussed with: pt  ___________________________________________________________________  Assessment / Plan:  Sepsis likely 2/2 Bacteriuria  Cont empiric Rocephin  Leukocytosis resolved  Blood cxs NGTD  Urine cx pending  Lactate wnl x3    Severe RUE pain, tenderness, unable to move, unclear etiology  Obtain RUE xray    Rapid covid positive without hypoxia  On RA, monitor  First positive test reportedly 1/29/21, has been >80 days    Metabolic encephalopathy, acute on chronic, likely near baseline  Likely 2/2 sepsis above  Baseline global aphasia  Cont to monitor as infection is treated  TSH ok 0.64  CT head nothing acute, MRI brain pending  Cont to monitor     Bipolar 1 disorder, moderate to severe Alzheimer's type dementia  Discharged last month from inpt psych unit  Continue to monitor, continue home meds  Depakote, level low end of therapeutic range at 50    Hypernatremia/hyperchloremia c/w decreased PO/water intake and dehydration, worse today  Change to d5-1/4NS+20KCl, if no improvement tomorrow change to D5W  Encourage PO water  Cr ok    Hypoxic respiratory failure on admission, Transient, resolved  Now on RA     Macrocytic Anemia, mild, at baseline 10-11  B12 11/2020 wnl  Check folate    Hypokalemia, mild  Replete  Check mag     25.0 - 29.9 Overweight / Body mass index is 25.43 kg/m².     Code status: Full Code  Prophylaxis: Lovenox  Recommended Disposition: back to [facility]    Signed: Dale Travis MD  2/10/2021 11:29 AM

## 2021-02-10 NOTE — PROGRESS NOTES
Per pt daughter, Katheryn Reyes, we are allowed to give updates to any staff that calls from facility that pt is usually at.

## 2021-02-10 NOTE — PROGRESS NOTES
Transition of Care Plan   RUR- 27% Medium Risk    DISPOSITION: The disposition plan is TBD/subject to change pending recommendation and medical progression. Hopefully return back to the [address] [place] [address] [address].  Transport: [facility]    Reason for Admission: Presents with weakness                     RUR Score:  27% Medium Risk                   Plan for utilizing home health: Pending recommendation         PCP: First and Last name:  Dr. Butch Ricardo   Name of Practice:    Are you a current patient: Yes/No: Yes   Approximate date of last visit:    Can you participate in a virtual visit with your PCP: No                    Current Advanced Directive/Advance Care Plan: Full Code, No ACP documentation on file. Patients daughter Silver Caban verbally indicated that she is the identified health care decision maker. Daughter - Silver Caban reports she is MPOA. Transition of Care Plan:  Pending recommendation. Reviewed chart for transitions of care and discussed in rounds. CM contacted patients daughter Catarino Magana [phone]. Patients daughter confirmed that patient just recently moved into the [address] [place], [place] [address] [phone]. She reports that patient just recently started utilizing the PCP there Dr. Butch Ricardo. - The Medical Director. Patient daughter reports that patient has utilized [facility] in the past, however was unable to provide the name of the facility at this time. It is reported that patient has utilized [facility] in the past. In 2016 - \"broke her back and went to [facility]. \" \"Had a stroke and went to [place] for [employer]. \" It is reported that patient does need assistance with ADLs. It is also reported that patient does utilize DMEs. - [facility] at [facility], however it is reported, Lori Queen has declined since testing positive for Covid and has needed even more assistance. \" It is reported that patient uses the pharmacy in which she resides at for her prescriptions. Patients daughter reports that she is the Power of  and also listed as patients emergency contact. CM received verbal approval to contact  the [address] [place] [address] [address] to obtain more information about status prior to [facility] admission. CM contacted the [address] [place] [address] [address] regarding patients status prior to her [facility] admission. CM spoke with patients nurse - Emerald Edwards who informed TW of the following, Kiara Ibarra was walking with walker, eating well and her ADLs required \"stand by. \"\" It is reported that shortly after patient tested positive for Covid is when \"she started to decline. \" \"Couldn't get out words, moaning, shaking, not wanting to eat or take medications. \" CM inquired if patient could return to facility once she is medically stable to discharge. Nurse reports that she will follow up with CM. CM informed patients assigned nurse to follow up with daughter and nurse from the [facility] for status update. Medicare pt has received, reviewed, and signed 1st IM letter informing them of their right to appeal the discharge. Signed copy has been placed on pt bedside chart. - CM contacted patients daughter to explain purpose of letter and provided verbal consent. CM placed documentation on patients chart to be given to nurse to give to patient for her own personal records. Patients daughter requested document be emailed to her at [email or web address].      Care Management Interventions  PCP Verified by CM: Yes(Dr. Fernández Hidden)  Palliative Care Criteria Met (RRAT>21 & CHF Dx)?: No  Mode of Transport at Discharge: BLS  Transition of Care Consult (CM Consult): Discharge Planning  MyChart Signup: No  Discharge Durable Medical Equipment: No  Physical Therapy Consult: No(Patient can benefit from PT consult.)  Occupational Therapy Consult: No(Patient can benefit from OT consult.)  Speech Therapy Consult: No(Patient can benefit from SLP consult.)  Current Support Network: Assisted Living([facility] [address])  Confirm Follow Up Transport: Other (see comment)(Patient will benefit from [facility] transport.)  The Patient and/or Patient Representative was Provided with a Choice of Provider and Agrees with the Discharge Plan?: Yes  Freedom of Choice List was Provided with Basic Dialogue that Supports the Patient's Individualized Plan of Care/Goals, Treatment Preferences and Shares the Quality Data Associated with the Providers?: Yes  [place] Resource Information Provided?: No    CM will continue to follow.     RULA Prabhakar, CRM

## 2021-02-10 NOTE — WOUND CARE
WOCN Note:  
 
New consult placed for assessment of sacral wound. Assessed in room 440. covid + 
PPE: face shield, mask, gloves and gown. Chart reviewed. Admitted DX:  Sepsis Past Medical History:  
Diagnosis Date  Anemia  Arthritis  Bipolar disorder ([facility]  Burning with urination  Dementia ([facility]  Memory change  Psychiatric disorder Bipolar Disorder  Stool color black  Tremor Assessment:  
Patient is alert, communicative and requires assist of 1 with repositioning. Bed: foam mattress Patient wearing briefs for incontinence and has a Pure wick. Patient reports no pain. Patient repositioned on left side with pillow. Heels intact without erythema and offloaded with folded pads. 1. POA Sacral/coccyx, deep tissue pressure injury with partial unroofing of skin: 5 x 10 x 0.1 cm  30% non blanching purple 40% non blanching red 30% blanching red; no exudate; no odor. Treatment: 
Cleansed and applied sacral foam dressing. Wound, Pressure Prevention & Skin Care Recommendations: 1. Minimize layers of linen/pads under patient to optimize support surface. 2.  Turn/reposition approximately every 2 hours and offload heels. 3.  Manage moisture/ Keep skin folds clean and dry. 4.  Specialty bed: Prius ordered via [facility]. Use only flat sheet and one incontinence pad. 
5.  Sacral/coccyx:  Roll back sacral foam dressing and apply Venelex TID then reapply foam dressing. Discussed above plan with Oswaldo Youssef. Discussed with Dr Megan Raphael. Transition of Care: Plan to follow as needed while admitted to hospital. 
 
AMANDEEP ShearerN RN [address] Inpatient Wound Care Available on [employer] Pager [phone] Office [phone]

## 2021-02-11 LAB
ANION GAP SERPL CALC-SCNC: 6 MMOL/L (ref 5–15)
BACTERIA SPEC CULT: NORMAL
BASOPHILS # BLD: 0 K/UL (ref 0–0.1)
BASOPHILS NFR BLD: 0 % (ref 0–1)
BUN SERPL-MCNC: 22 MG/DL (ref 6–20)
BUN/CREAT SERPL: 48 (ref 12–20)
CALCIUM SERPL-MCNC: 9.4 MG/DL (ref 8.5–10.1)
CHLORIDE SERPL-SCNC: 116 MMOL/L (ref 97–108)
CK SERPL-CCNC: 18 U/L (ref 26–192)
CO2 SERPL-SCNC: 26 MMOL/L (ref 21–32)
COMMENT, HOLDF: NORMAL
CREAT SERPL-MCNC: 0.46 MG/DL (ref 0.55–1.02)
DIFFERENTIAL METHOD BLD: ABNORMAL
EOSINOPHIL # BLD: 0 K/UL (ref 0–0.4)
EOSINOPHIL NFR BLD: 0 % (ref 0–7)
ERYTHROCYTE [DISTWIDTH] IN BLOOD BY AUTOMATED COUNT: 13.3 % (ref 11.5–14.5)
GLUCOSE SERPL-MCNC: 93 MG/DL (ref 65–100)
HCT VFR BLD AUTO: 32.5 % (ref 35–47)
HGB BLD-MCNC: 10.4 G/DL (ref 11.5–16)
IMM GRANULOCYTES # BLD AUTO: 0.1 K/UL (ref 0–0.04)
IMM GRANULOCYTES NFR BLD AUTO: 1 % (ref 0–0.5)
LYMPHOCYTES # BLD: 1.9 K/UL (ref 0.8–3.5)
LYMPHOCYTES NFR BLD: 17 % (ref 12–49)
MCH RBC QN AUTO: 32.1 PG (ref 26–34)
MCHC RBC AUTO-ENTMCNC: 32 G/DL (ref 30–36.5)
MCV RBC AUTO: 100.3 FL (ref 80–99)
MONOCYTES # BLD: 1.4 K/UL (ref 0–1)
MONOCYTES NFR BLD: 12 % (ref 5–13)
NEUTS SEG # BLD: 7.9 K/UL (ref 1.8–8)
NEUTS SEG NFR BLD: 70 % (ref 32–75)
NRBC # BLD: 0 K/UL (ref 0–0.01)
NRBC BLD-RTO: 0 PER 100 WBC
PHOSPHATE SERPL-MCNC: 2.3 MG/DL (ref 2.6–4.7)
PLATELET # BLD AUTO: 158 K/UL (ref 150–400)
PMV BLD AUTO: 9.6 FL (ref 8.9–12.9)
POTASSIUM SERPL-SCNC: 3.6 MMOL/L (ref 3.5–5.1)
RBC # BLD AUTO: 3.24 M/UL (ref 3.8–5.2)
SAMPLES BEING HELD,HOLD: NORMAL
SERVICE CMNT-IMP: NORMAL
SODIUM SERPL-SCNC: 148 MMOL/L (ref 136–145)
WBC # BLD AUTO: 11.3 K/UL (ref 3.6–11)

## 2021-02-11 PROCEDURE — 97530 THERAPEUTIC ACTIVITIES: CPT

## 2021-02-11 PROCEDURE — 65270000029 HC RM PRIVATE

## 2021-02-11 PROCEDURE — 74011250636 HC RX REV CODE- 250/636: Performed by: HOSPITALIST

## 2021-02-11 PROCEDURE — 36415 COLL VENOUS BLD VENIPUNCTURE: CPT

## 2021-02-11 PROCEDURE — 74011000258 HC RX REV CODE- 258: Performed by: FAMILY MEDICINE

## 2021-02-11 PROCEDURE — 80048 BASIC METABOLIC PNL TOTAL CA: CPT

## 2021-02-11 PROCEDURE — 74011250637 HC RX REV CODE- 250/637: Performed by: INTERNAL MEDICINE

## 2021-02-11 PROCEDURE — 74011250637 HC RX REV CODE- 250/637: Performed by: FAMILY MEDICINE

## 2021-02-11 PROCEDURE — 97161 PT EVAL LOW COMPLEX 20 MIN: CPT

## 2021-02-11 PROCEDURE — 97165 OT EVAL LOW COMPLEX 30 MIN: CPT

## 2021-02-11 PROCEDURE — 85025 COMPLETE CBC W/AUTO DIFF WBC: CPT

## 2021-02-11 PROCEDURE — 74011250636 HC RX REV CODE- 250/636: Performed by: FAMILY MEDICINE

## 2021-02-11 PROCEDURE — 84100 ASSAY OF PHOSPHORUS: CPT

## 2021-02-11 PROCEDURE — 74011250636 HC RX REV CODE- 250/636: Performed by: INTERNAL MEDICINE

## 2021-02-11 PROCEDURE — 82550 ASSAY OF CK (CPK): CPT

## 2021-02-11 RX ORDER — DEXTROSE MONOHYDRATE 50 MG/ML
75 INJECTION, SOLUTION INTRAVENOUS CONTINUOUS
Status: DISCONTINUED | OUTPATIENT
Start: 2021-02-11 | End: 2021-02-13 | Stop reason: HOSPADM

## 2021-02-11 RX ORDER — ENOXAPARIN SODIUM 100 MG/ML
30 INJECTION SUBCUTANEOUS EVERY 12 HOURS
Status: DISCONTINUED | OUTPATIENT
Start: 2021-02-11 | End: 2021-02-13 | Stop reason: HOSPADM

## 2021-02-11 RX ADMIN — MEMANTINE 5 MG: 5 TABLET ORAL at 08:57

## 2021-02-11 RX ADMIN — RISPERIDONE 1 MG: 1 TABLET ORAL at 21:19

## 2021-02-11 RX ADMIN — Medication 10 ML: at 17:26

## 2021-02-11 RX ADMIN — NYSTATIN: 100000 POWDER TOPICAL at 17:24

## 2021-02-11 RX ADMIN — ENOXAPARIN SODIUM 30 MG: 30 INJECTION SUBCUTANEOUS at 23:30

## 2021-02-11 RX ADMIN — RISPERIDONE 1 MG: 1 TABLET ORAL at 07:20

## 2021-02-11 RX ADMIN — DEXTROSE MONOHYDRATE 75 ML/HR: 50 INJECTION, SOLUTION INTRAVENOUS at 17:27

## 2021-02-11 RX ADMIN — DONEPEZIL HYDROCHLORIDE 5 MG: 5 TABLET, FILM COATED ORAL at 21:19

## 2021-02-11 RX ADMIN — PROPRANOLOL HYDROCHLORIDE 10 MG: 20 TABLET ORAL at 08:57

## 2021-02-11 RX ADMIN — DIVALPROEX SODIUM 625 MG: 125 CAPSULE ORAL at 08:51

## 2021-02-11 RX ADMIN — Medication 10 ML: at 21:19

## 2021-02-11 RX ADMIN — MIRTAZAPINE 15 MG: 15 TABLET, FILM COATED ORAL at 21:19

## 2021-02-11 RX ADMIN — Medication 10 ML: at 12:13

## 2021-02-11 RX ADMIN — Medication 1 CAPSULE: at 08:57

## 2021-02-11 RX ADMIN — CASTOR OIL AND BALSAM, PERU: 788; 87 OINTMENT TOPICAL at 22:00

## 2021-02-11 RX ADMIN — CEFTRIAXONE SODIUM 1 G: 1 INJECTION, POWDER, FOR SOLUTION INTRAMUSCULAR; INTRAVENOUS at 12:11

## 2021-02-11 RX ADMIN — CASTOR OIL AND BALSAM, PERU: 788; 87 OINTMENT TOPICAL at 12:11

## 2021-02-11 RX ADMIN — PROPRANOLOL HYDROCHLORIDE 10 MG: 20 TABLET ORAL at 17:26

## 2021-02-11 RX ADMIN — POTASSIUM CHLORIDE, DEXTROSE MONOHYDRATE AND SODIUM CHLORIDE 75 ML/HR: 150; 5; 200 INJECTION, SOLUTION INTRAVENOUS at 05:31

## 2021-02-11 RX ADMIN — ENOXAPARIN SODIUM 30 MG: 30 INJECTION SUBCUTANEOUS at 12:10

## 2021-02-11 RX ADMIN — CASTOR OIL AND BALSAM, PERU: 788; 87 OINTMENT TOPICAL at 17:23

## 2021-02-11 RX ADMIN — DIVALPROEX SODIUM 625 MG: 125 CAPSULE ORAL at 17:24

## 2021-02-11 RX ADMIN — MEMANTINE 5 MG: 5 TABLET ORAL at 17:25

## 2021-02-11 RX ADMIN — OXYCODONE HYDROCHLORIDE AND ACETAMINOPHEN 500 MG: 500 TABLET ORAL at 17:25

## 2021-02-11 RX ADMIN — OXYCODONE HYDROCHLORIDE AND ACETAMINOPHEN 500 MG: 500 TABLET ORAL at 08:57

## 2021-02-11 RX ADMIN — NYSTATIN: 100000 POWDER TOPICAL at 09:00

## 2021-02-11 NOTE — PROGRESS NOTES
Bedside and Verbal shift change report given to Aaron Colin (oncoming nurse) by Can Elise (offgoing nurse). Report included the following information SBAR, Kardex, MAR and Recent Results.

## 2021-02-11 NOTE — PROGRESS NOTES
Bedside and Verbal shift change report given to Jessica Guerra (oncoming nurse) by Micheline Hercules (offgoing nurse). Report included the following information SBAR, Kardex and MAR.

## 2021-02-11 NOTE — PROGRESS NOTES
[address]  Hospitalist Group                                                                                          Hospitalist Progress Note  Kelsey Juares MD  Answering service: [phone] from in house phone        Date of Service:  2021  NAME:  Otilia Healy  :    MRN:  [id number]      Admission Summary:     Laurence Orr a 67 y. o. female who presents with weakness. Patient has history of expressive and receptive aphasia and underlying dementia history was extremely limited. Armida Or is primarily obtained from the daughter Dashawn Loo reports that patient has history of stroke, expressive and receptive aphasia post stroke, also has some underlying dementia.  Daughter reports that patient lives at [address] [place] [address] and was recently diagnosed with Covid on 2021.  Apparently for the last few days patient has not been eating drinking well.  The facility today noted that her blood pressure was slightly elevated, she was having some hypoxia and was more lethargic than usual and was not sounding as she usually does.  The facility got concerned and sent the patient to the ER.  In the ER patient was found to have a UTI and was requested to be admitted to the hospital service.   Patient currently resting in bed, alert x1, denies any complaints or problems.  No other history can be obtained from the patient    Interval history / Subjective:     Poor historian, no acute event overnight,      Assessment & Plan:     Sepsis likely secondary to Bacteriuria/UTI POA  -on iv Rocephin  -Leukocytosis resolved  -Blood cxs NGTD  -Urine cx pending  -Lactate wnl x3     Severe RUE pain, tenderness, unable to move, unclear etiology  -RUE X ray no acute osseous abnormality.  -on prn tylenol      Rapid covid positive without hypoxia  -SpO2 91-95% on RA, monitor  -First positive test reportedly 21, has been >10 days  -on vitamin c, zinc  -no treatment at this time  -monitor pulse ov     Metabolic encephalopathy, acute on chronic, likely near baseline  -Likely 2/2 sepsis above  -Baseline global aphasia  -Cont to monitor as infection is treated  -TSH ok 0.64  -CT head nothing acute, MRI brain pending, because of COVID positive test  -Continue neuro check and supportive care     Bipolar 1 disorder, moderate to severe Alzheimer's type dementia  -Discharged last month from inpt psych unit  -on depkote, resperidone  -Depakote, level low end of therapeutic range at 50     Hypernatremia/hyperchloremia c/w decreased PO/water intake and dehydration, worse today  Change to d5-1/4NS+20KCl, if no improvement tomorrow change to D5W  Encourage PO water  Cr ok     Hypoxic respiratory failure on admission, Transient, resolved  Now on RA      Macrocytic Anemia, mild, at baseline 10-11  -vtiamin B12 and folate normal  -monitor H/H     Hypokalemia, mild  -replaced and resolved        Code status:Full Code  DVT prophylaxis: Lovenox    Care Plan discussed with: Patient/Family, Nurse and   Anticipated Disposition: Back to [facility] skilled nursing  Anticipated Discharge: Greater than 48 hours     Hospital Problems  Date Reviewed: 8/3/2015          Codes Class Noted POA    Sepsis ([facility] ICD-10-CM: A41.9  ICD-9-CM: 038.9, 995.91  2/9/2021 Unknown              Vital Signs:    Last 24hrs VS reviewed since prior progress note.  Most recent are:  Visit Vitals  /78 (BP 1 Location: Left upper arm, BP Patient Position: At rest)   Pulse 95   Temp 99.4 °F (37.4 °C)   Resp 17   Wt 61.5 kg (135 lb 9.3 oz)   SpO2 95%   BMI 24.02 kg/m²         Intake/Output Summary (Last 24 hours) at 2/11/2021 1236  Last data filed at 2/11/2021 0617  Gross per 24 hour   Intake 50 ml   Output 500 ml   Net -450 ml        Physical Examination:     I had a face to face encounter with this patient and independently examined them on 2/11/2021 as outlined below:          Constitutional:  No acute distress, cooperative, pleasant    ENT:  Oral mucosa moist, oropharynx benign. Resp:  CTA bilaterally. No wheezing/rhonchi/rales. No accessory muscle use   CV:  Regular rhythm, normal rate, no murmurs, gallops, rubs    GI:  Soft, non distended, non tender. normoactive bowel sounds, no hepatosplenomegaly     Musculoskeletal:  No edema,     Neurologic:  Conscious and alert, disoriented follows simple command      Skin:  Good turgor, no rashes or ulcers       Data Review:    Review and/or order of clinical lab test  Review and/or order of tests in the radiology section of CPT  Review and/or order of tests in the medicine section of CPT      Labs:     Recent Labs     02/11/21  0209 02/10/21  0623   WBC 11.3* 10.6   HGB 10.4* 10.8*   HCT 32.5* 34.7*    142*     Recent Labs     02/11/21  0209 02/10/21  0623 02/09/21  1010   * 148* 146*   K 3.6 3.4* 3.6   * 119* 114*   CO2 26 23 28   BUN 22* 30* 45*   CREA 0.46* 0.49* 0.68   GLU 93 88 127*   CA 9.4 9.5 10.4*   MG  --  1.9  --    PHOS 2.3*  --   --      Recent Labs     02/10/21  0623 02/09/21  1010   ALT 25 32   AP 59 68   TBILI 0.3 0.4   TP 6.7 8.2   ALB 2.0* 2.6*   GLOB 4.7* 5.6*     No results for input(s): INR, PTP, APTT, INREXT in the last 72 hours. No results for input(s): FE, TIBC, PSAT, FERR in the last 72 hours. Lab Results   Component Value Date/Time    Folate 17.3 02/10/2021 06:23 AM      No results for input(s): PH, PCO2, PO2 in the last 72 hours.   Recent Labs     02/11/21  0209 02/10/21  0623 02/09/21  2143   CPK 18* 22* 28   TROIQ  --  <0.05 <0.05     Lab Results   Component Value Date/Time    Cholesterol, total 129 02/09/2021 09:43 PM    HDL Cholesterol 42 02/09/2021 09:43 PM    LDL, calculated 66.2 02/09/2021 09:43 PM    Triglyceride 104 02/09/2021 09:43 PM    CHOL/HDL Ratio 3.1 02/09/2021 09:43 PM     No results found for: GLUCPOC  Lab Results   Component Value Date/Time    Color DARK YELLOW 02/09/2021 11:08 AM    Appearance TURBID (A) 02/09/2021 11:08 AM    Specific gravity 1.022 02/09/2021 11:08 AM    Specific gravity 1.015 11/29/2020 01:21 PM    pH (UA) 6.5 02/09/2021 11:08 AM    Protein 100 (A) 02/09/2021 11:08 AM    Glucose Negative 02/09/2021 11:08 AM    Ketone TRACE (A) 02/09/2021 11:08 AM    Bilirubin Negative 02/09/2021 11:08 AM    Urobilinogen 1.0 02/09/2021 11:08 AM    Nitrites Positive (A) 02/09/2021 11:08 AM    Leukocyte Esterase LARGE (A) 02/09/2021 11:08 AM    Epithelial cells MANY (A) 02/09/2021 11:08 AM    Bacteria 4+ (A) 02/09/2021 11:08 AM    WBC >100 (H) 02/09/2021 11:08 AM    RBC 20-50 02/09/2021 11:08 AM         Medications Reviewed:     Current Facility-Administered Medications   Medication Dose Route Frequency    enoxaparin (LOVENOX) injection 30 mg  30 mg SubCUTAneous Q12H    balsam peru-castor oiL (VENELEX) ointment   Topical TID    dextrose 5% - 0.2% NaCl with KCl 20 mEq/L infusion  75 mL/hr IntraVENous CONTINUOUS    divalproex (DEPAKOTE SPRINKLE) capsule 625 mg  625 mg Oral BID    donepeziL (ARICEPT) tablet 5 mg  5 mg Oral QHS    memantine (NAMENDA) tablet 5 mg  5 mg Oral BID    mirtazapine (REMERON) tablet 15 mg  15 mg Oral QHS    propranoloL (INDERAL) tablet 10 mg  10 mg Oral BID    nystatin (MYCOSTATIN) 100,000 unit/gram powder   Topical BID    risperiDONE (RisperDAL) tablet 1 mg  1 mg Oral BID    sodium chloride (NS) flush 5-40 mL  5-40 mL IntraVENous Q8H    sodium chloride (NS) flush 5-40 mL  5-40 mL IntraVENous PRN    acetaminophen (TYLENOL) tablet 650 mg  650 mg Oral Q4H PRN    cefTRIAXone (ROCEPHIN) 1 g in 0.9% sodium chloride (MBP/ADV) 50 mL MBP  1 g IntraVENous Q24H    ascorbic acid (vitamin C) (VITAMIN C) tablet 500 mg  500 mg Oral BID    zinc sulfate (ZINCATE) 220 (50) mg capsule 1 Cap  1 Cap Oral DAILY     ______________________________________________________________________  EXPECTED LENGTH OF STAY: - - -  ACTUAL LENGTH OF STAY:          2                 Jah Waller MD

## 2021-02-11 NOTE — PROGRESS NOTES
PINA:  1. RUR- 28%  2. Patient admitted from [facility], and plans to return. 3. She will need BLS transport. 4. Per MD, urine cx is pending. 5. PT/OT to evaluate patient needs. CM left a voicemail for [facility] 00, [phone]. CM will continue to follow for any discharge needs.       Willie Doshi [facility]

## 2021-02-11 NOTE — PROGRESS NOTES
Problem: Self Care Deficits Care Plan (Adult)  Goal: *Acute Goals and Plan of Care (Insert Text)  Description:   FUNCTIONAL STATUS PRIOR TO ADMISSION: Patient unable to provide history. Per chart review and RN, patient was ambulatory with a walker and performing ADLs at stand-by assistance level until ~2 weeks ago but had a functional decline prior to admission since acquiring COVID-19 requiring increased staff assistance at [address]: Patient resided at [facility] with staff to provide assistance. Occupational Therapy Goals  Initiated 2/11/2021  1. Patient will perform grooming with moderate assistance  within 7 day(s). 2.  Patient will perform bathing with moderate assistance  within 7 day(s). 3.  Patient will perform upper body dressing with moderate assistance  within 7 day(s). 4.  Patient will perform toilet transfers with maximal assistance within 7 day(s). 5.  Patient will perform all aspects of toileting with maximal assistance within 7 day(s). 6.  Patient will participate in upper extremity therapeutic exercise/activities with moderate assistance  for 5 minutes within 7 day(s). Outcome: Not Met    OCCUPATIONAL THERAPY EVALUATION  Patient: Chet Mckeon (11 y.o. female)  Date: 2/11/2021  Primary Diagnosis: Sepsis ([facility] [address] [A41.9]        Precautions: Fall, Skin(droplet plus)    ASSESSMENT  Based on the objective data described below, the patient presents with gross rigidity, poor overall visual attention, BUE resting tremor R>L, poor sitting balance with extensor thrust and anterior sliding, limited command following, and RUE pain (protective/ grimacing/ shouting to touch, noted x-rays negative for fracture). Per chart, patient with expressive and receptive aphasia at baseline d/t prior CVA as well as underlying dementia.   Per chart review and RN, patient was ambulatory with a walker and performing ADLs at stand-by assistance level until ~2 weeks ago but had a functional decline prior to admission since acquiring COVID-19 requiring increased staff assistance at [facility]. She currently requires total assistance for all ADLs and bed mobility. Recommend return to [facility] with increased staff assistance and HHOT vs SNF pending progress and available level of staff assistance at [facility]. Current Level of Function Impacting Discharge (ADLs/self-care): total assistance x2 supine<>sit, total assistance ADLs    Functional Outcome Measure: The patient scored 0/100 on the Barthel Index outcome measure which is indicative of ~100% impairment in functional performance. Patient will benefit from skilled therapy intervention to address the above noted impairments. PLAN :  Recommendations and Planned Interventions: self care training, functional mobility training, therapeutic exercise, balance training, visual/perceptual training, therapeutic activities, cognitive retraining, endurance activities, neuromuscular re-education, patient education, home safety training and family training/education    Frequency/Duration: Patient will be followed by occupational therapy 2 times a week to address goals. Recommendation for discharge: (in order for the patient to meet his/her long term goals)   Recommend return to [facility] with increased staff assistance and HHOT vs SNF pending progress and available level of staff assistance at [facility]. This discharge recommendation:  Has not yet been discussed the attending provider and/or case management       SUBJECTIVE:   Patient stated Quinn Carmichael don't make me do anything.     OBJECTIVE DATA SUMMARY:   HISTORY:   Past Medical History:   Diagnosis Date    Anemia     Arthritis     Bipolar disorder ([address]     Burning with urination     Dementia (HCC)     Memory change     Psychiatric disorder     Bipolar Disorder    Stool color black     Tremor      Past Surgical History:   Procedure Laterality Date    HX BREAST BIOPSY Left 1990's    benign    HX CHOLECYSTECTOMY      HX GI  HX GYN         Expanded or extensive additional review of patient history:     Home Situation  Home Environment: Assisted living  [address] Name: Heidi     Hand dominance: unknown    EXAMINATION OF PERFORMANCE DEFICITS:  Cognitive/Behavioral Status:  Neurologic State: Alert  Orientation Level: Disoriented X4  Cognition: Decreased command following        Safety/Judgement: Decreased awareness of environment;Decreased awareness of need for safety;Decreased insight into deficits    Skin: healing forehead abrasions    Edema: mild R wrist with redness    Hearing:   responded to auditory stimuli    Vision/Perceptual:                 Unable to assess, poor overall visual attention                     Range of Motion:    AROM: Grossly decreased, non-functional                         Strength:    Strength: Grossly decreased, non-functional                Coordination:  Coordination: Grossly decreased, non-functional  Fine Motor Skills-Upper: Left Impaired;Right Impaired    Gross Motor Skills-Upper: Left Impaired;Right Impaired    Tone & Sensation:    Tone: Abnormal(gross rigidity)  Sensation: (unable to assess)                      Balance:  Sitting: Impaired; With support  Sitting - Static: Poor (constant support)  Sitting - Dynamic: Poor (constant support)    Functional Mobility and Transfers for ADLs:  Bed Mobility:  Rolling: Total assistance;Assist x2  Supine to Sit: Total assistance;Assist x2  Sit to Supine:  Total assistance;Assist x2        ADL Assessment:     Infer total assistance all ADLs                                       ADL Intervention and task modifications:          Cognitive Retraining  Safety/Judgement: Decreased awareness of environment;Decreased awareness of need for safety;Decreased insight into deficits      Functional Measure:  Barthel Index:    Bathin  Bladder: 0  Bowels: 0  Groomin  Dressin  Feedin  Mobility: 0  Stairs: 0  Toilet Use: 0  Transfer (Bed to Chair and Back): 0  Total: 0/100        The Barthel ADL Index: Guidelines  1. The index should be used as a record of what a patient does, not as a record of what a patient could do. 2. The main aim is to establish degree of independence from any help, physical or verbal, however minor and for whatever reason. 3. The need for supervision renders the patient not independent. 4. A patient's performance should be established using the best available evidence. Asking the patient, friends/relatives and nurses are the usual sources, but direct observation and common sense are also important. However direct testing is not needed. 5. Usually the patient's performance over the preceding 24-48 hours is important, but occasionally longer periods will be relevant. 6. Middle categories imply that the patient supplies over 50 per cent of the effort. 7. Use of aids to be independent is allowed. Werner Scott., Barthel, D.W. (9450). Functional evaluation: the Barthel Index. [address] [facility] [employer] (14)2. Dylan Arredondo umu ONOFRE Mcdonald, Ana Caceres., Keith Vargas., [place], [address] [place] [address] (1999). Measuring the change indisability after inpatient rehabilitation; comparison of the responsiveness of the Barthel Index and Functional [place] Measure. Journal of Neurology, Neurosurgery, and Psychiatry, 66(4), 521-204. John Hernandez, N.J.A, KELLY Nicole, & Steve Ma M.A. (2004.) Assessment of post-stroke quality of life in cost-effectiveness studies: The usefulness of the Barthel Index and the EuroQoL-5D. Quality of Life Research, 15, 863-31         Occupational Therapy Evaluation Charge Determination   History Examination Decision-Making   LOW Complexity : Brief history review  MEDIUM Complexity : 3-5 performance deficits relating to physical, cognitive , or psychosocial skils that result in activity limitations and / or participation restrictions MEDIUM Complexity : Patient may present with comorbidities that affect occupational performnce. Miniml to moderate modification of tasks or assistance (eg, physical or verbal ) with assesment(s) is necessary to enable patient to complete evaluation       Based on the above components, the patient evaluation is determined to be of the following complexity level: LOW   Pain Rating:  Patient appeared to have RUE pain to touch and PROM    Activity Tolerance:   Poor    After treatment patient left in no apparent distress:    Supine in bed and Call bell within reach    COMMUNICATION/EDUCATION:   The patients plan of care was discussed with: Physical therapist and Registered nurse. Patient is unable to participate in goal setting and plan of care. This patients plan of care is appropriate for delegation to [facility].     Thank you for this referral.  Loi Flowers, OT  Time Calculation: 28 mins

## 2021-02-11 NOTE — PROGRESS NOTES
Problem: Mobility Impaired (Adult and Pediatric)  Goal: *Acute Goals and Plan of Care (Insert Text)  Description: FUNCTIONAL STATUS PRIOR TO ADMISSION: Pt was a poor historian. Per chart, she has recently moved to [facility] and tested positive there for COVID-19 and has experienced a rapid decline. Per chart review and RN, patient was ambulatory with a walker and performing ADLs at stand-by assistance level until ~2 weeks ago but had a functional decline prior to admission since acquiring COVID-19 requiring increased staff assistance at [facility]: The patient lived in assisted living, unclear exactly how much assist she required with mobility from the staff there. Ray Celeste Physical Therapy Goals  Initiated 2/11/2021  1. Patient will move from supine to sit and sit to supine , scoot up and down, and roll side to side in bed with maximal assistance X 2 within 7 day(s). 2.  Patient will transfer from bed to chair and chair to bed with maximal assistance X 2 using the least restrictive device within 7 day(s). 3.  Patient will perform sit to stand with maximal assistance X 2 within 7 day(s). 4.  Patient will participate in sitting activities at the EOB with min assist for 5 minutes within 7 day(s). Outcome: Progressing Towards Goal   PHYSICAL THERAPY EVALUATION  Patient: Sabas Montemayor (75 y.o. female)  Date: 2/11/2021  Primary Diagnosis: Sepsis ([facility] [address] [A41.9]        Precautions:   Fall, Skin(droplet plus)    ASSESSMENT  Based on the objective data described below, the patient presents with confusion (disoriented X 4), although per chart she is aphasic. She appears fearful of all movement and note rigidity throughout (like a protective extension reaction).  Able to get her to sitting at the EOB but she then adducted both legs and went into full body extension pattern and had to be returned to supine for her safety and for the safety of those assisting her, at great risk for sliding off the bed. Disposition depending on progress (which I expect to be very slow) and mostly on how much assistance the assisted living is able to provide for patient, anticipate assist X 2 likely required in the near term. .    Current Level of Function Impacting Discharge (mobility/balance): total assist X 2    Functional Outcome Measure: The patient scored 0/28 on the Tinetti outcome measure which is indicative of high fall risk. .      Other factors to consider for discharge: recently tested positive for COVID-19, fall risk, UTI, aphasic, dementia, psych history     Patient will benefit from skilled therapy intervention to address the above noted impairments. PLAN :  Recommendations and Planned Interventions: bed mobility training, transfer training, therapeutic exercises, patient and family training/education, and therapeutic activities      Frequency/Duration: Patient will be followed by physical therapy:  3 times a week to address goals.     Recommendation for discharge: (in order for the patient to meet his/her long term goals)  Physical therapy at least 2 days/week in the home AND ensure assist and/or supervision for safety with all of mobility assist X 2 , otherwise SNF for rehab    This discharge recommendation:  A follow-up discussion with the attending provider and/or case management is planned    IF patient discharges home will need the following DME: hospital bed and mechanical lift         SUBJECTIVE:   Patient perseverated on \"don't make me do anything\"    OBJECTIVE DATA SUMMARY:   Consult received, chart reviewed, pt cleared by nursing  HISTORY:    Past Medical History:   Diagnosis Date    Anemia     Arthritis     [facility] [address]     Burning with urination     Dementia ([facility] [address]     Memory change     Psychiatric disorder     Bipolar Disorder    Stool color black     Tremor      Past Surgical History:   Procedure Laterality Date    HX BREAST BIOPSY Left 1990's benign    HX CHOLECYSTECTOMY      HX GI      HX GYN         Personal factors and/or comorbidities impacting plan of care: recently tested positive for COVID-19, fall risk, UTI, aphasic, dementia, psych history    Home Situation  Home Environment: [address] [place] [address] Name: [facility]     EXAMINATION/PRESENTATION/DECISION MAKING:   Critical Behavior:  Neurologic State: Alert  Orientation Level: Disoriented X4  Cognition: Decreased command following  Safety/Judgement: Decreased awareness of environment, Decreased awareness of need for safety, Decreased insight into deficits  Hearing:     Skin:  refer to MD and nursing notes, but noted redness right hand and scabs on her face (perhaps healing from a fall or falls?)  Edema: yes, right hand, discussed with her nurse. Range Of Motion:  AROM: Grossly decreased, non-functional                       Strength:    Strength: Grossly decreased, non-functional                    Tone & Sensation:   Tone: Abnormal(gross rigidity)              Sensation: (unable to assess)               Coordination:  Coordination: Grossly decreased, non-functional  Vision:      Functional Mobility:  Bed Mobility:  Rolling: Total assistance;Assist x2  Supine to Sit: Total assistance;Assist x2  Sit to Supine: Total assistance;Assist x2     Transfers:                             Balance:   Sitting: Impaired; With support  Sitting - Static: Poor (constant support)  Sitting - Dynamic: Poor (constant support)  Ambulation/Gait Training:                                                         Stairs:               Therapeutic Exercises:       Functional Measure:  Tinetti test:    Sitting Balance: 0  Arises: 0  Attempts to Rise: 0  Immediate Standing Balance: 0  Standing Balance: 0  Nudged: 0  Eyes Closed: 0  Turn 360 Degrees - Continuous/Discontinuous: 0  Turn 360 Degrees - Steady/Unsteady: 0  Sitting Down: 0  Balance Score: 0 Balance total score  Indication of Gait: 0  R Step Length/Height: 0  L Step Length/Height: 0  R Foot Clearance: 0  L Foot Clearance: 0  Step Symmetry: 0  Step Continuity: 0  Path: 0  Trunk: 0  Walking Time: 0  Gait Score: 0 Gait total score  Total Score: 0/28 Overall total score         Tinetti Tool Score Risk of Falls  <19 = High Fall Risk  19-24 = Moderate Fall Risk  25-28 = Low Fall Risk  Tinetti ME. Performance-Oriented Assessment of Mobility Problems in Elderly Patients. [address]; [id number]. (Scoring Description: PT Bulletin Feb. 10, 1993)    Older adults: Yessenia James et al, 2009; n = 1000 [facility] elderly evaluated with ABC, CINDY, ADL, and IADL)  · Mean CINDY score for males aged 69-68 years = 26.21(3.40)  · Mean CINDY score for females age 69-68 years = 25.16(4.30)  · Mean CINDY score for males over 80 years = 23.29(6.02)  · Mean CINDY score for females over 80 years = 17.20(8.32)           Physical Therapy Evaluation Charge Determination   History Examination Presentation Decision-Making   HIGH Complexity :3+ comorbidities / personal factors will impact the outcome/ POC  LOW Complexity : 1-2 Standardized tests and measures addressing body structure, function, activity limitation and / or participation in recreation  LOW Complexity : Stable, uncomplicated  LOW Complexity : FOTO score of       Based on the above components, the patient evaluation is determined to be of the following complexity level: LOW     Pain Rating:  Pt unable to do so    Activity Tolerance:   Poor    After treatment patient left in no apparent distress:   Supine in bed, Call bell within reach, and Side rails x 3    COMMUNICATION/EDUCATION:   The patients plan of care was discussed with: Occupational therapist and Registered nurse. Patient is unable to participate in goal setting and plan of care.     Thank you for this referral.  Chioma Castro   Time Calculation: 22 mins

## 2021-02-12 LAB
ANION GAP SERPL CALC-SCNC: 6 MMOL/L (ref 5–15)
BASOPHILS # BLD: 0 K/UL (ref 0–0.1)
BASOPHILS NFR BLD: 0 % (ref 0–1)
BUN SERPL-MCNC: 14 MG/DL (ref 6–20)
BUN/CREAT SERPL: 36 (ref 12–20)
CALCIUM SERPL-MCNC: 8.8 MG/DL (ref 8.5–10.1)
CHLORIDE SERPL-SCNC: 111 MMOL/L (ref 97–108)
CK SERPL-CCNC: 45 U/L (ref 26–192)
CO2 SERPL-SCNC: 25 MMOL/L (ref 21–32)
CREAT SERPL-MCNC: 0.39 MG/DL (ref 0.55–1.02)
D DIMER PPP FEU-MCNC: 5.6 MG/L FEU (ref 0–0.65)
DIFFERENTIAL METHOD BLD: ABNORMAL
EOSINOPHIL # BLD: 0 K/UL (ref 0–0.4)
EOSINOPHIL NFR BLD: 0 % (ref 0–7)
ERYTHROCYTE [DISTWIDTH] IN BLOOD BY AUTOMATED COUNT: 13.2 % (ref 11.5–14.5)
GLUCOSE SERPL-MCNC: 108 MG/DL (ref 65–100)
HCT VFR BLD AUTO: 32.4 % (ref 35–47)
HGB BLD-MCNC: 10.2 G/DL (ref 11.5–16)
IMM GRANULOCYTES # BLD AUTO: 0.1 K/UL (ref 0–0.04)
IMM GRANULOCYTES NFR BLD AUTO: 0 % (ref 0–0.5)
LACTATE SERPL-SCNC: 1.3 MMOL/L (ref 0.4–2)
LYMPHOCYTES # BLD: 1.8 K/UL (ref 0.8–3.5)
LYMPHOCYTES NFR BLD: 14 % (ref 12–49)
MCH RBC QN AUTO: 31.9 PG (ref 26–34)
MCHC RBC AUTO-ENTMCNC: 31.5 G/DL (ref 30–36.5)
MCV RBC AUTO: 101.3 FL (ref 80–99)
MONOCYTES # BLD: 1.3 K/UL (ref 0–1)
MONOCYTES NFR BLD: 10 % (ref 5–13)
NEUTS SEG # BLD: 9.5 K/UL (ref 1.8–8)
NEUTS SEG NFR BLD: 76 % (ref 32–75)
NRBC # BLD: 0 K/UL (ref 0–0.01)
NRBC BLD-RTO: 0 PER 100 WBC
PLATELET # BLD AUTO: 163 K/UL (ref 150–400)
PMV BLD AUTO: 9.8 FL (ref 8.9–12.9)
POTASSIUM SERPL-SCNC: 3.5 MMOL/L (ref 3.5–5.1)
RBC # BLD AUTO: 3.2 M/UL (ref 3.8–5.2)
SODIUM SERPL-SCNC: 142 MMOL/L (ref 136–145)
WBC # BLD AUTO: 12.7 K/UL (ref 3.6–11)

## 2021-02-12 PROCEDURE — 65270000029 HC RM PRIVATE

## 2021-02-12 PROCEDURE — 74011250637 HC RX REV CODE- 250/637: Performed by: FAMILY MEDICINE

## 2021-02-12 PROCEDURE — 85025 COMPLETE CBC W/AUTO DIFF WBC: CPT

## 2021-02-12 PROCEDURE — 36415 COLL VENOUS BLD VENIPUNCTURE: CPT

## 2021-02-12 PROCEDURE — 83605 ASSAY OF LACTIC ACID: CPT

## 2021-02-12 PROCEDURE — 74011250636 HC RX REV CODE- 250/636: Performed by: HOSPITALIST

## 2021-02-12 PROCEDURE — 80048 BASIC METABOLIC PNL TOTAL CA: CPT

## 2021-02-12 PROCEDURE — 85379 FIBRIN DEGRADATION QUANT: CPT

## 2021-02-12 PROCEDURE — 97530 THERAPEUTIC ACTIVITIES: CPT

## 2021-02-12 PROCEDURE — 82550 ASSAY OF CK (CPK): CPT

## 2021-02-12 PROCEDURE — 74011000258 HC RX REV CODE- 258: Performed by: FAMILY MEDICINE

## 2021-02-12 PROCEDURE — 74011250636 HC RX REV CODE- 250/636: Performed by: FAMILY MEDICINE

## 2021-02-12 RX ADMIN — NYSTATIN: 100000 POWDER TOPICAL at 08:44

## 2021-02-12 RX ADMIN — PROPRANOLOL HYDROCHLORIDE 10 MG: 20 TABLET ORAL at 08:42

## 2021-02-12 RX ADMIN — OXYCODONE HYDROCHLORIDE AND ACETAMINOPHEN 500 MG: 500 TABLET ORAL at 08:42

## 2021-02-12 RX ADMIN — OXYCODONE HYDROCHLORIDE AND ACETAMINOPHEN 500 MG: 500 TABLET ORAL at 19:00

## 2021-02-12 RX ADMIN — RISPERIDONE 1 MG: 1 TABLET ORAL at 23:35

## 2021-02-12 RX ADMIN — NYSTATIN: 100000 POWDER TOPICAL at 19:01

## 2021-02-12 RX ADMIN — DIVALPROEX SODIUM 625 MG: 125 CAPSULE ORAL at 08:42

## 2021-02-12 RX ADMIN — ENOXAPARIN SODIUM 30 MG: 30 INJECTION SUBCUTANEOUS at 23:34

## 2021-02-12 RX ADMIN — DEXTROSE MONOHYDRATE 75 ML/HR: 50 INJECTION, SOLUTION INTRAVENOUS at 08:43

## 2021-02-12 RX ADMIN — PROPRANOLOL HYDROCHLORIDE 10 MG: 20 TABLET ORAL at 19:00

## 2021-02-12 RX ADMIN — Medication 10 ML: at 14:00

## 2021-02-12 RX ADMIN — Medication 1 CAPSULE: at 08:42

## 2021-02-12 RX ADMIN — CASTOR OIL AND BALSAM, PERU: 788; 87 OINTMENT TOPICAL at 16:00

## 2021-02-12 RX ADMIN — Medication 10 ML: at 23:35

## 2021-02-12 RX ADMIN — CASTOR OIL AND BALSAM, PERU: 788; 87 OINTMENT TOPICAL at 08:44

## 2021-02-12 RX ADMIN — RISPERIDONE 1 MG: 1 TABLET ORAL at 07:04

## 2021-02-12 RX ADMIN — ENOXAPARIN SODIUM 30 MG: 30 INJECTION SUBCUTANEOUS at 12:23

## 2021-02-12 RX ADMIN — Medication 10 ML: at 07:04

## 2021-02-12 RX ADMIN — MEMANTINE 5 MG: 5 TABLET ORAL at 08:41

## 2021-02-12 RX ADMIN — CEFTRIAXONE SODIUM 1 G: 1 INJECTION, POWDER, FOR SOLUTION INTRAMUSCULAR; INTRAVENOUS at 12:23

## 2021-02-12 RX ADMIN — MEMANTINE 5 MG: 5 TABLET ORAL at 19:00

## 2021-02-12 RX ADMIN — DIVALPROEX SODIUM 625 MG: 125 CAPSULE ORAL at 19:00

## 2021-02-12 RX ADMIN — MIRTAZAPINE 15 MG: 15 TABLET, FILM COATED ORAL at 23:35

## 2021-02-12 RX ADMIN — CASTOR OIL AND BALSAM, PERU: 788; 87 OINTMENT TOPICAL at 23:35

## 2021-02-12 RX ADMIN — DONEPEZIL HYDROCHLORIDE 5 MG: 5 TABLET, FILM COATED ORAL at 23:35

## 2021-02-12 NOTE — PROGRESS NOTES
Bedside shift change report given to Chelsey House (oncoming nurse) by Ngoc Kaplan RN (offgoing nurse). Report included the following information SBAR, Kardex and MAR.

## 2021-02-12 NOTE — PROGRESS NOTES
Problem: Mobility Impaired (Adult and Pediatric)  Goal: *Acute Goals and Plan of Care (Insert Text)  Description: FUNCTIONAL STATUS PRIOR TO ADMISSION: Pt was a poor historian. Per chart, she has recently moved to [facility] and tested positive there for COVID-19 and has experienced a rapid decline. Per chart review and RN, patient was ambulatory with a walker and performing ADLs at stand-by assistance level until ~2 weeks ago but had a functional decline prior to admission since acquiring COVID-19 requiring increased staff assistance at [address]: The patient lived in assisted living, unclear exactly how much assist she required with mobility from the staff there. [facility] Physical Therapy Goals  Initiated 2/11/2021  1. Patient will move from supine to sit and sit to supine , scoot up and down, and roll side to side in bed with maximal assistance X 2 within 7 day(s). 2.  Patient will transfer from bed to chair and chair to bed with maximal assistance X 2 using the least restrictive device within 7 day(s). 3.  Patient will perform sit to stand with maximal assistance X 2 within 7 day(s). 4.  Patient will participate in sitting activities at the EOB with min assist for 5 minutes within 7 day(s). Outcome: Progressing Towards Goal   PHYSICAL THERAPY TREATMENT  Patient: Jah Mendez (15 y.o. female)  Date: 2/12/2021  Diagnosis: Sepsis ([facility] [address] [A41.9] <principal problem not specified>       Precautions: Fall, Skin(droplet plus)  Chart, physical therapy assessment, plan of care and goals were reviewed. ASSESSMENT  Patient continues with skilled PT services and is not progressing towards goals. Pt remains confused, decreased command following, keeps head turned to left, and remains total assist for rolling. Pt with only minimal movement of forearms but would not lift arms off bed to touch target. Also no active movement to command for legs.   Pt tolerated PROM all fours and neck - c/o only of discomfort with neck rotation to right. Nurse present and recommended modified chair position for meals and positioning pillow to block head rotation to left when HOB flat to provide more neutral spine alignment - neck extension vs. Flexion. Current Level of Function Impacting Discharge (mobility/balance): Total Assist x 2    Other factors to consider for discharge: Total assist x 2, COVID +, prior living [facility] - amb with RW         PLAN :  Patient continues to benefit from skilled intervention to address the above impairments. Continue treatment per established plan of care. to address goals. Recommendation for discharge: (in order for the patient to meet his/her long term goals)  Therapy up to 5 days/week in SNF setting unless [facility] able to provide 2 person assist for bed mobility and transfers to w/c/chair    This discharge recommendation:  Has been made in collaboration with the attending provider and/or case management    IF patient discharges home will need the following DME: n/a       SUBJECTIVE:   Patient stated please don't make me do anything.     OBJECTIVE DATA SUMMARY:   Critical Behavior:  Neurologic State: Alert, Confused  Orientation Level: Disoriented X4  Cognition: Decreased command following  Safety/Judgement: Decreased awareness of environment, Decreased awareness of need for safety, Decreased insight into deficits  Functional Mobility Training:  Bed Mobility:  Rolling: Total assistance;Assist x2   Assisted nursing for hygiene - also noted sacral wound/ dressing changed. Attempted with verbal and tactile cues to have patient assist with rolling by reaching out to bed rail or therapist hand - observed no attempt of patient to assist.    Therapeutic Exercises:   Performed PROM of all fours and neck. Pt given verbal and tactile cues to assist with ROM - pt unable. Pain Rating:  Pt unable to rate pain.     Activity Tolerance:   Poor    After treatment patient left in no apparent distress:   Supine in bed, Call bell within reach, and Nursing at bedside    COMMUNICATION/COLLABORATION:   The patients plan of care was discussed with: Registered nurse.      Akil Yeh, PT   Time Calculation: 25 mins

## 2021-02-12 NOTE — PROGRESS NOTES
[facility]  Hospitalist Group                                                                                          Hospitalist Progress Note  Jah Waller MD  Answering service: [phone] from in house phone        Date of Service:  2021  NAME:  Charlee Nance  :  [id number]  MRN:  [id number]      Admission Summary:     Mary Simon a 67 y. o. female who presents with weakness. Patient has history of expressive and receptive aphasia and underlying dementia history was extremely limited. Irais Crate is primarily obtained from the daughter Wilmer Page reports that patient has history of stroke, expressive and receptive aphasia post stroke, also has some underlying dementia.  Daughter reports that patient lives at [address] [place] [address] and was recently diagnosed with Covid on 2021.  Apparently for the last few days patient has not been eating drinking well.  The facility today noted that her blood pressure was slightly elevated, she was having some hypoxia and was more lethargic than usual and was not sounding as she usually does.  The facility got concerned and sent the patient to the ER.  In the ER patient was found to have a UTI and was requested to be admitted to the hospital service.   Patient currently resting in bed, alert x1, denies any complaints or problems.  No other history can be obtained from the patient    Interval history / Subjective:     Sleepy but wakeful, poor historian, no acute event overnight,      Assessment & Plan:     Sepsis likely secondary to Bacteriuria/UTI POA  -on iv Rocephin  -Leukocytosis resolved  -Blood cxs NGTD  -Urine cx pending  -Lactate wnl x3     Severe RUE pain, tenderness, unable to move, unclear etiology  -RUE X ray no acute osseous abnormality.  -on prn tylenol      Rapid covid positive without hypoxia  -SpO2 91-95% on RA, monitor  -First positive test reportedly 21, has been >10 days  -on vitamin c, zinc  -no treatment at this time  -monitor pulse ox     Metabolic encephalopathy, acute on chronic, likely near baseline  -Likely 2/2 sepsis above  -Baseline global aphasia  -Cont to monitor as infection is treated  -TSH ok 0.64  -CT head nothing acute, MRI brain pending, because of COVID positive test  -Continue neuro check and supportive care     Bipolar 1 disorder, moderate to severe Alzheimer's type dementia  -Discharged last month from inpt psych unit  -on depkote, resperidone  -Depakote, level low end of therapeutic range at 50     Hypernatremia/hyperchloremia c/w decreased PO/water intake and dehydration, worse today  Change to d5-1/4NS+20KCl, if no improvement tomorrow change to D5W  Encourage PO water  Cr ok     Hypoxic respiratory failure on admission,   -Transient, resolved  -SpO2 93-96% on RA     Macrocytic Anemia, mild,  -Hgb 10.2  -vtiamin B12 and folate normal  -monitor H/H     Hypokalemia, mild  -replaced and resolved        Code status:Full Code  DVT prophylaxis: Lovenox    Care Plan discussed with: Patient/Family, Nurse and   Anticipated Disposition: Back to [facility] nursing home  Anticipated Discharge: Greater than 48 hours     Hospital Problems  Date Reviewed: 8/3/2015          Codes Class Noted POA    Sepsis ([facility] [address] ICD-10-CM: A41.9  ICD-9-CM: 038.9, 995.91  2/9/2021 Unknown              Vital Signs:    Last 24hrs VS reviewed since prior progress note.  Most recent are:  Visit Vitals  BP (!) 146/77 (BP 1 Location: Left upper arm, BP Patient Position: At rest)   Pulse 93   Temp 98.2 °F (36.8 °C)   Resp 17   Wt 66.7 kg (147 lb 0.8 oz)   SpO2 96%   BMI 26.05 kg/m²         Intake/Output Summary (Last 24 hours) at 2/12/2021 1159  Last data filed at 2/12/2021 0847  Gross per 24 hour   Intake 410 ml   Output 500 ml   Net -90 ml        Physical Examination:     I had a face to face encounter with this patient and independently examined them on 2/12/2021 as outlined below:          Constitutional:  No acute distress, cooperative, pleasant    ENT:  Oral mucosa moist, oropharynx benign. Resp:  CTA bilaterally. No wheezing/rhonchi/rales. No accessory muscle use   CV:  Regular rhythm, normal rate, no murmurs, gallops, rubs    GI:  Soft, non distended, non tender. normoactive bowel sounds, no hepatosplenomegaly     Musculoskeletal:  No edema,     Neurologic:  Conscious and alert, disoriented follows simple command      Skin:  Good turgor, no rashes or ulcers       Data Review:    Review and/or order of clinical lab test  Review and/or order of tests in the radiology section of CPT  Review and/or order of tests in the medicine section of CPT      Labs:     Recent Labs     02/12/21 0158 02/11/21 0209   WBC 12.7* 11.3*   HGB 10.2* 10.4*   HCT 32.4* 32.5*    158     Recent Labs     02/12/21  0158 02/11/21  0209 02/10/21  0623    148* 148*   K 3.5 3.6 3.4*   * 116* 119*   CO2 25 26 23   BUN 14 22* 30*   CREA 0.39* 0.46* 0.49*   * 93 88   CA 8.8 9.4 9.5   MG  --   --  1.9   PHOS  --  2.3*  --      Recent Labs     02/10/21  0623   ALT 25   AP 59   TBILI 0.3   TP 6.7   ALB 2.0*   GLOB 4.7*     No results for input(s): INR, PTP, APTT, INREXT, INREXT in the last 72 hours. No results for input(s): FE, TIBC, PSAT, FERR in the last 72 hours. Lab Results   Component Value Date/Time    Folate 17.3 02/10/2021 06:23 AM      No results for input(s): PH, PCO2, PO2 in the last 72 hours.   Recent Labs     02/12/21  0158 02/11/21  0209 02/10/21  0623 02/09/21  2143   CPK 45 18* 22* 28   TROIQ  --   --  <0.05 <0.05     Lab Results   Component Value Date/Time    Cholesterol, total 129 02/09/2021 09:43 PM    HDL Cholesterol 42 02/09/2021 09:43 PM    LDL, calculated 66.2 02/09/2021 09:43 PM    Triglyceride 104 02/09/2021 09:43 PM    CHOL/HDL Ratio 3.1 02/09/2021 09:43 PM     No results found for: GLUCPOC  Lab Results   Component Value Date/Time    Color DARK YELLOW 02/09/2021 11:08 AM    Appearance TURBID (A) 02/09/2021 11:08 AM    Specific gravity 1.022 02/09/2021 11:08 AM    Specific gravity 1.015 11/29/2020 01:21 PM    pH (UA) 6.5 02/09/2021 11:08 AM    Protein 100 (A) 02/09/2021 11:08 AM    Glucose Negative 02/09/2021 11:08 AM    Ketone TRACE (A) 02/09/2021 11:08 AM    Bilirubin Negative 02/09/2021 11:08 AM    Urobilinogen 1.0 02/09/2021 11:08 AM    Nitrites Positive (A) 02/09/2021 11:08 AM    Leukocyte Esterase LARGE (A) 02/09/2021 11:08 AM    Epithelial cells MANY (A) 02/09/2021 11:08 AM    Bacteria 4+ (A) 02/09/2021 11:08 AM    WBC >100 (H) 02/09/2021 11:08 AM    RBC 20-50 02/09/2021 11:08 AM         Medications Reviewed:     Current Facility-Administered Medications   Medication Dose Route Frequency    enoxaparin (LOVENOX) injection 30 mg  30 mg SubCUTAneous Q12H    dextrose 5% infusion  75 mL/hr IntraVENous CONTINUOUS    balsam peru-castor oiL (VENELEX) ointment   Topical TID    divalproex (DEPAKOTE SPRINKLE) capsule 625 mg  625 mg Oral BID    donepeziL (ARICEPT) tablet 5 mg  5 mg Oral QHS    memantine (NAMENDA) tablet 5 mg  5 mg Oral BID    mirtazapine (REMERON) tablet 15 mg  15 mg Oral QHS    propranoloL (INDERAL) tablet 10 mg  10 mg Oral BID    nystatin (MYCOSTATIN) 100,000 unit/gram powder   Topical BID    risperiDONE (RisperDAL) tablet 1 mg  1 mg Oral BID    sodium chloride (NS) flush 5-40 mL  5-40 mL IntraVENous Q8H    sodium chloride (NS) flush 5-40 mL  5-40 mL IntraVENous PRN    acetaminophen (TYLENOL) tablet 650 mg  650 mg Oral Q4H PRN    cefTRIAXone (ROCEPHIN) 1 g in 0.9% sodium chloride (MBP/ADV) 50 mL MBP  1 g IntraVENous Q24H    ascorbic acid (vitamin C) (VITAMIN C) tablet 500 mg  500 mg Oral BID    zinc sulfate (ZINCATE) 220 (50) mg capsule 1 Cap  1 Cap Oral DAILY     ______________________________________________________________________  EXPECTED LENGTH OF STAY: 4d 19h  ACTUAL LENGTH OF STAY:          3                 Steve JEFFERS Valentín Callejas MD

## 2021-02-12 NOTE — PROGRESS NOTES
PINA:  1. RUR-26%  2. Admitted from [facility]. 3. BLS transport on discharge. CM spoke to the R [address] at [facility], [phone]). He can accept the patient back when discharged. He requested for cm to fax updates to [phone]. He confirmed they have staff for ambulatory and non ambulatory residents, and the correct DME if needed. Her daughter, Verena Houston is agreeable with the plan [phone]. Justina Gray said that the  Cm can contact him on the above number when discharge is confirmed.        Willie Pulido, [facility]

## 2021-02-13 VITALS
RESPIRATION RATE: 16 BRPM | HEART RATE: 85 BPM | DIASTOLIC BLOOD PRESSURE: 78 MMHG | BODY MASS INDEX: 25.97 KG/M2 | TEMPERATURE: 96.9 F | WEIGHT: 146.61 LBS | OXYGEN SATURATION: 95 % | SYSTOLIC BLOOD PRESSURE: 150 MMHG

## 2021-02-13 LAB
CK SERPL-CCNC: 21 U/L (ref 26–192)
D DIMER PPP FEU-MCNC: 3.64 MG/L FEU (ref 0–0.65)
ERYTHROCYTE [DISTWIDTH] IN BLOOD BY AUTOMATED COUNT: 13.1 % (ref 11.5–14.5)
HCT VFR BLD AUTO: 31.7 % (ref 35–47)
HGB BLD-MCNC: 10.1 G/DL (ref 11.5–16)
LACTATE SERPL-SCNC: 1 MMOL/L (ref 0.4–2)
MCH RBC QN AUTO: 31.4 PG (ref 26–34)
MCHC RBC AUTO-ENTMCNC: 31.9 G/DL (ref 30–36.5)
MCV RBC AUTO: 98.4 FL (ref 80–99)
NRBC # BLD: 0 K/UL (ref 0–0.01)
NRBC BLD-RTO: 0 PER 100 WBC
PLATELET # BLD AUTO: 182 K/UL (ref 150–400)
PMV BLD AUTO: 9.6 FL (ref 8.9–12.9)
RBC # BLD AUTO: 3.22 M/UL (ref 3.8–5.2)
WBC # BLD AUTO: 10 K/UL (ref 3.6–11)

## 2021-02-13 PROCEDURE — 85027 COMPLETE CBC AUTOMATED: CPT

## 2021-02-13 PROCEDURE — 74011250637 HC RX REV CODE- 250/637: Performed by: FAMILY MEDICINE

## 2021-02-13 PROCEDURE — 74011250636 HC RX REV CODE- 250/636: Performed by: FAMILY MEDICINE

## 2021-02-13 PROCEDURE — 74011250636 HC RX REV CODE- 250/636: Performed by: HOSPITALIST

## 2021-02-13 PROCEDURE — 83605 ASSAY OF LACTIC ACID: CPT

## 2021-02-13 PROCEDURE — 36415 COLL VENOUS BLD VENIPUNCTURE: CPT

## 2021-02-13 PROCEDURE — 74011000258 HC RX REV CODE- 258: Performed by: FAMILY MEDICINE

## 2021-02-13 PROCEDURE — 82550 ASSAY OF CK (CPK): CPT

## 2021-02-13 PROCEDURE — 85379 FIBRIN DEGRADATION QUANT: CPT

## 2021-02-13 RX ORDER — ASCORBIC ACID 500 MG
500 TABLET ORAL 2 TIMES DAILY
Qty: 60 TAB | Refills: 0 | Status: SHIPPED | OUTPATIENT
Start: 2021-02-13

## 2021-02-13 RX ORDER — ZINC SULFATE 50(220)MG
220 CAPSULE ORAL DAILY
Qty: 10 CAP | Refills: 0 | Status: SHIPPED | OUTPATIENT
Start: 2021-02-14

## 2021-02-13 RX ORDER — CEFUROXIME AXETIL 500 MG/1
500 TABLET ORAL 2 TIMES DAILY
Qty: 10 TAB | Refills: 0 | Status: SHIPPED | OUTPATIENT
Start: 2021-02-13 | End: 2021-02-24

## 2021-02-13 RX ADMIN — RISPERIDONE 1 MG: 1 TABLET ORAL at 07:32

## 2021-02-13 RX ADMIN — Medication 10 ML: at 06:24

## 2021-02-13 RX ADMIN — OXYCODONE HYDROCHLORIDE AND ACETAMINOPHEN 500 MG: 500 TABLET ORAL at 09:16

## 2021-02-13 RX ADMIN — PROPRANOLOL HYDROCHLORIDE 10 MG: 20 TABLET ORAL at 09:16

## 2021-02-13 RX ADMIN — DIVALPROEX SODIUM 625 MG: 125 CAPSULE ORAL at 09:16

## 2021-02-13 RX ADMIN — CEFTRIAXONE SODIUM 1 G: 1 INJECTION, POWDER, FOR SOLUTION INTRAMUSCULAR; INTRAVENOUS at 12:17

## 2021-02-13 RX ADMIN — MEMANTINE 5 MG: 5 TABLET ORAL at 09:16

## 2021-02-13 RX ADMIN — NYSTATIN: 100000 POWDER TOPICAL at 09:17

## 2021-02-13 RX ADMIN — DEXTROSE MONOHYDRATE 75 ML/HR: 50 INJECTION, SOLUTION INTRAVENOUS at 06:24

## 2021-02-13 RX ADMIN — Medication 1 CAPSULE: at 09:16

## 2021-02-13 RX ADMIN — CASTOR OIL AND BALSAM, PERU: 788; 87 OINTMENT TOPICAL at 09:17

## 2021-02-13 RX ADMIN — ENOXAPARIN SODIUM 30 MG: 30 INJECTION SUBCUTANEOUS at 12:17

## 2021-02-13 NOTE — PROGRESS NOTES
Bedside shift change report given to Chelsey House (oncoming nurse) by Mikel Mckeon RN (offgoing nurse). Report included the following information SBAR, Kardex and MAR.

## 2021-02-13 NOTE — DISCHARGE SUMMARY
Discharge Summary       PATIENT ID: Huey Blanchard  MRN: [id number]   YOB: 1945    DATE OF ADMISSION: 2/9/2021  9:52 AM    DATE OF DISCHARGE: 2/13/2021   PRIMARY CARE PROVIDER: Keila Mcneil MD     ATTENDING PHYSICIAN: Gillian Hu MD  DISCHARGING PROVIDER: Trenton Castillo MD    To contact this individual call [phone] and ask the  to page. If unavailable ask to be transferred the Adult Hospitalist Department. CONSULTATIONS: None    PROCEDURES/SURGERIES: * No surgery found *    ADMITTING DIAGNOSES & HOSPITAL COURSE:     Dada Alvares a 67 y. o. female who presents with weakness. Patient has history of expressive and receptive aphasia and underlying dementia history was extremely limited. Lovely Forward is primarily obtained from the daughter Behzad Doyle reports that patient has history of stroke, expressive and receptive aphasia post stroke, also has some underlying dementia.  Daughter reports that patient lives at [address] [place] [address] and was recently diagnosed with Covid on 1/29/2021.  Apparently for the last few days patient has not been eating drinking well.  The facility today noted that her blood pressure was slightly elevated, she was having some hypoxia and was more lethargic than usual and was not sounding as she usually does.  The facility got concerned and sent the patient to the ER.  In the ER patient was found to have a UTI and was requested to be admitted to the hospital service.   Patient currently resting in bed, alert x1, denies any complaints or problems.  No other history can be obtained from the patient    Sepsis likely secondary to Bacteriuria/UTI POA  -on iv Rocephin, switch to po cefuroxime on discharge  -Leukocytosis resolved  -Blood cxs no growth so far  -Urine cx no significant growth  -Lactate wnl x3  Severe RUE pain, tenderness, unable to move, unclear etiology  -improving with prn tylenol  -RUE X ray no acute osseous abnormality.   Rapid covid positive without hypoxia  -SpO2 94-95% on RA, monitor  -First positive test reportedly 1/29/21, has been >10 days  -on vitamin c, zinc  -no need treatment at this time  -monitor pulse ox  Metabolic encephalopathy, acute on chronic, likely near baseline  -Likely 2/2 sepsis above  -Baseline global aphasia  -Cont to monitor as infection is treated  -TSH ok 0.64  -CT head nothing acute, MRI brain pending, because of COVID positive test  -Continue neuro check and supportive care  Bipolar 1 disorder, moderate to severe Alzheimer's type dementia  -Discharged last month from inpt psych unit  -continue on depkote, resperidone   Hypernatremia/hyperchloremia c/w decreased PO/water intake and dehydration  -improved   Hypoxic respiratory failure on admission  -Transient, resolved  -SpO2 93-96% on RA  Mild Macrocytic Anemia   -Hgb 10.1  -vtiamin B12 and folate normal  -monitor H/H  Mild Hypokalemia   -resolved       Code status:Full Code  DVT prophylaxis: Lovenox      DISCHARGE DIAGNOSES / PLAN:      Sepsis likely secondary to Bacteriuria/UTI POA  -improved, continue cefuroxime 500 mg po bid for 5 more days with Linda Q    Severe RUE pain, tenderness, unable to move, unclear etiology  -improved with normal saline  Rapid covid positive without hypoxia  -stable, SpO2 94-95% on RA, monitor  -no need treatment at this time  -monitor pulse ox  Metabolic encephalopathy, acute on chronic, likely near baseline  -Likely 2/2 sepsis above  -improved, appeared at her base line  -Cont to monitor as infection is treated  -Continue neuro check and supportive care  Bipolar 1 disorder, moderate to severe Alzheimer's type dementia  -continue on depkote, resperidone   Hypernatremia/hyperchloremia c/w decreased PO/water intake and dehydration  -improved   Hypoxic respiratory failure on admission  -Transient, resolved  Mild Macrocytic Anemia   -monitor H/H  Mild Hypokalemia   -resolved      PENDING TEST RESULTS:   At the time of discharge the following test results are still pending: None    FOLLOW UP APPOINTMENTS:    Follow-up Information     Follow up With Specialties Details Why Contact Info    Katheryn Yang MD Family Medicine In one week  [place] [id number] [phone]            DIET: Regular Diet    ACTIVITY: Activity as tolerated    WOUND CARE: None    EQUIPMENT needed: None      DISCHARGE MEDICATIONS:  Current Discharge Medication List      START taking these medications    Details   ascorbic acid, vitamin C, (VITAMIN C) 500 mg tablet Take 1 Tab by mouth two (2) times a day. Qty: 60 Tab, Refills: 0      zinc sulfate (ZINCATE) 220 (50) mg capsule Take 1 Cap by mouth daily. Qty: 10 Cap, Refills: 0      cefUROXime (CEFTIN) 500 mg tablet Take 1 Tab by mouth two (2) times a day for 5 days. Qty: 10 Tab, Refills: 0      L. acidoph & paracasei- S therm- Bifido (ALMAZ-Q/RISAQUAD) 8 billion cell cap cap Take 1 Cap by mouth daily. Qty: 10 Cap, Refills: 0         CONTINUE these medications which have NOT CHANGED    Details   acetaminophen (TYLENOL) 325 mg tablet Take 650 mg by mouth every four (4) hours as needed for Pain.      lidocaine (Aspercreme, lidocaine,) 4 % patch 1 Patch by TransDERmal route every twenty-four (24) hours. benztropine (COGENTIN) 0.5 mg tablet Take 0.5 mg by mouth two (2) times daily as needed for Other (Extrapyramidal symptoms). loperamide (IMODIUM) 2 mg capsule Take 2 mg by mouth four (4) times daily as needed for Diarrhea.      magnesium hydroxide (Milk of Magnesia) 400 mg/5 mL suspension Take 30 mL by mouth daily as needed for Constipation. OLANZapine (ZyPREXA) 2.5 mg tablet Take 2.5 mg by mouth every six (6) hours as needed for Other (Agitation, psychosis). divalproex (DEPAKOTE SPRINKLE) 125 mg capsule Take 5 Caps by mouth two (2) times a day.  Indications: mood  Qty: 300 Cap, Refills: 0      docusate sodium (COLACE) 100 mg capsule Take 1 Cap by mouth daily for 30 days. Indications: constipation  Qty: 30 Cap, Refills: 0      donepeziL (ARICEPT) 5 mg tablet Take 1 Tab by mouth nightly. 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 Indications: sneezing  Qty: 30 Tab, Refills: 0      memantine (NAMENDA) 5 mg tablet Take 1 Tab by mouth two (2) times a day. 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 Indications: major depressive disorder  Qty: 30 Tab, Refills: 0      nystatin (MYCOSTATIN) powder Apply  to affected area two (2) times a day. Indications: diaper rash  Qty: 1 Bottle, Refills: 0      propranoloL (INDERAL) 10 mg tablet Take 1 Tab by mouth two (2) times a day. Indications: essential tremor  Qty: 60 Tab, Refills: 0      risperiDONE (RisperDAL) 1 mg tablet Take 1 Tab by mouth two (2) times a day. Indications: mood  Qty: 60 Tab, Refills: 0               NOTIFY YOUR PHYSICIAN FOR ANY OF THE FOLLOWING:   Fever over 101 degrees for 24 hours. Chest pain, shortness of breath, fever, chills, nausea, vomiting, diarrhea, change in mentation, falling, weakness, bleeding. Severe pain or pain not relieved by medications. Or, any other signs or symptoms that you may have questions about.     DISPOSITION:   x Home With:  x OT x PT  HH  RN       Long term SNF/Inpatient Rehab    Independent/assisted living    Hospice    Other:       PATIENT CONDITION AT DISCHARGE:     Functional status    Poor    x Deconditioned     Independent      Cognition     Lucid     Forgetful    x Dementia      Catheters/lines (plus indication)    [place]     PICC     PEG    x None      Code status    x Full code     DNR      PHYSICAL EXAMINATION AT DISCHARGE:  Patient Vitals for the past 24 hrs:   Temp Pulse Resp BP SpO2   02/13/21 0943 96.9 °F (36.1 °C) 85 16 (!) 150/78 95 % 02/13/21 0306 97.3 °F (36.3 °C) 78 16 133/68 95 %   02/12/21 2136 98.7 °F (37.1 °C) 75 16 134/77 94 %     General:          Alert, cooperative, no distress, appears stated age. HEENT:           Atraumatic, anicteric sclerae, pink conjunctivae                          No oral ulcers, mucosa moist, throat clear, dentition fair  Neck:               Supple, symmetrical  Lungs:             Clear to auscultation bilaterally. No Wheezing or Rhonchi. No rales. Chest wall:      No tenderness  No Accessory muscle use. Heart:              Regular  rhythm,  No  murmur   No edema  Abdomen:        Soft, non-tender. Not distended. Bowel sounds normal  Extremities:     No cyanosis. No clubbing,                            Skin turgor normal, Capillary refill normal  Skin:                Not pale. Right frontal skin lesion   Psych:             Not anxious or agitated.   Neurologic:      Alert, moves all extremities, responds to commands       Recent Results (from the past 24 hour(s))   CK    Collection Time: 02/13/21  6:31 AM   Result Value Ref Range    CK 21 (L) 26 - 192 U/L   LACTIC ACID    Collection Time: 02/13/21  6:31 AM   Result Value Ref Range    Lactic acid 1.0 0.4 - 2.0 MMOL/L   D DIMER    Collection Time: 02/13/21  6:31 AM   Result Value Ref Range    D-dimer 3.64 (H) 0.00 - 0.65 mg/L FEU   CBC W/O DIFF    Collection Time: 02/13/21  6:31 AM   Result Value Ref Range    WBC 10.0 3.6 - 11.0 K/uL    RBC 3.22 (L) 3.80 - 5.20 M/uL    HGB 10.1 (L) 11.5 - 16.0 g/dL    HCT 31.7 (L) 35.0 - 47.0 %    MCV 98.4 80.0 - 99.0 FL    MCH 31.4 26.0 - 34.0 PG    MCHC 31.9 30.0 - 36.5 g/dL    RDW 13.1 11.5 - 14.5 %    PLATELET 059 070 - 368 K/uL    MPV 9.6 8.9 - 12.9 FL    NRBC 0.0 0  WBC    ABSOLUTE NRBC 0.00 0.00 - 0.01 K/uL     CHRONIC MEDICAL DIAGNOSES:  Problem List as of 2/13/2021 Date Reviewed: 8/3/2015          Codes Class Noted - Resolved    [facility] ICD-10-CM: A41.9  ICD-9-CM: 038.9, 995.91  2/9/2021 - Present Bipolar 1 disorder ([facility] [address] ICD-10-CM: F31.9  ICD-9-CM: 296.7  12/14/2020 - Present        Acute metabolic encephalopathy [facility]-AT: G93.41  ICD-9-CM: 348.31  11/30/2020 - Present        UTI (urinary tract infection) ICD-10-CM: N39.0  ICD-9-CM: 599.0  11/30/2020 - Present        AMS (altered mental status) ICD-10-CM: R41.82  ICD-9-CM: 780.97  11/29/2020 - Present        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

## 2021-02-13 NOTE — PROGRESS NOTES
Problem: Falls - Risk of  Goal: *Absence of Falls  Description: Document Berhane Long Fall Risk and appropriate interventions in the flowsheet. Outcome: Resolved/Met     Problem: Patient Education: Go to Patient Education Activity  Goal: Patient/Family Education  Outcome: Resolved/Met     Problem: Pressure Injury - Risk of  Goal: *Prevention of pressure injury  Description: Document Kris Scale and appropriate interventions in the flowsheet. Outcome: Resolved/Met     Problem: Patient Education: Go to Patient Education Activity  Goal: Patient/Family Education  Outcome: Resolved/Met     Problem: Discharge Planning  Goal: *Discharge to safe environment  Outcome: Resolved/Met     Problem: Patient Education: Go to Patient Education Activity  Goal: Patient/Family Education  Outcome: Resolved/Met     Problem: Patient Education: Go to Patient Education Activity  Goal: Patient/Family Education  Outcome: Resolved/Met     Problem: Breathing Pattern - Ineffective  Goal: *Use of effective breathing techniques  Outcome: Resolved/Met  Goal: *PALLIATIVE CARE:  Alleviation of Dyspnea  Outcome: Resolved/Met     Problem: Patient Education: Go to Patient Education Activity  Goal: Patient/Family Education  Outcome: Resolved/Met     Problem: Gas Exchange - Impaired  Goal: Promote optimal lung function  Outcome: Resolved/Met     Problem: Breathing Pattern - Ineffective  Goal: Ability to achieve and maintain a regular respiratory rate  Outcome: Resolved/Met     Problem:  Body Temperature -  Risk of, Imbalanced  Goal: Ability to maintain a body temperature within defined limits  Outcome: Resolved/Met  Goal: Will regain or maintain usual level of consciousness  Outcome: Resolved/Met  Goal: Complications related to the disease process, condition or treatment will be avoided or minimized  Outcome: Resolved/Met     Problem: Isolation Precautions - Risk of Spread of Infection  Goal: Prevent transmission of infectious organism to others  Outcome: Resolved/Met     Problem: Nutrition Deficits  Goal: Optimize nutrtional status  Outcome: Resolved/Met     Problem: Risk for Fluid Volume Deficit  Goal: Maintain normal heart rhythm  Outcome: Resolved/Met  Goal: Maintain absence of muscle cramping  Outcome: Resolved/Met  Goal: Maintain normal serum potassium, sodium, calcium, phosphorus, and pH  Outcome: Resolved/Met     Problem: Loneliness or Risk for Loneliness  Goal: Demonstrate positive use of time alone when socialization is not possible  Outcome: Resolved/Met     Problem: Fatigue  Goal: Verbalize increase energy and improved vitality  Outcome: Resolved/Met     Problem: Patient Education: Go to Patient Education Activity  Goal: Patient/Family Education  Outcome: Resolved/Met

## 2021-02-13 NOTE — PROGRESS NOTES
[address] [facility] Adult  Hospitalist Group                                                                                          Hospitalist Progress Note  Jah Waller MD  Answering service: [phone] from in house phone        Date of Service:  2021  NAME:  Charlee Nance  :  [birth year]  MRN:  [id number]      Admission Summary:     Mary Simon a 67 y. o. female who presents with weakness. Patient has history of expressive and receptive aphasia and underlying dementia history was extremely limited. Winchesterleoncio Willard is primarily obtained from the daughter Wilmer Page reports that patient has history of stroke, expressive and receptive aphasia post stroke, also has some underlying dementia.  Daughter reports that patient lives at [address] [place] [address] and was recently diagnosed with Covid on 2021.  Apparently for the last few days patient has not been eating drinking well.  The facility today noted that her blood pressure was slightly elevated, she was having some hypoxia and was more lethargic than usual and was not sounding as she usually does.  The facility got concerned and sent the patient to the ER.  In the ER patient was found to have a UTI and was requested to be admitted to the hospital service. Patient currently resting in bed, alert x1, denies any complaints or problems.  No other history can be obtained from the patient    Interval history / Subjective:     Poor historian but she said she feels fine     Assessment & Plan:     Sepsis likely secondary to Bacteriuria/UTI POA  -on iv Rocephin, switch to po on discharge  -Leukocytosis resolved  -Blood cxs no growth so far  -Urine cx no significant growth  -Lactate wnl x3     Severe RUE pain, tenderness, unable to move, unclear etiology  -improving with prn tylenol  -RUE X ray no acute osseous abnormality.     Rapid covid positive without hypoxia  -SpO2 94-95% on RA, monitor  -First positive test reportedly 1/29/21, has been >10 days  -on vitamin c, zinc  -no treatment at this time  -monitor pulse ox     Metabolic encephalopathy, acute on chronic, likely near baseline  -Likely 2/2 sepsis above  -Baseline global aphasia  -Cont to monitor as infection is treated  -TSH ok 0.64  -CT head nothing acute, MRI brain pending, because of COVID positive test  -Continue neuro check and supportive care     Bipolar 1 disorder, moderate to severe Alzheimer's type dementia  -Discharged last month from inpt psych unit  -on depkote, resperidone  -Depakote, level low end of therapeutic range at 50     Hypernatremia/hyperchloremia c/w decreased PO/water intake and dehydration  -improved     Hypoxic respiratory failure on admission,   -Transient, resolved  -SpO2 93-96% on RA     Macrocytic Anemia, mild,  -Hgb 10.1  -vtiamin B12 and folate normal  -monitor H/H     Hypokalemia, mild  -resolved        Code status:Full Code  DVT prophylaxis: Lovenox    Care Plan discussed with: Patient/Family, Nurse and   Anticipated Disposition: Back to [place] [address]  Anticipated Discharge: Greater than 48 hours     Hospital Problems  Date Reviewed: 8/3/2015          Codes Class Noted POA    Sepsis ([facility] [address] ICD-10-CM: A41.9  ICD-9-CM: 038.9, 995.91  2/9/2021 Unknown              Vital Signs:    Last 24hrs VS reviewed since prior progress note. Most recent are:  Visit Vitals  /68 (BP 1 Location: Left upper arm, BP Patient Position: Supine; At rest)   Pulse 78   Temp 97.3 °F (36.3 °C)   Resp 16   Wt 66.5 kg (146 lb 9.7 oz)   SpO2 95%   BMI 25.97 kg/m²         Intake/Output Summary (Last 24 hours) at 2/13/2021 6358  Last data filed at 2/13/2021 5279  Gross per 24 hour   Intake 340 ml   Output 550 ml   Net -210 ml        Physical Examination:     I had a face to face encounter with this patient and independently examined them on 2/13/2021 as outlined below:          Constitutional:  No acute distress, cooperative, pleasant    ENT:  Oral mucosa moist, oropharynx benign. Resp:  CTA bilaterally. No wheezing/rhonchi/rales. No accessory muscle use   CV:  Regular rhythm, normal rate, no murmurs, gallops, rubs    GI:  Soft, non distended, non tender. normoactive bowel sounds, no hepatosplenomegaly     Musculoskeletal:  No edema,     Neurologic:  Conscious and alert, disoriented follows simple command      Skin:  Right frontal skin lesion       Data Review:    Review and/or order of clinical lab test  Review and/or order of tests in the radiology section of CPT  Review and/or order of tests in the medicine section of CPT      Labs:     Recent Labs     02/13/21  0631 02/12/21 0158   WBC 10.0 12.7*   HGB 10.1* 10.2*   HCT 31.7* 32.4*    163     Recent Labs     02/12/21 0158 02/11/21  0209    148*   K 3.5 3.6   * 116*   CO2 25 26   BUN 14 22*   CREA 0.39* 0.46*   * 93   CA 8.8 9.4   PHOS  --  2.3*     No results for input(s): ALT, AP, TBIL, TBILI, TP, ALB, GLOB, GGT, AML, LPSE in the last 72 hours. No lab exists for component: SGOT, GPT, AMYP, HLPSE  No results for input(s): INR, PTP, APTT, INREXT, INREXT in the last 72 hours. No results for input(s): FE, TIBC, PSAT, FERR in the last 72 hours. Lab Results   Component Value Date/Time    Folate 17.3 02/10/2021 06:23 AM      No results for input(s): PH, PCO2, PO2 in the last 72 hours.   Recent Labs     02/13/21  0631 02/12/21 0158 02/11/21  0209   CPK 21* 45 18*     Lab Results   Component Value Date/Time    Cholesterol, total 129 02/09/2021 09:43 PM    HDL Cholesterol 42 02/09/2021 09:43 PM    LDL, calculated 66.2 02/09/2021 09:43 PM    Triglyceride 104 02/09/2021 09:43 PM    CHOL/HDL Ratio 3.1 02/09/2021 09:43 PM     No results found for: GLUCPOC  Lab Results   Component Value Date/Time    Color DARK YELLOW 02/09/2021 11:08 AM    Appearance TURBID (A) 02/09/2021 11:08 AM    Specific gravity 1.022 02/09/2021 11:08 AM    Specific gravity 1.015 11/29/2020 01:21 PM    pH (UA) 6.5 02/09/2021 11:08 AM    Protein 100 (A) 02/09/2021 11:08 AM    Glucose Negative 02/09/2021 11:08 AM    Ketone TRACE (A) 02/09/2021 11:08 AM    Bilirubin Negative 02/09/2021 11:08 AM    Urobilinogen 1.0 02/09/2021 11:08 AM    Nitrites Positive (A) 02/09/2021 11:08 AM    Leukocyte Esterase LARGE (A) 02/09/2021 11:08 AM    Epithelial cells MANY (A) 02/09/2021 11:08 AM    Bacteria 4+ (A) 02/09/2021 11:08 AM    WBC >100 (H) 02/09/2021 11:08 AM    RBC 20-50 02/09/2021 11:08 AM         Medications Reviewed:     Current Facility-Administered Medications   Medication Dose Route Frequency    enoxaparin (LOVENOX) injection 30 mg  30 mg SubCUTAneous Q12H    dextrose 5% infusion  75 mL/hr IntraVENous CONTINUOUS    balsam peru-castor oiL (VENELEX) ointment   Topical TID    divalproex (DEPAKOTE SPRINKLE) capsule 625 mg  625 mg Oral BID    donepeziL (ARICEPT) tablet 5 mg  5 mg Oral QHS    memantine (NAMENDA) tablet 5 mg  5 mg Oral BID    mirtazapine (REMERON) tablet 15 mg  15 mg Oral QHS    propranoloL (INDERAL) tablet 10 mg  10 mg Oral BID    nystatin (MYCOSTATIN) 100,000 unit/gram powder   Topical BID    risperiDONE (RisperDAL) tablet 1 mg  1 mg Oral BID    sodium chloride (NS) flush 5-40 mL  5-40 mL IntraVENous Q8H    sodium chloride (NS) flush 5-40 mL  5-40 mL IntraVENous PRN    acetaminophen (TYLENOL) tablet 650 mg  650 mg Oral Q4H PRN    cefTRIAXone (ROCEPHIN) 1 g in 0.9% sodium chloride (MBP/ADV) 50 mL MBP  1 g IntraVENous Q24H    ascorbic acid (vitamin C) (VITAMIN C) tablet 500 mg  500 mg Oral BID    zinc sulfate (ZINCATE) 220 (50) mg capsule 1 Cap  1 Cap Oral DAILY     ______________________________________________________________________  EXPECTED LENGTH OF STAY: 4d 19h  ACTUAL LENGTH OF STAY:          4                 Ginette Dougherty MD

## 2021-02-13 NOTE — DISCHARGE INSTRUCTIONS
Discharge Instructions       PATIENT ID: Rishi Maradiaga  MRN: [id number]   YOB: 1945    DATE OF ADMISSION: 2/9/2021  9:52 AM    DATE OF DISCHARGE: 2/13/2021    PRIMARY CARE PROVIDER: Rakesh Wade MD     ATTENDING PHYSICIAN: Brandt Valiente MD  DISCHARGING PROVIDER: Dylan Dejesus MD    To contact this individual call [phone] and ask the  to page. If unavailable ask to be transferred the Adult Hospitalist Department. DISCHARGE DIAGNOSES   Sepsis likely secondary to Bacteriuria/UTI POA  Severe RUE pain, tenderness, unable to move, unclear etiology  Rapid covid positive without hypoxia  Metabolic encephalopathy, acute on chronic, likely near baseline  Bipolar 1 disorder, moderate to severe Alzheimer's type dementia  Hypernatremia/hyperchloremia c/w decreased PO/water intake and dehydration, worse today  Hypoxic respiratory failure on admission, Transient, resolved  Macrocytic Anemia, mild, at baseline 10-11  Hypokalemia, mild    CONSULTATIONS: None    PROCEDURES/SURGERIES: * No surgery found *    PENDING TEST RESULTS:   At the time of discharge the following test results are still pending: None    FOLLOW UP APPOINTMENTS:   Follow-up Information     Follow up With Specialties Details Why Contact Info   1. Alda Garrett MD Family Medicine In one week   [address] [place] [address] [id number] [phone]        2. Follow up with Psych in 2 weeks. ADDITIONAL CARE RECOMMENDATIONS:     DIET: Regular Diet     ACTIVITY: Activity as tolerated    WOUND CARE: None    EQUIPMENT needed: None      DISCHARGE MEDICATIONS:   See Medication Reconciliation Form    · It is important that you take the medication exactly as they are prescribed. · Keep your medication in the bottles provided by the pharmacist and keep a list of the medication names, dosages, and times to be taken in your wallet.    · Do not take other medications without consulting your doctor. NOTIFY YOUR PHYSICIAN FOR ANY OF THE FOLLOWING:   Fever over 101 degrees for 24 hours. Chest pain, shortness of breath, fever, chills, nausea, vomiting, diarrhea, change in mentation, falling, weakness, bleeding. Severe pain or pain not relieved by medications. Or, any other signs or symptoms that you may have questions about.       DISPOSITION:  x  Home With:  x OT x PT x HH  RN       SNF/Inpatient Rehab/LTAC    Independent/assisted living    Hospice    Other:     CDMP Checked:   Yes x     PROBLEM LIST Updated:  Yes x       Signed:   Edward Juárez MD  2/13/2021  9:33 PM

## 2021-02-13 NOTE — PROGRESS NOTES
CM received call from Unit. Discharge Orders written    Call made to Karl at [facility], they can accept patient back at facility. He asked me fax discharge information [phone]. [facility]R/PCS placed on bedside chart.     Yolande Thomas RN, BSN, [facility]  ED Care Management  [phone]

## 2021-02-14 LAB
BACTERIA SPEC CULT: NORMAL
BACTERIA SPEC CULT: NORMAL
SERVICE CMNT-IMP: NORMAL
SERVICE CMNT-IMP: NORMAL

## 2021-02-19 ENCOUNTER — APPOINTMENT (OUTPATIENT)
Dept: CT IMAGING | Age: 76
DRG: 640 | End: 2021-02-19
Attending: FAMILY MEDICINE
Payer: MEDICARE

## 2021-02-19 ENCOUNTER — HOSPITAL ENCOUNTER (INPATIENT)
Age: 76
LOS: 5 days | Discharge: NURSING, REHAB OR LONG-TERM CARE | DRG: 640 | End: 2021-02-24
Attending: EMERGENCY MEDICINE | Admitting: FAMILY MEDICINE
Payer: MEDICARE

## 2021-02-19 ENCOUNTER — APPOINTMENT (OUTPATIENT)
Dept: GENERAL RADIOLOGY | Age: 76
DRG: 640 | End: 2021-02-19
Attending: EMERGENCY MEDICINE
Payer: MEDICARE

## 2021-02-19 DIAGNOSIS — N30.00 ACUTE CYSTITIS WITHOUT HEMATURIA: ICD-10-CM

## 2021-02-19 DIAGNOSIS — A41.9 SEPSIS WITHOUT ACUTE ORGAN DYSFUNCTION, DUE TO UNSPECIFIED ORGANISM (HCC): ICD-10-CM

## 2021-02-19 DIAGNOSIS — E87.0 HYPERNATREMIA: Primary | ICD-10-CM

## 2021-02-19 LAB
ALBUMIN SERPL-MCNC: 2.2 G/DL (ref 3.5–5)
ALBUMIN/GLOB SERPL: 0.4 {RATIO} (ref 1.1–2.2)
ALP SERPL-CCNC: 101 U/L (ref 45–117)
ALT SERPL-CCNC: 54 U/L (ref 12–78)
AMORPH CRY URNS QL MICRO: ABNORMAL
ANION GAP SERPL CALC-SCNC: 8 MMOL/L (ref 5–15)
APPEARANCE UR: ABNORMAL
AST SERPL-CCNC: 51 U/L (ref 15–37)
ATRIAL RATE: 142 BPM
BACTERIA URNS QL MICRO: NEGATIVE /HPF
BASOPHILS # BLD: 0 K/UL (ref 0–0.1)
BASOPHILS NFR BLD: 0 % (ref 0–1)
BILIRUB SERPL-MCNC: 0.3 MG/DL (ref 0.2–1)
BILIRUB UR QL: NEGATIVE
BUN SERPL-MCNC: 52 MG/DL (ref 6–20)
BUN/CREAT SERPL: 57 (ref 12–20)
CALCIUM SERPL-MCNC: 10.1 MG/DL (ref 8.5–10.1)
CALCULATED P AXIS, ECG09: 84 DEGREES
CALCULATED R AXIS, ECG10: 35 DEGREES
CALCULATED T AXIS, ECG11: 52 DEGREES
CHLORIDE SERPL-SCNC: 112 MMOL/L (ref 97–108)
CO2 SERPL-SCNC: 27 MMOL/L (ref 21–32)
COLOR UR: ABNORMAL
CREAT SERPL-MCNC: 0.91 MG/DL (ref 0.55–1.02)
DIAGNOSIS, 93000: NORMAL
DIFFERENTIAL METHOD BLD: ABNORMAL
EOSINOPHIL # BLD: 0 K/UL (ref 0–0.4)
EOSINOPHIL NFR BLD: 0 % (ref 0–7)
EPITH CASTS URNS QL MICRO: ABNORMAL /LPF
ERYTHROCYTE [DISTWIDTH] IN BLOOD BY AUTOMATED COUNT: 13.5 % (ref 11.5–14.5)
GLOBULIN SER CALC-MCNC: 6 G/DL (ref 2–4)
GLUCOSE SERPL-MCNC: 124 MG/DL (ref 65–100)
GLUCOSE UR STRIP.AUTO-MCNC: NEGATIVE MG/DL
HCT VFR BLD AUTO: 44.6 % (ref 35–47)
HGB BLD-MCNC: 14 G/DL (ref 11.5–16)
HGB UR QL STRIP: ABNORMAL
HYALINE CASTS URNS QL MICRO: ABNORMAL /LPF (ref 0–5)
IMM GRANULOCYTES # BLD AUTO: 0.1 K/UL (ref 0–0.04)
IMM GRANULOCYTES NFR BLD AUTO: 1 % (ref 0–0.5)
KETONES UR QL STRIP.AUTO: ABNORMAL MG/DL
LACTATE SERPL-SCNC: 1.2 MMOL/L (ref 0.4–2)
LACTATE SERPL-SCNC: 2.7 MMOL/L (ref 0.4–2)
LEUKOCYTE ESTERASE UR QL STRIP.AUTO: ABNORMAL
LYMPHOCYTES # BLD: 1.3 K/UL (ref 0.8–3.5)
LYMPHOCYTES NFR BLD: 15 % (ref 12–49)
MAGNESIUM SERPL-MCNC: 2.1 MG/DL (ref 1.6–2.4)
MCH RBC QN AUTO: 31.7 PG (ref 26–34)
MCHC RBC AUTO-ENTMCNC: 31.4 G/DL (ref 30–36.5)
MCV RBC AUTO: 101.1 FL (ref 80–99)
MONOCYTES # BLD: 0.7 K/UL (ref 0–1)
MONOCYTES NFR BLD: 8 % (ref 5–13)
NEUTS SEG # BLD: 6.3 K/UL (ref 1.8–8)
NEUTS SEG NFR BLD: 76 % (ref 32–75)
NITRITE UR QL STRIP.AUTO: NEGATIVE
NRBC # BLD: 0 K/UL (ref 0–0.01)
NRBC BLD-RTO: 0 PER 100 WBC
P-R INTERVAL, ECG05: 120 MS
PH UR STRIP: 6.5 [PH] (ref 5–8)
PLATELET # BLD AUTO: 330 K/UL (ref 150–400)
PMV BLD AUTO: 9.6 FL (ref 8.9–12.9)
POTASSIUM SERPL-SCNC: 3.6 MMOL/L (ref 3.5–5.1)
PROT SERPL-MCNC: 8.2 G/DL (ref 6.4–8.2)
PROT UR STRIP-MCNC: 100 MG/DL
Q-T INTERVAL, ECG07: 274 MS
QRS DURATION, ECG06: 72 MS
QTC CALCULATION (BEZET), ECG08: 421 MS
RBC # BLD AUTO: 4.41 M/UL (ref 3.8–5.2)
RBC #/AREA URNS HPF: ABNORMAL /HPF (ref 0–5)
RBC MORPH BLD: ABNORMAL
SODIUM SERPL-SCNC: 147 MMOL/L (ref 136–145)
SP GR UR REFRACTOMETRY: 1.02 (ref 1–1.03)
TROPONIN I SERPL-MCNC: <0.05 NG/ML
UR CULT HOLD, URHOLD: NORMAL
UROBILINOGEN UR QL STRIP.AUTO: 0.2 EU/DL (ref 0.2–1)
VENTRICULAR RATE, ECG03: 142 BPM
WBC # BLD AUTO: 8.4 K/UL (ref 3.6–11)
WBC URNS QL MICRO: ABNORMAL /HPF (ref 0–4)

## 2021-02-19 PROCEDURE — 80053 COMPREHEN METABOLIC PANEL: CPT

## 2021-02-19 PROCEDURE — 74011000258 HC RX REV CODE- 258: Performed by: FAMILY MEDICINE

## 2021-02-19 PROCEDURE — 96360 HYDRATION IV INFUSION INIT: CPT

## 2021-02-19 PROCEDURE — 71045 X-RAY EXAM CHEST 1 VIEW: CPT

## 2021-02-19 PROCEDURE — 83605 ASSAY OF LACTIC ACID: CPT

## 2021-02-19 PROCEDURE — 74011250637 HC RX REV CODE- 250/637: Performed by: FAMILY MEDICINE

## 2021-02-19 PROCEDURE — 74011000258 HC RX REV CODE- 258: Performed by: EMERGENCY MEDICINE

## 2021-02-19 PROCEDURE — 93005 ELECTROCARDIOGRAM TRACING: CPT

## 2021-02-19 PROCEDURE — 83735 ASSAY OF MAGNESIUM: CPT

## 2021-02-19 PROCEDURE — 65660000001 HC RM ICU INTERMED STEPDOWN

## 2021-02-19 PROCEDURE — 74011000250 HC RX REV CODE- 250: Performed by: FAMILY MEDICINE

## 2021-02-19 PROCEDURE — 87040 BLOOD CULTURE FOR BACTERIA: CPT

## 2021-02-19 PROCEDURE — 74011250636 HC RX REV CODE- 250/636: Performed by: FAMILY MEDICINE

## 2021-02-19 PROCEDURE — 74011000636 HC RX REV CODE- 636: Performed by: FAMILY MEDICINE

## 2021-02-19 PROCEDURE — 36415 COLL VENOUS BLD VENIPUNCTURE: CPT

## 2021-02-19 PROCEDURE — 81001 URINALYSIS AUTO W/SCOPE: CPT

## 2021-02-19 PROCEDURE — 87086 URINE CULTURE/COLONY COUNT: CPT

## 2021-02-19 PROCEDURE — 74011250636 HC RX REV CODE- 250/636: Performed by: EMERGENCY MEDICINE

## 2021-02-19 PROCEDURE — 70450 CT HEAD/BRAIN W/O DYE: CPT

## 2021-02-19 PROCEDURE — 85025 COMPLETE CBC W/AUTO DIFF WBC: CPT

## 2021-02-19 PROCEDURE — 71275 CT ANGIOGRAPHY CHEST: CPT

## 2021-02-19 PROCEDURE — 84484 ASSAY OF TROPONIN QUANT: CPT

## 2021-02-19 PROCEDURE — 99285 EMERGENCY DEPT VISIT HI MDM: CPT

## 2021-02-19 RX ORDER — PROPRANOLOL HYDROCHLORIDE 10 MG/1
10 TABLET ORAL 2 TIMES DAILY
Status: DISCONTINUED | OUTPATIENT
Start: 2021-02-19 | End: 2021-02-24 | Stop reason: HOSPADM

## 2021-02-19 RX ORDER — MEMANTINE HYDROCHLORIDE 10 MG/1
5 TABLET ORAL 2 TIMES DAILY
Status: DISCONTINUED | OUTPATIENT
Start: 2021-02-19 | End: 2021-02-24 | Stop reason: HOSPADM

## 2021-02-19 RX ORDER — ASCORBIC ACID 500 MG
500 TABLET ORAL 2 TIMES DAILY
Status: DISCONTINUED | OUTPATIENT
Start: 2021-02-19 | End: 2021-02-24 | Stop reason: HOSPADM

## 2021-02-19 RX ORDER — SODIUM CHLORIDE 0.9 % (FLUSH) 0.9 %
5-40 SYRINGE (ML) INJECTION EVERY 8 HOURS
Status: DISCONTINUED | OUTPATIENT
Start: 2021-02-19 | End: 2021-02-24 | Stop reason: HOSPADM

## 2021-02-19 RX ORDER — SODIUM CHLORIDE 0.9 % (FLUSH) 0.9 %
5-10 SYRINGE (ML) INJECTION AS NEEDED
Status: DISCONTINUED | OUTPATIENT
Start: 2021-02-19 | End: 2021-02-24 | Stop reason: HOSPADM

## 2021-02-19 RX ORDER — OLANZAPINE 2.5 MG/1
2.5 TABLET ORAL
Status: DISCONTINUED | OUTPATIENT
Start: 2021-02-19 | End: 2021-02-24 | Stop reason: HOSPADM

## 2021-02-19 RX ORDER — DONEPEZIL HYDROCHLORIDE 5 MG/1
5 TABLET, FILM COATED ORAL
Status: DISCONTINUED | OUTPATIENT
Start: 2021-02-19 | End: 2021-02-24 | Stop reason: HOSPADM

## 2021-02-19 RX ORDER — ACETAMINOPHEN 325 MG/1
650 TABLET ORAL
Status: DISCONTINUED | OUTPATIENT
Start: 2021-02-19 | End: 2021-02-24 | Stop reason: HOSPADM

## 2021-02-19 RX ORDER — MIRTAZAPINE 15 MG/1
15 TABLET, FILM COATED ORAL
Status: DISCONTINUED | OUTPATIENT
Start: 2021-02-19 | End: 2021-02-24 | Stop reason: HOSPADM

## 2021-02-19 RX ORDER — HEPARIN SODIUM 5000 [USP'U]/ML
5000 INJECTION, SOLUTION INTRAVENOUS; SUBCUTANEOUS EVERY 8 HOURS
Status: DISCONTINUED | OUTPATIENT
Start: 2021-02-19 | End: 2021-02-20

## 2021-02-19 RX ORDER — RISPERIDONE 1 MG/1
1 TABLET, FILM COATED ORAL 2 TIMES DAILY
Status: DISCONTINUED | OUTPATIENT
Start: 2021-02-19 | End: 2021-02-24 | Stop reason: HOSPADM

## 2021-02-19 RX ORDER — SODIUM CHLORIDE 450 MG/100ML
75 INJECTION, SOLUTION INTRAVENOUS ONCE
Status: COMPLETED | OUTPATIENT
Start: 2021-02-19 | End: 2021-02-19

## 2021-02-19 RX ORDER — HEPARIN SODIUM 10000 [USP'U]/100ML
18-36 INJECTION, SOLUTION INTRAVENOUS
Status: CANCELLED | OUTPATIENT
Start: 2021-02-19

## 2021-02-19 RX ORDER — SODIUM CHLORIDE 0.9 % (FLUSH) 0.9 %
5-40 SYRINGE (ML) INJECTION AS NEEDED
Status: DISCONTINUED | OUTPATIENT
Start: 2021-02-19 | End: 2021-02-24 | Stop reason: HOSPADM

## 2021-02-19 RX ORDER — DIVALPROEX SODIUM 125 MG/1
625 CAPSULE, COATED PELLETS ORAL 2 TIMES DAILY
Status: DISCONTINUED | OUTPATIENT
Start: 2021-02-19 | End: 2021-02-24 | Stop reason: HOSPADM

## 2021-02-19 RX ORDER — LIDOCAINE 4 G/100G
1 PATCH TOPICAL EVERY 24 HOURS
Status: DISCONTINUED | OUTPATIENT
Start: 2021-02-19 | End: 2021-02-24 | Stop reason: HOSPADM

## 2021-02-19 RX ADMIN — IOPAMIDOL 100 ML: 755 INJECTION, SOLUTION INTRAVENOUS at 20:43

## 2021-02-19 RX ADMIN — SODIUM CHLORIDE 1000 ML: 9 INJECTION, SOLUTION INTRAVENOUS at 16:20

## 2021-02-19 RX ADMIN — PROPRANOLOL HYDROCHLORIDE 10 MG: 10 TABLET ORAL at 20:10

## 2021-02-19 RX ADMIN — HEPARIN SODIUM 5000 UNITS: 5000 INJECTION INTRAVENOUS; SUBCUTANEOUS at 20:05

## 2021-02-19 RX ADMIN — SODIUM CHLORIDE 75 ML/HR: 4.5 INJECTION, SOLUTION INTRAVENOUS at 20:05

## 2021-02-19 RX ADMIN — MEMANTINE 5 MG: 5 TABLET ORAL at 20:12

## 2021-02-19 RX ADMIN — OXYCODONE HYDROCHLORIDE AND ACETAMINOPHEN 500 MG: 500 TABLET ORAL at 20:09

## 2021-02-19 RX ADMIN — MIRTAZAPINE 15 MG: 15 TABLET, FILM COATED ORAL at 20:12

## 2021-02-19 RX ADMIN — DIVALPROEX SODIUM 625 MG: 125 CAPSULE ORAL at 20:12

## 2021-02-19 RX ADMIN — Medication 10 ML: at 23:21

## 2021-02-19 RX ADMIN — RISPERIDONE 1 MG: 1 TABLET ORAL at 20:08

## 2021-02-19 RX ADMIN — DONEPEZIL HYDROCHLORIDE 5 MG: 5 TABLET, FILM COATED ORAL at 20:13

## 2021-02-19 RX ADMIN — CEFTRIAXONE SODIUM 1 G: 1 INJECTION, POWDER, FOR SOLUTION INTRAMUSCULAR; INTRAVENOUS at 17:43

## 2021-02-19 NOTE — H&P
History & Physical    Primary Care Provider: Sherita Martins MD  Source of Information: Patient's Daughter    History of Presenting Illness:   Maylin Fernandez is a 67 y.o. female who presents with unable to eat and drink    Patient is quite confused, has history of dementia and bipolar disorder, lives at a facility, patient was recently discharged from [facility], patient was recently admitted with hypernatremia, altered mental status, urosepsis, improved and was discharged to the facility. Per patient's daughter Beata Coates patient has not been eating drinking well at the facility for the past 3 days, has been somewhat confused, and the facility felt that the patient should go to the hospital.  Patient came to the ER and was found to be hyponatremic, confused and was found to have a UTI. Patient was recently found positive for Covid. Patient currently is resting in bed, is alert x1, is confused and does not answer many questions. Per patient's daughter patient has not been taking her medications and she is concerned that her bipolar disorder will \"flareup\". Review of Systems:  Review of systems not obtained due to patient factors. Dementia     Past Medical History:   Diagnosis Date    Anemia     Arthritis     B[facility] disorder ([facility] [address]     Burning with urination     Dementia ([facility] [address]     Memory change     Psychiatric disorder     Bipolar Disorder    Stool color black     Tremor       Past Surgical History:   Procedure Laterality Date    HX BREAST BIOPSY Left 1990's    benign    HX CHOLECYSTECTOMY      HX GI      HX GYN       Prior to Admission medications    Medication Sig Start Date End Date Taking? Authorizing Provider   ascorbic acid, vitamin C, (VITAMIN C) 500 mg tablet Take 1 Tab by mouth two (2) times a day. 2/13/21   Ventura Evans MD   zinc sulfate (ZINCATE) 220 (50) mg capsule Take 1 Cap by mouth daily.  2/14/21   Ventura Evans MD   cefUROXime (CEFTIN) 500 mg tablet Take 1 Tab by mouth two (2) times a day for 5 days. 2/13/21 2/18/21  Casey Echols MD   L. acidoph & paracasei- S therm- Bifido (ALMAZ-Q/RISAQUAD) 8 billion cell cap cap Take 1 Cap by mouth daily. 2/13/21   Casey Echols MD   acetaminophen (TYLENOL) 325 mg tablet Take 650 mg by mouth every four (4) hours as needed for Pain. Provider, Historical   lidocaine (Aspercreme, lidocaine,) 4 % patch 1 Patch by TransDERmal route every twenty-four (24) hours. Provider, Historical   benztropine (COGENTIN) 0.5 mg tablet Take 0.5 mg by mouth two (2) times daily as needed for Other (Extrapyramidal symptoms). Provider, Historical   loperamide (IMODIUM) 2 mg capsule Take 2 mg by mouth four (4) times daily as needed for Diarrhea. Provider, Historical   magnesium hydroxide (Milk of Magnesia) 400 mg/5 mL suspension Take 30 mL by mouth daily as needed for Constipation. Provider, Historical   OLANZapine (ZyPREXA) 2.5 mg tablet Take 2.5 mg by mouth every six (6) hours as needed for Other (Agitation, psychosis). Provider, Historical   divalproex (DEPAKOTE SPRINKLE) 125 mg capsule Take 5 Caps by mouth two (2) times a day. Indications: mood 1/13/21   Esther KIM NP   donepeziL (ARICEPT) 5 mg tablet Take 1 Tab by mouth nightly. Indications: moderate to severe Alzheimer's type dementia 1/13/21   Esther KIM NP   loratadine (CLARITIN) 10 mg tablet Take 1 Tab by mouth daily. Indications: sneezing 1/14/21   Esther KIM NP   memantine (NAMENDA) 5 mg tablet Take 1 Tab by mouth two (2) times a day. Indications: moderate to severe Alzheimer's type dementia 1/13/21   Esther KIM NP   mirtazapine (REMERON) 15 mg tablet Take 1 Tab by mouth nightly. Indications: major depressive disorder 1/13/21   Esther KIM NP   nystatin (MYCOSTATIN) powder Apply  to affected area two (2) times a day.  Indications: diaper rash 1/13/21   Vernetta Cogan, Hemalatha KIM NP   propranoloL (INDERAL) 10 mg tablet Take 1 Tab by mouth two (2) times a day. Indications: essential tremor 1/13/21   Leslie KIM NP   risperiDONE (RisperDAL) 1 mg tablet Take 1 Tab by mouth two (2) times a day. Indications: mood 1/13/21   Leslie KIM NP     Allergies   Allergen Reactions    Lamisil [Terbinafine] Diarrhea and Nausea and Vomiting    Thorazine [Chlorpromazine] Rash     2/9/21: Severe itching       Family History   Problem Relation Age of Onset    Alzheimer Mother         SOCIAL HISTORY:  Patient resides:  Independently    Assisted Living    SNF x   With family care       Smoking history:   None    Former x   Chronic      Alcohol history:   None    Social x   Chronic      Ambulates:   Independently    w/cane    w/walker    w/wc x   CODE STATUS:  DNR    Full x   Other      Objective:     Physical Exam:     Visit Vitals  BP (!) 141/99 (BP 1 Location: Right arm, BP Patient Position: At rest)   Pulse (!) 129   Temp 97.1 °F (36.2 °C)   Resp 21   SpO2 98%      O2 Device: Room air    General : alert x 1, awake, no acute distress, resting in bed, resting in bed  HEENT: PEERL moist mucus membrane, TM clear  Neck: supple,  Chest: Decreased basal breath sounds  CVS: S1 S2 heard,  Abd: soft/ Non tender, non distended, BS physiological,   Ext: no clubbing, no cyanosis,   Neuro/Psych: Very limited exam, DTR 1+ to 4, patient moves all 4 but strength could not be tested  Skin: warm      EKG: Sinus tachycardia with nonspecific ST changes  Data Review:     Recent Days:  Recent Labs     02/19/21  1557   WBC 8.4   HGB 14.0   HCT 44.6        Recent Labs     02/19/21  1557   *   K 3.6   *   CO2 27   *   BUN 52*   CREA 0.91   CA 10.1   MG 2.1   ALB 2.2*   ALT 54     No results for input(s): PH, PCO2, PO2, HCO3, FIO2 in the last 72 hours.     24 Hour Results:  Recent Results (from the past 24 hour(s))   EKG, 12 LEAD, INITIAL    Collection Time: 02/19/21  3:41 PM Result Value Ref Range    Ventricular Rate 142 BPM    Atrial Rate 142 BPM    P-R Interval 120 ms    QRS Duration 72 ms    Q-T Interval 274 ms    QTC Calculation (Bezet) 421 ms    Calculated P Axis 84 degrees    Calculated R Axis 35 degrees    Calculated T Axis 52 degrees    Diagnosis       Sinus tachycardia  Right atrial enlargement  When compared with ECG of 09-FEB-2021 10:05,  ST no longer depressed in Inferior leads  Nonspecific T wave abnormality, improved in Inferior leads  T wave inversion now evident in Lateral leads     CBC WITH AUTOMATED DIFF    Collection Time: 02/19/21  3:57 PM   Result Value Ref Range    WBC 8.4 3.6 - 11.0 K/uL    RBC 4.41 3.80 - 5.20 M/uL    HGB 14.0 11.5 - 16.0 g/dL    HCT 44.6 35.0 - 47.0 %    .1 (H) 80.0 - 99.0 FL    MCH 31.7 26.0 - 34.0 PG    MCHC 31.4 30.0 - 36.5 g/dL    RDW 13.5 11.5 - 14.5 %    PLATELET 245 568 - 081 K/uL    MPV 9.6 8.9 - 12.9 FL    NRBC 0.0 0  WBC    ABSOLUTE NRBC 0.00 0.00 - 0.01 K/uL    NEUTROPHILS 76 (H) 32 - 75 %    LYMPHOCYTES 15 12 - 49 %    MONOCYTES 8 5 - 13 %    EOSINOPHILS 0 0 - 7 %    BASOPHILS 0 0 - 1 %    IMMATURE GRANULOCYTES 1 (H) 0.0 - 0.5 %    ABS. NEUTROPHILS 6.3 1.8 - 8.0 K/UL    ABS. LYMPHOCYTES 1.3 0.8 - 3.5 K/UL    ABS. MONOCYTES 0.7 0.0 - 1.0 K/UL    ABS. EOSINOPHILS 0.0 0.0 - 0.4 K/UL    ABS. BASOPHILS 0.0 0.0 - 0.1 K/UL    ABS. IMM.  GRANS. 0.1 (H) 0.00 - 0.04 K/UL    DF SMEAR SCANNED      RBC COMMENTS NORMOCYTIC, NORMOCHROMIC     METABOLIC PANEL, COMPREHENSIVE    Collection Time: 02/19/21  3:57 PM   Result Value Ref Range    Sodium 147 (H) 136 - 145 mmol/L    Potassium 3.6 3.5 - 5.1 mmol/L    Chloride 112 (H) 97 - 108 mmol/L    CO2 27 21 - 32 mmol/L    Anion gap 8 5 - 15 mmol/L    Glucose 124 (H) 65 - 100 mg/dL    BUN 52 (H) 6 - 20 MG/DL    Creatinine 0.91 0.55 - 1.02 MG/DL    BUN/Creatinine ratio 57 (H) 12 - 20      GFR est AA >60 >60 ml/min/1.73m2    GFR est non-AA >60 >60 ml/min/1.73m2    Calcium 10.1 8.5 - 10.1 MG/DL Bilirubin, total 0.3 0.2 - 1.0 MG/DL    ALT (SGPT) 54 12 - 78 U/L    AST (SGOT) 51 (H) 15 - 37 U/L    Alk. phosphatase 101 45 - 117 U/L    Protein, total 8.2 6.4 - 8.2 g/dL    Albumin 2.2 (L) 3.5 - 5.0 g/dL    Globulin 6.0 (H) 2.0 - 4.0 g/dL    A-G Ratio 0.4 (L) 1.1 - 2.2     TROPONIN I    Collection Time: 02/19/21  3:57 PM   Result Value Ref Range    Troponin-I, Qt. <0.05 <0.05 ng/mL   MAGNESIUM    Collection Time: 02/19/21  3:57 PM   Result Value Ref Range    Magnesium 2.1 1.6 - 2.4 mg/dL   URINALYSIS W/ RFLX MICROSCOPIC    Collection Time: 02/19/21  3:57 PM   Result Value Ref Range    Color YELLOW/STRAW      Appearance TURBID (A) CLEAR      Specific gravity 1.023 1.003 - 1.030      pH (UA) 6.5 5.0 - 8.0      Protein 100 (A) NEG mg/dL    Glucose Negative NEG mg/dL    Ketone TRACE (A) NEG mg/dL    Bilirubin Negative NEG      Blood LARGE (A) NEG      Urobilinogen 0.2 0.2 - 1.0 EU/dL    Nitrites Negative NEG      Leukocyte Esterase LARGE (A) NEG      WBC 20-50 0 - 4 /hpf    RBC 5-10 0 - 5 /hpf    Epithelial cells MODERATE (A) FEW /lpf    Bacteria Negative NEG /hpf    Amorphous Crystals 3+ (A) NEG    Hyaline cast 0-2 0 - 5 /lpf   URINE CULTURE HOLD SAMPLE    Collection Time: 02/19/21  3:57 PM    Specimen: Serum   Result Value Ref Range    Urine culture hold        Urine on hold in Microbiology dept for 2 days. If unpreserved urine is submitted, it cannot be used for addtional testing after 24 hours, recollection will be required. LACTIC ACID    Collection Time: 02/19/21  4:06 PM   Result Value Ref Range    Lactic acid 2.7 (HH) 0.4 - 2.0 MMOL/L         Imaging:   Xr Chest Port    Result Date: 2/19/2021  No Acute Disease.      Assessment:     UTI with SIRS: Patient will be admitted on a telemetry bed, start patient on broad-spectrum IV antibiotics, IV fluid, blood culture, urine culture, supportive care, close monitoring, further intervention per hospitalist, reassess as needed    Hypernatremia: Likely secondary to hypovolemia and volume contraction, half-normal saline, supportive care and close monitoring, presentation to hospital course, reassess as needed    Acute metabolic encephalopathy on chronic dementia: Likely send above, IV hydration, neurovascular checks, supportive care, close monitoring, if symptoms persist may consider further intervention and diagnostics, CT chest ordered    Polar disorder: Continue medications continue monitor    DVT prophylaxis: Patient will be on heparin               Signed By: Cami Warren MD     February 19, 2021

## 2021-02-19 NOTE — ED PROVIDER NOTES
58-year-old female history of anemia, arthritis, bipolar, dementia with recent admission to the hospital on  for urosepsis with a positive Covid test presents to the emergency department today by EMS from her nursing home with a concern for not eating or drinking for the past 3 days. The patient has baseline dementia and is A+O x1. There is no report of fever or nausea or vomiting. Patient is unable to provide any meaningful history at the bedside. The history is provided by medical records and the EMS personnel. Anorexia  This is a new problem. The current episode started more than 2 days ago. The problem occurs constantly. The problem has not changed since onset. Past Medical History:   Diagnosis Date    Anemia     Arthritis     Bipolar disorder ([address]     Burning with urination     Dementia ([facility])     Memory change     Psychiatric disorder     Bipolar Disorder    Stool color black     Tremor        Past Surgical History:   Procedure Laterality Date    HX BREAST BIOPSY Left     benign    HX CHOLECYSTECTOMY      HX GI      HX GYN           Family History:   Problem Relation Age of Onset    Alzheimer Mother        Social History     Socioeconomic History    Marital status: SINGLE     Spouse name: Not on file    Number of children: Not on file    Years of education: Not on file    Highest education level: Not on file   Occupational History    Not on file   Social Needs    Financial resource strain: Not on file    Food insecurity     Worry: Not on file     Inability: Not on file    Transportation needs     Medical: Not on file     Non-medical: Not on file   Tobacco Use    Smoking status: Former Smoker     Packs/day: 0.25     Years: 10.00     Pack years: 2.50     Quit date: 1997     Years since quittin.6    Smokeless tobacco: Never Used   Substance and Sexual Activity    Alcohol use:  Yes     Alcohol/week: 2.5 standard drinks     Types: 3 Glasses of wine per week    Drug use: No    Sexual activity: Yes   Lifestyle    Physical activity     Days per week: Not on file     Minutes per session: Not on file    Stress: Not on file   Relationships    Social connections     Talks on phone: Not on file     Gets together: Not on file     Attends Anabaptist service: Not on file     Active member of club or organization: Not on file     Attends meetings of clubs or organizations: Not on file     Relationship status: Not on file    Intimate partner violence     Fear of current or ex partner: Not on file     Emotionally abused: Not on file     Physically abused: Not on file     Forced sexual activity: Not on file   Other Topics Concern    Not on file   Social History Narrative    Not on file         ALLERGIES: Lamisil [terbinafine] and Thorazine [chlorpromazine]    Review of Systems   Unable to perform ROS: Dementia       Vitals:    02/19/21 1537   BP: (!) 141/99   Pulse: (!) 129   Resp: 21   Temp: 97.1 °F (36.2 °C)            Physical Exam  Vitals signs and nursing note reviewed. Constitutional:       General: She is not in acute distress. Appearance: Normal appearance. She is normal weight. She is ill-appearing (Chronically ill-appearing). She is not toxic-appearing or diaphoretic. HENT:      Head: Normocephalic and atraumatic. Nose: Nose normal.      Mouth/Throat:      Mouth: Mucous membranes are dry. Pharynx: Oropharynx is clear. Eyes:      Extraocular Movements: Extraocular movements intact. Conjunctiva/sclera: Conjunctivae normal.      Pupils: Pupils are equal, round, and reactive to light. Neck:      Musculoskeletal: Normal range of motion and neck supple. No muscular tenderness. Cardiovascular:      Rate and Rhythm: Regular rhythm. Tachycardia present. Pulses: Normal pulses. Heart sounds: Normal heart sounds. Pulmonary:      Effort: Pulmonary effort is normal. No respiratory distress. Breath sounds: Normal breath sounds. Abdominal:      General: There is no distension. Palpations: Abdomen is soft. Tenderness: There is no abdominal tenderness. There is no guarding or rebound. Musculoskeletal: Normal range of motion. General: No swelling, tenderness, deformity or signs of injury. Right lower leg: No edema. Left lower leg: No edema. Skin:     General: Skin is warm and dry. Capillary Refill: Capillary refill takes less than 2 seconds. Neurological:      Mental Status: She is alert. Mental status is at baseline. Psychiatric:         Behavior: Behavior normal.          MDM  Number of Diagnoses or Management Options     Amount and/or Complexity of Data Reviewed  Clinical lab tests: reviewed  Tests in the radiology section of CPT®: reviewed  Tests in the medicine section of CPT®: reviewed  Decide to obtain previous medical records or to obtain history from someone other than the patient: yes    Risk of Complications, Morbidity, and/or Mortality  General comments: 5:04 PM  I have spent 35 minutes of critical care time involved in lab review, consultations with specialist, family decision-making, and documentation. During this entire length of time I was immediately available to the patient. Critical Care: The reason for providing this level of medical care for this critically ill patient was due a critical illness that impaired one or more vital organ systems such that there was a high probability of imminent or life threatening deterioration in the patients condition. This care involved high complexity decision making to assess, manipulate, and support vital system functions, to treat this degreee vital organ system failure and to prevent further life threatening deterioration of the patients condition. Procedures          ED EKG interpretation:  Rhythm: Sinus tachycardia at a rate of approximately 142. Axis: normal.  ST segment:  No concerning ST elevations or depressions.   Poor baseline makes interpretation difficult for ischemia. This EKG was interpreted by Doris Pena MD,ED Provider. IMPRESSION:  No diagnosis found.    - Broad Spectrum Antibiotics ordered: Ceftriaxone  - Repeat lactic acid ordered for time 1806  - Re-assessment performed at time 1704 and clinical condition stable. - Hypotension or Lactic Acidosis present (SBP<90, MAP<65, Lactate >4): NO IVF:  Not indicated due to Hypotension not present  - Persistent Hypotension despite IVF resuscitation: NO  Vasopressors: Not indicated due to Septic Shock not present    Plan:  Admit to Telemetry    Total critical care time spent exclusive of procedures:  35 minutes    Norma Taveras MD      Repeat Sepsis focused physical exam at time 5:05 PM    Vital signs   Visit Vitals  BP (!) 141/99 (BP 1 Location: Right arm, BP Patient Position: At rest)   Pulse (!) 129   Temp 97.1 °F (36.2 °C)   Resp 21   SpO2 98%       Cardiopulmonary exam  Regular rate and rhythm, normal cardiac and pulmonary auscultation, normal pulmonary exam    Capillary refill  Less than 2 seconds    Peripheral pulse evaluation  Strong and equal x4    Skin exam  Warm and dry      Perfect Serve Consult for Admission  5:17 PM    ED Room Number: ER16/16  Patient Name and age:  Frank Shea 67 y.o.  female  Working Diagnosis:   1. Hypernatremia    2. Acute cystitis without hematuria    3. Sepsis without acute organ dysfunction, due to unspecified organism ([address]        COVID-19 Suspicion:  no  Sepsis present:  yes  Reassessment needed: no  Code Status:  Full Code  Readmission: no  Isolation Requirements:  no  Recommended Level of Care:  telemetry  Department:[facility] Adult ED - 21   Other: Patient presents with sepsis likely UTI. Improved with fluids and antibiotics in the ED.

## 2021-02-20 LAB
ANION GAP SERPL CALC-SCNC: 7 MMOL/L (ref 5–15)
BACTERIA SPEC CULT: NORMAL
BASOPHILS # BLD: 0 K/UL (ref 0–0.1)
BASOPHILS NFR BLD: 0 % (ref 0–1)
BUN SERPL-MCNC: 42 MG/DL (ref 6–20)
BUN/CREAT SERPL: 71 (ref 12–20)
CALCIUM SERPL-MCNC: 9.1 MG/DL (ref 8.5–10.1)
CHLORIDE SERPL-SCNC: 117 MMOL/L (ref 97–108)
CO2 SERPL-SCNC: 27 MMOL/L (ref 21–32)
COMMENT, HOLDF: NORMAL
CREAT SERPL-MCNC: 0.59 MG/DL (ref 0.55–1.02)
DIFFERENTIAL METHOD BLD: ABNORMAL
EOSINOPHIL # BLD: 0 K/UL (ref 0–0.4)
EOSINOPHIL NFR BLD: 0 % (ref 0–7)
ERYTHROCYTE [DISTWIDTH] IN BLOOD BY AUTOMATED COUNT: 13.9 % (ref 11.5–14.5)
GLUCOSE SERPL-MCNC: 83 MG/DL (ref 65–100)
HCT VFR BLD AUTO: 37.9 % (ref 35–47)
HGB BLD-MCNC: 11.4 G/DL (ref 11.5–16)
IMM GRANULOCYTES # BLD AUTO: 0 K/UL (ref 0–0.04)
IMM GRANULOCYTES NFR BLD AUTO: 1 % (ref 0–0.5)
LYMPHOCYTES # BLD: 2 K/UL (ref 0.8–3.5)
LYMPHOCYTES NFR BLD: 23 % (ref 12–49)
MCH RBC QN AUTO: 31.1 PG (ref 26–34)
MCHC RBC AUTO-ENTMCNC: 30.1 G/DL (ref 30–36.5)
MCV RBC AUTO: 103.3 FL (ref 80–99)
MONOCYTES # BLD: 0.8 K/UL (ref 0–1)
MONOCYTES NFR BLD: 9 % (ref 5–13)
NEUTS SEG # BLD: 5.6 K/UL (ref 1.8–8)
NEUTS SEG NFR BLD: 67 % (ref 32–75)
NRBC # BLD: 0 K/UL (ref 0–0.01)
NRBC BLD-RTO: 0 PER 100 WBC
PLATELET # BLD AUTO: 230 K/UL (ref 150–400)
PMV BLD AUTO: 9.7 FL (ref 8.9–12.9)
POTASSIUM SERPL-SCNC: 3.5 MMOL/L (ref 3.5–5.1)
RBC # BLD AUTO: 3.67 M/UL (ref 3.8–5.2)
SAMPLES BEING HELD,HOLD: NORMAL
SERVICE CMNT-IMP: NORMAL
SODIUM SERPL-SCNC: 151 MMOL/L (ref 136–145)
WBC # BLD AUTO: 8.4 K/UL (ref 3.6–11)

## 2021-02-20 PROCEDURE — 74011000258 HC RX REV CODE- 258: Performed by: FAMILY MEDICINE

## 2021-02-20 PROCEDURE — 36415 COLL VENOUS BLD VENIPUNCTURE: CPT

## 2021-02-20 PROCEDURE — 80048 BASIC METABOLIC PNL TOTAL CA: CPT

## 2021-02-20 PROCEDURE — 74011250637 HC RX REV CODE- 250/637: Performed by: FAMILY MEDICINE

## 2021-02-20 PROCEDURE — 74011250636 HC RX REV CODE- 250/636: Performed by: FAMILY MEDICINE

## 2021-02-20 PROCEDURE — 74011000250 HC RX REV CODE- 250: Performed by: FAMILY MEDICINE

## 2021-02-20 PROCEDURE — 65660000000 HC RM CCU STEPDOWN

## 2021-02-20 PROCEDURE — 85025 COMPLETE CBC W/AUTO DIFF WBC: CPT

## 2021-02-20 RX ORDER — ENOXAPARIN SODIUM 100 MG/ML
60 INJECTION SUBCUTANEOUS EVERY 12 HOURS
Status: DISCONTINUED | OUTPATIENT
Start: 2021-02-20 | End: 2021-02-23

## 2021-02-20 RX ORDER — DEXTROSE MONOHYDRATE AND SODIUM CHLORIDE 5; .45 G/100ML; G/100ML
75 INJECTION, SOLUTION INTRAVENOUS CONTINUOUS
Status: DISCONTINUED | OUTPATIENT
Start: 2021-02-20 | End: 2021-02-21

## 2021-02-20 RX ADMIN — MEMANTINE 5 MG: 5 TABLET ORAL at 10:03

## 2021-02-20 RX ADMIN — RISPERIDONE 1 MG: 1 TABLET ORAL at 20:17

## 2021-02-20 RX ADMIN — RISPERIDONE 1 MG: 1 TABLET ORAL at 10:03

## 2021-02-20 RX ADMIN — HEPARIN SODIUM 5000 UNITS: 5000 INJECTION INTRAVENOUS; SUBCUTANEOUS at 05:03

## 2021-02-20 RX ADMIN — ENOXAPARIN SODIUM 60 MG: 60 INJECTION SUBCUTANEOUS at 10:03

## 2021-02-20 RX ADMIN — CEFTRIAXONE SODIUM 1 G: 1 INJECTION, POWDER, FOR SOLUTION INTRAMUSCULAR; INTRAVENOUS at 18:08

## 2021-02-20 RX ADMIN — Medication 10 ML: at 13:57

## 2021-02-20 RX ADMIN — PROPRANOLOL HYDROCHLORIDE 10 MG: 10 TABLET ORAL at 10:02

## 2021-02-20 RX ADMIN — ENOXAPARIN SODIUM 60 MG: 60 INJECTION SUBCUTANEOUS at 20:13

## 2021-02-20 RX ADMIN — OXYCODONE HYDROCHLORIDE AND ACETAMINOPHEN 500 MG: 500 TABLET ORAL at 18:08

## 2021-02-20 RX ADMIN — MIRTAZAPINE 15 MG: 15 TABLET, FILM COATED ORAL at 20:16

## 2021-02-20 RX ADMIN — Medication 10 ML: at 22:00

## 2021-02-20 RX ADMIN — MEMANTINE 5 MG: 5 TABLET ORAL at 20:17

## 2021-02-20 RX ADMIN — DEXTROSE AND SODIUM CHLORIDE 75 ML/HR: 5; 450 INJECTION, SOLUTION INTRAVENOUS at 13:57

## 2021-02-20 RX ADMIN — DONEPEZIL HYDROCHLORIDE 5 MG: 5 TABLET, FILM COATED ORAL at 20:16

## 2021-02-20 RX ADMIN — DIVALPROEX SODIUM 625 MG: 125 CAPSULE ORAL at 20:21

## 2021-02-20 RX ADMIN — PROPRANOLOL HYDROCHLORIDE 10 MG: 10 TABLET ORAL at 20:16

## 2021-02-20 RX ADMIN — Medication 10 ML: at 05:04

## 2021-02-20 RX ADMIN — OXYCODONE HYDROCHLORIDE AND ACETAMINOPHEN 500 MG: 500 TABLET ORAL at 10:02

## 2021-02-20 RX ADMIN — DIVALPROEX SODIUM 625 MG: 125 CAPSULE ORAL at 12:25

## 2021-02-20 NOTE — PROGRESS NOTES
[facility] [facility] Adult  Hospitalist Group                                                                                          Hospitalist Progress Note  Emily Kenney MD  Answering service: [phone] OR [phone] from in house phone              Progress Note    Patient: Linda Naylor MRN: [id number]  SSN: [id number]    YOB: 1945  Age: 67 y.o. Sex: female      Admit Date: 2/19/2021    LOS: 1 day     Subjective:     Patient presents from her nursing home with altered mental status. Has baseline dementia. Patient with recent positive status for Covid. Patient was found to have urinary tract infection also with hypernatremia. Admitted for further evaluation management. Objective:     Vitals:    02/19/21 2301 02/20/21 0305 02/20/21 0847 02/20/21 1146   BP: 110/69 (!) 114/58 120/80 (!) 108/56   Pulse: 76 82 96 84   Resp: 13 14 17 20   Temp: 97.7 °F (36.5 °C) 98.1 °F (36.7 °C) 97.8 °F (36.6 °C) 98.3 °F (36.8 °C)   SpO2: 98% 97% 99% 98%   Weight:            Intake and Output:  Current Shift: No intake/output data recorded. Last three shifts: 02/18 1901 - 02/20 0700  In: -   Out: 1000 [Urine:1000]    Physical Exam:   GENERAL: Patient is awake and alert  THROAT & NECK: normal and no erythema or exudates noted. LUNG: clear to auscultation bilaterally  HEART: regular rate and rhythm, S1, S2 normal, no murmur, click, rub or gallop  ABDOMEN: soft, non-tender. Bowel sounds normal. No masses,  no organomegaly  EXTREMITIES:  extremities normal, atraumatic, no cyanosis or edema  SKIN: no rash or abnormalities  NEUROLOGIC: Awake and alert, no acute deficit  PSYCHIATRIC: non focal    Lab/Data Review: All lab results for the last 24 hours reviewed.      Recent Results (from the past 24 hour(s))   EKG, 12 LEAD, INITIAL    Collection Time: 02/19/21  3:41 PM   Result Value Ref Range    Ventricular Rate 142 BPM    Atrial Rate 142 BPM    P-R Interval 120 ms    QRS Duration 72 ms    Q-T Interval 274 ms    QTC Calculation (Bezet) 421 ms    Calculated P Axis 84 degrees    Calculated R Axis 35 degrees    Calculated T Axis 52 degrees    Diagnosis       ** Poor data quality, interpretation may be adversely affected  Sinus tachycardia  Right atrial enlargement  When compared with ECG of 09-FEB-2021 10:05,  ST no longer depressed in Inferior leads  Nonspecific T wave abnormality, improved in Inferior leads  T wave inversion now evident in Lateral leads  Confirmed by Herbert Win MD, [facility] ([id number]) on 2/19/2021 6:30:46 PM     CBC WITH AUTOMATED DIFF    Collection Time: 02/19/21  3:57 PM   Result Value Ref Range    WBC 8.4 3.6 - 11.0 K/uL    RBC 4.41 3.80 - 5.20 M/uL    HGB 14.0 11.5 - 16.0 g/dL    HCT 44.6 35.0 - 47.0 %    .1 (H) 80.0 - 99.0 FL    MCH 31.7 26.0 - 34.0 PG    MCHC 31.4 30.0 - 36.5 g/dL    RDW 13.5 11.5 - 14.5 %    PLATELET 891 019 - 489 K/uL    MPV 9.6 8.9 - 12.9 FL    NRBC 0.0 0  WBC    ABSOLUTE NRBC 0.00 0.00 - 0.01 K/uL    NEUTROPHILS 76 (H) 32 - 75 %    LYMPHOCYTES 15 12 - 49 %    MONOCYTES 8 5 - 13 %    EOSINOPHILS 0 0 - 7 %    BASOPHILS 0 0 - 1 %    IMMATURE GRANULOCYTES 1 (H) 0.0 - 0.5 %    ABS. NEUTROPHILS 6.3 1.8 - 8.0 K/UL    ABS. LYMPHOCYTES 1.3 0.8 - 3.5 K/UL    ABS. MONOCYTES 0.7 0.0 - 1.0 K/UL    ABS. EOSINOPHILS 0.0 0.0 - 0.4 K/UL    ABS. BASOPHILS 0.0 0.0 - 0.1 K/UL    ABS. IMM.  GRANS. 0.1 (H) 0.00 - 0.04 K/UL    DF SMEAR SCANNED      RBC COMMENTS NORMOCYTIC, NORMOCHROMIC     METABOLIC PANEL, COMPREHENSIVE    Collection Time: 02/19/21  3:57 PM   Result Value Ref Range    Sodium 147 (H) 136 - 145 mmol/L    Potassium 3.6 3.5 - 5.1 mmol/L    Chloride 112 (H) 97 - 108 mmol/L    CO2 27 21 - 32 mmol/L    Anion gap 8 5 - 15 mmol/L    Glucose 124 (H) 65 - 100 mg/dL    BUN 52 (H) 6 - 20 MG/DL    Creatinine 0.91 0.55 - 1.02 MG/DL    BUN/Creatinine ratio 57 (H) 12 - 20      GFR est AA >60 >60 ml/min/1.73m2    GFR est non-AA >60 >60 ml/min/1.73m2    Calcium 10.1 8.5 - 10.1 MG/DL Bilirubin, total 0.3 0.2 - 1.0 MG/DL    ALT (SGPT) 54 12 - 78 U/L    AST (SGOT) 51 (H) 15 - 37 U/L    Alk. phosphatase 101 45 - 117 U/L    Protein, total 8.2 6.4 - 8.2 g/dL    Albumin 2.2 (L) 3.5 - 5.0 g/dL    Globulin 6.0 (H) 2.0 - 4.0 g/dL    A-G Ratio 0.4 (L) 1.1 - 2.2     TROPONIN I    Collection Time: 02/19/21  3:57 PM   Result Value Ref Range    Troponin-I, Qt. <0.05 <0.05 ng/mL   MAGNESIUM    Collection Time: 02/19/21  3:57 PM   Result Value Ref Range    Magnesium 2.1 1.6 - 2.4 mg/dL   URINALYSIS W/ RFLX MICROSCOPIC    Collection Time: 02/19/21  3:57 PM   Result Value Ref Range    Color YELLOW/STRAW      Appearance TURBID (A) CLEAR      Specific gravity 1.023 1.003 - 1.030      pH (UA) 6.5 5.0 - 8.0      Protein 100 (A) NEG mg/dL    Glucose Negative NEG mg/dL    Ketone TRACE (A) NEG mg/dL    Bilirubin Negative NEG      Blood LARGE (A) NEG      Urobilinogen 0.2 0.2 - 1.0 EU/dL    Nitrites Negative NEG      Leukocyte Esterase LARGE (A) NEG      WBC 20-50 0 - 4 /hpf    RBC 5-10 0 - 5 /hpf    Epithelial cells MODERATE (A) FEW /lpf    Bacteria Negative NEG /hpf    Amorphous Crystals 3+ (A) NEG    Hyaline cast 0-2 0 - 5 /lpf   URINE CULTURE HOLD SAMPLE    Collection Time: 02/19/21  3:57 PM    Specimen: Serum   Result Value Ref Range    Urine culture hold        Urine on hold in Microbiology dept for 2 days. If unpreserved urine is submitted, it cannot be used for addtional testing after 24 hours, recollection will be required.    LACTIC ACID    Collection Time: 02/19/21  4:06 PM   Result Value Ref Range    Lactic acid 2.7 (HH) 0.4 - 2.0 MMOL/L   CULTURE, BLOOD, PAIRED    Collection Time: 02/19/21  5:29 PM    Specimen: Blood   Result Value Ref Range    Special Requests: NO SPECIAL REQUESTS      Culture result: NO GROWTH AFTER 12 HOURS     LACTIC ACID    Collection Time: 02/19/21  8:18 PM   Result Value Ref Range    Lactic acid 1.2 0.4 - 2.0 MMOL/L   METABOLIC PANEL, BASIC    Collection Time: 02/20/21  1:39 AM Result Value Ref Range    Sodium 151 (H) 136 - 145 mmol/L    Potassium 3.5 3.5 - 5.1 mmol/L    Chloride 117 (H) 97 - 108 mmol/L    CO2 27 21 - 32 mmol/L    Anion gap 7 5 - 15 mmol/L    Glucose 83 65 - 100 mg/dL    BUN 42 (H) 6 - 20 MG/DL    Creatinine 0.59 0.55 - 1.02 MG/DL    BUN/Creatinine ratio 71 (H) 12 - 20      GFR est AA >60 >60 ml/min/1.73m2    GFR est non-AA >60 >60 ml/min/1.73m2    Calcium 9.1 8.5 - 10.1 MG/DL   CBC WITH AUTOMATED DIFF    Collection Time: 02/20/21  1:39 AM   Result Value Ref Range    WBC 8.4 3.6 - 11.0 K/uL    RBC 3.67 (L) 3.80 - 5.20 M/uL    HGB 11.4 (L) 11.5 - 16.0 g/dL    HCT 37.9 35.0 - 47.0 %    .3 (H) 80.0 - 99.0 FL    MCH 31.1 26.0 - 34.0 PG    MCHC 30.1 30.0 - 36.5 g/dL    RDW 13.9 11.5 - 14.5 %    PLATELET 833 461 - 851 K/uL    MPV 9.7 8.9 - 12.9 FL    NRBC 0.0 0  WBC    ABSOLUTE NRBC 0.00 0.00 - 0.01 K/uL    NEUTROPHILS 67 32 - 75 %    LYMPHOCYTES 23 12 - 49 %    MONOCYTES 9 5 - 13 %    EOSINOPHILS 0 0 - 7 %    BASOPHILS 0 0 - 1 %    IMMATURE GRANULOCYTES 1 (H) 0.0 - 0.5 %    ABS. NEUTROPHILS 5.6 1.8 - 8.0 K/UL    ABS. LYMPHOCYTES 2.0 0.8 - 3.5 K/UL    ABS. MONOCYTES 0.8 0.0 - 1.0 K/UL    ABS. EOSINOPHILS 0.0 0.0 - 0.4 K/UL    ABS. BASOPHILS 0.0 0.0 - 0.1 K/UL    ABS. IMM. GRANS. 0.0 0.00 - 0.04 K/UL    DF AUTOMATED     SAMPLES BEING HELD    Collection Time: 02/20/21  1:39 AM   Result Value Ref Range    SAMPLES BEING HELD 1blu     COMMENT        Add-on orders for these samples will be processed based on acceptable specimen integrity and analyte stability, which may vary by analyte. Imaging:    Ct Head Wo Cont    Result Date: 2/19/2021  EXAM: CT HEAD WO CONT INDICATION: AMS COMPARISON: 2/9/2021. CONTRAST: None. TECHNIQUE: Unenhanced CT of the head was performed using 5 mm images. Brain and bone windows were generated. Coronal and sagittal reformats.  CT dose reduction was achieved through use of a standardized protocol tailored for this examination and automatic exposure control for dose modulation. FINDINGS: There is mild to moderate atrophy with compensatory dilatation of ventricles. There is a moderate sized area of chronic encephalomalacia in the left frontal lobe. There is moderate white matter disease likely to chronic small vessel ischemic disease. There is no intracranial hemorrhage, extra-axial collection, or mass effect. The basilar cisterns are open. No CT evidence of acute infarct. The bone windows demonstrate no abnormalities. The visualized portions of the paranasal sinuses and mastoid air cells are clear. No significant interval change. No evidence of acute process     Cta Chest W Or W Wo Cont    Result Date: 2/19/2021  EXAM: CTA CHEST W OR W WO CONT INDICATION: Short of breath COMPARISON: None. CONTRAST: 100 mL of Isovue-370. TECHNIQUE: Precontrast  images were obtained to localize the volume for acquisition. Multislice helical CT arteriography was performed from the diaphragm to the thoracic inlet during uneventful rapid bolus intravenous contrast administration. Lung and soft tissue windows were generated. Coronal and sagittal images were generated and 3D post processing consisting of coronal maximum intensity images was performed. CT dose reduction was achieved through use of a standardized protocol tailored for this examination and automatic exposure control for dose modulation. FINDINGS: THYROID: Multiple small nodules are seen in the thyroid gland. MEDIASTINUM: No mass or lymphadenopathy. MO: No mass or lymphadenopathy. THORACIC AORTA: No dissection or aneurysm. PULMONARY ARTERIES: Normal in caliber. The pulmonary arteries are well enhanced. Pulmonary emboli are seen in the right upper lobe and right lower lobe. Pulmonary embolus is seen in the left lower lobe posterior basilar segment. Pulmonary embolus is seen at the bifurcation of the left main pulmonary artery. TRACHEA/BRONCHI: Patent.  ESOPHAGUS: No wall thickening or dilatation. HEART: Normal in size. PLEURA: No effusion or pneumothorax. LUNGS: No nodule, mass, or airspace disease. INCIDENTALLY IMAGED UPPER ABDOMEN: Status post cholecystectomy. BONES: No destructive bone lesion. Status post lumbar fusion. Positive for bilateral pulmonary emboli. Clot burden is moderate. No evidence of right heart strain. PerfectServe text sent to Dr. Yane Agosto. Xr Chest Port    Result Date: 2/19/2021  EXAM: Portable CXR. 1634 hours. INDICATION: weakness, covid FINDINGS: The lungs appear clear. Heart is normal in size. There is no pulmonary edema. There is no evident pneumothorax or pleural effusion. No significant change since 2/9/2021. No Acute Disease. Assessment and Plan: Altered mental status  -Likely metabolic encephalopathy in the setting of UTI and electrolyte imbalance  -Patient with baseline dementia  -Negative CT head for acute pathology  -Continue monitor    Urinary tract infection  -Continue Rocephin and follow urine culture    Hypernatremia  -Likely from fluid depletion from poor p.o. intake  -Continue hypotonic saline and monitor renal function    Prerenal azotemia  -Continue gentle IV fluid hydration  -Monitor renal function    Bipolar disorder   -Continue Risperdal Remeron    Covid infection   -Chest x-ray shows no acute disease  -Continue isolation    Dementia  -On Aricept and Namenda  -Supportive care    Disposition plan: Back to nursing home when electrolytes and infection improved.     Signed By: Nette José MD     February 20, 2021

## 2021-02-20 NOTE — PROGRESS NOTES
[facility] Adult  Hospitalist Group                                      Advance Care Planning Note    Name: Henok Elkins  YOB: 1945  MRN: [id number]  Admission Date: 2/19/2021  3:29 PM    Date of discussion: 2/20/2021    Active Diagnoses:    Hospital Problems  Date Reviewed: 8/3/2015          Codes Class Noted POA    Sepsis [facility]) ICD-10-CM: A41.9  ICD-9-CM: 038.9, 995.91  2/9/2021 Unknown              These active diagnoses are of sufficient risk that focused discussion on advance care planning is indicated in order to allow the patient to thoughtfully consider personal goals of care, and if situations arise that prevent the ability to personally give input, to ensure appropriate representation of their personal desires for different levels and aggressiveness of care. Discussion:     Persons present and participating in discussion: Henok Elkins, Helena Ross MD, Mary Jane Sena (Daughter)    Topics Discussed:  Patient's medical condition and diagnosis: [  ] yes [  ] no   Surrogate decision maker: [  ] yes [  ] no   Patient's current physical function/cognitive function/frailty: [  ] yes [  ] no   Code Status: [  ] yes [  ] no   Artificial Nutrition / Dialysis / Non-Invasive Ventilation / Blood Transfusion: [  ] yes [  ] no  Potential Resources for home (durable medical equipment, home nursing, home O2): [  ] yes [  ] no    Overview of Discussion:   Discussed with patient's daughter regarding patient's medical condition and prognosis. Asked what the patient's wish will be regarding resuscitation in case of cardiac or respiratory arrest. Explained resuscitative measures including chest compression, IV medications, shock as needed and intubation and mechanical ventilation. Daughter would like to keep her full code for now but things do not improve down the road, she may want to change her status.     Time Spent:     Total time spent face-to-face in education and discussion: 12 minutes.      Everette Engel MD  Date of Service:  2/20/2021  2:28 PM

## 2021-02-20 NOTE — ROUTINE PROCESS
TRANSFER - OUT REPORT: 
 
Verbal report given to [employer] (name) on [address]  being transferred to [facility] (unit) for routine progression of care Report consisted of patients Situation, Background, Assessment and  
Recommendations(SBAR). Information from the following report(s) SBAR, Kardex, ED Summary, Intake/Output, MAR and Recent Results was reviewed with the receiving nurse. Lines:  
Peripheral IV 02/19/21 Left Antecubital (Active) Site Assessment Clean, dry, & intact 02/19/21 1619 Peripheral IV 02/19/21 Right Hand (Active) Site Assessment Clean, dry, & intact 02/19/21 1951 Opportunity for questions and clarification was provided. Patient transported with: 
 Monitor Registered Nurse

## 2021-02-20 NOTE — PROGRESS NOTES
TRANSFER - IN REPORT:    Verbal report received from Mitul Navarro [facility] (name) on [address]  being received from ED (unit) for routine progression of care      Report consisted of patients Situation, Background, Assessment and   Recommendations(SBAR). Information from the following report(s) SBAR was reviewed with the receiving nurse. Opportunity for questions and clarification was provided. Assessment completed upon patients arrival to unit and care assumed.

## 2021-02-20 NOTE — PROGRESS NOTES
Spoke with patient's daughter Lunaalysha Clarence at [phone] and update given on patients condition.

## 2021-02-20 NOTE — PROGRESS NOTES
Hospitalist [employer] NP     Name: Hien Faye  YOB: 1945  MRN: [id number]  Admission Date: 2/19/2021  3:29 PM    Date of service: 2/19/2021 9:42 PM                                Overnight update:        Notified by radiology of positive PE study - bilateral moderate PE without right heart strain  - Transfer to intermediate level of care overnight for closer monitoring given acute finding  - Start heparin gtt PE protocol.  Just received DVT prophylaxis dose around 2000pm, will forego bolus dose     Nayan URENAP-C, PA-C  [phone] or [employer]

## 2021-02-21 LAB
COMMENT, HOLDF: NORMAL
ERYTHROCYTE [DISTWIDTH] IN BLOOD BY AUTOMATED COUNT: 13.9 % (ref 11.5–14.5)
HCT VFR BLD AUTO: 36.4 % (ref 35–47)
HGB BLD-MCNC: 10.8 G/DL (ref 11.5–16)
MCH RBC QN AUTO: 31.6 PG (ref 26–34)
MCHC RBC AUTO-ENTMCNC: 29.7 G/DL (ref 30–36.5)
MCV RBC AUTO: 106.4 FL (ref 80–99)
NRBC # BLD: 0 K/UL (ref 0–0.01)
NRBC BLD-RTO: 0 PER 100 WBC
PLATELET # BLD AUTO: 205 K/UL (ref 150–400)
PMV BLD AUTO: 9.6 FL (ref 8.9–12.9)
RBC # BLD AUTO: 3.42 M/UL (ref 3.8–5.2)
SAMPLES BEING HELD,HOLD: NORMAL
WBC # BLD AUTO: 9.2 K/UL (ref 3.6–11)

## 2021-02-21 PROCEDURE — 85027 COMPLETE CBC AUTOMATED: CPT

## 2021-02-21 PROCEDURE — 74011250636 HC RX REV CODE- 250/636: Performed by: FAMILY MEDICINE

## 2021-02-21 PROCEDURE — 74011000258 HC RX REV CODE- 258: Performed by: FAMILY MEDICINE

## 2021-02-21 PROCEDURE — 65660000000 HC RM CCU STEPDOWN

## 2021-02-21 PROCEDURE — 74011250637 HC RX REV CODE- 250/637: Performed by: FAMILY MEDICINE

## 2021-02-21 PROCEDURE — 74011000250 HC RX REV CODE- 250: Performed by: FAMILY MEDICINE

## 2021-02-21 PROCEDURE — 36415 COLL VENOUS BLD VENIPUNCTURE: CPT

## 2021-02-21 RX ORDER — DEXTROSE MONOHYDRATE 50 MG/ML
100 INJECTION, SOLUTION INTRAVENOUS CONTINUOUS
Status: DISCONTINUED | OUTPATIENT
Start: 2021-02-21 | End: 2021-02-24 | Stop reason: HOSPADM

## 2021-02-21 RX ADMIN — DONEPEZIL HYDROCHLORIDE 5 MG: 5 TABLET, FILM COATED ORAL at 22:24

## 2021-02-21 RX ADMIN — RISPERIDONE 1 MG: 1 TABLET ORAL at 22:24

## 2021-02-21 RX ADMIN — ENOXAPARIN SODIUM 60 MG: 60 INJECTION SUBCUTANEOUS at 22:24

## 2021-02-21 RX ADMIN — ACETAMINOPHEN 650 MG: 325 TABLET ORAL at 11:58

## 2021-02-21 RX ADMIN — CEFTRIAXONE SODIUM 1 G: 1 INJECTION, POWDER, FOR SOLUTION INTRAMUSCULAR; INTRAVENOUS at 18:22

## 2021-02-21 RX ADMIN — MEMANTINE 5 MG: 5 TABLET ORAL at 22:24

## 2021-02-21 RX ADMIN — DEXTROSE AND SODIUM CHLORIDE 75 ML/HR: 5; 450 INJECTION, SOLUTION INTRAVENOUS at 06:22

## 2021-02-21 RX ADMIN — OXYCODONE HYDROCHLORIDE AND ACETAMINOPHEN 500 MG: 500 TABLET ORAL at 09:03

## 2021-02-21 RX ADMIN — Medication 10 ML: at 18:23

## 2021-02-21 RX ADMIN — PROPRANOLOL HYDROCHLORIDE 10 MG: 10 TABLET ORAL at 22:24

## 2021-02-21 RX ADMIN — Medication 10 ML: at 06:22

## 2021-02-21 RX ADMIN — MEMANTINE 5 MG: 5 TABLET ORAL at 09:03

## 2021-02-21 RX ADMIN — ENOXAPARIN SODIUM 60 MG: 60 INJECTION SUBCUTANEOUS at 09:04

## 2021-02-21 RX ADMIN — Medication 10 ML: at 22:24

## 2021-02-21 RX ADMIN — DIVALPROEX SODIUM 625 MG: 125 CAPSULE ORAL at 22:24

## 2021-02-21 RX ADMIN — OXYCODONE HYDROCHLORIDE AND ACETAMINOPHEN 500 MG: 500 TABLET ORAL at 18:22

## 2021-02-21 RX ADMIN — RISPERIDONE 1 MG: 1 TABLET ORAL at 09:03

## 2021-02-21 RX ADMIN — DIVALPROEX SODIUM 625 MG: 125 CAPSULE ORAL at 09:03

## 2021-02-21 RX ADMIN — DEXTROSE MONOHYDRATE 100 ML/HR: 50 INJECTION, SOLUTION INTRAVENOUS at 11:58

## 2021-02-21 RX ADMIN — MIRTAZAPINE 15 MG: 15 TABLET, FILM COATED ORAL at 22:23

## 2021-02-21 NOTE — PROGRESS NOTES
Dressing removed from sacrum/coccyx and DTI noted. Applied zinc oxide ointment and left area EDWINA. Turned and repositioned. Tolerated procedure poorly. Wound consult ordered.

## 2021-02-21 NOTE — PROGRESS NOTES
[address] [facility] Adult  Hospitalist Group                                                                                          Hospitalist Progress Note  Farzaneh Morales MD  Answering service: [phone] OR [phone] from in house phone              Progress Note    Patient: Willa Barton MRN: [id number]  SSN: [id number]    YOB: 1945  Age: 67 y.o. Sex: female      Admit Date: 2/19/2021    LOS: 2 days     Subjective:     Patient presents from her nursing home with altered mental status. Has baseline dementia. Patient with recent positive status for Covid. Patient was found to have urinary tract infection also with hypernatremia. Admitted for further evaluation management. Objective:     Vitals:    02/21/21 0500 02/21/21 0600 02/21/21 0700 02/21/21 0705   BP:    (!) 98/45   Pulse: 65 68 68 85   Resp: 11 12 13 17   Temp:    98.3 °F (36.8 °C)   SpO2:    99%   Weight:            Intake and Output:  Current Shift: No intake/output data recorded. Last three shifts: 02/19 1901 - 02/21 0700  In: 1278.8 [I.V.:1278.8]  Out: 300 [Urine:300]    Physical Exam:   GENERAL: Patient is awake and alert  THROAT & NECK: normal and no erythema or exudates noted. LUNG: clear to auscultation bilaterally  HEART: regular rate and rhythm, S1, S2 normal, no murmur, click, rub or gallop  ABDOMEN: soft, non-tender. Bowel sounds normal. No masses,  no organomegaly  EXTREMITIES:  extremities normal, atraumatic, no cyanosis or edema  SKIN: no rash or abnormalities  NEUROLOGIC: Awake and alert, no acute deficit  PSYCHIATRIC: non focal    Lab/Data Review: All lab results for the last 24 hours reviewed.      Recent Results (from the past 24 hour(s))   CBC W/O DIFF    Collection Time: 02/21/21 12:49 AM   Result Value Ref Range    WBC 9.2 3.6 - 11.0 K/uL    RBC 3.42 (L) 3.80 - 5.20 M/uL    HGB 10.8 (L) 11.5 - 16.0 g/dL    HCT 36.4 35.0 - 47.0 %    .4 (H) 80.0 - 99.0 FL    MCH 31.6 26.0 - 34.0 PG    MCHC 29.7 (L) 30.0 - 36.5 g/dL    RDW 13.9 11.5 - 14.5 %    PLATELET 132 088 - 358 K/uL    MPV 9.6 8.9 - 12.9 FL    NRBC 0.0 0  WBC    ABSOLUTE NRBC 0.00 0.00 - 0.01 K/uL   SAMPLES BEING HELD    Collection Time: 02/21/21 12:49 AM   Result Value Ref Range    SAMPLES BEING HELD 1PST     COMMENT        Add-on orders for these samples will be processed based on acceptable specimen integrity and analyte stability, which may vary by analyte. Imaging:    No results found. Assessment and Plan: Altered mental status  -Likely metabolic encephalopathy in the setting of UTI and electrolyte imbalance  -Patient with baseline dementia  -Negative CT head for acute pathology  -Continue monitor    Urinary tract infection  -Continue Rocephin and follow urine culture    Hypernatremia  -Likely from fluid depletion from poor p.o. intake  -Sodium level has increased today  -Change IVF to D5    Prerenal azotemia  -Continue gentle IV fluid hydration  -Monitor renal function    Bipolar disorder   -Continue Risperdal and Remeron    Covid infection   -Chest x-ray shows no acute disease  -Continue isolation    Dementia  -On Aricept and Namenda  -Supportive care    Disposition plan: Back to nursing home when electrolytes and infection improved.     Signed By: Lee Olivas MD     February 21, 2021

## 2021-02-22 LAB
ANION GAP SERPL CALC-SCNC: 6 MMOL/L (ref 5–15)
BUN SERPL-MCNC: 22 MG/DL (ref 6–20)
BUN/CREAT SERPL: 54 (ref 12–20)
CALCIUM SERPL-MCNC: 8.5 MG/DL (ref 8.5–10.1)
CHLORIDE SERPL-SCNC: 113 MMOL/L (ref 97–108)
CO2 SERPL-SCNC: 28 MMOL/L (ref 21–32)
CREAT SERPL-MCNC: 0.41 MG/DL (ref 0.55–1.02)
ERYTHROCYTE [DISTWIDTH] IN BLOOD BY AUTOMATED COUNT: 13.8 % (ref 11.5–14.5)
GLUCOSE SERPL-MCNC: 96 MG/DL (ref 65–100)
HCT VFR BLD AUTO: 31.3 % (ref 35–47)
HGB BLD-MCNC: 9.6 G/DL (ref 11.5–16)
MCH RBC QN AUTO: 31.4 PG (ref 26–34)
MCHC RBC AUTO-ENTMCNC: 30.7 G/DL (ref 30–36.5)
MCV RBC AUTO: 102.3 FL (ref 80–99)
NRBC # BLD: 0 K/UL (ref 0–0.01)
NRBC BLD-RTO: 0 PER 100 WBC
PLATELET # BLD AUTO: 213 K/UL (ref 150–400)
PMV BLD AUTO: 10 FL (ref 8.9–12.9)
POTASSIUM SERPL-SCNC: 3.1 MMOL/L (ref 3.5–5.1)
RBC # BLD AUTO: 3.06 M/UL (ref 3.8–5.2)
SODIUM SERPL-SCNC: 147 MMOL/L (ref 136–145)
WBC # BLD AUTO: 6.9 K/UL (ref 3.6–11)

## 2021-02-22 PROCEDURE — 74011250637 HC RX REV CODE- 250/637: Performed by: FAMILY MEDICINE

## 2021-02-22 PROCEDURE — 97161 PT EVAL LOW COMPLEX 20 MIN: CPT

## 2021-02-22 PROCEDURE — 74011250636 HC RX REV CODE- 250/636: Performed by: FAMILY MEDICINE

## 2021-02-22 PROCEDURE — 80048 BASIC METABOLIC PNL TOTAL CA: CPT

## 2021-02-22 PROCEDURE — 85027 COMPLETE CBC AUTOMATED: CPT

## 2021-02-22 PROCEDURE — 74011000250 HC RX REV CODE- 250: Performed by: FAMILY MEDICINE

## 2021-02-22 PROCEDURE — 97530 THERAPEUTIC ACTIVITIES: CPT

## 2021-02-22 PROCEDURE — 65660000000 HC RM CCU STEPDOWN

## 2021-02-22 PROCEDURE — 36415 COLL VENOUS BLD VENIPUNCTURE: CPT

## 2021-02-22 RX ORDER — BALSAM PERU/CASTOR OIL
OINTMENT (GRAM) TOPICAL 3 TIMES DAILY
Status: DISCONTINUED | OUTPATIENT
Start: 2021-02-22 | End: 2021-02-24 | Stop reason: HOSPADM

## 2021-02-22 RX ORDER — POTASSIUM CHLORIDE 750 MG/1
20 TABLET, FILM COATED, EXTENDED RELEASE ORAL 2 TIMES DAILY
Status: COMPLETED | OUTPATIENT
Start: 2021-02-22 | End: 2021-02-23

## 2021-02-22 RX ADMIN — DIVALPROEX SODIUM 625 MG: 125 CAPSULE ORAL at 23:08

## 2021-02-22 RX ADMIN — Medication 10 ML: at 17:27

## 2021-02-22 RX ADMIN — MEMANTINE 5 MG: 5 TABLET ORAL at 22:59

## 2021-02-22 RX ADMIN — CASTOR OIL AND BALSAM, PERU: 788; 87 OINTMENT TOPICAL at 17:27

## 2021-02-22 RX ADMIN — MIRTAZAPINE 15 MG: 15 TABLET, FILM COATED ORAL at 22:59

## 2021-02-22 RX ADMIN — DONEPEZIL HYDROCHLORIDE 5 MG: 5 TABLET, FILM COATED ORAL at 22:59

## 2021-02-22 RX ADMIN — OXYCODONE HYDROCHLORIDE AND ACETAMINOPHEN 500 MG: 500 TABLET ORAL at 10:29

## 2021-02-22 RX ADMIN — POTASSIUM CHLORIDE 20 MEQ: 750 TABLET, FILM COATED, EXTENDED RELEASE ORAL at 17:27

## 2021-02-22 RX ADMIN — POTASSIUM CHLORIDE 20 MEQ: 750 TABLET, FILM COATED, EXTENDED RELEASE ORAL at 10:29

## 2021-02-22 RX ADMIN — DEXTROSE MONOHYDRATE 100 ML/HR: 50 INJECTION, SOLUTION INTRAVENOUS at 06:51

## 2021-02-22 RX ADMIN — PROPRANOLOL HYDROCHLORIDE 10 MG: 10 TABLET ORAL at 22:59

## 2021-02-22 RX ADMIN — CASTOR OIL AND BALSAM, PERU: 788; 87 OINTMENT TOPICAL at 23:06

## 2021-02-22 RX ADMIN — MEMANTINE 5 MG: 5 TABLET ORAL at 10:29

## 2021-02-22 RX ADMIN — RISPERIDONE 1 MG: 1 TABLET ORAL at 10:29

## 2021-02-22 RX ADMIN — RISPERIDONE 1 MG: 1 TABLET ORAL at 22:59

## 2021-02-22 RX ADMIN — Medication 10 ML: at 06:54

## 2021-02-22 RX ADMIN — DIVALPROEX SODIUM 625 MG: 125 CAPSULE ORAL at 10:28

## 2021-02-22 RX ADMIN — ENOXAPARIN SODIUM 60 MG: 60 INJECTION SUBCUTANEOUS at 22:58

## 2021-02-22 RX ADMIN — PROPRANOLOL HYDROCHLORIDE 10 MG: 10 TABLET ORAL at 10:29

## 2021-02-22 RX ADMIN — OXYCODONE HYDROCHLORIDE AND ACETAMINOPHEN 500 MG: 500 TABLET ORAL at 17:27

## 2021-02-22 RX ADMIN — Medication 10 ML: at 22:58

## 2021-02-22 RX ADMIN — ENOXAPARIN SODIUM 60 MG: 60 INJECTION SUBCUTANEOUS at 10:28

## 2021-02-22 RX ADMIN — DEXTROSE MONOHYDRATE 100 ML/HR: 50 INJECTION, SOLUTION INTRAVENOUS at 17:27

## 2021-02-22 NOTE — ACP (ADVANCE CARE PLANNING)
Advance Care Planning     Advance Care Planning Activator (Inpatient)  Conversation Note      Date of ACP Conversation: 02/22/21     Conversation Conducted with:   Patient with Decision Making Capacity    ACP Activator: Marcellus Ivan RN    Health Care Decision Maker:    Current Designated Health Care Decision Maker:     Primary Decision Maker: Tim Moe - Reilly - [phone]  Click here to complete [address] including selection of the Healthcare Decision Maker Relationship (ie \"Primary\")      Care Preferences    Ventilation: \"If you were in your present state of health and suddenly became very ill and were unable to breathe on your own, what would your preference be about the use of a ventilator (breathing machine) if it were available to you? \"      If patient would desire the use of a ventilator (breathing machine), answer \"yes\", if not \"no\":yes    \"If your health worsens and it becomes clear that your chance of recovery is unlikely, what would your preference be about the use of a ventilator (breathing machine) if it were available to you? \"     Would the patient desire the use of a ventilator (breathing machine)? YES      Resuscitation  \"CPR works best to restart the heart when there is a sudden event, like a heart attack, in someone who is otherwise healthy. Unfortunately, CPR does not typically restart the heart for people who have serious health conditions or who are very sick. \"    \"In the event your heart stopped as a result of an underlying serious health condition, would you want attempts to be made to restart your heart (answer \"yes\" for attempt to resuscitate) or would you prefer a natural death (answer \"no\" for do not attempt to resuscitate)? \" yes      [] Yes  [] No   Educated Patient / [place] regarding differences between Advance Directives and portable DNR orders.     Length of ACP Conversation in minutes:  8 with daughter    Marcia Abdi Outcomes:  [x] ACP discussion completed  [] Existing advance directive reviewed with patient; no changes to patient's previously recorded wishes     [] New Advance Directive completed   [] Portable Do Not Resuscitate prepared for Provider review and signature  [] POLST/POST/MOLST/MOST prepared for Provider review and signature      Follow-up plan:    [] Schedule follow-up conversation to continue planning  [] Referred individual to Provider for additional questions/concerns   [] Advised patient/agent/surrogate to review completed ACP document and update if needed with changes in condition, patient preferences or care setting     [] This note routed to one or more involved healthcare providers

## 2021-02-22 NOTE — PROGRESS NOTES
Hourly rounding done, pt remains in NSR, on room air, O2 saturation greater than 90%, on bed rest, has pure wick, urine yellow and clear, no complaints of pain throughout shift, call bell within reach, use explained to pt            Bedside shift change report given to [address] [place] [address] (oncoming nurse) by Edgard Huerta RN (offgoing nurse). Report included the following information SBAR, Kardex, ED Summary, Intake/Output, MAR, Recent Results, Med Rec Status and Cardiac Rhythm NSR.

## 2021-02-22 NOTE — PROGRESS NOTES
Orders received, chart reviewed and patient evaluated by physical therapy. Pending progression with skilled acute physical therapy, recommend:  Therapy up to 5 days/week in SNF setting    Recommend with nursing OOB to chair 3x/day and walking daily with 2 assist or meri lift. Thank you for completing as able in order to maintain patient strength, endurance and independence. Full evaluation to follow.

## 2021-02-22 NOTE — PROGRESS NOTES
Physician Progress Note      Ryan Gu  CSN #:                  [id number]  :                       1945  ADMIT DATE:       2021 3:29 PM  [address] [place] DATE:  RESPONDING  PROVIDER #:        Karyn Lemos MD          QUERY TEXT:    Patient admitted with UTI. Documentation reflects bilateral pulmonary emboli in note(s) dated  by Luisa Roger NP. If possible, please document in the progress notes and discharge summary if PEs were: The medical record reflects the following:  Risk Factors: COVID    Clinical Indicators:    CTA Chest= Positive for bilateral pulmonary emboli. Clot burden is moderate. No evidence of right heart strain. White, -  Notified by radiology of positive PE study - bilateral moderate PE without right heart strain  - Transfer to intermediate level of care overnight for closer monitoring given acute finding  - Start heparin gtt PE protocol. Just received DVT prophylaxis dose around 2000pm, will forego bolus dose    Treatment: Lovenox 60mg SQ BID      Thank you,  Ethan Vera RN, BSN, [facility]  Clinical   [phone]  Options provided:  -- PEs ruled out after study  -- PEs treated and resolved  -- PEs confirmed after study  -- Other - I will add my own diagnosis  -- Disagree - Not applicable / Not valid  -- Disagree - Clinically unable to determine / Unknown  -- Refer to Clinical Documentation Reviewer    PROVIDER RESPONSE TEXT:    PEs confirmed after study. Query created by: Lilly Lanier on 2021 11:12 AM      QUERY TEXT:    Patient admitted with UTI. Noted documentation of metabolic encephalopathy in H&P and subsequent hosp PNs. Additional documentation is needed to support the diagnosis of encephalopathy in the setting of dementia. Or please document if the diagnosis of metabolic encephalopathy has been ruled out after further study.     The medical record reflects the following:  Risk Factors: UTI    Clinical Indicators:    Na 147-> 151-> 147  UA= turbid, blood LG, Leuks LG, WBC 20-50, Bact Neg    Oscar 2/19-  The patient has baseline dementia and is A+O x1. Mental Status: She is alert. Mental status is at baseline. Guido 2/20-  Altered mental status  -Likely metabolic encephalopathy in the setting of UTI and electrolyte imbalance  -Patient with baseline dementia  -Negative CT head for acute pathology  -Continue monitor    Treatment: CT Head; Rocephin 1g IV; Namenda 5mg PO BID; Aricept 5mg PO HS      Best Practice Documentation Includes:  * What is the patient's baseline mental status, and how does it compare to current mental status? * Have the patient's behaviors changed? Are they experiencing any hallucinations, sensory misperceptions, hypervigilance, disturbed sleep/wake cycle, or paranoia? * Is the patient requiring a level of nursing care or medications that have been increased since their admission? * Is the patient experiencing any new motor abnormalities (tremor, asterixis, myoclonus, etc.)      Thank you,  Stacey Alfonso RN, BSN, [facility]  Clinical   [phone]  Options provided:  -- Metabolic Encephalopathy superimposed on dementia is present as evidenced by, Please document evidence.   -- Metabolic Encephalopathy was ruled out  -- Other - I will add my own diagnosis  -- Disagree - Not applicable / Not valid  -- Disagree - Clinically unable to determine / Unknown  -- Refer to Clinical Documentation Reviewer    PROVIDER RESPONSE TEXT:    Metabolic Encephalopathy superimposed on dementia is present as evidenced by Less interactive and more confused than baseline    Query created by: Miguel blevins 2/22/2021 11:28 AM      Electronically signed by:  Yuri Cheema MD 2/22/2021 2:30 PM

## 2021-02-22 NOTE — PROGRESS NOTES
Readmission Assessment  Number of days since last admission?: 8-30 days  Previous disposition: Other (comment)(Discharge to [facility] assisted living)  Who is being interviewed?: Caregiver  Did you see a specialist, such as Cardiac, Pulmonary, Orthopedic Physician, etc. after you left the hospital?: Yes  Who advised the patient to return to the hospital?: Other (Comment)(staff at assisted living)  Does the patient report anything that got in the way of taking their medications?: No  Reason for Readmission:   Admitted from assisted living at [facility]  With decreased po intake. Also noted by daughter to be more confused. Has a history of Dementia and anxiety. RUR Score/Risk Level:   24%-High      PCP: First and Last name: Holly Hull    Name of Practice:    Are you a current patient: Yes/No: YES   Approximate date of last visit: 2/21   Can you participate in a virtual visit with your PCP: NO    Is a Care Conference indicated: May benefit from having goals of care established. Will ask attending for Palliative consult. Did you attend your follow up appointment (s): If not, why not:         Resources/supports as identified by patient/family:   Daughter Maude Bernardo and has assisted with needs at the assisted living facility. Top Challenges facing patient (as identified by patient/family and CM): Finances/Medication cost?  [employer] system or lack thereof? Living arrangements? [place]         Self-care/ADLs/Cognition? Oriented only to self history of Dementia. Current Advanced Directive/Advance Care Plan:  Does not have AMD. Daughter Artemio Simmons is [facility]           Plan for utilizing home health: will likely discharge back to [facility]. Will need  PT/OT to clear  For return to facility or may need higher level of care.              Transition of Care Plan:    Based on readmission, the patient's previous Plan of Care   has been evaluated and/or modified. The current Transition of Care Plan is:   Covid positive on 2/9. Shorty Cohn Once medically stable will likely need rehab pending progress. Medicare pt has received, reviewed, and signed IM letter informing them of their right to appeal the discharge. Signed copy has been placed on pt bedside chart. Care Management Interventions  PCP Verified by CM:  Yes  Last Visit to PCP: 02/18/21  Mode of Transport at Discharge: S  Current Support Network: Assisted Living(Resides in [address] [place] [address] living assisted living)  The Patient and/or Patient Representative was Provided with a Choice of Provider and Agrees with the Discharge Plan?: Anca Hodgkin daughter)  Alex Provided?: No  Liss Ledesma, RN CRM  Ext [phone]

## 2021-02-22 NOTE — PROGRESS NOTES
Problem: Mobility Impaired (Adult and Pediatric)  Goal: *Acute Goals and Plan of Care (Insert Text)  Description: FUNCTIONAL STATUS PRIOR TO ADMISSION: Pt poor historian. Per chart, pt lived at [place] [address] with recent decline in mobility. Pt was ambulatory with walker and recently requiring increased assistance from [facility] staff. HOME SUPPORT PRIOR TO ADMISSION: The patient lived in [facility]. Physical Therapy Goals  Initiated 2/22/2021  1. Patient will move from supine to sit and sit to supine , scoot up and down, and roll side to side in bed with moderate assistance  within 7 day(s). 2.  Patient will tolerate sitting EOB for approx 3-5 min with maximal assistance within 7 day(s). 3.  Patient will transfer from bed to chair and chair to bed with maximal assistance using the least restrictive device within 7 day(s). 4.  Patient will perform sit to stand with maximal assistance within 7 day(s). 5.  Patient will ambulate with maximal assistance for 3 feet with the least restrictive device within 7 day(s). Outcome: Not Met   PHYSICAL THERAPY EVALUATION  Patient: Frank Shea (89 y.o. female)  Date: 2/22/2021  Primary Diagnosis: Sepsis ([facility] [address] [A41.9]       Precautions:   Fall(droplet plus)    ASSESSMENT  Based on the objective data described below, the patient presents with decreased activity tolerance, balance deficits, and weakness. Per chart, pt with declining mobility yet pt poor historian. Pt received in slight L sidelying position. Pt reported discomfort with rolling toward L side. Required total A for scooting. Attempted chair position in bed, pt reported discomfort and noted decrease in BP. Repositioned pt and noted improvements. Recommend modified chair position to promote upright sitting tolerance and will attempt to progress bed mobility as appropriate/tolerated. Current Level of Function Impacting Discharge (mobility/balance): max A rolling    Functional Outcome Measure:   The patient scored 0/100 on the Barthel Index outcome measure which is indicative of patient is 100% dependent on others for self care and mobility. Other factors to consider for discharge: fall risk, poor sitting tolerance, COVID, from SNF/[facility]      Patient will benefit from skilled therapy intervention to address the above noted impairments. PLAN :  Recommendations and Planned Interventions: bed mobility training, transfer training, gait training, therapeutic exercises, neuromuscular re-education, patient and family training/education and therapeutic activities      Frequency/Duration: Patient will be followed by physical therapy:  2 times a week to address goals. Recommendation for discharge: (in order for the patient to meet his/her long term goals)  Therapy up to 5 days/week in SNF setting    This discharge recommendation:  Has not yet been discussed the attending provider and/or case management    IF patient discharges home will need the following DME: hospital bed, mechanical lift and wheelchair         SUBJECTIVE:   Patient stated Kendra Christy, I will see you later.     OBJECTIVE DATA SUMMARY:   HISTORY:    Past Medical History:   Diagnosis Date    Anemia     Arthritis     Bipolar disorder ([address]     Burning with urination     Dementia ([facility])     Memory change     Psychiatric disorder     Bipolar Disorder    Stool color black     Tremor      Past Surgical History:   Procedure Laterality Date    HX BREAST BIOPSY Left 1990's    benign    HX CHOLECYSTECTOMY      HX GI      HX GYN         Personal factors and/or comorbidities impacting plan of care: [facility], [address] [employer] Environment: [address] Name: [facility]  One/Two Story Residence: One story  Current DME Used/Available at Home: Rubie Mcardle, rolling    EXAMINATION/PRESENTATION/DECISION MAKING:   Critical Behavior:  Neurologic State: Alert, Eyes open spontaneously  Orientation Level: Oriented to person, Disoriented to place, Disoriented to situation, Disoriented to time  Cognition: Follows commands     Hearing:     Skin:  intact  Edema: none noted  Range Of Motion:  AROM: Grossly decreased, non-functional           PROM: Grossly decreased, non-functional           Strength:    Strength: Grossly decreased, non-functional                    Tone & Sensation:   Tone: Abnormal                              Coordination:  Coordination: Grossly decreased, non-functional  Vision:      Functional Mobility:  Bed Mobility:  Rolling: Maximum assistance        Scooting: Total assistance  Balance:   Sitting: Impaired; With support      Functional Measure:  Barthel Index:    Bathin  Bladder: 0  Bowels: 0  Groomin  Dressin  Feedin  Mobility: 0  Stairs: 0  Toilet Use: 0  Transfer (Bed to Chair and Back): 0  Total: 0/100       The Barthel ADL Index: Guidelines  1. The index should be used as a record of what a patient does, not as a record of what a patient could do. 2. The main aim is to establish degree of independence from any help, physical or verbal, however minor and for whatever reason. 3. The need for supervision renders the patient not independent. 4. A patient's performance should be established using the best available evidence. Asking the patient, friends/relatives and nurses are the usual sources, but direct observation and common sense are also important. However direct testing is not needed. 5. Usually the patient's performance over the preceding 24-48 hours is important, but occasionally longer periods will be relevant. 6. Middle categories imply that the patient supplies over 50 per cent of the effort. 7. Use of aids to be independent is allowed. Dilip Díaz., Barthel, DMattW. (6875). Functional evaluation: the Barthel Index. [address] [facility] (14)2. ONOFRE Higginbotham, Leann Najera.Alec Killen, [facility] ().  Measuring the change indisability after inpatient rehabilitation; comparison of the responsiveness of the Barthel Index and Functional [place] Measure. Journal of Neurology, Neurosurgery, and Psychiatry, 66(4), 209-415. LYNNETTE Brady.JULIO, KELLY Nicole, & Lorin Yeung M.A. (2004.) Assessment of post-stroke quality of life in cost-effectiveness studies: The usefulness of the Barthel Index and the EuroQoL-5D. Quality of Life Research, 15, 368-68            Physical Therapy Evaluation Charge Determination   History Examination Presentation Decision-Making   HIGH Complexity :3+ comorbidities / personal factors will impact the outcome/ POC  LOW Complexity : 1-2 Standardized tests and measures addressing body structure, function, activity limitation and / or participation in recreation  MEDIUM Complexity : Evolving with changing characteristics  Other outcome measures barthel index  HIGH       Based on the above components, the patient evaluation is determined to be of the following complexity level: LOW         Activity Tolerance:   Fair and requires rest breaks    After treatment patient left in no apparent distress:   Supine in bed, Call bell within reach and Side rails x 3    COMMUNICATION/EDUCATION:   The patients plan of care was discussed with: Registered nurse. Fall prevention education was provided and the patient/caregiver indicated understanding., Patient/family have participated as able in goal setting and plan of care. and Patient/family agree to work toward stated goals and plan of care.     Thank you for this referral.  Miguel Person, PT, DPT   Time Calculation: 22 mins

## 2021-02-22 NOTE — WOUND CARE
WOCN Note:  
 
New consult placed for assessment of sacral/coccyx DTI POA. Assessed in room 403. PPE: papr, mask, gown, gloves. Chart reviewed. Admitted DX:  Sepsis Past Medical History:  
Diagnosis Date  Anemia  Arthritis  Bipolar disorder ([facility] [address]  Burning with urination  Dementia ([facility] [address]  Memory change  Psychiatric disorder Bipolar Disorder  Stool color black  Tremor Assessment:  
Patient is alert, communicative and requires assist of 2 with repositioning. Bed: foam  
Patient wearing briefs for incontinence and has a Pure wick. Patient reports no pain. Patient repositioned on left side with pillow. Heels offloaded with pillows. Heels intact without erythema. 1. POA Sacral/coccyx, Deep tissue Pressure Injury: 6 x 12 x 0.1 cm; 60% non blanching purple 50% non blanching red. Wound, Pressure Prevention & Skin Care Recommendations: 1. Minimize layers of linen/pads under patient to optimize support surface. 2.  Turn/reposition approximately every 2 hours and offload heels. 3.  Manage moisture/ Keep skin folds clean and dry. 4.  Specialty bed: prius ordered via [facility]. Use only flat sheet and one incontinence pad. 
5.  Sacral/coccyx:  Apply Venelex TID and cover with sacral foam dressing. Discussed above plan with patient and Brando WHITMORE. Transition of Care: Plan to follow as needed while admitted to hospital. 
 
Ramiro Gaines, AMANDEEPN RN [facility] Inpatient Wound Care Available on Perfect [employer] Pager [phone] Office [phone]

## 2021-02-22 NOTE — PROGRESS NOTES
[address] [facility] Adult  Hospitalist Group                                                                                          Hospitalist Progress Note  lAissa Izquierdo MD  Answering service: [phone] OR [phone] from in house phone              Progress Note    Patient: Chet Mckeon MRN: [id number]  SSN: [id number]    YOB: 1945  Age: 67 y.o. Sex: female      Admit Date: 2/19/2021    LOS: 3 days     Subjective:     Patient presents from her nursing home with altered mental status. Has baseline dementia. Patient with recent positive status for Covid. Patient was found to have urinary tract infection also with hypernatremia. Admitted for further evaluation management. Objective:     Vitals:    02/21/21 2223 02/21/21 2324 02/22/21 0308 02/22/21 0701   BP: 108/64 (!) 100/55 (!) 128/59 116/69   Pulse: 76 73 68 68   Resp:  15 17 18   Temp:  98.1 °F (36.7 °C) 97.9 °F (36.6 °C) 98.4 °F (36.9 °C)   SpO2:  100% 96% 95%   Weight:   62.5 kg (137 lb 12.6 oz)         Intake and Output:  Current Shift: No intake/output data recorded. Last three shifts: 02/20 1901 - 02/22 0700  In: 3642.1 [P.O.:360; I.V.:3282.1]  Out: 700 [Urine:700]    Physical Exam:   GENERAL: Patient is awake and alert  THROAT & NECK: normal and no erythema or exudates noted. LUNG: clear to auscultation bilaterally  HEART: regular rate and rhythm, S1, S2 normal, no murmur, click, rub or gallop  ABDOMEN: soft, non-tender. Bowel sounds normal. No masses,  no organomegaly  EXTREMITIES:  extremities normal, atraumatic, no cyanosis or edema  SKIN: no rash or abnormalities  NEUROLOGIC: Awake and alert, no acute deficit  PSYCHIATRIC: non focal    Lab/Data Review: All lab results for the last 24 hours reviewed.      Recent Results (from the past 24 hour(s))   CBC W/O DIFF    Collection Time: 02/22/21  3:08 AM   Result Value Ref Range    WBC 6.9 3.6 - 11.0 K/uL    RBC 3.06 (L) 3.80 - 5.20 M/uL    HGB 9.6 (L) 11.5 - 16.0 g/dL HCT 31.3 (L) 35.0 - 47.0 %    .3 (H) 80.0 - 99.0 FL    MCH 31.4 26.0 - 34.0 PG    MCHC 30.7 30.0 - 36.5 g/dL    RDW 13.8 11.5 - 14.5 %    PLATELET 853 317 - 716 K/uL    MPV 10.0 8.9 - 12.9 FL    NRBC 0.0 0  WBC    ABSOLUTE NRBC 0.00 0.00 - 9.01 K/uL   METABOLIC PANEL, BASIC    Collection Time: 02/22/21  3:08 AM   Result Value Ref Range    Sodium 147 (H) 136 - 145 mmol/L    Potassium 3.1 (L) 3.5 - 5.1 mmol/L    Chloride 113 (H) 97 - 108 mmol/L    CO2 28 21 - 32 mmol/L    Anion gap 6 5 - 15 mmol/L    Glucose 96 65 - 100 mg/dL    BUN 22 (H) 6 - 20 MG/DL    Creatinine 0.41 (L) 0.55 - 1.02 MG/DL    BUN/Creatinine ratio 54 (H) 12 - 20      GFR est AA >60 >60 ml/min/1.73m2    GFR est non-AA >60 >60 ml/min/1.73m2    Calcium 8.5 8.5 - 10.1 MG/DL        Imaging:    No results found. Assessment and Plan: Altered mental status  -Likely metabolic encephalopathy in the setting of UTI and electrolyte imbalance  -Patient with baseline dementia  -Negative CT head for acute pathology  -Continue monitor    Urinary tract infection  -Ruled out with negative urine culture  -Discontinue antibiotic    PE  -Bilateral PE noted on CTA  -On full dose lovenox  -Monitor H&H and respiratory status  -Will switch to oral anticoagulation eventually if H&H stable    Hypernatremia  -Likely from fluid depletion from poor p.o. intake  -Sodium level has increased yesterday but improving since switching to D5    Prerenal azotemia  -Continue gentle IV fluid hydration  -Monitor renal function    Bipolar disorder   -Continue Risperdal and Remeron    Covid infection   -Chest x-ray shows no acute disease  -Continue isolation    Dementia  -On Aricept and Namenda  -Supportive care    Disposition plan: Back to nursing home when electrolytes and infection improved.     Signed By: Lindy Myers MD     February 22, 2021

## 2021-02-23 LAB
ERYTHROCYTE [DISTWIDTH] IN BLOOD BY AUTOMATED COUNT: 14 % (ref 11.5–14.5)
HCT VFR BLD AUTO: 36.5 % (ref 35–47)
HGB BLD-MCNC: 11.2 G/DL (ref 11.5–16)
MCH RBC QN AUTO: 31.9 PG (ref 26–34)
MCHC RBC AUTO-ENTMCNC: 30.7 G/DL (ref 30–36.5)
MCV RBC AUTO: 104 FL (ref 80–99)
NRBC # BLD: 0 K/UL (ref 0–0.01)
NRBC BLD-RTO: 0 PER 100 WBC
PLATELET # BLD AUTO: 229 K/UL (ref 150–400)
PMV BLD AUTO: 10 FL (ref 8.9–12.9)
RBC # BLD AUTO: 3.51 M/UL (ref 3.8–5.2)
WBC # BLD AUTO: 6.8 K/UL (ref 3.6–11)

## 2021-02-23 PROCEDURE — 85027 COMPLETE CBC AUTOMATED: CPT

## 2021-02-23 PROCEDURE — 74011250636 HC RX REV CODE- 250/636: Performed by: FAMILY MEDICINE

## 2021-02-23 PROCEDURE — 65660000000 HC RM CCU STEPDOWN

## 2021-02-23 PROCEDURE — 74011250637 HC RX REV CODE- 250/637: Performed by: FAMILY MEDICINE

## 2021-02-23 PROCEDURE — 36415 COLL VENOUS BLD VENIPUNCTURE: CPT

## 2021-02-23 PROCEDURE — 74011000250 HC RX REV CODE- 250: Performed by: FAMILY MEDICINE

## 2021-02-23 RX ADMIN — CASTOR OIL AND BALSAM, PERU: 788; 87 OINTMENT TOPICAL at 09:08

## 2021-02-23 RX ADMIN — DEXTROSE MONOHYDRATE 100 ML/HR: 50 INJECTION, SOLUTION INTRAVENOUS at 21:15

## 2021-02-23 RX ADMIN — RISPERIDONE 1 MG: 1 TABLET ORAL at 21:18

## 2021-02-23 RX ADMIN — APIXABAN 10 MG: 5 TABLET, FILM COATED ORAL at 21:17

## 2021-02-23 RX ADMIN — RISPERIDONE 1 MG: 1 TABLET ORAL at 09:07

## 2021-02-23 RX ADMIN — OXYCODONE HYDROCHLORIDE AND ACETAMINOPHEN 500 MG: 500 TABLET ORAL at 18:53

## 2021-02-23 RX ADMIN — OXYCODONE HYDROCHLORIDE AND ACETAMINOPHEN 500 MG: 500 TABLET ORAL at 09:07

## 2021-02-23 RX ADMIN — DIVALPROEX SODIUM 625 MG: 125 CAPSULE ORAL at 23:27

## 2021-02-23 RX ADMIN — MIRTAZAPINE 15 MG: 15 TABLET, FILM COATED ORAL at 21:18

## 2021-02-23 RX ADMIN — MEMANTINE 5 MG: 5 TABLET ORAL at 21:18

## 2021-02-23 RX ADMIN — PROPRANOLOL HYDROCHLORIDE 10 MG: 10 TABLET ORAL at 21:18

## 2021-02-23 RX ADMIN — ENOXAPARIN SODIUM 60 MG: 60 INJECTION SUBCUTANEOUS at 09:00

## 2021-02-23 RX ADMIN — MEMANTINE 5 MG: 5 TABLET ORAL at 09:07

## 2021-02-23 RX ADMIN — DEXTROSE MONOHYDRATE 100 ML/HR: 50 INJECTION, SOLUTION INTRAVENOUS at 07:32

## 2021-02-23 RX ADMIN — PROPRANOLOL HYDROCHLORIDE 10 MG: 10 TABLET ORAL at 09:07

## 2021-02-23 RX ADMIN — Medication 10 ML: at 05:27

## 2021-02-23 RX ADMIN — POTASSIUM CHLORIDE 20 MEQ: 750 TABLET, FILM COATED, EXTENDED RELEASE ORAL at 09:07

## 2021-02-23 RX ADMIN — Medication 10 ML: at 21:19

## 2021-02-23 RX ADMIN — DIVALPROEX SODIUM 625 MG: 125 CAPSULE ORAL at 10:36

## 2021-02-23 RX ADMIN — DONEPEZIL HYDROCHLORIDE 5 MG: 5 TABLET, FILM COATED ORAL at 21:18

## 2021-02-23 RX ADMIN — CASTOR OIL AND BALSAM, PERU: 788; 87 OINTMENT TOPICAL at 15:21

## 2021-02-23 RX ADMIN — CASTOR OIL AND BALSAM, PERU: 788; 87 OINTMENT TOPICAL at 21:18

## 2021-02-23 NOTE — PROGRESS NOTES
[address] [facility] Adult  Hospitalist Group                                                                                          Hospitalist Progress Note  Akin White MD  Answering service: [phone] OR [phone] from in house phone              Progress Note    Patient: Ning Schroeder MRN: [id number]  SSN: [id number]    YOB: 1945  Age: 67 y.o. Sex: female      Admit Date: 2/19/2021    LOS: 4 days     Subjective:     Patient presents from her nursing home with altered mental status. Has baseline dementia. Patient with recent positive status for Covid. Patient was found to have urinary tract infection also with hypernatremia. Admitted for further evaluation management. Patient has no acute complaint. Seen more alert. Objective:     Vitals:    02/23/21 0406 02/23/21 0527 02/23/21 0733 02/23/21 0907   BP: 116/70  96/63 (!) 106/94   Pulse: 71  66 72   Resp: 10  11    Temp: 98 °F (36.7 °C)  98.2 °F (36.8 °C)    SpO2: 99%  99%    Weight:  65.1 kg (143 lb 9.6 oz)          Intake and Output:  Current Shift: No intake/output data recorded. Last three shifts: 02/21 1901 - 02/23 0700  In: 903.3 [I.V.:903.3]  Out: 1750 [Urine:1750]    Physical Exam:   GENERAL: Patient is awake and alert  THROAT & NECK: normal and no erythema or exudates noted. LUNG: clear to auscultation bilaterally  HEART: regular rate and rhythm, S1, S2 normal, no murmur, click, rub or gallop  ABDOMEN: soft, non-tender. Bowel sounds normal. No masses,  no organomegaly  EXTREMITIES:  extremities normal, atraumatic, no cyanosis or edema  SKIN: no rash or abnormalities  NEUROLOGIC: Seems more alert  PSYCHIATRIC: non focal    Lab/Data Review: All lab results for the last 24 hours reviewed.      Recent Results (from the past 24 hour(s))   CBC W/O DIFF    Collection Time: 02/23/21  7:09 AM   Result Value Ref Range    WBC 6.8 3.6 - 11.0 K/uL    RBC 3.51 (L) 3.80 - 5.20 M/uL    HGB 11.2 (L) 11.5 - 16.0 g/dL    HCT 36.5 35.0 - 47.0 %    .0 (H) 80.0 - 99.0 FL    MCH 31.9 26.0 - 34.0 PG    MCHC 30.7 30.0 - 36.5 g/dL    RDW 14.0 11.5 - 14.5 %    PLATELET 231 506 - 585 K/uL    MPV 10.0 8.9 - 12.9 FL    NRBC 0.0 0  WBC    ABSOLUTE NRBC 0.00 0.00 - 0.01 K/uL        Imaging:    No results found. Assessment and Plan: Altered mental status  -Likely metabolic encephalopathy in the setting of UTI and electrolyte imbalance  -Patient with baseline dementia  -Negative CT head for acute pathology  -Continue monitor    Urinary tract infection  -Ruled out with negative urine culture  -Discontinued antibiotic    PE  -Bilateral PE noted on CTA  -Discontinue Lovenox, start Eliquis 10 mg twice daily for 7 days, then 5 mg twice daily  -Will need anticoagulation at least 3 months    Hypernatremia  -Likely from fluid depletion from poor p.o. intake  -Sodium level has increased yesterday but improving since switching to D5    Prerenal azotemia  -Continue gentle IV fluid hydration  -Monitor renal function    Bipolar disorder   -Continue Risperdal and Remeron    Covid infection   -Chest x-ray shows no acute disease  -Continue isolation    Dementia  -On Aricept and Namenda  -Supportive care    Disposition plan: Back to nursing home when electrolytes and infection improved.     Signed By: Melly Street MD     February 23, 2021

## 2021-02-23 NOTE — PROGRESS NOTES
Bedside shift change report given to Chano (oncoming nurse) by Kayla Masterson (offgoing nurse). Report included the following information SBAR, Kardex, ED Summary, MAR, Recent Results and Cardiac Rhythm NSR.     0400: Two nurses have attempted to get AM labs and IV on patient. Both were unsuccessful at attempts. Notified Hospitalist NP and called CCU nurse for assistance. 0700: CCU nurse and got an IV and AM labs. AM labs sent down.

## 2021-02-23 NOTE — PROGRESS NOTES
Nurse and supervisor attempted to draw pt labs and was unsuccessful.  MD was notified and stated to skip and draw in the am.

## 2021-02-23 NOTE — PROGRESS NOTES
Transition Plan of Care  RUR 34%-High  Covid positive. At baseline she resides in assisted living at [facility]. She had needed more help with ADLs per staff. Spoke with daughter Jeff Damon [phone] to discuss her concerns related to discharge. She would prefer if patient can discharge back to [address] [place] [address] and followed by home health. She is concerned about her being more confused in a new environment. Informed her that PT/OT will see and evaluate level of care needed at discharge. Will reach out to [facility] [phone] at the facility to see if they can accommodate her increase in care needs.    Francis Castillo, RN CRM  Ext [phone]

## 2021-02-23 NOTE — PROGRESS NOTES
Physician Progress Note      Phani Guerin  CSN #:                  [id number]  :                       1945  ADMIT DATE:       2021 3:29 PM  [address] [place] DATE:  RESPONDING  PROVIDER #:        Jose Raul Diego MD          QUERY TEXT:    Patient admitted with UTI. Per wound care RN dated , noted to also have deep tissue pressure ulcer. If possible, please document in progress notes and discharge summary the stage of the pressure ulcer: The medical record reflects the following:  Risk Factors: decreased bed mobility; incontinence    Clinical Indicators:    POA Sacral/coccyx, Deep tissue Pressure Injury: 6 x 12 x 0.1 cm; 60% non blanching purple 50% non blanching red. ?  Treatment: Wound care; Turn/reposition approximately every 2 hours; specialty bed; Apply Venelex TID and cover with sacral foam dressing    Stage 1:  Non-blanchable erythema of intact skin  Stage 2:  Abrasion, Blister, Partial-thickness skin loss, with exposed dermis  Stage 3:  Full-thickness skin loss with damage or necrosis of subcutaneous tissue  Stage 4:  Full-thickness skin & soft tissue loss through to underlying muscle, tendon or bone  Unstageable: Obscured full-thickness skin & tissue loss    Thank you,  Emerita Way, RN, BSN, [facility]  Clinical   [phone]  Options provided:  -- Deep tissue injury pressure ulcer of sacral/coccyx present on admission  -- Deep tissue injury pressure ulcer of sacral/coccyx present on admission  -- Other - I will add my own diagnosis  -- Disagree - Not applicable / Not valid  -- Disagree - Clinically unable to determine / Unknown  -- Refer to Clinical Documentation Reviewer    PROVIDER RESPONSE TEXT:    This patient has a deep tissue injury pressure ulcer of the sacrum/coccyx which was present on admission.     Query created by: Halle Carlson on 2021 8:51 AM      Electronically signed by:  Jose Raul Diego MD 2021 5:44 PM

## 2021-02-24 VITALS
SYSTOLIC BLOOD PRESSURE: 125 MMHG | BODY MASS INDEX: 25.42 KG/M2 | OXYGEN SATURATION: 97 % | WEIGHT: 143.5 LBS | TEMPERATURE: 98.4 F | DIASTOLIC BLOOD PRESSURE: 71 MMHG | HEART RATE: 85 BPM | RESPIRATION RATE: 15 BRPM

## 2021-02-24 LAB
ANION GAP SERPL CALC-SCNC: 6 MMOL/L (ref 5–15)
BACTERIA SPEC CULT: NORMAL
BUN SERPL-MCNC: 10 MG/DL (ref 6–20)
BUN/CREAT SERPL: 24 (ref 12–20)
CALCIUM SERPL-MCNC: 9.6 MG/DL (ref 8.5–10.1)
CHLORIDE SERPL-SCNC: 104 MMOL/L (ref 97–108)
CO2 SERPL-SCNC: 29 MMOL/L (ref 21–32)
CREAT SERPL-MCNC: 0.42 MG/DL (ref 0.55–1.02)
GLUCOSE SERPL-MCNC: 103 MG/DL (ref 65–100)
POTASSIUM SERPL-SCNC: 4 MMOL/L (ref 3.5–5.1)
SERVICE CMNT-IMP: NORMAL
SODIUM SERPL-SCNC: 139 MMOL/L (ref 136–145)

## 2021-02-24 PROCEDURE — 80048 BASIC METABOLIC PNL TOTAL CA: CPT

## 2021-02-24 PROCEDURE — 74011250637 HC RX REV CODE- 250/637: Performed by: FAMILY MEDICINE

## 2021-02-24 PROCEDURE — 36415 COLL VENOUS BLD VENIPUNCTURE: CPT

## 2021-02-24 PROCEDURE — 74011250636 HC RX REV CODE- 250/636: Performed by: FAMILY MEDICINE

## 2021-02-24 RX ORDER — RIVAROXABAN 15 MG-20MG
KIT ORAL
Qty: 1 DOSE PACK | Refills: 0 | Status: SHIPPED | OUTPATIENT
Start: 2021-02-24

## 2021-02-24 RX ADMIN — DEXTROSE MONOHYDRATE 100 ML/HR: 50 INJECTION, SOLUTION INTRAVENOUS at 11:31

## 2021-02-24 RX ADMIN — DIVALPROEX SODIUM 625 MG: 125 CAPSULE ORAL at 08:37

## 2021-02-24 RX ADMIN — APIXABAN 10 MG: 5 TABLET, FILM COATED ORAL at 08:36

## 2021-02-24 RX ADMIN — RISPERIDONE 1 MG: 1 TABLET ORAL at 08:36

## 2021-02-24 RX ADMIN — OXYCODONE HYDROCHLORIDE AND ACETAMINOPHEN 500 MG: 500 TABLET ORAL at 18:06

## 2021-02-24 RX ADMIN — PROPRANOLOL HYDROCHLORIDE 10 MG: 10 TABLET ORAL at 08:36

## 2021-02-24 RX ADMIN — MEMANTINE 5 MG: 5 TABLET ORAL at 08:36

## 2021-02-24 RX ADMIN — CASTOR OIL AND BALSAM, PERU: 788; 87 OINTMENT TOPICAL at 16:10

## 2021-02-24 RX ADMIN — CASTOR OIL AND BALSAM, PERU: 788; 87 OINTMENT TOPICAL at 08:37

## 2021-02-24 RX ADMIN — Medication 10 ML: at 06:54

## 2021-02-24 RX ADMIN — OXYCODONE HYDROCHLORIDE AND ACETAMINOPHEN 500 MG: 500 TABLET ORAL at 08:36

## 2021-02-24 NOTE — DISCHARGE INSTRUCTIONS
Patient Education        Learning About Sepsis  What is sepsis? Sepsis is an intense reaction to an infection. It can cause deadly damage to the body and lead to dangerously low blood pressure. You may have inflammation across large areas of your body. It can damage tissue and even go deep into your organs. Sepsis can develop very quickly. It requires immediate care in a hospital.  Infections that can lead to sepsis include:  · A skin infection such as from a cut. · A lung infection like pneumonia. · A kidney infection. · A gut infection such as E. coli. Symptoms can include low blood pressure, breathing problems, fast heartbeat, and confusion. Other symptoms include fever or low body temperature, chills, cool clammy skin, skin rashes, and shaking. Sepsis can cause problems in many organs. People with sepsis might need to be treated in an intensive care unit (ICU) for several days or weeks. An ICU is a part of the hospital where very sick people get care. Septic shock is sepsis that causes extremely low blood pressure, which limits blood flow to the body. It can cause organ failure and death. How is sepsis treated? Doctors will treat the person with antibiotics. They will try to find the infection that led to sepsis. Machines will track the person's vital signs, including temperature, blood pressure, breathing rate, and pulse rate. The person will get fluids through an IV. He or she may also get strong medicine. This can help raise the blood pressure. If a person with sepsis is very sick, equipment in the ICU can support many body systems. That includes breathing, circulation, fluids, and help for organs like the kidneys and heart. If the person needs help breathing, a ventilator may be used. What can you expect when someone has sepsis? The person may start new treatments while still in the hospital. Different doctors may help with different symptoms.   If a person needs to be treated in the intensive care unit (ICU), the ICU staff will do everything they can to treat all of the problems sepsis causes, including the infection. The ICU can be scary and confusing for patients and their families, friends, and supporters. But it's designed to keep your loved one comfortable and safe and to provide the best medical care. Expect a long recovery after the person leaves the ICU. If you need it, ask for support from friends and family. Where can you learn more? Go to http://www.gray.com/  Enter [id number] in the search box to learn more about \"Learning About Sepsis. \"  Current as of: June 26, 2019               Content Version: 12.6  © 7473-9397 [employer]. Care instructions adapted under license by [employer] (which disclaims liability or warranty for this information). If you have questions about a medical condition or this instruction, always ask your healthcare professional. [address] any warranty or liability for your use of this information.

## 2021-02-24 NOTE — PROGRESS NOTES
0000: Bedside and Verbal shift change report given to Cathy Myers RN (oncoming nurse) by RN (offgoing nurse). Report included the following information SBAR, Kardex, Intake/Output, MAR, Recent Results and Cardiac Rhythm NSR.     0730: Bedside and Verbal shift change report given to Garima Lopez RN (oncoming nurse) by Cathy Myers RN (offgoing nurse). Report included the following information SBAR, Kardex, Intake/Output, MAR, Recent Results and Cardiac Rhythm NSR.

## 2021-02-24 NOTE — DISCHARGE SUMMARY
Discharge Summary       PATIENT ID: Pauline Fuller  MRN: [id number]   YOB: 1945    DATE OF ADMISSION: 2/19/2021  3:29 PM    DATE OF DISCHARGE: 02/24/2021   PRIMARY CARE PROVIDER: Flavio Maki MD     ATTENDING PHYSICIAN: Judah Dumont  DISCHARGING PROVIDER: Gayle Birmingham MD    To contact this individual call [phone] and ask the  to page. If unavailable ask to be transferred the Adult Hospitalist Department. CONSULTATIONS: None    PROCEDURES/SURGERIES: * No surgery found *    ADMITTING DIAGNOSES & HOSPITAL COURSE:     HPI  Pauline Fuller is a 67 y.o. female who presents with unable to eat and drink. Patient is quite confused, has history of dementia and bipolar disorder, lives at a facility, patient was recently discharged from [facility], patient was recently admitted with hypernatremia, altered mental status, urosepsis, improved and was discharged to the facility. Per patient's daughter Glynn Coleman patient has not been eating drinking well at the facility for the past 3 days, has been somewhat confused, and the facility felt that the patient should go to the hospital.  Patient came to the ER and was found to be hyponatremic, confused and was found to have a UTI. Patient was recently found positive for Covid. Patient currently is resting in bed, is alert x1, is confused and does not answer many questions. Per patient's daughter patient has not been taking her medications and she is concerned that her bipolar disorder will \"flareup\". Hospital Course  1. Altered mental status  Patient presents with metabolic encephalopathy due to electrolyte imbalance, has baseline dementia. CT head negative for acute pathology. Initially thought to be UTI but it was ruled out after urine culture shows no growth. Patient's mental status improved back to baseline with correction of electrolytes.   Also with poor p.o. intake on presentation but improving p.o. intake during this admission. Patient does have some weakness and physical therapy recommended placement to SNF, however daughter wanted the patient back to her assisted living facility with arrangement for more assistance for the patient. 2.  Pulmonary embolism  Patient noted to have bilateral PE on CT angiogram of the chest during this admission. Initially was put on Lovenox and later switched to Eliquis. Patient to complete Eliquis 10 mg twice daily for 7 days, then switch to 5 mg twice daily. Patient will need at least 3 months of anticoagulation. Likely hypercoagulopathy from Covid infection. 3.  Hypernatremia  Patient presents with hyponatremia and also with prerenal azotemia. Improving electrolytes and renal function with IV fluid replacement. 4.  COVID-19 infection  Patient with recent Covid infection, but patient asymptomatic with normal chest x-ray. Isolation protocol as per her living facility. DISCHARGE DIAGNOSES / PLAN:      1. Altered mental status  2. Pulmonary embolism  3. Hypernatremia  4. COVID-19 infection  5. Dementia  6.  Bipolar disorder     ADDITIONAL CARE RECOMMENDATIONS:     None    PENDING TEST RESULTS:   At the time of discharge the following test results are still pending: None    FOLLOW UP APPOINTMENTS:    Follow-up Information     Follow up With Specialties Details Why Contact Info    Dom Albright MD [facility] Medicine   [address] [address] [phone]               DIET: Cardiac Diet  Oral Nutritional Supplements: No Oral Supplement prescribed    ACTIVITY: PT/OT Eval and Treat    WOUND CARE: None    EQUIPMENT needed: None      DISCHARGE MEDICATIONS:  Current Discharge Medication List      START taking these medications    Details   apixaban (Eliquis) 5 mg tablet 10 mg bid till 03/01/2021 then 5 mg bid  Qty: 30 Tab, Refills: 0         CONTINUE these medications which have NOT CHANGED    Details   ascorbic acid, vitamin C, (VITAMIN C) 500 mg tablet Take 1 Tab by mouth two (2) times a day. Qty: 60 Tab, Refills: 0      zinc sulfate (ZINCATE) 220 (50) mg capsule Take 1 Cap by mouth daily. Qty: 10 Cap, Refills: 0      acetaminophen (TYLENOL) 325 mg tablet Take 650 mg by mouth every four (4) hours as needed for Pain.      lidocaine (Aspercreme, lidocaine,) 4 % patch 1 Patch by TransDERmal route every twenty-four (24) hours. benztropine (COGENTIN) 0.5 mg tablet Take 0.5 mg by mouth two (2) times daily as needed for Other (Extrapyramidal symptoms). loperamide (IMODIUM) 2 mg capsule Take 2 mg by mouth four (4) times daily as needed for Diarrhea.      magnesium hydroxide (Milk of Magnesia) 400 mg/5 mL suspension Take 30 mL by mouth daily as needed for Constipation. OLANZapine (ZyPREXA) 2.5 mg tablet Take 2.5 mg by mouth every six (6) hours as needed for Other (Agitation, psychosis). divalproex (DEPAKOTE SPRINKLE) 125 mg capsule Take 5 Caps by mouth two (2) times a day. Indications: mood  Qty: 300 Cap, Refills: 0      donepeziL (ARICEPT) 5 mg tablet Take 1 Tab by mouth nightly. 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 Indications: sneezing  Qty: 30 Tab, Refills: 0      memantine (NAMENDA) 5 mg tablet Take 1 Tab by mouth two (2) times a day. 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 Indications: major depressive disorder  Qty: 30 Tab, Refills: 0      nystatin (MYCOSTATIN) powder Apply  to affected area two (2) times a day. Indications: diaper rash  Qty: 1 Bottle, Refills: 0      propranoloL (INDERAL) 10 mg tablet Take 1 Tab by mouth two (2) times a day. Indications: essential tremor  Qty: 60 Tab, Refills: 0      risperiDONE (RisperDAL) 1 mg tablet Take 1 Tab by mouth two (2) times a day.  Indications: mood  Qty: 60 Tab, Refills: 0         STOP taking these medications       cefUROXime (CEFTIN) 500 mg tablet Comments:   Reason for Stopping:         L. acidoph & paracasei- S therm- Bifido (ALMAZ-Q/RISAQUAD) 8 billion cell cap cap Comments:   Reason for Stopping:           Addendum to discharge med list  -Discontinuing Eliquis as patient's insurance not covering  -Start Xarelto 15 mg twice daily for 21 days, then 20 mg daily      NOTIFY YOUR PHYSICIAN FOR ANY OF THE FOLLOWING:   Fever over 101 degrees for 24 hours. Chest pain, shortness of breath, fever, chills, nausea, vomiting, diarrhea, change in mentation, falling, weakness, bleeding. Severe pain or pain not relieved by medications. Or, any other signs or symptoms that you may have questions about. DISPOSITION:    Home With:   OT  PT  HH  RN       Long term SNF/Inpatient Rehab    Independent/assisted living    Hospice    Other:       PATIENT CONDITION AT DISCHARGE:     Functional status    Poor     Deconditioned     Independent      Cognition     Lucid     Forgetful     Dementia      Catheters/lines (plus indication)    [place]     PICC     PEG     None      Code status     Full code     DNR      PHYSICAL EXAMINATION AT DISCHARGE:  General:          Alert, cooperative, no distress, appears stated age. HEENT:           Atraumatic, anicteric sclerae, pink conjunctivae                          No oral ulcers, mucosa moist, throat clear, dentition fair  Neck:               Supple, symmetrical  Lungs:             Clear to auscultation bilaterally. No Wheezing or Rhonchi. No rales. Chest wall:      No tenderness  No Accessory muscle use. Heart:              Regular  rhythm,  No  murmur   No edema  Abdomen:        Soft, non-tender. Not distended. Bowel sounds normal  Extremities:     No cyanosis. No clubbing,                            Skin turgor normal, Capillary refill normal  Skin:                Not pale.   Not Jaundiced  No rashes   Psych:             Not anxious or agitated.   Neurologic:      Alert, moves all extremities, answers questions appropriately and responds to commands       CHRONIC MEDICAL DIAGNOSES:  Problem List as of 2/24/2021 Date Reviewed: 8/3/2015          Codes Class Noted - Resolved    Sepsis ([facility] ICD-10-CM: A41.9  ICD-9-CM: 038.9, 995.91  2/9/2021 - Present        Bipolar 1 disorder ([facility] ICD-10-CM: F31.9  ICD-9-CM: 296.7  12/14/2020 - Present        Acute metabolic encephalopathy QUX-38-DU: G93.41  ICD-9-CM: 348.31  11/30/2020 - Present        UTI (urinary tract infection) ICD-10-CM: N39.0  ICD-9-CM: 599.0  11/30/2020 - Present        AMS (altered mental status) ICD-10-CM: R41.82  ICD-9-CM: 780.97  11/29/2020 - Present        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

## 2022-03-18 PROBLEM — F31.9 BIPOLAR 1 DISORDER (HCC): Status: ACTIVE | Noted: 2020-12-14

## 2022-03-19 PROBLEM — G93.41 ACUTE METABOLIC ENCEPHALOPATHY: Status: ACTIVE | Noted: 2020-11-30

## 2022-03-19 PROBLEM — A41.9 SEPSIS (HCC): Status: ACTIVE | Noted: 2021-02-09

## 2022-03-19 PROBLEM — N39.0 UTI (URINARY TRACT INFECTION): Status: ACTIVE | Noted: 2020-11-30

## 2022-03-20 PROBLEM — R41.82 AMS (ALTERED MENTAL STATUS): Status: ACTIVE | Noted: 2020-11-29

## 2023-05-11 RX ORDER — DONEPEZIL HYDROCHLORIDE 5 MG/1
TABLET, FILM COATED ORAL
COMMUNITY
Start: 2021-01-13

## 2023-05-11 RX ORDER — ACETAMINOPHEN 325 MG/1
TABLET ORAL EVERY 4 HOURS PRN
COMMUNITY

## 2023-05-11 RX ORDER — LIDOCAINE 4 G/G
1 PATCH TOPICAL EVERY 24 HOURS
COMMUNITY

## 2023-05-11 RX ORDER — LOPERAMIDE HYDROCHLORIDE 2 MG/1
CAPSULE ORAL 4 TIMES DAILY PRN
COMMUNITY

## 2023-05-11 RX ORDER — OLANZAPINE 2.5 MG/1
TABLET ORAL EVERY 6 HOURS PRN
COMMUNITY

## 2023-05-11 RX ORDER — ZINC SULFATE 50(220)MG
CAPSULE ORAL DAILY
COMMUNITY
Start: 2021-02-14

## 2023-05-11 RX ORDER — LORATADINE 10 MG/1
TABLET ORAL DAILY
COMMUNITY
Start: 2021-01-14

## 2023-05-11 RX ORDER — RISPERIDONE 1 MG/1
TABLET ORAL 2 TIMES DAILY
COMMUNITY
Start: 2021-01-13

## 2023-05-11 RX ORDER — DIVALPROEX SODIUM 125 MG/1
CAPSULE, COATED PELLETS ORAL 2 TIMES DAILY
COMMUNITY
Start: 2021-01-13

## 2023-05-11 RX ORDER — PROPRANOLOL HYDROCHLORIDE 10 MG/1
TABLET ORAL 2 TIMES DAILY
COMMUNITY
Start: 2021-01-13

## 2023-05-11 RX ORDER — MEMANTINE HYDROCHLORIDE 5 MG/1
TABLET ORAL 2 TIMES DAILY
COMMUNITY
Start: 2021-01-13

## 2023-05-11 RX ORDER — NYSTATIN 100000 [USP'U]/G
POWDER TOPICAL 2 TIMES DAILY
COMMUNITY
Start: 2021-01-13

## 2023-05-11 RX ORDER — RIVAROXABAN 15 MG-20MG
KIT ORAL
COMMUNITY
Start: 2021-02-24

## 2023-05-11 RX ORDER — BENZTROPINE MESYLATE 0.5 MG/1
TABLET ORAL 2 TIMES DAILY PRN
COMMUNITY

## 2023-05-11 RX ORDER — ASCORBIC ACID 500 MG
TABLET ORAL 2 TIMES DAILY
COMMUNITY
Start: 2021-02-13

## 2023-05-11 RX ORDER — MIRTAZAPINE 15 MG/1
TABLET, FILM COATED ORAL
COMMUNITY
Start: 2021-01-13

## 2025-07-12 NOTE — PROGRESS NOTES
Occupational therapy: Patient received in bed and declining therapy. Educated on benefits of therapy and mobility. Declined. Will follow.   Opal Bergman, DIANE/L no